# Patient Record
Sex: MALE | Race: WHITE | NOT HISPANIC OR LATINO | Employment: OTHER | URBAN - METROPOLITAN AREA
[De-identification: names, ages, dates, MRNs, and addresses within clinical notes are randomized per-mention and may not be internally consistent; named-entity substitution may affect disease eponyms.]

---

## 2017-01-11 ENCOUNTER — OFFICE VISIT (OUTPATIENT)
Dept: OTHER | Facility: CLINIC | Age: 77
End: 2017-01-11
Attending: SURGERY
Payer: COMMERCIAL

## 2017-01-11 ENCOUNTER — HOSPITAL ENCOUNTER (OUTPATIENT)
Dept: ULTRASOUND IMAGING | Facility: CLINIC | Age: 77
Discharge: HOME | End: 2017-01-11
Attending: SURGERY | Admitting: SURGERY
Payer: COMMERCIAL

## 2017-01-11 VITALS — DIASTOLIC BLOOD PRESSURE: 64 MMHG | SYSTOLIC BLOOD PRESSURE: 129 MMHG | HEART RATE: 50 BPM

## 2017-01-11 DIAGNOSIS — I65.22 CAROTID STENOSIS, LEFT: Primary | ICD-10-CM

## 2017-01-11 DIAGNOSIS — I65.29 CAROTID ARTERY STENOSIS: ICD-10-CM

## 2017-01-11 PROCEDURE — 93880 EXTRACRANIAL BILAT STUDY: CPT

## 2017-01-11 PROCEDURE — 99213 OFFICE O/P EST LOW 20 MIN: CPT | Mod: ZP | Performed by: SURGERY

## 2017-01-11 PROCEDURE — 99211 OFF/OP EST MAY X REQ PHY/QHP: CPT

## 2017-01-11 NOTE — NURSING NOTE
"Chief Complaint   Patient presents with     RECHECK     (12:00 VHC; 1:00 HLS) History of bilateral carotid artery stenosis; 1 year follow up to 12/14/15 appt with         Initial /64 mmHg  Pulse 50 Estimated body mass index is 28.23 kg/(m^2) as calculated from the following:    Height as of 8/8/16: 5' 7\" (1.702 m).    Weight as of 8/8/16: 180 lb 4.8 oz (81.784 kg).  BP completed using cuff size: regular right arm    Face to face nursing time: 8 minutes    Livia Cho MA     "

## 2017-01-18 DIAGNOSIS — I65.29 CAROTID ARTERY STENOSIS: Primary | ICD-10-CM

## 2017-01-18 NOTE — PROGRESS NOTES
No history TIA CVA Amaurosis fugax since last check.  Carotids-4/4 bilateral bruits motor/sensory function intact Cr. N. 2-12 intact  U/S basically unchanged over the last year.  Continue present medications F/U one year discussed.

## 2017-03-24 ENCOUNTER — PRE VISIT (OUTPATIENT)
Dept: CARDIOLOGY | Facility: CLINIC | Age: 77
End: 2017-03-24

## 2017-03-24 ASSESSMENT — CHADS2 SCORE
CHF: NO
HTN: YES
DIABETES: NO
STROK/TIA/THROM: YES
VASCULAR DISEASE: NO
AGE: >74
SEX: MALE

## 2017-03-29 DIAGNOSIS — I48.91 ATRIAL FIBRILLATION (H): ICD-10-CM

## 2017-03-30 ENCOUNTER — OFFICE VISIT (OUTPATIENT)
Dept: CARDIOLOGY | Facility: CLINIC | Age: 77
End: 2017-03-30
Attending: INTERNAL MEDICINE
Payer: COMMERCIAL

## 2017-03-30 ENCOUNTER — HOSPITAL ENCOUNTER (OUTPATIENT)
Dept: CARDIOLOGY | Facility: CLINIC | Age: 77
Discharge: HOME | End: 2017-03-30
Attending: INTERNAL MEDICINE | Admitting: INTERNAL MEDICINE
Payer: COMMERCIAL

## 2017-03-30 VITALS
SYSTOLIC BLOOD PRESSURE: 162 MMHG | HEIGHT: 67 IN | DIASTOLIC BLOOD PRESSURE: 80 MMHG | WEIGHT: 185 LBS | BODY MASS INDEX: 29.03 KG/M2 | HEART RATE: 60 BPM

## 2017-03-30 DIAGNOSIS — I48.0 PAROXYSMAL ATRIAL FIBRILLATION (H): Primary | ICD-10-CM

## 2017-03-30 DIAGNOSIS — I48.91 ATRIAL FIBRILLATION (H): ICD-10-CM

## 2017-03-30 PROCEDURE — 40000264 ECHO COMPLETE WITH OPTISON

## 2017-03-30 PROCEDURE — 25500064 ZZH RX 255 OP 636: Performed by: INTERNAL MEDICINE

## 2017-03-30 PROCEDURE — 93306 TTE W/DOPPLER COMPLETE: CPT | Mod: 26 | Performed by: INTERNAL MEDICINE

## 2017-03-30 PROCEDURE — 99214 OFFICE O/P EST MOD 30 MIN: CPT | Mod: 25 | Performed by: INTERNAL MEDICINE

## 2017-03-30 PROCEDURE — 93000 ELECTROCARDIOGRAM COMPLETE: CPT | Performed by: INTERNAL MEDICINE

## 2017-03-30 RX ADMIN — HUMAN ALBUMIN MICROSPHERES AND PERFLUTREN 2 ML: 10; .22 INJECTION, SOLUTION INTRAVENOUS at 11:20

## 2017-03-30 NOTE — LETTER
3/30/2017    [facility]   [address]  [facility] [address]    RE: Kamari Valentin       Dear Colleague,    I had the pleasure of seeing Kamari Valentin in the [facility].    I saw Mr. Valentin for followup of paroxysmal atrial fibrillation and aortic valve stenosis.  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  Over the last year he has been on anticoagulation without any rate control drug or antiarrhythmic drug.  The patient does not feel palpitation, dizziness or other rapid heart racing symptoms.      The patient is currently undergoing significant mental stress due to the unexpected death of his 20-year-old granddaughter.  For the above reason, he has noticed high blood pressure, even though he is currently on 4 high blood pressure medications.      PHYSICAL EXAMINATION:   VITAL SIGNS:  Blood pressure was 162/80, heart rate 60 beats per minute, body weight 185 pounds.   HEENT:  Eyes and ENT were unremarkable.   LUNGS:  Clear.   CARDIAC:  Rhythm was regular and heart sounds were normal.  He has a third-degree systolic murmur in the aortic valve area.   ABDOMEN:  Showed no hepatomegaly.   EXTREMITIES:  There was no pedal edema.     Outpatient Encounter Prescriptions as of 3/30/2017   Medication Sig Dispense Refill     rivaroxaban ANTICOAGULANT (XARELTO) 20 MG TABS tablet Take 1 tablet (20 mg) by mouth daily 90 tablet 0     hydrochlorothiazide (HYDRODIURIL) 25 MG tablet Take 1 tablet (25 mg) by mouth daily 90 tablet 0     amlodipine (NORVASC) 10 MG tablet Take 1 tablet by mouth daily.       cloNIDINE (CATAPRES) 0.1 MG tablet Take 1 tablet by mouth daily.       simvastatin (ZOCOR) 40 MG tablet Take 1 tablet by mouth daily.       hydrALAZINE (APRESOLINE) 25 MG tablet Take 25 mg by mouth 2 times daily.       No facility-administered encounter medications on file as of 3/30/2017.       ASSESSMENT AND RECOMMENDATIONS:    Homero is stable from the Cardiology point of view.  The repeat echocardiography today showed moderate aortic stenosis that is basically unchanged.  In addition, the left ventricular end-diastolic diameter also remained about the same.  I agree for him to continue the current medications and return for followup in a year.      I suggested that the patient continue to monitor his blood pressure.  If it remains persistently elevated, you may consider Losartan.  He previously had an allergy to lisinopril but no problem with the ARB.      Again, thank you for allowing me to participate in the care of your patient.      Sincerely,    Reyes Curtis MD     [facility]

## 2017-03-30 NOTE — MR AVS SNAPSHOT
"              After Visit Summary   3/30/2017    Kamari Valentin    MRN: [id number]           Patient Information     Date Of Birth          1940        Visit Information        Provider Department      3/30/2017 4:15 PM Reyes Curtis MD [facility]        Today's Diagnoses     Paroxysmal atrial fibrillation (H)    -  1       Follow-ups after your visit        Additional Services     Follow-Up with Cardiac Advanced Practice Provider           Follow-Up with Electrophysiologist                 Who to contact     If you have questions or need follow up information about today's clinic visit or your schedule please contact [facility] directly at [phone].  Normal or non-critical lab and imaging results will be communicated to you by MyChart, letter or phone within 4 business days after the clinic has received the results. If you do not hear from us within 7 days, please contact the clinic through MyChart or phone. If you have a critical or abnormal lab result, we will notify you by phone as soon as possible.  Submit refill requests through Makeblock or call your pharmacy and they will forward the refill request to us. Please allow 3 business days for your refill to be completed.          Additional Information About Your Visit        MyChart Information     Makeblock lets you send messages to your doctor, view your test results, renew your prescriptions, schedule appointments and more. To sign up, go to [email or web address] . Click on \"Log in\" on the left side of the screen, which will take you to the Welcome page. Then click on \"Sign up Now\" on the right side of the page.     You will be asked to enter the access code listed below, as well as some personal information. Please follow the directions to create your username and password.     Your access code is: [email or web address]  Expires: 7/3/2017  4:10 PM     Your access code will  " "in 90 days. If you need help or a new code, please call your [place] clinic or [phone].        Care EveryWhere ID     This is your Care EveryWhere ID. This could be used by other organizations to access your [place] medical records  [id number]        Your Vitals Were     Pulse Height BMI (Body Mass Index)             60 1.702 m (5' 7\") 28.98 kg/m2          Blood Pressure from Last 3 Encounters:   03/30/17 162/80   01/11/17 129/64   08/08/16 120/60    Weight from Last 3 Encounters:   03/30/17 83.9 kg (185 lb)   08/08/16 81.8 kg (180 lb 4.8 oz)   03/21/16 81 kg (178 lb 8 oz)              We Performed the Following     EKG 12-lead complete w/read (Future)- to be scheduled     EKG 12-lead complete w/read - Clinics (performed today)     Follow-Up with Electrophysiologist        Primary Care Provider Office Phone # Fax #    Gus Lanier [phone]       [facility] [address]  [place] [address]        Thank you!     Thank you for choosing [facility] [place]  for your care. Our goal is always to provide you with excellent care. Hearing back from our patients is one way we can continue to improve our services. Please take a few minutes to complete the written survey that you may receive in the mail after your visit with us. Thank you!             Your Updated Medication List - Protect others around you: Learn how to safely use, store and throw away your medicines at www.disposemymeds.org.          This list is accurate as of: 3/30/17 11:59 PM.  Always use your most recent med list.                   Brand Name Dispense Instructions for use    amLODIPine 10 MG tablet    NORVASC     Take 1 tablet by mouth daily.       cloNIDine 0.1 MG tablet    CATAPRES     Take 1 tablet by mouth daily.       hydrALAZINE 25 MG tablet    APRESOLINE     Take 25 mg by mouth 2 times daily.       hydrochlorothiazide 25 MG tablet    HYDRODIURIL    90 tablet    " Take 1 tablet (25 mg) by mouth daily       rivaroxaban ANTICOAGULANT 20 MG Tabs tablet    XARELTO    90 tablet    Take 1 tablet (20 mg) by mouth daily       simvastatin 40 MG tablet    ZOCOR     Take 1 tablet by mouth daily.

## 2017-03-31 NOTE — PROGRESS NOTES
HISTORY OF PRESENT ILLNESS:  I saw Mr. García for followup of paroxysmal atrial fibrillation and aortic valve stenosis.  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  Over the last year he has been on anticoagulation without any rate control drug or antiarrhythmic drug.  The patient does not feel palpitation, dizziness or other rapid heart racing symptoms.      The patient is currently undergoing significant mental stress due to the unexpected death of his 20-year-old granddaughter.  For the above reason, he has noticed high blood pressure, even though he is currently on 4 high blood pressure medications.      PHYSICAL EXAMINATION:   VITAL SIGNS:  Blood pressure was 162/80, heart rate 60 beats per minute, body weight 185 pounds.   HEENT:  Eyes and ENT were unremarkable.   LUNGS:  Clear.   CARDIAC:  Rhythm was regular and heart sounds were normal.  He has a third-degree systolic murmur in the aortic valve area.   ABDOMEN:  Showed no hepatomegaly.   EXTREMITIES:  There was no pedal edema.      ASSESSMENT AND RECOMMENDATIONS:  Mr. García is stable from the Cardiology point of view.  The repeat echocardiography today showed moderate aortic stenosis that is basically unchanged.  In addition, the left ventricular end-diastolic diameter also remained about the same.  I agree for him to continue the current medications and return for followup in a year.      I suggested that the patient continue to monitor his blood pressure.  If it remains persistently elevated, you may consider Losartan.  He previously had an allergy to lisinopril but no problem with the ARB.      cc:   Gus Lanier MD   [facility]   [address] [place] [address], [address] [place] [address]         DHARA GO MD             D: 2017 16:22   T: 2017 07:25   MT: TERESO      Name:     SHERRY GARCÍA   MRN:      [id number]        Account:      [id number]   :      1940           Service Date:  03/30/2017      Document: [id number]

## 2017-07-25 DIAGNOSIS — I48.91 ATRIAL FIBRILLATION (H): ICD-10-CM

## 2018-01-03 ENCOUNTER — OFFICE VISIT (OUTPATIENT)
Dept: OTHER | Facility: CLINIC | Age: 78
End: 2018-01-03
Attending: SURGERY
Payer: COMMERCIAL

## 2018-01-03 ENCOUNTER — HOSPITAL ENCOUNTER (OUTPATIENT)
Dept: ULTRASOUND IMAGING | Facility: CLINIC | Age: 78
Discharge: HOME | End: 2018-01-03
Attending: SURGERY | Admitting: SURGERY
Payer: COMMERCIAL

## 2018-01-03 VITALS — HEART RATE: 55 BPM | SYSTOLIC BLOOD PRESSURE: 145 MMHG | DIASTOLIC BLOOD PRESSURE: 67 MMHG

## 2018-01-03 DIAGNOSIS — I65.29 CAROTID ARTERY STENOSIS: ICD-10-CM

## 2018-01-03 DIAGNOSIS — I65.23 CAROTID STENOSIS, ASYMPTOMATIC, BILATERAL: Primary | ICD-10-CM

## 2018-01-03 PROCEDURE — 99213 OFFICE O/P EST LOW 20 MIN: CPT | Mod: ZP | Performed by: SURGERY

## 2018-01-03 PROCEDURE — 93880 EXTRACRANIAL BILAT STUDY: CPT

## 2018-01-03 PROCEDURE — G0463 HOSPITAL OUTPT CLINIC VISIT: HCPCS

## 2018-01-03 NOTE — MR AVS SNAPSHOT
"              After Visit Summary   1/3/2018    Kamari Valentin    MRN: [id number]           Patient Information     Date Of Birth          1940        Visit Information        Provider Department      1/3/2018 1:45 PM Yevgeniy Singletary MD [facility]      Today's Diagnoses     Carotid stenosis, asymptomatic, bilateral    -  1       Follow-ups after your visit        Who to contact     If you have questions or need follow up information about today's clinic visit or your schedule please contact [facility] directly at [phone].  Normal or non-critical lab and imaging results will be communicated to you by iCarsClubhart, letter or phone within 4 business days after the clinic has received the results. If you do not hear from us within 7 days, please contact the clinic through iCarsClubhart or phone. If you have a critical or abnormal lab result, we will notify you by phone as soon as possible.  Submit refill requests through Beststudy or call your pharmacy and they will forward the refill request to us. Please allow 3 business days for your refill to be completed.          Additional Information About Your Visit        MyChart Information     Beststudy lets you send messages to your doctor, view your test results, renew your prescriptions, schedule appointments and more. To sign up, go to [email or web address] . Click on \"Log in\" on the left side of the screen, which will take you to the Welcome page. Then click on \"Sign up Now\" on the right side of the page.     You will be asked to enter the access code listed below, as well as some personal information. Please follow the directions to create your username and password.     Your access code is: [email or web address]  Expires: 4/3/2018  2:16 PM     Your access code will  in 90 days. If you need help or a new code, please call your [place] clinic or [phone].        Care " EveryWhere ID     This is your Care EveryWhere ID. This could be used by other organizations to access your [place] medical records  [id number]        Your Vitals Were     Pulse                   55            Blood Pressure from Last 3 Encounters:   01/03/18 145/67   03/30/17 162/80   01/11/17 129/64    Weight from Last 3 Encounters:   03/30/17 185 lb (83.9 kg)   08/08/16 180 lb 4.8 oz (81.8 kg)   03/21/16 178 lb 8 oz (81 kg)              Today, you had the following     No orders found for display       Primary Care Provider Office Phone # Fax #    Gus Lanier [phone] [address] [address]        Equal Access to Services     CLAUDIA VELA : Hadii jacque addison hadasho Soomaali, waaxda luqadaha, qaybta kaalmada adeegyada, romeo rebolledo . So [facility] [phone].    ATENCIÓN: Si habla español, tiene a stoddard disposición servicios gratuitos de asistencia lingüística. Llame al [phone].    We comply with applicable federal civil rights laws and [place] laws. We do not discriminate on the basis of race, color, national origin, age, disability, sex, sexual orientation, or gender identity.            Thank you!     Thank you for choosing [facility] [place]  for your care. Our goal is always to provide you with excellent care. Hearing back from our patients is one way we can continue to improve our services. Please take a few minutes to complete the written survey that you may receive in the mail after your visit with us. Thank you!             Your Updated Medication List - Protect others around you: Learn how to safely use, store and throw away your medicines at www.disposemymeds.org.          This list is accurate as of: 1/3/18  2:16 PM.  Always use your most recent med list.                   Brand Name Dispense Instructions for use Diagnosis    amLODIPine 10 MG tablet    NORVASC     Take 1 tablet by mouth daily.        cloNIDine  0.1 MG tablet    CATAPRES     Take 1 tablet by mouth daily.        hydrALAZINE 25 MG tablet    APRESOLINE     Take 25 mg by mouth 2 times daily.        hydrochlorothiazide 25 MG tablet    HYDRODIURIL    90 tablet    Take 1 tablet (25 mg) by mouth daily    Essential hypertension, benign       rivaroxaban ANTICOAGULANT 20 MG Tabs tablet    XARELTO    90 tablet    Take 1 tablet (20 mg) by mouth daily    Atrial fibrillation (H)       simvastatin 40 MG tablet    ZOCOR     Take 1 tablet by mouth daily.

## 2018-01-03 NOTE — PROGRESS NOTES
No history TIA CVA Amaurosis fugax since last check.  Motor/sensory intact Cr. N. 2-12 intact Carotids 4/4 transmitted MAGALI on the left no  Bruit on the right.  He is taking Xaralto for anticoagulation.  Ultrasound today shows decreased peak velocities as compared to last year.  Doing well continue present medications F/U one year.

## 2018-01-03 NOTE — NURSING NOTE
"Chief Complaint   Patient presents with     RECHECK     Bilateral carotid (12:45 VHC; 1:45 HLS) 1 year follow up, hx of bilateral carotid artery stenosis       Initial /67 (BP Location: Left arm, Patient Position: Chair, Cuff Size: Adult Large)  Pulse 55 Estimated body mass index is 28.98 kg/(m^2) as calculated from the following:    Height as of 3/30/17: 5' 7\" (1.702 m).    Weight as of 3/30/17: 185 lb (83.9 kg).  Medication Reconciliation: complete     Face to face nursing time: 8 minutes    Livia Cho MA     "

## 2018-01-03 NOTE — LETTER
[facility] [place] [address]  [place] [address]  Phone: [phone]  Fax: [phone]      January 3, 2018    Re: Kamari Valentin - 1940    No history TIA CVA Amaurosis fugax since last check.  Motor/sensory intact Cr. N. 2-12 intact Carotids 4/4 transmitted MAGALI on the left no  Bruit on the right.  He is taking Xaralto for anticoagulation.  Ultrasound today shows decreased peak velocities as compared to last year. Doing well continue present medications F/U one year.    Yevgeniy Singletary MD

## 2018-03-21 PROBLEM — I35.0 AORTIC STENOSIS: Status: ACTIVE | Noted: 2018-03-21

## 2018-03-23 ENCOUNTER — OFFICE VISIT (OUTPATIENT)
Dept: CARDIOLOGY | Facility: CLINIC | Age: 78
End: 2018-03-23
Attending: INTERNAL MEDICINE
Payer: COMMERCIAL

## 2018-03-23 VITALS
HEART RATE: 71 BPM | BODY MASS INDEX: 29.82 KG/M2 | HEIGHT: 67 IN | DIASTOLIC BLOOD PRESSURE: 72 MMHG | SYSTOLIC BLOOD PRESSURE: 138 MMHG | WEIGHT: 190 LBS

## 2018-03-23 DIAGNOSIS — I35.0 AORTIC VALVE STENOSIS, ETIOLOGY OF CARDIAC VALVE DISEASE UNSPECIFIED: ICD-10-CM

## 2018-03-23 DIAGNOSIS — I48.0 PAROXYSMAL ATRIAL FIBRILLATION (H): Primary | ICD-10-CM

## 2018-03-23 PROCEDURE — 93000 ELECTROCARDIOGRAM COMPLETE: CPT | Performed by: PHYSICIAN ASSISTANT

## 2018-03-23 PROCEDURE — 99214 OFFICE O/P EST MOD 30 MIN: CPT | Mod: 25 | Performed by: PHYSICIAN ASSISTANT

## 2018-03-23 NOTE — MR AVS SNAPSHOT
After Visit Summary   3/23/2018    Kamari Valentin    MRN: [id number]           Patient Information     Date Of Birth          1940        Visit Information        Provider Department      3/23/2018 9:00 AM Neeru Harley PA-C [facility]        Today's Diagnoses     Paroxysmal atrial fibrillation (H)    -  1    Aortic valve stenosis, etiology of cardiac valve disease unspecified          Care Instructions    1. EKG today showed you're in atrial fibrillation with a controlled rate    2. Discussed echocardiogram done 3/2017 - moderate Aortic Stenosis.    3. No changes today - stay on all medications and see Dr. Curtis in 1 year with repeat echocardiogram but CALL if issues prior: [phone]          Follow-ups after your visit        Future tests that were ordered for you today     Open Future Orders        Priority Expected Expires Ordered    EKG 12-lead complete w/read - Clinics (to be scheduled) Routine 3/23/2019 3/24/2019 3/23/2018    Echocardiogram Routine 3/23/2019 3/24/2019 3/23/2018            Who to contact     If you have questions or need follow up information about today's clinic visit or your schedule please contact [facility] directly at [phone].  Normal or non-critical lab and imaging results will be communicated to you by Insp[employer] Log[employer] Corporationhart, letter or phone within 4 business days after the clinic has received the results. If you do not hear from us within 7 days, please contact the clinic through [employer]hart or phone. If you have a critical or abnormal lab result, we will notify you by phone as soon as possible.  Submit refill requests through StockCastr or call your pharmacy and they will forward the refill request to us. Please allow 3 business days for your refill to be completed.          Additional Information About Your Visit        StockCastr Information     StockCastr lets you send messages to your doctor,  "view your test results, renew your prescriptions, schedule appointments and more. To sign up, go to [email or web address] . Click on \"Log in\" on the left side of the screen, which will take you to the Welcome page. Then click on \"Sign up Now\" on the right side of the page.     You will be asked to enter the access code listed below, as well as some personal information. Please follow the directions to create your username and password.     Your access code is: [email or web address]  Expires: 4/3/2018  3:16 PM     Your access code will  in 90 days. If you need help or a new code, please call your [place] clinic or [phone].        Care EveryWhere ID     This is your Care EveryWhere ID. This could be used by other organizations to access your [place] medical records  [id number]        Your Vitals Were     Pulse Height BMI (Body Mass Index)             71 1.702 m (5' 7\") 29.76 kg/m2          Blood Pressure from Last 3 Encounters:   18 138/72   18 145/67   17 162/80    Weight from Last 3 Encounters:   18 86.2 kg (190 lb)   17 83.9 kg (185 lb)   16 81.8 kg (180 lb 4.8 oz)              We Performed the Following     EKG 12-lead complete w/read - Clinics (performed today)     Follow-Up with Cardiac Advanced Practice Provider        Primary Care Provider Office Phone # Fax #    Gus Lanier [phone] [address] [address]        Equal Access to Services     [facility] : Hadii jacque Chappell, albino rosas, romeo mello. So [facility] [phone].    ATENCIÓN: Si habla español, tiene a stoddard disposición servicios gratuitos de asistencia lingüística. Shalom al [phone].    We comply with applicable federal civil rights laws and [place] laws. We do not discriminate on the basis of race, color, national origin, age, disability, sex, sexual orientation, or gender " identity.            Thank you!     Thank you for choosing [facility]  for your care. Our goal is always to provide you with excellent care. Hearing back from our patients is one way we can continue to improve our services. Please take a few minutes to complete the written survey that you may receive in the mail after your visit with us. Thank you!             Your Updated Medication List - Protect others around you: Learn how to safely use, store and throw away your medicines at www.disposemymeds.org.          This list is accurate as of 3/23/18  9:29 AM.  Always use your most recent med list.                   Brand Name Dispense Instructions for use Diagnosis    amLODIPine 10 MG tablet    NORVASC     Take 1 tablet by mouth daily.        cloNIDine 0.1 MG tablet    CATAPRES     Take 1 tablet by mouth daily.        hydrALAZINE 25 MG tablet    APRESOLINE     Take 25 mg by mouth 2 times daily.        hydrochlorothiazide 25 MG tablet    HYDRODIURIL    90 tablet    Take 1 tablet (25 mg) by mouth daily    Essential hypertension, benign       rivaroxaban ANTICOAGULANT 20 MG Tabs tablet    XARELTO    90 tablet    Take 1 tablet (20 mg) by mouth daily    Atrial fibrillation (H)       simvastatin 40 MG tablet    ZOCOR     Take 1 tablet by mouth daily.

## 2018-03-23 NOTE — LETTER
3/23/2018    Gus Lanier MD  [address]  [facility] [address]    RE: Kamari Valentin       Dear Colleague,    I had the pleasure of seeing Kamari Valentin in the [facility].    HPI:   I had the pleasure of seeing Kamari today when he came with his wife Agatha for annual follow-up. He is a pleasant 78-year-old who has a history of paroxysmal atrial fibrillation. He has been asymptomatic from an atrial fibrillation standpoint and has been maintained on no rate controlling medications. He has been on anticoagulation with Xarelto without issues. Other history includes L sided CVA (resolved), hypertension and aortic stenosis, which Dr. Curtis has followed with routine echocardiograms.    He comes back today for annual follow-up telling me he continues to do well. He and his wife are quite active. He denies problems with chest pain, pressure or tightness. He denies lightheadedness, dizziness or palpitations. He has no awareness of when he is in atrial fibrillation. He denies edema, orthopnea or PND. He denies any bleeding problems on the Xarelto.    EKG today, which I over read showed atrial fibrillation at 62 bpm.  Echo 3/2017 showed an EF of 60-65% with grade 1 diastolic dysfunction. There was calcification and restriction of the aortic valve consistent with moderate aortic stenosis. The mean gradient was 25 mmHg. Left ventricle was normal in size.      Assessment & Plan:  1. Atrial Fibrillation -   * Previously paroxysmal - could be persistent as now in AFib and does not feel it  * Remains rate controlled on no medications  * Remains on AC with CHADSVASc  5 (hypertension, history of stroke and age)     PLAN:   * Continue Xarelto 20 mg daily   * BMP and EKG next year    2. AS -   * Asx'c. No significant change on 3/2017 echo compared with previous     PLAN:   * Echo in 1 year    3. HTN -   * Under good control today with polypharmacy      PLAN:   * Continue medications      Bertha  NANETTE Harley, MSPAS      Orders Placed This Encounter   Procedures     EKG 12-lead complete w/read - Clinics (performed today)     EKG 12-lead complete w/read - Clinics (to be scheduled)     Echocardiogram     No orders of the defined types were placed in this encounter.    There are no discontinued medications.      Encounter Diagnoses   Name Primary?     Paroxysmal atrial fibrillation (H) Yes     Aortic valve stenosis, etiology of cardiac valve disease unspecified        CURRENT MEDICATIONS:  Current Outpatient Prescriptions   Medication Sig Dispense Refill     rivaroxaban ANTICOAGULANT (XARELTO) 20 MG TABS tablet Take 1 tablet (20 mg) by mouth daily 90 tablet 2     hydrochlorothiazide (HYDRODIURIL) 25 MG tablet Take 1 tablet (25 mg) by mouth daily 90 tablet 0     hydrALAZINE (APRESOLINE) 25 MG tablet Take 25 mg by mouth 2 times daily.       amlodipine (NORVASC) 10 MG tablet Take 1 tablet by mouth daily.       cloNIDINE (CATAPRES) 0.1 MG tablet Take 1 tablet by mouth daily.       simvastatin (ZOCOR) 40 MG tablet Take 1 tablet by mouth daily.         ALLERGIES     Allergies   Allergen Reactions     Lisinopril        PAST MEDICAL HISTORY:  Past Medical History:   Diagnosis Date     Aortic stenosis     mild-moderate     Atrial fibrillation (H)     paroxysmal     Carotid stenosis     mild on right, moderate on left     Chronic renal insufficiency      Essential hypertension, benign      Hyperlipidaemia      Hypertrophy of prostate without urinary obstruction and other lower urinary tract symptoms (LUTS)      Rheumatic fever      Unspecified cerebral artery occlusion with cerebral infarction        PAST SURGICAL HISTORY:  Past Surgical History:   Procedure Laterality Date     ENT SURGERY      SEPTOPLASTY     ENT SURGERY      TONSILLECTOMY     HERNIA REPAIR       HERNIORRHAPHY INGUINAL  2/15/2012    Procedure:HERNIORRHAPHY INGUINAL; LEFT INGUINAL HERNIA REPAIR WITH MESH; Surgeon:SHILO READ; Location:[facility]      "ORTHOPEDIC SURGERY  left ankle 2/2013    ORIF LEFT WRIST FRACTURE       FAMILY HISTORY:  Family History   Problem Relation Age of Onset     Respiratory Brother      Coronary Artery Disease Early Onset Mother      Abdominal Aortic Aneurysm Father        SOCIAL HISTORY:  Social History     Social History     Marital status:      Spouse name: N/A     Number of children: N/A     Years of education: N/A     Social History Main Topics     Smoking status: Former Smoker     Years: 20.00     Types: Cigars, Pipe     Quit date: 9/1/2010     Smokeless tobacco: Never Used     Alcohol use 0.0 oz/week     0 Standard drinks or equivalent per week      Comment: MINIMAL AMOUNT     Drug use: No     Sexual activity: Not Asked     Other Topics Concern     None     Social History Narrative       Review of Systems:  Skin:  Negative     Eyes:  Positive for glasses  ENT:  Negative    Respiratory:  Negative for shortness of breath;dyspnea on exertion;cough  Cardiovascular:  Negative for;palpitations;chest pain;edema;dizziness;lightheadedness;fatigue    Gastroenterology: Negative for melena;hematochezia  Genitourinary:  Negative    Musculoskeletal:  Negative    Neurologic:  Negative    Psychiatric:  Negative    Heme/Lymph/Imm:  Negative    Endocrine:  Negative      Physical Exam:  Vitals: /72  Pulse 71  Ht 1.702 m (5' 7\")  Wt 86.2 kg (190 lb)  BMI 29.76 kg/m2    Constitutional:  cooperative, alert and oriented, well developed, well nourished, in no acute distress        Skin:  warm and dry to the touch, no apparent skin lesions or masses noted        Head:  normocephalic, no masses or lesions        Eyes:  pupils equal and round;conjunctivae and lids unremarkable   wear eye glasses    ENT:  no pallor or cyanosis, dentition good        Neck:  JVP normal transmitted murmur      Chest:  normal breath sounds, clear to auscultation, normal A-P diameter, normal symmetry, normal respiratory excursion, no use of accessory muscles    "     Cardiac: regular rhythm       grade 2;grade 3;systolic ejection murmur     regular rhythm, normal heart sounds, III/VI systolic murmur over the aortic valve area    Abdomen:  abdomen soft        Vascular: pulses full and equal                                      Extremities and Back:  no deformities, clubbing, cyanosis, erythema observed;no edema        Neurological:  no gross motor deficits          Recent Lab Results:  LIPID RESULTS:  Lab Results   Component Value Date    CHOL 130 02/03/2016    HDL 41 02/03/2016    LDL 75 02/03/2016    TRIG 70 02/03/2016    CHOLHDLRATIO 7.0 (H) 09/22/2010       LIVER ENZYME RESULTS:  Lab Results   Component Value Date    AST 32 09/21/2010    ALT 17 09/21/2010       CBC RESULTS:  Lab Results   Component Value Date    WBC 9.9 11/30/2010    RBC 4.33 (L) 11/30/2010    HGB 14.0 11/30/2010    HCT 41.0 11/30/2010    MCV 95 11/30/2010    MCH 32.4 11/30/2010    MCHC 34.2 11/30/2010    RDW 12.8 11/30/2010     11/30/2010       BMP RESULTS:  Lab Results   Component Value Date     11/30/2010    POTASSIUM 3.6 11/30/2010    CHLORIDE 97 11/30/2010    CO2 29 11/30/2010    ANIONGAP 13 11/30/2010     (H) 11/30/2010    BUN 20 11/30/2010    CR 1.03 11/30/2010    GFRESTIMATED 71 11/30/2010    GFRESTBLACK 86 11/30/2010    CLAY 10.3 11/30/2010        A1C RESULTS:  No results found for: A1C    INR RESULTS:  Lab Results   Component Value Date    INR 1.10 11/30/2010    INR 0.92 09/21/2010           CC  Gus Lanier MD  [address]  [place] [address]                  Thank you for allowing me to participate in the care of your patient.    Sincerely,     Neeru Harley PA-C     [facility]    cc:   Gus Lanier MD  [address]  [place] [address]

## 2018-03-23 NOTE — PROGRESS NOTES
HPI:   I had the pleasure of seeing Kamari today when he came with his wife Agatha for annual follow-up. He is a pleasant 78-year-old who has a history of paroxysmal atrial fibrillation. He has been asymptomatic from an atrial fibrillation standpoint and has been maintained on no rate controlling medications. He has been on anticoagulation with Xarelto without issues. Other history includes L sided CVA (resolved), hypertension and aortic stenosis, which Dr. Curtis has followed with routine echocardiograms.    He comes back today for annual follow-up telling me he continues to do well. He and his wife are quite active. He denies problems with chest pain, pressure or tightness. He denies lightheadedness, dizziness or palpitations. He has no awareness of when he is in atrial fibrillation. He denies edema, orthopnea or PND. He denies any bleeding problems on the Xarelto.    EKG today, which I over read showed atrial fibrillation at 62 bpm.  Echo 3/2017 showed an EF of 60-65% with grade 1 diastolic dysfunction. There was calcification and restriction of the aortic valve consistent with moderate aortic stenosis. The mean gradient was 25 mmHg. Left ventricle was normal in size.      Assessment & Plan:  1. Atrial Fibrillation -   * Previously paroxysmal - could be persistent as now in AFib and does not feel it  * Remains rate controlled on no medications  * Remains on AC with CHADSVASc  5 (hypertension, history of stroke and age)     PLAN:   * Continue Xarelto 20 mg daily   * BMP and EKG next year    2. AS -   * Asx'c. No significant change on 3/2017 echo compared with previous     PLAN:   * Echo in 1 year    3. HTN -   * Under good control today with polypharmacy      PLAN:   * Continue medications      Bertha Harley PA-C, MSPAS      Orders Placed This Encounter   Procedures     EKG 12-lead complete w/read - Clinics (performed today)     EKG 12-lead complete w/read - Clinics (to be scheduled)     Echocardiogram     No orders of  the defined types were placed in this encounter.    There are no discontinued medications.      Encounter Diagnoses   Name Primary?     Paroxysmal atrial fibrillation (H) Yes     Aortic valve stenosis, etiology of cardiac valve disease unspecified        CURRENT MEDICATIONS:  Current Outpatient Prescriptions   Medication Sig Dispense Refill     rivaroxaban ANTICOAGULANT (XARELTO) 20 MG TABS tablet Take 1 tablet (20 mg) by mouth daily 90 tablet 2     hydrochlorothiazide (HYDRODIURIL) 25 MG tablet Take 1 tablet (25 mg) by mouth daily 90 tablet 0     hydrALAZINE (APRESOLINE) 25 MG tablet Take 25 mg by mouth 2 times daily.       amlodipine (NORVASC) 10 MG tablet Take 1 tablet by mouth daily.       cloNIDINE (CATAPRES) 0.1 MG tablet Take 1 tablet by mouth daily.       simvastatin (ZOCOR) 40 MG tablet Take 1 tablet by mouth daily.         ALLERGIES     Allergies   Allergen Reactions     Lisinopril        PAST MEDICAL HISTORY:  Past Medical History:   Diagnosis Date     Aortic stenosis     mild-moderate     Atrial fibrillation (H)     paroxysmal     Carotid stenosis     mild on right, moderate on left     Chronic renal insufficiency      Essential hypertension, benign      Hyperlipidaemia      Hypertrophy of prostate without urinary obstruction and other lower urinary tract symptoms (LUTS)      Rheumatic fever      Unspecified cerebral artery occlusion with cerebral infarction        PAST SURGICAL HISTORY:  Past Surgical History:   Procedure Laterality Date     ENT SURGERY      SEPTOPLASTY     ENT SURGERY      TONSILLECTOMY     HERNIA REPAIR       HERNIORRHAPHY INGUINAL  2/15/2012    Procedure:HERNIORRHAPHY INGUINAL; LEFT INGUINAL HERNIA REPAIR WITH MESH; Surgeon:SHILO READ; Location:[facility]     ORTHOPEDIC SURGERY  left ankle 2/2013    ORIF LEFT WRIST FRACTURE       FAMILY HISTORY:  Family History   Problem Relation Age of Onset     Respiratory Brother      Coronary Artery Disease Early Onset Mother      Abdominal Aortic  "Aneurysm Father        SOCIAL HISTORY:  Social History     Social History     Marital status:      Spouse name: N/A     Number of children: N/A     Years of education: N/A     Social History Main Topics     Smoking status: Former Smoker     Years: 20.00     Types: Cigars, Pipe     Quit date: 9/1/2010     Smokeless tobacco: Never Used     Alcohol use 0.0 oz/week     0 Standard drinks or equivalent per week      Comment: MINIMAL AMOUNT     Drug use: No     Sexual activity: Not Asked     Other Topics Concern     None     Social History Narrative       Review of Systems:  Skin:  Negative     Eyes:  Positive for glasses  ENT:  Negative    Respiratory:  Negative for shortness of breath;dyspnea on exertion;cough  Cardiovascular:  Negative for;palpitations;chest pain;edema;dizziness;lightheadedness;fatigue    Gastroenterology: Negative for melena;hematochezia  Genitourinary:  Negative    Musculoskeletal:  Negative    Neurologic:  Negative    Psychiatric:  Negative    Heme/Lymph/Imm:  Negative    Endocrine:  Negative      Physical Exam:  Vitals: /72  Pulse 71  Ht 1.702 m (5' 7\")  Wt 86.2 kg (190 lb)  BMI 29.76 kg/m2    Constitutional:  cooperative, alert and oriented, well developed, well nourished, in no acute distress        Skin:  warm and dry to the touch, no apparent skin lesions or masses noted        Head:  normocephalic, no masses or lesions        Eyes:  pupils equal and round;conjunctivae and lids unremarkable   wear eye glasses    ENT:  no pallor or cyanosis, dentition good        Neck:  JVP normal transmitted murmur      Chest:  normal breath sounds, clear to auscultation, normal A-P diameter, normal symmetry, normal respiratory excursion, no use of accessory muscles        Cardiac: regular rhythm       grade 2;grade 3;systolic ejection murmur     regular rhythm, normal heart sounds, III/VI systolic murmur over the aortic valve area    Abdomen:  abdomen soft        Vascular: pulses full and equal "                                      Extremities and Back:  no deformities, clubbing, cyanosis, erythema observed;no edema        Neurological:  no gross motor deficits          Recent Lab Results:  LIPID RESULTS:  Lab Results   Component Value Date    CHOL 130 02/03/2016    HDL 41 02/03/2016    LDL 75 02/03/2016    TRIG 70 02/03/2016    CHOLHDLRATIO 7.0 (H) 09/22/2010       LIVER ENZYME RESULTS:  Lab Results   Component Value Date    AST 32 09/21/2010    ALT 17 09/21/2010       CBC RESULTS:  Lab Results   Component Value Date    WBC 9.9 11/30/2010    RBC 4.33 (L) 11/30/2010    HGB 14.0 11/30/2010    HCT 41.0 11/30/2010    MCV 95 11/30/2010    MCH 32.4 11/30/2010    MCHC 34.2 11/30/2010    RDW 12.8 11/30/2010     11/30/2010       BMP RESULTS:  Lab Results   Component Value Date     11/30/2010    POTASSIUM 3.6 11/30/2010    CHLORIDE 97 11/30/2010    CO2 29 11/30/2010    ANIONGAP 13 11/30/2010     (H) 11/30/2010    BUN 20 11/30/2010    CR 1.03 11/30/2010    GFRESTIMATED 71 11/30/2010    GFRESTBLACK 86 11/30/2010    CLAY 10.3 11/30/2010        A1C RESULTS:  No results found for: A1C    INR RESULTS:  Lab Results   Component Value Date    INR 1.10 11/30/2010    INR 0.92 09/21/2010           CC  Gus Lanier MD  [address]  [place] [address]

## 2018-03-23 NOTE — PATIENT INSTRUCTIONS
1. EKG today showed you're in atrial fibrillation with a controlled rate    2. Discussed echocardiogram done 3/2017 - moderate Aortic Stenosis.    3. No changes today - stay on all medications and see Dr. Curtis in 1 year with repeat echocardiogram but CALL if issues prior: [phone]

## 2018-04-16 DIAGNOSIS — I48.91 ATRIAL FIBRILLATION, UNSPECIFIED TYPE (H): ICD-10-CM

## 2018-09-10 ENCOUNTER — THERAPY VISIT (OUTPATIENT)
Dept: PHYSICAL THERAPY | Facility: CLINIC | Age: 78
End: 2018-09-10
Payer: COMMERCIAL

## 2018-09-10 DIAGNOSIS — M54.50 RIGHT-SIDED LOW BACK PAIN WITHOUT SCIATICA: Primary | ICD-10-CM

## 2018-09-10 PROCEDURE — 97161 PT EVAL LOW COMPLEX 20 MIN: CPT | Mod: GP | Performed by: PHYSICAL THERAPIST

## 2018-09-10 PROCEDURE — 97110 THERAPEUTIC EXERCISES: CPT | Mod: GP | Performed by: PHYSICAL THERAPIST

## 2018-09-10 PROCEDURE — G8982 BODY POS GOAL STATUS: HCPCS | Mod: GP | Performed by: PHYSICAL THERAPIST

## 2018-09-10 PROCEDURE — G8981 BODY POS CURRENT STATUS: HCPCS | Mod: GP | Performed by: PHYSICAL THERAPIST

## 2018-09-10 NOTE — MR AVS SNAPSHOT
After Visit Summary   9/10/2018    Kamari Valentin    MRN: [id number]           Patient Information     Date Of Birth          1940        Visit Information        Provider Department      9/10/2018 12:10 PM Kendy Bryson PT [facility]        Today's Diagnoses     Right-sided low back pain without sciatica    -  1       Follow-ups after your visit        Your next 10 appointments already scheduled     Sep 18, 2018  8:50 AM CDT   [facility] Sp[facility] with Kendy Bryson PT   [facility] ([facility] [place]  )    [address] [id number] [phone]              Who to contact     If you have questions or need follow up information about today's clinic visit or your schedule please contact [facility] directly at [phone].  Normal or non-critical lab and imaging results will be communicated to you by MyChart, letter or phone within 4 business days after the clinic has received the results. If you do not hear from us within 7 days, please contact the clinic through MyChart or phone. If you have a critical or abnormal lab result, we will notify you by phone as soon as possible.  Submit refill requests through [employer] [facility] or call your pharmacy and they will forward the refill request to us. Please allow 3 business days for your refill to be completed.          Additional Information About Your Visit        Care EveryWhere ID     This is your Care EveryWhere ID. This could be used by other organizations to access your [facility] medical records  [id number]         Blood Pressure from Last 3 Encounters:   03/23/18 138/72   01/03/18 145/67   03/30/17 162/80    Weight from Last 3 Encounters:   03/23/18 86.2 kg (190 lb)   03/30/17 83.9 kg (185 lb)   08/08/16 81.8 kg (180 lb 4.8 oz)              We Performed the Following     [facility]  Inital Eval Report     PT Eval, Low Complexity ([id number])     Therapeutic Exercises        Primary Care Provider Office Phone # Fax #    Gus Lanier [phone] [address]  [facility] [address]        Equal Access to Services     MARYHARISH MIRIAN : Hadii aad ku hadasho Soomaali, waaxda luqadaha, qaybta kaalmada adeegyada, waxay idiin hayaan adesunshine ramirez latonycaity . So [facility] [phone].    ATENCIÓN: Si habla español, tiene a stoddard disposición servicios gratuitos de asistencia lingüística. Llame al [phone].    We comply with applicable federal civil rights laws and [place] laws. We do not discriminate on the basis of race, color, national origin, age, disability, sex, sexual orientation, or gender identity.            Thank you!     Thank you for choosing [facility]  for your care. Our goal is always to provide you with excellent care. Hearing back from our patients is one way we can continue to improve our services. Please take a few minutes to complete the written survey that you may receive in the mail after your visit with us. Thank you!             Your Updated Medication List - Protect others around you: Learn how to safely use, store and throw away your medicines at www.disposemymeds.org.          This list is accurate as of 9/10/18  2:46 PM.  Always use your most recent med list.                   Brand Name Dispense Instructions for use Diagnosis    amLODIPine 10 MG tablet    NORVASC     Take 1 tablet by mouth daily.        cloNIDine 0.1 MG tablet    CATAPRES     Take 1 tablet by mouth daily.        hydrALAZINE 25 MG tablet    APRESOLINE     Take 25 mg by mouth 2 times daily.        hydrochlorothiazide 25 MG tablet    HYDRODIURIL    90 tablet    Take 1 tablet (25 mg) by mouth daily    Essential hypertension, benign       rivaroxaban ANTICOAGULANT 20 MG Tabs tablet    XARELTO    90 tablet    Take 1 tablet (20 mg) by mouth  daily    Atrial fibrillation, unspecified type (H)       simvastatin 40 MG tablet    ZOCOR     Take 1 tablet by mouth daily.

## 2018-09-10 NOTE — PROGRESS NOTES
[facility] Initial Evaluation  Subjective:  Patient is a 78 year old male presenting with rehab back hpi.   Kamari Valentin is a 78 year old male with a lumbar condition.  Condition occurred with:  Twisting.  Condition occurred: at home.  This is a chronic condition  Pt presents with complaints of right sided low back pain. Pt reported initial injury in May of 2018 while he was lifting a boat battery onto his boat. Pt reported he was standing on a ladder and his left foot did not move as he turned to the right and ended up twisting as he was lifting the battery. Pt reported onset of left sided sx's at the time but sx's eventually switched to the R; currently notes very minimal left sided sx's. Pt reported he saw his primary in June of 2018 for sx's related to the incident. Pt reported [place] yet scheduleed PT. Pt reported back issues appeared to be resolving however pt reported waking this AM with R sided sx's-spent yesterday cleaning his pontoon boat which may have aggravated existing sx's. Pt reported medical history significant for a stroke 8 years ago which resulted in left sided weakness. Pt.    Patient reports pain:  Lumbar spine right.  Radiates to:  No radiation.  Pain is described as aching and is intermittent Pain Scale: 0-8/10.     Symptoms are exacerbated by walking and standing and relieved by other (over the counter pain patches).  Since onset symptoms are gradually improving (until this AM when noted increased right sided low back pain).        General health as reported by patient is good.                  Barriers include:  None as reported by the patient.    Red flags:  None as reported by the patient.                        Objective:    Gait:  Slight gait deviations related to stroke 8 years ago, resulting in left sided weakness.  Assistive Devices:  None                 Lumbar/SI Evaluation  ROM:    AROM Lumbar:    Flexion:          Fingertips to ankles  Ext:                    Moderte loss, no provocation of sx's   Side Bend:        Left:  Moderate loss, no provocation of sx's    Right:  Moderate loss, increase of right sided sx's  Rotation:           Left:     Right:   Side Glide:        Left:     Right:                   Neural Tension/Mobility:    Left side:  SLR positive.     Right side:   SLR  negative.   Lumbar Palpation:      Tenderness present at Right: Quadratus Lumborum and Iliac Crest    Lumbar Provocation:      Left negative with:  PROM hip    Right negative with:  PROM hip                                                 General     ROS    Assessment/Plan:    Patient is a 78 year old male with lumbar complaints  corresponding treatment plan.                Diagnosis 1:  Right sided low back pain  Pain -  manual therapy, self management, education and home program  Decreased ROM/flexibility - manual therapy and therapeutic exercise  Decreased strength - therapeutic exercise and therapeutic activities  Impaired muscle performance - neuro re-education  Decreased function - therapeutic activities    Therapy Evaluation Codes:   1) History comprised of:   Personal factors that impact the plan of care:      None.    Comorbidity factors that impact the plan of care are:      None.     Medications impacting care: None.  2) Examination of Body Systems comprised of:   Body structures and functions that impact the plan of care:      Lumbar spine.   Activity limitations that impact the plan of care are:      Lifting, Standing and Walking.  3) Clinical presentation characteristics are:   Stable/Uncomplicated.  4) Decision-Making    Low complexity using standardized patient assessment instrument and/or measureable assessment of functional outcome.  Cumulative Therapy Evaluation is: Low complexity.    Previous and current functional limitations:  (See Goal Flow Sheet for this information)    Short term and Long term goals: (See  Goal Flow Sheet for this information)     Communication ability:  Patient appears to be able to clearly communicate and understand verbal and written communication and follow directions correctly.  Treatment Explanation - The following has been discussed with the patient:   RX ordered/plan of care  Anticipated outcomes  Possible risks and side effects  This patient would benefit from PT intervention to resume normal activities.   Rehab potential is good.    Frequency:  1 X week, once daily   Duration:  for 6 weeks  Discharge Plan:  Achieve all LTG.  Independent in home treatment program.  Reach maximal therapeutic benefit.    Please refer to the daily flowsheet for treatment today, total treatment time and time spent performing 1:1 timed codes.

## 2018-09-11 NOTE — PROGRESS NOTES
[facility] Initial Evaluation  Subjective:  Patient is a 78 year old male presenting with rehab left ankle/foot hpi.                                      Pertinent medical history includes:  High blood pressure and stroke.  Medical allergies: no.  Other surgeries include:  Orthopedic surgery and other (Hernia, tendon cleanup, wrist, shoulder).  Current medications:  High blood pressure medication.  Current occupation is Retired.    Primary job tasks include:  Driving and lifting (carrying, pushing/pulling).              Oswestry Score: 17.5 %                 Objective:  System    Physical Exam    General     ROS    Assessment/Plan:

## 2018-09-18 ENCOUNTER — THERAPY VISIT (OUTPATIENT)
Dept: PHYSICAL THERAPY | Facility: CLINIC | Age: 78
End: 2018-09-18
Payer: COMMERCIAL

## 2018-09-18 DIAGNOSIS — M54.50 RIGHT-SIDED LOW BACK PAIN WITHOUT SCIATICA: ICD-10-CM

## 2018-09-18 PROCEDURE — 97110 THERAPEUTIC EXERCISES: CPT | Mod: GP | Performed by: PHYSICAL THERAPIST

## 2018-09-18 PROCEDURE — 97140 MANUAL THERAPY 1/> REGIONS: CPT | Mod: GP | Performed by: PHYSICAL THERAPIST

## 2018-09-25 ENCOUNTER — THERAPY VISIT (OUTPATIENT)
Dept: PHYSICAL THERAPY | Facility: CLINIC | Age: 78
End: 2018-09-25
Payer: COMMERCIAL

## 2018-09-25 DIAGNOSIS — M54.50 ACUTE RIGHT-SIDED LOW BACK PAIN WITHOUT SCIATICA: ICD-10-CM

## 2018-09-25 PROCEDURE — 97110 THERAPEUTIC EXERCISES: CPT | Mod: GP | Performed by: PHYSICAL THERAPIST

## 2018-09-25 NOTE — MR AVS SNAPSHOT
After Visit Summary   9/25/2018    Kamari Valentin    MRN: [id number]           Patient Information     Date Of Birth          1940        Visit Information        Provider Department      9/25/2018 9:30 AM Kendy Bryson PT [facility]        Today's Diagnoses     Acute right-sided low back pain without sciatica           Follow-ups after your visit        Your next 10 appointments already scheduled     Oct 02, 2018  9:30 AM CDT   [facility] with Kendy Bryson PT   [facility] ([facility]  )    [address] [place] [address] [phone]              Who to contact     If you have questions or need follow up information about today's clinic visit or your schedule please contact [facility] PHYSICAL THERAP[facility] directly at [phone].  Normal or non-critical lab and imaging results will be communicated to you by MyChart, letter or phone within 4 business days after the clinic has received the results. If you do not hear from us within 7 days, please contact the clinic through MyChart or phone. If you have a critical or abnormal lab result, we will notify you by phone as soon as possible.  Submit refill requests through flikdate or call your pharmacy and they will forward the refill request to us. Please allow 3 business days for your refill to be completed.          Additional Information About Your Visit        Care EveryWhere ID     This is your Care EveryWhere ID. This could be used by other organizations to access your [facility] medical records  [id number]         Blood Pressure from Last 3 Encounters:   03/23/18 138/72   01/03/18 145/67   03/30/17 162/80    Weight from Last 3 Encounters:   03/23/18 86.2 kg (190 lb)   03/30/17 83.9 kg (185 lb)   08/08/16 81.8 kg (180 lb 4.8 oz)              We Performed the Following      Therapeutic Exercises        Primary Care Provider Office Phone # Fax #    Gus Lanier [phone] [address] [address]        Equal Access to Services     CLAUDIA VELA : Hadii jacque ku rossyo Sogloriaali, waaxda luqadaha, qaybta kaalmada adeevin, romeo nairsusan rosado. So [facility] [phone].    ATENCIÓN: Si habla español, tiene a stoddard disposición servicios gratuitos de asistencia lingüística. Llame al [phone].    We comply with applicable federal civil rights laws and [place] laws. We do not discriminate on the basis of race, color, national origin, age, disability, sex, sexual orientation, or gender identity.            Thank you!     Thank you for choosing [facility]  for your care. Our goal is always to provide you with excellent care. Hearing back from our patients is one way we can continue to improve our services. Please take a few minutes to complete the written survey that you may receive in the mail after your visit with us. Thank you!             Your Updated Medication List - Protect others around you: Learn how to safely use, store and throw away your medicines at www.disposemymeds.org.          This list is accurate as of 9/25/18 11:24 AM.  Always use your most recent med list.                   Brand Name Dispense Instructions for use Diagnosis    amLODIPine 10 MG tablet    NORVASC     Take 1 tablet by mouth daily.        cloNIDine 0.1 MG tablet    CATAPRES     Take 1 tablet by mouth daily.        hydrALAZINE 25 MG tablet    APRESOLINE     Take 25 mg by mouth 2 times daily.        hydrochlorothiazide 25 MG tablet    HYDRODIURIL    90 tablet    Take 1 tablet (25 mg) by mouth daily    Essential hypertension, benign       rivaroxaban ANTICOAGULANT 20 MG Tabs tablet    XARELTO    90 tablet    Take 1 tablet (20 mg) by mouth daily    Atrial fibrillation, unspecified type (H)        simvastatin 40 MG tablet    ZOCOR     Take 1 tablet by mouth daily.

## 2018-09-27 ENCOUNTER — HOSPITAL ENCOUNTER (EMERGENCY)
Facility: CLINIC | Age: 78
Discharge: HOME | End: 2018-09-27
Attending: EMERGENCY MEDICINE | Admitting: EMERGENCY MEDICINE
Payer: COMMERCIAL

## 2018-09-27 ENCOUNTER — APPOINTMENT (OUTPATIENT)
Dept: MRI IMAGING | Facility: CLINIC | Age: 78
End: 2018-09-27
Attending: EMERGENCY MEDICINE
Payer: COMMERCIAL

## 2018-09-27 ENCOUNTER — APPOINTMENT (OUTPATIENT)
Dept: CT IMAGING | Facility: CLINIC | Age: 78
End: 2018-09-27
Attending: EMERGENCY MEDICINE
Payer: COMMERCIAL

## 2018-09-27 VITALS
OXYGEN SATURATION: 95 % | BODY MASS INDEX: 26.68 KG/M2 | RESPIRATION RATE: 16 BRPM | TEMPERATURE: 97.9 F | DIASTOLIC BLOOD PRESSURE: 87 MMHG | HEIGHT: 67 IN | SYSTOLIC BLOOD PRESSURE: 161 MMHG | WEIGHT: 170 LBS

## 2018-09-27 DIAGNOSIS — R42 DIZZINESS: ICD-10-CM

## 2018-09-27 LAB
ALBUMIN SERPL-MCNC: 4.3 G/DL (ref 3.4–5)
ALBUMIN UR-MCNC: NEGATIVE MG/DL
ALP SERPL-CCNC: 47 U/L (ref 40–150)
ALT SERPL W P-5'-P-CCNC: 25 U/L (ref 0–70)
ANION GAP SERPL CALCULATED.3IONS-SCNC: 6 MMOL/L (ref 3–14)
APPEARANCE UR: CLEAR
AST SERPL W P-5'-P-CCNC: 22 U/L (ref 0–45)
BASOPHILS # BLD AUTO: 0 10E9/L (ref 0–0.2)
BASOPHILS NFR BLD AUTO: 0.2 %
BILIRUB SERPL-MCNC: 0.5 MG/DL (ref 0.2–1.3)
BILIRUB UR QL STRIP: NEGATIVE
BUN SERPL-MCNC: 20 MG/DL (ref 7–30)
CALCIUM SERPL-MCNC: 9.2 MG/DL (ref 8.5–10.1)
CHLORIDE SERPL-SCNC: 106 MMOL/L (ref 94–109)
CO2 SERPL-SCNC: 28 MMOL/L (ref 20–32)
COLOR UR AUTO: NORMAL
CREAT SERPL-MCNC: 0.99 MG/DL (ref 0.66–1.25)
DIFFERENTIAL METHOD BLD: NORMAL
EOSINOPHIL # BLD AUTO: 0.2 10E9/L (ref 0–0.7)
EOSINOPHIL NFR BLD AUTO: 3.3 %
ERYTHROCYTE [DISTWIDTH] IN BLOOD BY AUTOMATED COUNT: 13 % (ref 10–15)
GFR SERPL CREATININE-BSD FRML MDRD: 73 ML/MIN/1.7M2
GLUCOSE SERPL-MCNC: 109 MG/DL (ref 70–99)
GLUCOSE UR STRIP-MCNC: NEGATIVE MG/DL
HCT VFR BLD AUTO: 43.9 % (ref 40–53)
HGB BLD-MCNC: 15.1 G/DL (ref 13.3–17.7)
HGB UR QL STRIP: NEGATIVE
IMM GRANULOCYTES # BLD: 0 10E9/L (ref 0–0.4)
IMM GRANULOCYTES NFR BLD: 0 %
INTERPRETATION ECG - MUSE: NORMAL
KETONES UR STRIP-MCNC: NEGATIVE MG/DL
LEUKOCYTE ESTERASE UR QL STRIP: NEGATIVE
LYMPHOCYTES # BLD AUTO: 1.6 10E9/L (ref 0.8–5.3)
LYMPHOCYTES NFR BLD AUTO: 28.3 %
MCH RBC QN AUTO: 32.5 PG (ref 26.5–33)
MCHC RBC AUTO-ENTMCNC: 34.4 G/DL (ref 31.5–36.5)
MCV RBC AUTO: 94 FL (ref 78–100)
MONOCYTES # BLD AUTO: 0.6 10E9/L (ref 0–1.3)
MONOCYTES NFR BLD AUTO: 10.4 %
NEUTROPHILS # BLD AUTO: 3.3 10E9/L (ref 1.6–8.3)
NEUTROPHILS NFR BLD AUTO: 57.8 %
NITRATE UR QL: NEGATIVE
NRBC # BLD AUTO: 0 10*3/UL
NRBC BLD AUTO-RTO: 0 /100
PH UR STRIP: 6.5 PH (ref 5–7)
PLATELET # BLD AUTO: 209 10E9/L (ref 150–450)
POTASSIUM SERPL-SCNC: 3.8 MMOL/L (ref 3.4–5.3)
PROT SERPL-MCNC: 7.7 G/DL (ref 6.8–8.8)
RBC # BLD AUTO: 4.65 10E12/L (ref 4.4–5.9)
RBC #/AREA URNS AUTO: 1 /HPF (ref 0–2)
SODIUM SERPL-SCNC: 140 MMOL/L (ref 133–144)
SOURCE: NORMAL
SP GR UR STRIP: 1.01 (ref 1–1.03)
TROPONIN I SERPL-MCNC: <0.015 UG/L (ref 0–0.04)
UROBILINOGEN UR STRIP-MCNC: NORMAL MG/DL (ref 0–2)
WBC # BLD AUTO: 5.8 10E9/L (ref 4–11)
WBC #/AREA URNS AUTO: 1 /HPF (ref 0–5)

## 2018-09-27 PROCEDURE — A9585 GADOBUTROL INJECTION: HCPCS | Performed by: EMERGENCY MEDICINE

## 2018-09-27 PROCEDURE — 70450 CT HEAD/BRAIN W/O DYE: CPT

## 2018-09-27 PROCEDURE — 96360 HYDRATION IV INFUSION INIT: CPT | Mod: 59

## 2018-09-27 PROCEDURE — 81001 URINALYSIS AUTO W/SCOPE: CPT | Performed by: EMERGENCY MEDICINE

## 2018-09-27 PROCEDURE — 84484 ASSAY OF TROPONIN QUANT: CPT | Performed by: EMERGENCY MEDICINE

## 2018-09-27 PROCEDURE — 25000128 H RX IP 250 OP 636: Performed by: EMERGENCY MEDICINE

## 2018-09-27 PROCEDURE — 99285 EMERGENCY DEPT VISIT HI MDM: CPT | Mod: 25

## 2018-09-27 PROCEDURE — 80053 COMPREHEN METABOLIC PANEL: CPT | Performed by: EMERGENCY MEDICINE

## 2018-09-27 PROCEDURE — 85025 COMPLETE CBC W/AUTO DIFF WBC: CPT | Performed by: EMERGENCY MEDICINE

## 2018-09-27 PROCEDURE — 70553 MRI BRAIN STEM W/O & W/DYE: CPT

## 2018-09-27 PROCEDURE — 25500064 ZZH RX 255 OP 636: Performed by: EMERGENCY MEDICINE

## 2018-09-27 PROCEDURE — 93005 ELECTROCARDIOGRAM TRACING: CPT

## 2018-09-27 RX ORDER — GADOBUTROL 604.72 MG/ML
6 INJECTION INTRAVENOUS ONCE
Status: COMPLETED | OUTPATIENT
Start: 2018-09-27 | End: 2018-09-27

## 2018-09-27 RX ADMIN — SODIUM CHLORIDE 1000 ML: 9 INJECTION, SOLUTION INTRAVENOUS at 09:14

## 2018-09-27 RX ADMIN — GADOBUTROL 6 ML: 604.72 INJECTION INTRAVENOUS at 08:20

## 2018-09-27 ASSESSMENT — ENCOUNTER SYMPTOMS
LIGHT-HEADEDNESS: 1
CONFUSION: 0
SPEECH DIFFICULTY: 0

## 2018-09-27 NOTE — ED NOTES
PT walked to the bathroom without assistance.  PT maintained a steady gait throughout.  PT stated they felt slightly dizzy during walk.

## 2018-09-27 NOTE — ED NOTES
Report off to Livia LYON.  Fatimah Gar RN,.......................................... 9/27/2018   7:05 AM

## 2018-09-27 NOTE — ED AVS SNAPSHOT
Emergency Department    [address] [facility] [address]    Phone:  [phone]    Fax:  [phone]                                       Kamari Valentin   MRN: [id number]    Department:   Emergency Department   Date of Visit:  9/27/2018           Patient Information     Date Of Birth          1940        Your diagnoses for this visit were:     Dizziness        You were seen by Neeru Welch MD.      Follow-up Information     Schedule an appointment as soon as possible for a visit with Gus Lanier MD.    Specialty:  Family Practice    Contact information:    [address] [id number] [phone]          Follow up with  Emergency Department.    Specialty:  EMERGENCY MEDICINE    Why:  As needed, If symptoms worsen    Contact information:    [address] [facility] [address] [phone]        Discharge Instructions         Dizziness (Uncertain Cause)  Dizziness is a common symptom. It may be described as lightheadedness, spinning, or feeling like you are going to faint. Dizziness can have many causes.  Be sure to tell the healthcare provider about:    All medicines you take, including prescription, over-the-counter, herbs, and supplements    Any other symptoms you have    Any health problems you are being treated for    Any past major health problems you've had, such as a heart attack, balance issues, hearing problems, or blood pressure problems    Anything that causes the dizziness to get worse or better  Today's exam did not show an exact cause for your dizziness. Other tests may be needed. Follow up with your healthcare provider.  Home care    Dizziness that occurs with sudden standing may be a sign of mild dehydration. Drink extra fluids for the next few days.    If you recently started a new medicine, stopped a medicine, or had the dose of a current medicine changed, talk with the prescribing healthcare provider. Your  medicine plan may need adjustment.    If dizziness lasts more than a few seconds, sit or lie down until it passes. This may help prevent injury in case you pass out. Get up slowly when you feel better.    Don't drive or use power tools or dangerous equipment until you have had no dizziness for at least 48 hours.  Follow-up care  Follow up with your healthcare provider for further evaluation within the next 7 days or as advised.  When to seek medical advice  Call your healthcare provider for any of the following:    Worsening of symptoms or new symptoms    Passing out or seizure    Repeated vomiting    Headache    Palpitations (the sense that your heart is fluttering or beating fast or hard)    Shortness of breath    Blood in vomit or stool (black or red color)    Weakness of an arm or leg or 1 side of the face    Vision or hearing changes    Trouble walking or speaking    Chest, arm, neck, back, or jaw pain  Date Last Reviewed: 11/1/2017 2000-2017 The [employer]. [address] [place] [address]. All rights reserved. This information is not intended as a substitute for professional medical care. Always follow your healthcare professional's instructions.          Your next 10 appointments already scheduled     Oct 02, 2018  9:30 AM CDT   [facility] Sp[facility] with Kendy Bryson PT   [facility] ([facility]  )    [address] [address] [phone]              24 Hour Appointment Hotline       To make an appointment at any [facility], call [phone] ([phone]). If you don't have a family doctor or clinic, we will help you find one. [facility] ([facility] are conveniently located to serve the needs of you and your family.             Review of your medicines      Our records show that you are taking the medicines listed below. If these are incorrect, please call your family doctor or clinic.        Dose /  Directions Last dose taken    amLODIPine 10 MG tablet   Commonly known as:  NORVASC   Dose:  1 tablet        Take 1 tablet by mouth daily.   Refills:  0        cloNIDine 0.1 MG tablet   Commonly known as:  CATAPRES   Dose:  1 tablet        Take 1 tablet by mouth daily.   Refills:  0        hydrALAZINE 25 MG tablet   Commonly known as:  APRESOLINE   Dose:  25 mg        Take 25 mg by mouth 2 times daily.   Refills:  0        hydrochlorothiazide 25 MG tablet   Commonly known as:  HYDRODIURIL   Dose:  25 mg   Quantity:  90 tablet        Take 1 tablet (25 mg) by mouth daily   Refills:  0        rivaroxaban ANTICOAGULANT 20 MG Tabs tablet   Commonly known as:  XARELTO   Dose:  20 mg   Quantity:  90 tablet        Take 1 tablet (20 mg) by mouth daily   Refills:  3        simvastatin 40 MG tablet   Commonly known as:  ZOCOR   Dose:  1 tablet        Take 1 tablet by mouth daily.   Refills:  0                Procedures and tests performed during your visit     CBC with platelets + differential    Comprehensive metabolic panel    EKG 12-lead, tracing only    Head CT w/o contrast    MR Brain w/o & w Contrast    Troponin I    UA with Microscopic      Orders Needing Specimen Collection     None      Pending Results     No orders found from 9/25/2018 to 9/28/2018.            Pending Culture Results     No orders found from 9/25/2018 to 9/28/2018.            Pending Results Instructions     If you had any lab results that were not finalized at the time of your Discharge, you can call the ED Lab Result RN at [phone]. You will be contacted by this team for any positive Lab results or changes in treatment. The nurses are available 7 days a week from 10A to 6:30P.  You can leave a message 24 hours per day and they will return your call.        Test Results From Your Hospital Stay        9/27/2018  6:33 AM      Narrative     CT HEAD WITHOUT CONTRAST  9/27/2018 6:17 AM     HISTORY: Dizziness.    COMPARISON: 9/21/2010.    TECHNIQUE:  Without intravenous contrast, helical sections were  acquired through the brain. Coronal reconstructions were generated.  Radiation dose for this scan was reduced using automated exposure  control, adjustment of the mA and/or kV according to the patient's  size, or iterative reconstruction technique.    FINDINGS: Mild diffuse cerebral atrophy. Mild periventricular white  matter low attenuation, likely relating to chronic small vessel  ischemic disease. No intra-axial mass, mass effect or midline shift.  Normal gray-white matter differentiation. No visualized acute  intra-axial hemorrhage. The cerebral ventricles are normal in caliber.  The basal cisterns are patent. No extra-axial fluid collection. The  visualized portions of the paranasal sinuses and mastoid air cells are  unremarkable.        Impression     IMPRESSION: No evidence of acute intracranial abnormality.    FREDDIE OLIVER MD         9/27/2018  6:15 AM      Component Results     Component Value Ref Range & Units Status    WBC 5.8 4.0 - 11.0 10e9/L Final    RBC Count 4.65 4.4 - 5.9 10e12/L Final    Hemoglobin 15.1 13.3 - 17.7 g/dL Final    Hematocrit 43.9 40.0 - 53.0 % Final    MCV 94 78 - 100 fl Final    MCH 32.5 26.5 - 33.0 pg Final    MCHC 34.4 31.5 - 36.5 g/dL Final    RDW 13.0 10.0 - 15.0 % Final    Platelet Count 209 150 - 450 10e9/L Final    Diff Method Automated Method  Final    % Neutrophils 57.8 % Final    % Lymphocytes 28.3 % Final    % Monocytes 10.4 % Final    % Eosinophils 3.3 % Final    % Basophils 0.2 % Final    % Immature Granulocytes 0.0 % Final    Nucleated RBCs 0 0 /100 Final    Absolute Neutrophil 3.3 1.6 - 8.3 10e9/L Final    Absolute Lymphocytes 1.6 0.8 - 5.3 10e9/L Final    Absolute Monocytes 0.6 0.0 - 1.3 10e9/L Final    Absolute Eosinophils 0.2 0.0 - 0.7 10e9/L Final    Absolute Basophils 0.0 0.0 - 0.2 10e9/L Final    Abs Immature Granulocytes 0.0 0 - 0.4 10e9/L Final    Absolute Nucleated RBC 0.0  Final         9/27/2018  6:28  AM      Component Results     Component Value Ref Range & Units Status    Sodium 140 133 - 144 mmol/L Final    Potassium 3.8 3.4 - 5.3 mmol/L Final    Chloride 106 94 - 109 mmol/L Final    Carbon Dioxide 28 20 - 32 mmol/L Final    Anion Gap 6 3 - 14 mmol/L Final    Glucose 109 (H) 70 - 99 mg/dL Final    Urea Nitrogen 20 7 - 30 mg/dL Final    Creatinine 0.99 0.66 - 1.25 mg/dL Final    GFR Estimate 73 >60 mL/min/1.7m2 Final    Non  GFR Calc    GFR Estimate If Black 88 >60 mL/min/1.7m2 Final    African American GFR Calc    Calcium 9.2 8.5 - 10.1 mg/dL Final    Bilirubin Total 0.5 0.2 - 1.3 mg/dL Final    Albumin 4.3 3.4 - 5.0 g/dL Final    Protein Total 7.7 6.8 - 8.8 g/dL Final    Alkaline Phosphatase 47 40 - 150 U/L Final    ALT 25 0 - 70 U/L Final    AST 22 0 - 45 U/L Final         9/27/2018  6:47 AM      Component Results     Component Value Ref Range & Units Status    Troponin I ES <0.015 0.000 - 0.045 ug/L Final    The 99th percentile for upper reference range is 0.045 ug/L.  Troponin values   in the range of 0.045 - 0.120 ug/L may be associated with risks of adverse   clinical events.           9/27/2018  7:14 AM      Component Results     Component Value Ref Range & Units Status    Color Urine Light Yellow  Final    Appearance Urine Clear  Final    Glucose Urine Negative NEG^Negative mg/dL Final    Bilirubin Urine Negative NEG^Negative Final    Ketones Urine Negative NEG^Negative mg/dL Final    Specific Gravity Urine 1.010 1.003 - 1.035 Final    Blood Urine Negative NEG^Negative Final    pH Urine 6.5 5.0 - 7.0 pH Final    Protein Albumin Urine Negative NEG^Negative mg/dL Final    Urobilinogen mg/dL Normal 0.0 - 2.0 mg/dL Final    Nitrite Urine Negative NEG^Negative Final    Leukocyte Esterase Urine Negative NEG^Negative Final    Source Midstream Urine  Final    WBC Urine 1 0 - 5 /HPF Final    RBC Urine 1 0 - 2 /HPF Final         9/27/2018  8:49 AM      Narrative     MRI BRAIN WITHOUT AND WITH  CONTRAST  9/27/2018 8:20 AM     HISTORY:  Dizziness, history of similar symptoms with disorder of  stroke.      TECHNIQUE:  Multiplanar, multisequence MRI of the brain without and  with 6 mL Gadavist.     COMPARISON: Head CT from earlier the same day. Brain MR 9/21/2010.     FINDINGS: No restricted diffusion to suggest acute infarct. No mass  effect or midline shift. Chronic lacunar infarct in the right basal  ganglia and right thalamus. Moderate diffuse parenchymal volume loss.  Patchy periventricular white matter T2 hyperintensities are  nonspecific, but most likely related to chronic microvascular ischemic  disease.    There is no abnormal intracranial enhancement.     Moderate mucosal thickening along the inferior left maxillary sinus.  The major arterial T2 flow voids at the base of the brain appear  patent.         Impression     IMPRESSION:    1. No evidence of acute infarct, mass, hemorrhage, or herniation.  2. Chronic lacunar infarct in the right basal ganglia and right  thalamus.  3. Diffuse parenchymal volume loss and white matter changes likely due  to chronic microvascular ischemic disease.    KAMARI ALFORD MD                Clinical Quality Measure: Blood Pressure Screening     Your blood pressure was checked while you were in the emergency department today. The last reading we obtained was  BP: 154/72 . Please read the guidelines below about what these numbers mean and what you should do about them.  If your systolic blood pressure (the top number) is less than 120 and your diastolic blood pressure (the bottom number) is less than 80, then your blood pressure is normal. There is nothing more that you need to do about it.  If your systolic blood pressure (the top number) is 120-139 or your diastolic blood pressure (the bottom number) is 80-89, your blood pressure may be higher than it should be. You should have your blood pressure rechecked within a year by a primary care provider.  If your systolic  blood pressure (the top number) is 140 or greater or your diastolic blood pressure (the bottom number) is 90 or greater, you may have high blood pressure. High blood pressure is treatable, but if left untreated over time it can put you at risk for heart attack, stroke, or kidney failure. You should have your blood pressure rechecked by a primary care provider within the next 4 weeks.  If your provider in the emergency department today gave you specific instructions to follow-up with your doctor or provider even sooner than that, you should follow that instruction and not wait for up to 4 weeks for your follow-up visit.        Thank you for choosing [facility]       Thank you for choosing [facility] for your care. Our goal is always to provide you with excellent care. Hearing back from our patients is one way we can continue to improve our services. Please take a few minutes to complete the written survey that you may receive in the mail after you visit with us. Thank you!        Care EveryWhere ID     This is your Care EveryWhere ID. This could be used by other organizations to access your [facility] medical records  [id number]        Equal Access to Services     CLAUDIA VELA : Hadchio Chappell, wabernardinoda ralph, qaybta kaalmasee estrada, romeo rebolledo . So [facility] [phone].    ATENCIÓN: Si habla español, tiene a stoddard disposición servicios gratuitos de asistencia lingüística. Llame al [phone].    We comply with applicable federal civil rights laws and [place] laws. We do not discriminate on the basis of race, color, national origin, age, disability, sex, sexual orientation, or gender identity.            After Visit Summary       This is your record. Keep this with you and show to your community pharmacist(s) and doctor(s) at your next visit.

## 2018-09-27 NOTE — DISCHARGE INSTRUCTIONS
Dizziness (Uncertain Cause)  Dizziness is a common symptom. It may be described as lightheadedness, spinning, or feeling like you are going to faint. Dizziness can have many causes.  Be sure to tell the healthcare provider about:    All medicines you take, including prescription, over-the-counter, herbs, and supplements    Any other symptoms you have    Any health problems you are being treated for    Any past major health problems you've had, such as a heart attack, balance issues, hearing problems, or blood pressure problems    Anything that causes the dizziness to get worse or better  Today's exam did not show an exact cause for your dizziness. Other tests may be needed. Follow up with your healthcare provider.  Home care    Dizziness that occurs with sudden standing may be a sign of mild dehydration. Drink extra fluids for the next few days.    If you recently started a new medicine, stopped a medicine, or had the dose of a current medicine changed, talk with the prescribing healthcare provider. Your medicine plan may need adjustment.    If dizziness lasts more than a few seconds, sit or lie down until it passes. This may help prevent injury in case you pass out. Get up slowly when you feel better.    Don't drive or use power tools or dangerous equipment until you have had no dizziness for at least 48 hours.  Follow-up care  Follow up with your healthcare provider for further evaluation within the next 7 days or as advised.  When to seek medical advice  Call your healthcare provider for any of the following:    Worsening of symptoms or new symptoms    Passing out or seizure    Repeated vomiting    Headache    Palpitations (the sense that your heart is fluttering or beating fast or hard)    Shortness of breath    Blood in vomit or stool (black or red color)    Weakness of an arm or leg or 1 side of the face    Vision or hearing changes    Trouble walking or speaking    Chest, arm, neck, back, or jaw pain  Date  Last Reviewed: 11/1/2017 2000-2017 [employer]. [address] [place] [address], [place] [address]. All rights reserved. This information is not intended as a substitute for professional medical care. Always follow your healthcare professional's instructions.

## 2018-09-27 NOTE — ED PROVIDER NOTES
History     Chief Complaint:  Light Headedness    HPI   Kamari Valentin is a 78 year old male with a history of atrial fibrillation and a stroke on Xarelto who presents to the emergency department today for evaluation of light headedness. The patient reports that he went to bed last night around 2200 feeling otherwise normal. He states that he then woke around 0200 to use the restroom, which is not unusual for him, when he felt light headed. He notes that he used the furniture and walls to guide himself to be the bathroom before returning to sleep. The patient explains that he then again woke around 0400 and still felt uneasy, prompting him to inform his wife that he needed to present to the ED. Here the patient explains that he had a similar light headedness four years ago when he had a stroke. He denies any additional symptoms at that time. Today he and his wife deny any confusion, slurred speech, or difficulty moving extremities. He has not had other recent infectious sxs such as cough, SOB, CP, abd pain, n/v/d, urinary sxs.    Allergies:  Lisinopril     Medications:    Norvasc  Catapres  Apresoline  Hydrodiuril  Xarelto  Zocor    Past Medical History:    Aortic stenosis  Atrial fibrillation  Carotid stenosis  Chronic renal insufficiency  Essential hypertension  Hyperlipidemia  Hypertrophy of prostate without urinary obstruction and other lower UTI symptoms  Rheumatic fever  Cerebral artery occulusion with cerebral infarction    Past Surgical History:    Septoplasty  Tonsillectomy  Hernia repair  ORIF left wrist fracture    Family History:    Brother: respiratory  Mother: CAD  Father: AAA    Social History:  The patient was accompanied to the ED by his wife.  Smoking Status: Former Smoker   Years: 20   Types: Cigars, pipe   Quit date: 9/1/10  Smokeless Tobacco: Never Used  Alcohol Use: Positive  Marital Status:        Review of Systems   Neurological: Positive for light-headedness. Negative for speech  "difficulty.        Slight imbalance when walking  No difficulty moving extremities   Psychiatric/Behavioral: Negative for confusion.   All other systems reviewed and are negative.    Physical Exam     Patient Vitals for the past 24 hrs:   BP Temp Temp src Heart Rate Resp SpO2 Height Weight   09/27/18 0930 154/72 - - - - 96 % - -   09/27/18 0900 149/88 - - - - 96 % - -   09/27/18 0730 152/69 - - - - 94 % - -   09/27/18 0723 155/84 - - - - 92 % - -   09/27/18 0645 154/79 - - - - - - -   09/27/18 0630 151/68 - - 54 - 95 % - -   09/27/18 0600 152/76 - - - - - - -   09/27/18 0541 174/77 97.9  F (36.6  C) Temporal 71 16 94 % 1.702 m (5' 7\") 77.1 kg (170 lb)     Physical Exam  General/Appearance: appears stated age, well-groomed, appears comfortable  Eyes: EOMI, no scleral injection, no icterus  ENT: MMM  Neck: supple, nl ROM, no stiffness  Cardiovascular: RRR, nl S1S2, no m/r/g, 2+ pulses in all 4 extremities, cap refill <2sec  Respiratory: CTAB, good air movement throughout, no wheezes/rhonchi/rales, no increased WOB, no retractions  Back: no lesions  GI: abd soft, non-distended, nttp,  no HSM, no rebound, no guarding, nl BS  MSK: CRUMP, good tone, no bony abnormality  Skin: warm and well-perfused, no rash, no edema, no ecchymosis, nl turgor  Neuro: GCS 15, alert and oriented, no gross focal neuro deficits   Psych: interacts appropriately  Heme: no petechia, no purpura, no active bleeding        Emergency Department Course     ECG:  @ 0536  Indication: Dizziness  Vent. Rate 60 bpm. FL interval 172 ms. QRS duration 92 ms. QT/QTc 410/410 ms. P-R-T axis 27 -53 65.   Sinus rhythm. Left anterior fascicular block.  Abnormal ECG.    Read by Dr. Welch.     Imaging:  Head CT without contrast:   No evidence of acute intracranial abnormality.   Report per radiology.     MR Brain without and with contrast:   1. No evidence of acute infarct, mass, hemorrhage, or herniation.  2. Chronic lacunar infarct in the right basal ganglia and " right  thalamus.  3. Diffuse parenchymal volume loss and white matter changes likely due  to chronic microvascular ischemic disease.  Report per radiology.   Radiology findings were communicated with the patient who voiced understanding of the findings.    Laboratory:  UA: Clear light yellow urine, WNL     CBC:  WBC 5.8, HGB 15.1, , otherwise WNL   CMP: Glucose 109 (H), otherwise WNL (Creatinine 0.99)   (0600) Troponin I: <0.015      Interventions:  (0914) Normal Saline, 1 liter, IV bolus      Emergency Department Course:  Nursing notes and vitals reviewed.   I performed an exam of the patient as documented above.  EKG was done, interpretation as above.   A peripheral IV was established. Blood was drawn from the patient. This was sent for laboratory testing, findings above.    The patient was sent for a head CT and MRI while in the emergency department, findings above.      (0901) I updated the patient on results.   Findings and plan explained to the patient. Patient discharged home with instructions regarding supportive care, medications, and reasons to return. The importance of close follow-up was reviewed.     Impression & Plan      Medical Decision Making:  Kamari Valentin is a 78 year old male who presents to the emergency department today for evaluation of nonspecific dizziness, primarily lightheadedness.  He denies chest pain, shortness of breath, palpitations.  Most concerning to him and his family as he had the same presentation several years ago when he had a stroke.  He is on blood thinners so a CT head was ordered by the initial provider who saw him to rule out bleed.  Once his came back as negative we went to MRI to evaluate for stroke.  Thankfully there were no acute findings.  I doubt ACS.  There is no evidence of dysrhythmia.  Electrolytes and hemoglobins are within normal limits.  He feels better after IV fluids and ready for discharge.    Diagnosis:    ICD-10-CM    1. Dizziness R42       Disposition:   Discharge to home      Discharge Medications:  New Prescriptions    No medications on file     Scribe Disclosure:  I, Yamileth Lubin, am serving as a scribe at 5:54 AM on 9/27/2018 to document services personally performed by Dr. Welch based on my observations and the provider's statements to me.     EMERGENCY DEPARTMENT       Neeru Welch MD  09/27/18 1024

## 2018-09-27 NOTE — ED AVS SNAPSHOT
Emergency Department    [address] [place] [address]    Phone:  [phone]    Fax:  [phone]                                       Kamari Valentin   MRN: [id number]    Department:   Emergency Department   Date of Visit:  9/27/2018           After Visit Summary Signature Page     I have received my discharge instructions, and my questions have been answered. I have discussed any challenges I see with this plan with the nurse or doctor.    ..........................................................................................................................................  Patient/Patient Representative Signature      ..........................................................................................................................................  Patient Representative Print Name and Relationship to Patient    ..................................................               ................................................  Date                                   Time    ..........................................................................................................................................  Reviewed by Signature/Title    ...................................................              ..............................................  Date                                               Time          22EPIC Rev 08/18

## 2018-11-27 DIAGNOSIS — I65.29 CAROTID STENOSIS: Primary | ICD-10-CM

## 2019-01-22 ENCOUNTER — OFFICE VISIT (OUTPATIENT)
Dept: OTHER | Facility: CLINIC | Age: 79
End: 2019-01-22
Attending: SURGERY
Payer: COMMERCIAL

## 2019-01-22 ENCOUNTER — HOSPITAL ENCOUNTER (OUTPATIENT)
Dept: ULTRASOUND IMAGING | Facility: CLINIC | Age: 79
Discharge: HOME | End: 2019-01-22
Attending: SURGERY | Admitting: SURGERY
Payer: COMMERCIAL

## 2019-01-22 VITALS — DIASTOLIC BLOOD PRESSURE: 72 MMHG | SYSTOLIC BLOOD PRESSURE: 126 MMHG | HEART RATE: 54 BPM

## 2019-01-22 DIAGNOSIS — I65.29 CAROTID STENOSIS: ICD-10-CM

## 2019-01-22 DIAGNOSIS — I65.22 CAROTID STENOSIS, ASYMPTOMATIC, LEFT: Primary | ICD-10-CM

## 2019-01-22 PROCEDURE — 99213 OFFICE O/P EST LOW 20 MIN: CPT | Mod: ZP | Performed by: SURGERY

## 2019-01-22 PROCEDURE — G0463 HOSPITAL OUTPT CLINIC VISIT: HCPCS | Mod: 25

## 2019-01-22 PROCEDURE — 93880 EXTRACRANIAL BILAT STUDY: CPT

## 2019-01-22 SDOH — HEALTH STABILITY: MENTAL HEALTH: HOW OFTEN DO YOU HAVE A DRINK CONTAINING ALCOHOL?: NEVER

## 2019-01-22 NOTE — PROGRESS NOTES
"Kamari Valentin is a 78 year old male who presents for:  Chief Complaint   Patient presents with     RECHECK     francis carotid us (8:00 VHC, 9:00 HLS) History of bilateral carotid stenosis; 1 year follow up to 1/3/18 appointment with Dr.Saylor chin        Vitals:    Vitals:    01/22/19 0841   BP: 126/72   BP Location: Right arm   Patient Position: Chair   Cuff Size: Adult Regular   Pulse: 54       BMI:  Estimated body mass index is 26.63 kg/m  as calculated from the following:    Height as of 9/27/18: 5' 7\" (1.702 m).    Weight as of 9/27/18: 170 lb (77.1 kg).    Pain Score:  Data Unavailable        Petra Toney MA    "

## 2019-01-22 NOTE — LETTER
[facility]  [address]  [place] [address]  Phone: [phone]  Fax: [phone]      2019    RE: Kamari Valentin, : 1940    No history TIA  CVA Amaurosis  Fugax  Since last  Check.  Carotids 4/4 no  Bruits motor/sensory  Intact Cr. N. 2-12 intact  Carotid ultrasound stable over the last  Year 50-69% stenosis bilaterally by calculation however peak velocities 128 R and 228L.  Continue present medications F/U  One year.  He also has some ankle and foot edema on the right non tender no history  Of trauma.  ? Venous valvular incompetence if it becomes symptomatic consider venous duplex scan discussed--he  Will  Let us know        Sincerely,    Yevgeniy Singletary MD    [facility]  [address]  [place] [address]  Phone: [phone]  Fax: [phone]

## 2019-01-28 PROBLEM — M54.50 RIGHT-SIDED LOW BACK PAIN WITHOUT SCIATICA: Status: RESOLVED | Noted: 2018-09-10 | Resolved: 2019-01-28

## 2019-01-28 NOTE — PROGRESS NOTES
"DISCHARGE REPORT    Progress reporting period is from 9-10-18 to 10-2-18.       SUBJECTIVE  Subjective changes noted by patient:  [place] so low back pain was not aggravated. However, when they returned home on Sunday, there was water in the basement so had to pull up carpeting and remove the padding. A little more \"stiff\" than usual as a result. Reports standing tolerance remains about 30' but on occasion, longer.    Current pain level is 0-8/10.    Previous pain level was  0-8/10; 8/10 AM of IE.  Changes in function:  Goals ongoing.  Adverse reaction to treatment or activity: activity - see above.    OBJECTIVE  Objective: LROM: flexion/extension-NE on right sided sx's. L SB-NE; R SB-increased right sided sx's.     ASSESSMENT/PLAN  Updated problem list and treatment plan: Diagnosis 1:  Low back pain   STG/LTGs have been met or progress has been made towards goals:  Goals ongoing.  Assessment of Progress: The patient's condition has potential to improve.  Self Management Plans:  Patient has been instructed in a home treatment program.  Gene continues to require the following intervention to meet STG and LTG's:  Pt completed 3 visits; cancelled last visit due to other medical issues. Did not re-schedule.    Recommendations:  Discharge.    Please refer to the daily flowsheet for treatment today, total treatment time and time spent performing 1:1 timed codes.        "

## 2019-04-02 ENCOUNTER — OFFICE VISIT (OUTPATIENT)
Dept: CARDIOLOGY | Facility: CLINIC | Age: 79
End: 2019-04-02
Payer: COMMERCIAL

## 2019-04-02 ENCOUNTER — HOSPITAL ENCOUNTER (OUTPATIENT)
Dept: CARDIOLOGY | Facility: CLINIC | Age: 79
Discharge: HOME | End: 2019-04-02
Attending: PHYSICIAN ASSISTANT | Admitting: PHYSICIAN ASSISTANT
Payer: COMMERCIAL

## 2019-04-02 VITALS
BODY MASS INDEX: 29.03 KG/M2 | DIASTOLIC BLOOD PRESSURE: 71 MMHG | SYSTOLIC BLOOD PRESSURE: 140 MMHG | HEIGHT: 67 IN | WEIGHT: 185 LBS | HEART RATE: 60 BPM

## 2019-04-02 DIAGNOSIS — I48.0 PAROXYSMAL ATRIAL FIBRILLATION (H): Primary | ICD-10-CM

## 2019-04-02 DIAGNOSIS — I10 ESSENTIAL HYPERTENSION: ICD-10-CM

## 2019-04-02 DIAGNOSIS — I48.19 PERSISTENT ATRIAL FIBRILLATION (H): ICD-10-CM

## 2019-04-02 DIAGNOSIS — I35.0 AORTIC VALVE STENOSIS, ETIOLOGY OF CARDIAC VALVE DISEASE UNSPECIFIED: ICD-10-CM

## 2019-04-02 DIAGNOSIS — I48.0 PAROXYSMAL ATRIAL FIBRILLATION (H): ICD-10-CM

## 2019-04-02 PROCEDURE — 99214 OFFICE O/P EST MOD 30 MIN: CPT | Mod: 25 | Performed by: INTERNAL MEDICINE

## 2019-04-02 PROCEDURE — 93000 ELECTROCARDIOGRAM COMPLETE: CPT | Performed by: INTERNAL MEDICINE

## 2019-04-02 PROCEDURE — 40000264 ECHOCARDIOGRAM COMPLETE

## 2019-04-02 PROCEDURE — 93306 TTE W/DOPPLER COMPLETE: CPT | Mod: 26 | Performed by: INTERNAL MEDICINE

## 2019-04-02 PROCEDURE — 25500064 ZZH RX 255 OP 636: Performed by: PHYSICIAN ASSISTANT

## 2019-04-02 RX ORDER — LOSARTAN POTASSIUM 100 MG/1
100 TABLET ORAL DAILY
Qty: 30 TABLET | Refills: 3 | Status: ON HOLD | OUTPATIENT
Start: 2019-04-02 | End: 2020-11-06

## 2019-04-02 RX ADMIN — HUMAN ALBUMIN MICROSPHERES AND PERFLUTREN 9 ML: 10; .22 INJECTION, SOLUTION INTRAVENOUS at 13:45

## 2019-04-02 ASSESSMENT — MIFFLIN-ST. JEOR: SCORE: 1502.78

## 2019-04-02 NOTE — PROGRESS NOTES
Service Date: 2019      HISTORY OF PRESENT ILLNESS:  I saw Mr. García for followup of atrial fibrillation.  He is an 78-year-old white male who initially had paroxysmal atrial fibrillation.  Recently he has developed persistent atrial fibrillation.  He does not require rate control medications.  Symptomatically, he does not feel palpitation or shortness of breath.  He is on Xarelto without bleeding.      He recently has noticed some swelling in his lower legs.      PHYSICAL EXAMINATION:   VITAL SIGNS:  Blood pressure was 140/71, heart rate 60 beats per minute, body weight 185 pounds.   LUNGS:  Clear.   CARDIAC:  Rhythm was irregularly irregular.  Heart sounds were normal with no murmur.   EXTREMITIES:  He had bilateral 1+ pitting leg edema.      EKG showed atrial fibrillation with controlled ventricular rate.      ASSESSMENT AND RECOMMENDATIONS:  Mr. García has bilateral leg edema.  I have stopped amlodipine, which has a side effect of causing leg edema.  To control high blood pressure, I added losartan 100 mg p.o. b.i.d.  The patient will see you in about 2 weeks for further evaluation.  At that time, you can reassess his leg edema and decide if he will need a stronger diuretic or not.  His next Cardiology followup is scheduled for 1 year.      cc:      Gus Lanier MD    [facility]   [address]   [place] [address]         DHARA GO MD             D: 2019   T: 2019   MT: DOT      Name:     SHERRY GARCÍA   MRN:      [id number]        Account:      [id number]   :      1940           Service Date: 2019      Document: [id number]

## 2019-04-02 NOTE — LETTER
4/2/2019    Gus Lanier MD  [address]  [facility] [address]    RE: Kamari Valentin       Dear Colleague,    I had the pleasure of seeing Kamari Valentin in the [facility].    HPI and Plan:   See dictation    Orders Placed This Encounter   Procedures     Follow-Up with Electrophysiologist     Follow-Up with Cardiac Advanced Practice Provider     EKG 12-lead complete w/read - Clinics (performed today)     EKG 12-lead complete w/read (Future)- to be scheduled       Orders Placed This Encounter   Medications     losartan (COZAAR) 100 MG tablet     Sig: Take 1 tablet (100 mg) by mouth daily     Dispense:  30 tablet     Refill:  3       Medications Discontinued During This Encounter   Medication Reason     amlodipine (NORVASC) 10 MG tablet          Encounter Diagnoses   Name Primary?     Paroxysmal atrial fibrillation (H) Yes     Essential hypertension      Persistent atrial fibrillation (H)        CURRENT MEDICATIONS:  Current Outpatient Medications   Medication Sig Dispense Refill     cloNIDINE (CATAPRES) 0.1 MG tablet Take 1 tablet by mouth daily.       hydrALAZINE (APRESOLINE) 25 MG tablet Take 25 mg by mouth 2 times daily.       hydrochlorothiazide (HYDRODIURIL) 25 MG tablet Take 1 tablet (25 mg) by mouth daily 90 tablet 0     losartan (COZAAR) 100 MG tablet Take 1 tablet (100 mg) by mouth daily 30 tablet 3     rivaroxaban ANTICOAGULANT (XARELTO) 20 MG TABS tablet Take 1 tablet (20 mg) by mouth daily 90 tablet 3     simvastatin (ZOCOR) 40 MG tablet Take 1 tablet by mouth daily.         ALLERGIES     Allergies   Allergen Reactions     Lisinopril        PAST MEDICAL HISTORY:  Past Medical History:   Diagnosis Date     Aortic stenosis     mild-moderate     Atrial fibrillation (H)     paroxysmal     Carotid stenosis     mild on right, moderate on left     Chronic renal insufficiency      Essential hypertension, benign      Hyperlipidaemia      Hypertrophy of prostate without  urinary obstruction and other lower urinary tract symptoms (LUTS)      Rheumatic fever      Unspecified cerebral artery occlusion with cerebral infarction        PAST SURGICAL HISTORY:  Past Surgical History:   Procedure Laterality Date     ENT SURGERY      SEPTOPLASTY     ENT SURGERY      TONSILLECTOMY     HERNIA REPAIR       HERNIORRHAPHY INGUINAL  2/15/2012    Procedure:HERNIORRHAPHY INGUINAL; LEFT INGUINAL HERNIA REPAIR WITH MESH; Surgeon:SHILO READ; Location:[facility]     ORTHOPEDIC SURGERY  left ankle 2013    ORIF LEFT WRIST FRACTURE       FAMILY HISTORY:  Family History   Problem Relation Age of Onset     Respiratory Brother      Coronary Artery Disease Early Onset Mother      Abdominal Aortic Aneurysm Father        SOCIAL HISTORY:  Social History     Socioeconomic History     Marital status:      Spouse name: None     Number of children: None     Years of education: None     Highest education level: None   Occupational History     None   Social Needs     Financial resource strain: None     Food insecurity:     Worry: None     Inability: None     Transportation needs:     Medical: None     Non-medical: None   Tobacco Use     Smoking status: Former Smoker     Years: 20.00     Types: Cigars, Pipe     Last attempt to quit: 2010     Years since quittin.5     Smokeless tobacco: Never Used   Substance and Sexual Activity     Alcohol use: No     Alcohol/week: 0.0 oz     Frequency: Never     Drug use: No     Sexual activity: None   Lifestyle     Physical activity:     Days per week: None     Minutes per session: None     Stress: None   Relationships     Social connections:     Talks on phone: None     Gets together: None     Attends Nondenominational service: None     Active member of club or organization: None     Attends meetings of clubs or organizations: None     Relationship status: None     Intimate partner violence:     Fear of current or ex partner: None     Emotionally abused: None     Physically  "abused: None     Forced sexual activity: None   Other Topics Concern     Parent/sibling w/ CABG, MI or angioplasty before 65F 55M? Not Asked   Social History Narrative     None       Review of Systems:  Skin:  Negative       Eyes:  Positive for glasses    ENT:  Negative      Respiratory:  Negative for shortness of breath;dyspnea on exertion;cough     Cardiovascular:  Negative for;palpitations;chest pain;edema;dizziness;lightheadedness;fatigue      Gastroenterology: Negative for melena;hematochezia    Genitourinary:  Negative      Musculoskeletal:  Negative      Neurologic:  Negative      Psychiatric:  Negative      Heme/Lymph/Imm:  Negative      Endocrine:  Negative        Physical Exam:  Vitals: /71   Pulse 60   Ht 1.702 m (5' 7\")   Wt 83.9 kg (185 lb)   BMI 28.98 kg/m       Constitutional:  cooperative, alert and oriented, well developed, well nourished, in no acute distress        Skin:  warm and dry to the touch, no apparent skin lesions or masses noted          Head:  normocephalic, no masses or lesions        Eyes:  pupils equal and round;conjunctivae and lids unremarkable   wear eye glasses    Lymph:      ENT:  no pallor or cyanosis, dentition good        Neck:  JVP normal transmitted murmur      Respiratory:  normal breath sounds, clear to auscultation, normal A-P diameter, normal symmetry, normal respiratory excursion, no use of accessory muscles         Cardiac: regular rhythm       grade 2;grade 3;systolic ejection murmur     regular rhythm, normal heart sounds, III/VI systolic murmur over the aortic valve area  pulses full and equal                                        GI:  abdomen soft        Extremities and Muscular Skeletal:  no deformities, clubbing, cyanosis, erythema observed;no edema              Neurological:  no gross motor deficits        Psych:  Alert and Oriented x 3        CC  Reyes Curtis MD  [address]  [place] [address]                Thank you for allowing me to " participate in the care of your patient.      Sincerely,     Reyes Curtis MD     [facility]    cc:   Reyes Curtis MD  [address]  [place] [address]

## 2019-04-02 NOTE — LETTER
2019      Gus Lanier MD  [address]  [facility] [address]      RE: Sherry García       Dear Colleague,    I had the pleasure of seeing Sherry García in the [facility].    Service Date: 2019      HISTORY OF PRESENT ILLNESS:  I saw Mr. García for followup of atrial fibrillation.  He is an 78-year-old white male who initially had paroxysmal atrial fibrillation.  Recently he has developed persistent atrial fibrillation.  He does not require rate control medications.  Symptomatically, he does not feel palpitation or shortness of breath.  He is on Xarelto without bleeding.      He recently has noticed some swelling in his lower legs.      PHYSICAL EXAMINATION:   VITAL SIGNS:  Blood pressure was 140/71, heart rate 60 beats per minute, body weight 185 pounds.   LUNGS:  Clear.   CARDIAC:  Rhythm was irregularly irregular.  Heart sounds were normal with no murmur.   EXTREMITIES:  He had bilateral 1+ pitting leg edema.      EKG showed atrial fibrillation with controlled ventricular rate.      ASSESSMENT AND RECOMMENDATIONS:  Mr. García has bilateral leg edema.  I have stopped amlodipine, which has a side effect of causing leg edema.  To control high blood pressure, I added losartan 100 mg p.o. b.i.d.  The patient will see you in about 2 weeks for further evaluation.  At that time, you can reassess his leg edema and decide if he will need a stronger diuretic or not.  His next Cardiology followup is scheduled for 1 year.      cc:      Gus Lanier MD    [facility]   [address]   [place] [address]         DHARA GO MD             D: 2019   T: 2019   MT: DOT      Name:     SHERRY GARCÍA   MRN:      [id number]        Account:      [id number]   :      1940           Service Date: 2019      Document: [id number]           Outpatient Encounter Medications as of 2019   Medication Sig Dispense Refill      cloNIDINE (CATAPRES) 0.1 MG tablet Take 1 tablet by mouth daily.       hydrALAZINE (APRESOLINE) 25 MG tablet Take 25 mg by mouth 2 times daily.       hydrochlorothiazide (HYDRODIURIL) 25 MG tablet Take 1 tablet (25 mg) by mouth daily 90 tablet 0     losartan (COZAAR) 100 MG tablet Take 1 tablet (100 mg) by mouth daily 30 tablet 3     rivaroxaban ANTICOAGULANT (XARELTO) 20 MG TABS tablet Take 1 tablet (20 mg) by mouth daily 90 tablet 3     simvastatin (ZOCOR) 40 MG tablet Take 1 tablet by mouth daily.       [DISCONTINUED] amlodipine (NORVASC) 10 MG tablet Take 1 tablet by mouth daily.       [] perflutren diluted 1mL to 2mL with saline (OPTISON) diluted injection 3 mL        [] sodium chloride (PF) 0.9% PF flush 10 mL        No facility-administered encounter medications on file as of 2019.        Again, thank you for allowing me to participate in the care of your patient.      Sincerely,    Reyes Curtis MD     [facility]

## 2019-04-02 NOTE — PROGRESS NOTES
HPI and Plan:   See dictation    Orders Placed This Encounter   Procedures     Follow-Up with Electrophysiologist     Follow-Up with Cardiac Advanced Practice Provider     EKG 12-lead complete w/read - Clinics (performed today)     EKG 12-lead complete w/read (Future)- to be scheduled       Orders Placed This Encounter   Medications     losartan (COZAAR) 100 MG tablet     Sig: Take 1 tablet (100 mg) by mouth daily     Dispense:  30 tablet     Refill:  3       Medications Discontinued During This Encounter   Medication Reason     amlodipine (NORVASC) 10 MG tablet          Encounter Diagnoses   Name Primary?     Paroxysmal atrial fibrillation (H) Yes     Essential hypertension      Persistent atrial fibrillation (H)        CURRENT MEDICATIONS:  Current Outpatient Medications   Medication Sig Dispense Refill     cloNIDINE (CATAPRES) 0.1 MG tablet Take 1 tablet by mouth daily.       hydrALAZINE (APRESOLINE) 25 MG tablet Take 25 mg by mouth 2 times daily.       hydrochlorothiazide (HYDRODIURIL) 25 MG tablet Take 1 tablet (25 mg) by mouth daily 90 tablet 0     losartan (COZAAR) 100 MG tablet Take 1 tablet (100 mg) by mouth daily 30 tablet 3     rivaroxaban ANTICOAGULANT (XARELTO) 20 MG TABS tablet Take 1 tablet (20 mg) by mouth daily 90 tablet 3     simvastatin (ZOCOR) 40 MG tablet Take 1 tablet by mouth daily.         ALLERGIES     Allergies   Allergen Reactions     Lisinopril        PAST MEDICAL HISTORY:  Past Medical History:   Diagnosis Date     Aortic stenosis     mild-moderate     Atrial fibrillation (H)     paroxysmal     Carotid stenosis     mild on right, moderate on left     Chronic renal insufficiency      Essential hypertension, benign      Hyperlipidaemia      Hypertrophy of prostate without urinary obstruction and other lower urinary tract symptoms (LUTS)      Rheumatic fever      Unspecified cerebral artery occlusion with cerebral infarction        PAST SURGICAL HISTORY:  Past Surgical History:   Procedure  Laterality Date     ENT SURGERY      SEPTOPLASTY     ENT SURGERY      TONSILLECTOMY     HERNIA REPAIR       HERNIORRHAPHY INGUINAL  2/15/2012    Procedure:HERNIORRHAPHY INGUINAL; LEFT INGUINAL HERNIA REPAIR WITH MESH; Surgeon:SHILO READ; Location:[facility]     ORTHOPEDIC SURGERY  left ankle 2013    ORIF LEFT WRIST FRACTURE       FAMILY HISTORY:  Family History   Problem Relation Age of Onset     Respiratory Brother      Coronary Artery Disease Early Onset Mother      Abdominal Aortic Aneurysm Father        SOCIAL HISTORY:  Social History     Socioeconomic History     Marital status:      Spouse name: None     Number of children: None     Years of education: None     Highest education level: None   Occupational History     None   Social Needs     Financial resource strain: None     Food insecurity:     Worry: None     Inability: None     Transportation needs:     Medical: None     Non-medical: None   Tobacco Use     Smoking status: Former Smoker     Years: 20.00     Types: Cigars, Pipe     Last attempt to quit: 2010     Years since quittin.5     Smokeless tobacco: Never Used   Substance and Sexual Activity     Alcohol use: No     Alcohol/week: 0.0 oz     Frequency: Never     Drug use: No     Sexual activity: None   Lifestyle     Physical activity:     Days per week: None     Minutes per session: None     Stress: None   Relationships     Social connections:     Talks on phone: None     Gets together: None     Attends Christian service: None     Active member of club or organization: None     Attends meetings of clubs or organizations: None     Relationship status: None     Intimate partner violence:     Fear of current or ex partner: None     Emotionally abused: None     Physically abused: None     Forced sexual activity: None   Other Topics Concern     Parent/sibling w/ CABG, MI or angioplasty before 65F 55M? Not Asked   Social History Narrative     None       Review of Systems:  Skin:  Negative      "  Eyes:  Positive for glasses    ENT:  Negative      Respiratory:  Negative for shortness of breath;dyspnea on exertion;cough     Cardiovascular:  Negative for;palpitations;chest pain;edema;dizziness;lightheadedness;fatigue      Gastroenterology: Negative for melena;hematochezia    Genitourinary:  Negative      Musculoskeletal:  Negative      Neurologic:  Negative      Psychiatric:  Negative      Heme/Lymph/Imm:  Negative      Endocrine:  Negative        Physical Exam:  Vitals: /71   Pulse 60   Ht 1.702 m (5' 7\")   Wt 83.9 kg (185 lb)   BMI 28.98 kg/m      Constitutional:  cooperative, alert and oriented, well developed, well nourished, in no acute distress        Skin:  warm and dry to the touch, no apparent skin lesions or masses noted          Head:  normocephalic, no masses or lesions        Eyes:  pupils equal and round;conjunctivae and lids unremarkable   wear eye glasses    Lymph:      ENT:  no pallor or cyanosis, dentition good        Neck:  JVP normal transmitted murmur      Respiratory:  normal breath sounds, clear to auscultation, normal A-P diameter, normal symmetry, normal respiratory excursion, no use of accessory muscles         Cardiac: regular rhythm       grade 2;grade 3;systolic ejection murmur     regular rhythm, normal heart sounds, III/VI systolic murmur over the aortic valve area  pulses full and equal                                        GI:  abdomen soft        Extremities and Muscular Skeletal:  no deformities, clubbing, cyanosis, erythema observed;no edema              Neurological:  no gross motor deficits        Psych:  Alert and Oriented x 3        CC  Reyes Curtis MD  [id number]  [id number]  [place] [address]              "

## 2019-04-29 DIAGNOSIS — I48.91 ATRIAL FIBRILLATION, UNSPECIFIED TYPE (H): ICD-10-CM

## 2019-07-28 ENCOUNTER — OFFICE VISIT (OUTPATIENT)
Dept: URGENT CARE | Facility: URGENT CARE | Age: 79
End: 2019-07-28
Payer: COMMERCIAL

## 2019-07-28 VITALS
HEART RATE: 73 BPM | DIASTOLIC BLOOD PRESSURE: 76 MMHG | TEMPERATURE: 99 F | RESPIRATION RATE: 20 BRPM | SYSTOLIC BLOOD PRESSURE: 124 MMHG | WEIGHT: 184.19 LBS | BODY MASS INDEX: 28.85 KG/M2

## 2019-07-28 DIAGNOSIS — L03.113 CELLULITIS OF RIGHT UPPER EXTREMITY: Primary | ICD-10-CM

## 2019-07-28 PROCEDURE — 99214 OFFICE O/P EST MOD 30 MIN: CPT | Mod: 25 | Performed by: FAMILY MEDICINE

## 2019-07-28 PROCEDURE — 96372 THER/PROPH/DIAG INJ SC/IM: CPT | Performed by: FAMILY MEDICINE

## 2019-07-28 RX ORDER — CEPHALEXIN 500 MG/1
500 CAPSULE ORAL 4 TIMES DAILY
Qty: 40 CAPSULE | Refills: 0 | Status: SHIPPED | OUTPATIENT
Start: 2019-07-28 | End: 2019-08-07

## 2019-07-28 RX ORDER — CEFTRIAXONE SODIUM 1 G
1 VIAL (EA) INJECTION ONCE
Status: COMPLETED | OUTPATIENT
Start: 2019-07-28 | End: 2019-07-28

## 2019-07-28 RX ADMIN — Medication 1 G: at 12:53

## 2019-08-19 NOTE — PROGRESS NOTES
SUBJECTIVE: Kamari Valentin is a 79 year old male presenting with a chief complaint of red rt arm.  Onset of symptoms was day(s) ago.  Course of illness is worsening.    Severity moderate  Current and Associated symptoms: none  Treatment measures tried include None tried.  Predisposing factors include None.    Past Medical History:   Diagnosis Date     Aortic stenosis     mild-moderate     Atrial fibrillation (H)     paroxysmal     Carotid stenosis     mild on right, moderate on left     Chronic renal insufficiency      Essential hypertension, benign      Hyperlipidaemia      Hypertrophy of prostate without urinary obstruction and other lower urinary tract symptoms (LUTS)      Rheumatic fever      Unspecified cerebral artery occlusion with cerebral infarction      Allergies   Allergen Reactions     Lisinopril      Social History     Tobacco Use     Smoking status: Former Smoker     Years: 20.00     Types: Cigars, Pipe     Last attempt to quit: 2010     Years since quittin.9     Smokeless tobacco: Never Used   Substance Use Topics     Alcohol use: No     Alcohol/week: 0.0 oz     Frequency: Never       ROS:  SKIN: no rash  GI: no vomiting    OBJECTIVE:  /76 (Cuff Size: Adult Regular)   Pulse 73   Temp 99  F (37.2  C) (Oral)   Resp 20   Wt 83.5 kg (184 lb 3 oz)   BMI 28.85 kg/m  GENERAL APPEARANCE: healthy, alert and no distress  NEURO: Normal strength and tone, sensory exam grossly normal,  normal speech and mentation  SKIN: red warm arm      ICD-10-CM    1. Cellulitis of right upper extremity L03.113 cefTRIAXone (ROCEPHIN) injection 1 g     cephALEXin (KEFLEX) 500 MG capsule     CEFTRIAXONE NA INJ /250MG     INJECTION INTRAMUSCULAR OR SUB-Q       Fluids/Rest, f/u if worse/not any better

## 2019-09-13 DIAGNOSIS — I65.23 CAROTID STENOSIS, ASYMPTOMATIC, BILATERAL: Primary | ICD-10-CM

## 2020-01-06 ENCOUNTER — HOSPITAL ENCOUNTER (OUTPATIENT)
Dept: ULTRASOUND IMAGING | Facility: CLINIC | Age: 80
Discharge: HOME | End: 2020-01-06
Attending: SURGERY | Admitting: SURGERY
Payer: COMMERCIAL

## 2020-01-06 ENCOUNTER — OFFICE VISIT (OUTPATIENT)
Dept: OTHER | Facility: CLINIC | Age: 80
End: 2020-01-06
Attending: SURGERY
Payer: COMMERCIAL

## 2020-01-06 VITALS — HEART RATE: 70 BPM | SYSTOLIC BLOOD PRESSURE: 125 MMHG | DIASTOLIC BLOOD PRESSURE: 75 MMHG

## 2020-01-06 DIAGNOSIS — I65.23 CAROTID STENOSIS, ASYMPTOMATIC, BILATERAL: Primary | ICD-10-CM

## 2020-01-06 DIAGNOSIS — I65.23 CAROTID STENOSIS, ASYMPTOMATIC, BILATERAL: ICD-10-CM

## 2020-01-06 PROCEDURE — G0463 HOSPITAL OUTPT CLINIC VISIT: HCPCS | Mod: 25

## 2020-01-06 PROCEDURE — 93880 EXTRACRANIAL BILAT STUDY: CPT

## 2020-01-06 PROCEDURE — 99213 OFFICE O/P EST LOW 20 MIN: CPT | Mod: ZP | Performed by: SURGERY

## 2020-01-06 NOTE — NURSING NOTE
"Kamari Valentin is a 79 year old male who presents for:  Chief Complaint   Patient presents with     RECHECK     Previous Dr Singletary Pt - Bilat Carotid (9:30 V[facility], 10:15 KMK) History of bilateral carotid artery stenosis; 1 yr f/u to 1/22/19 appt with Dr. Singletary.        Vitals:    Vitals:    01/06/20 0957   BP: 125/75   BP Location: Left arm   Patient Position: Chair   Cuff Size: Adult Large   Pulse: 70       BMI:  Estimated body mass index is 28.85 kg/m  as calculated from the following:    Height as of 4/2/19: 5' 7\" (1.702 m).    Weight as of 7/28/19: 184 lb 3 oz (83.5 kg).    Pain Score:  Data Unavailable        Etta Gray MA    "

## 2020-01-06 NOTE — PROGRESS NOTES
Mr. Valentin is a very pleasant 79-year-old gentleman who has been under care of my recently retired , Dr. Singletary.   About 10 years ago patient suffered from a cerebrovascular accident which involved the right basal ganglia and internal capsule.  He was placed on Pradaxa and now he is on Xarelto.  He has had moderate carotid stenosis which has been under surveillance.  The carotid stenosis was not the reason for his cerebrovascular accident many years ago. He also has asymptomatic severe aortic stenosis.  He sees Dr. Curtis for his atrial fibrillation and aortic valvular stenosis.    Today he denies any recent episodes of amaurosis fugax or transient ischemic attack.    On examination: He appears comfortable and is in no acute distress.  Vitals: Blood pressure in left arm is 125 x 75 mmHg.  Pulse rate is 70 bpm.  HEENT: Head is atraumatic and normocephalic, mucosa pink.  Mental: Alert and oriented x4, judgment insight is good.  Lymphatic: No supraclavicular or cervical adenopathy is noted.  Cardiac: Irregular rhythm, S1 plus S2 plus 2 out of 6 systolic murmur.  Chest: Clear to auscultation bilaterally.  Neuro: Moving both upper and lower extremities with equal 5 out of 5 power.    Imaging data: He underwent duplex sonography of his carotid which was reviewed by myself and I reviewed those with the patient as well.    BILATERAL CAROTID ULTRASOUND   1/6/2020 9:43 AM      HISTORY:  History of bilateral carotid artery stenosis. Carotid  stenosis, asymptomatic, bilateral.     COMPARISON: January 22, 2019     RIGHT CAROTID FINDINGS:  There is moderate calcified atherosclerotic  plaque at the carotid bifurcation and proximal internal carotid artery  with mildly irregular margins.     Right ICA PSV:  101 cm/sec.  Right ICA EDV:  31 cm/sec.  Right ICA/CCA PSV Ratio:  1.19    These indicate less than 50% diameter stenosis of the right ICA.    Right Vertebral: Antegrade flow.   Right ECA: Antegrade flow.      LEFT  CAROTID FINDINGS:  There is moderate to severe mixed  atherosclerotic plaque at the proximal internal carotid artery.     Left ICA PSV:  171 cm/sec.  Left ICA EDV:  30 cm/sec.  Left ICA/CCA PSV Ratio:  2.55    These indicate 50-69% diameter stenosis of the left ICA.    Left Vertebral: Antegrade flow.   Left ECA: Antegrade flow.      Causes of Decreased Accuracy:   None.                                                                       IMPRESSION:    1. Less than 50% diameter stenosis of the right ICA relative to the  distal ICA diameter.   2. 50-69% diameter stenosis of the left ICA relative to the distal ICA  diameter.      KARYN ROGERS MD    Diagnosis: Asymptomatic, bilateral carotid stenosis.    Plan: I explained the findings of the duplex sonography to him and his wife.  I have explained that the findings are unchanged in the disease is stable.  No need for surgical intervention.  I recommend that we check it again in 1 year.  If they remain to be stable then I do not feel the need for continued surveillance given the fact that the reason for cerebrovascular accident was not the carotid stenosis.

## 2020-04-15 NOTE — PROGRESS NOTES
"Kamari Valentin is a 80 year old male who is being evaluated via a billable telephone visit.      The patient has been notified of following:     \"This telephone visit will be conducted via a call between you and your physician/provider. We have found that certain health care needs can be provided without the need for a physical exam.  This service lets us provide the care you need with a short phone conversation.  If a prescription is necessary we can send it directly to your pharmacy.  If lab work is needed we can place an order for that and you can then stop by our lab to have the test done at a later time.    Telephone visits are billed at different rates depending on your insurance coverage. During this emergency period, for some insurers they may be billed the same as an in-person visit.  Please reach out to your insurance provider with any questions.    If during the course of the call the physician/provider feels a telephone visit is not appropriate, you will not be charged for this service.\"    Patient has given verbal consent for Telephone visit?  Yes    How would you like to obtain your AVS? Mail a copy     [phone]  Pt reported wt today 175#, /74, P-79  Quynh Santacruz MARIA FERNANDA    CC:  Annual follow up    VITALS:  Weight 175# (was 185#@ 4/2019)  /74  HR 79 bpm    BRIEF HPI:  81 yo M who sees Dr. Curtis for his h/o:    1. Persistent AFib - had been asx'c. On Xarelto without issues.   2. CHADSVASc 5 (HTN, CVA, age)  3. Severe Aortic Stenosis noted on echo 4/2019  4. Hypertension  5. CVA, carotid disease followed by Dr. Lagunas. Had R basal ganglia and internal capsule stent ~2010. <50% JARON; 50-69% LICA stenosis (stable). Plan for follow-up 1/2021    Dr. Curtis saw him 4/2019 at which time his paroxysmal AFib and become more persistent. He was doing well but noted 1+ LE pitting edema. Amlodipine was stopped and switched to losartan 100 mg. Annual follow-up was recommended. It does not appear that the echo " that was done prior to his appt was reviewed.    INTERVAL HISTORY:  No LE edema since switching the amlodipine to losartan.  No c/o orthopnea or PND. His weight has dropped but he thinks he this is more caloric and wasn't due to fluid retention.    No c/o CP. 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    No palpitations, lightheadedness or dizziness. No syncope.    DIAGNOSTICS:  Carotids 1/2020 with <50% lesion JARON; 50-69% LICA  Echo 4/2019 with EF 60-65%. Mild-mod LVH. Normal WM. RV normal. Mild RISHABH. Trace-mild MR. Trace-mild TR. RVSP 33+RAP. Heavily calcified aortic valve (possibly bicuspid) with mean gradient significantly increased 54 mmHg KARYNA 0.72 cm2. No AI. It does not appear that this was reviewed at Dr. Curtis's appt with him 4/2019.      REVIEW OF SYSTEMS:  Negative except as noted above     PHYSICAL EXAM:  Deferred    ASSESSMENT/PLAN:    1. Persistent AFib    No sxs with this and HR appears to be under good control    Remains on Xarelto without issues     PLAN:    No changes; continue Xarelto. No rate controlling meds have been needed      2. Benign Essential HTN    Remains on losartan 100 mg daily, hydrochlorothiazide 25 mg daily, hydralazine 25 mg BID and clonidine 0.1     SBPs at home have been 130s typically     PLAN:    Continue current meds    3. CV Dz    Last saw Dr. Lagunas 1/2020 and carotid dz remained stable    Remains on statin therapy. LDL last checked 2016     PLAN:    Lipids in 3 m    Continue routine follow-up with Dr. Lagunas    4. Aortic Stenosis, severe    Noted on echo 4/2019 but it does not appear it was reviewed at Dr. Curtis's appt     Completly asx'c      PLAN:    Reviewed cardinal s/sxs of severe/critical aortic stenosis and what to watch for    Repeat echo once OK infection/exposure risk mitigated with plans for referral to [facility]    Will call if has any concerning sxs and then plan to  move work up/referally up      I have reviewed the note as documented above.  This accurately captures the substance of my conversation with the patient.        Phone call contact time  Call Started at 0900  Call Ended at 0923       NANETTE BreauxAS

## 2020-04-16 ENCOUNTER — VIRTUAL VISIT (OUTPATIENT)
Dept: CARDIOLOGY | Facility: CLINIC | Age: 80
End: 2020-04-16
Payer: COMMERCIAL

## 2020-04-16 VITALS
HEART RATE: 79 BPM | SYSTOLIC BLOOD PRESSURE: 134 MMHG | DIASTOLIC BLOOD PRESSURE: 74 MMHG | BODY MASS INDEX: 27.41 KG/M2 | WEIGHT: 175 LBS

## 2020-04-16 DIAGNOSIS — I35.0 AORTIC VALVE STENOSIS, ETIOLOGY OF CARDIAC VALVE DISEASE UNSPECIFIED: Primary | ICD-10-CM

## 2020-04-16 DIAGNOSIS — E78.2 MIXED HYPERLIPIDEMIA: ICD-10-CM

## 2020-04-16 PROCEDURE — 99213 OFFICE O/P EST LOW 20 MIN: CPT | Mod: 95 | Performed by: PHYSICIAN ASSISTANT

## 2020-05-15 DIAGNOSIS — I48.91 ATRIAL FIBRILLATION, UNSPECIFIED TYPE (H): ICD-10-CM

## 2020-07-09 ENCOUNTER — HOSPITAL ENCOUNTER (OUTPATIENT)
Dept: CARDIOLOGY | Facility: CLINIC | Age: 80
Discharge: HOME | End: 2020-07-09
Attending: PHYSICIAN ASSISTANT | Admitting: PHYSICIAN ASSISTANT
Payer: COMMERCIAL

## 2020-07-09 DIAGNOSIS — I35.0 AORTIC VALVE STENOSIS, ETIOLOGY OF CARDIAC VALVE DISEASE UNSPECIFIED: ICD-10-CM

## 2020-07-09 DIAGNOSIS — E78.5 HYPERLIPIDEMIA: Primary | ICD-10-CM

## 2020-07-09 PROCEDURE — 25500064 ZZH RX 255 OP 636: Performed by: PHYSICIAN ASSISTANT

## 2020-07-09 PROCEDURE — 40000264 ECHOCARDIOGRAM COMPLETE

## 2020-07-09 PROCEDURE — 93306 TTE W/DOPPLER COMPLETE: CPT | Mod: 26 | Performed by: INTERNAL MEDICINE

## 2020-07-09 RX ADMIN — HUMAN ALBUMIN MICROSPHERES AND PERFLUTREN 9 ML: 10; .22 INJECTION, SOLUTION INTRAVENOUS at 09:51

## 2020-07-10 ENCOUNTER — DOCUMENTATION ONLY (OUTPATIENT)
Dept: CARDIOLOGY | Facility: CLINIC | Age: 80
End: 2020-07-10

## 2020-07-10 NOTE — PROGRESS NOTES
Pls let Gene know that Dr. Fabian will review his echo in detail during 8/27 appt. I ordered this due to aortic stenosis noted on last echo 2019.     His EF remains normal and valve remains severely stenosed.  He should keep Dr. Fabian to determine if this valve should be fixed.    He should contact us if he starts getting sx'c AoS (fainting/lightheadedness, heart failure, angina).  When I spoke with him 4/2020 he was feeling great and had just laid down 15 bags of mulch!

## 2020-07-14 ENCOUNTER — DOCUMENTATION ONLY (OUTPATIENT)
Dept: CARDIOLOGY | Facility: CLINIC | Age: 80
End: 2020-07-14

## 2020-07-14 NOTE — PROGRESS NOTES
Gene returned my call, I did let him know that Echo is still showing severe aortic stenosis, and advise he plan to keep his scheduled  appt w/ Dr Fabian to discuss in Detail at the august appt.pt offers no complaints, I did ask him to call the office if he starts to have sxs such as Fainting, lightheadedness, angina, increased SOB. Pt agrees to call if any problems, and plans to keep august appt.  He also has appt next week @ [facility] office (Annita Proctor MD) and asked for the echo to be sent to her as well  Done-mmunns lpn

## 2020-07-14 NOTE — TELEPHONE ENCOUNTER
I did call Pt, got the VM, I left him a message to call back to discuss the echo results, sxs to watch for and need to keep appt in August w/ Dr Fabian re valve-left my number-mmunns lpn  Await a call back

## 2020-08-26 ENCOUNTER — TELEPHONE (OUTPATIENT)
Dept: CARDIOLOGY | Facility: CLINIC | Age: 80
End: 2020-08-26

## 2020-08-26 NOTE — TELEPHONE ENCOUNTER
Wellness Screening Tool    Symptom Screening:    Do you have one of the following NEW symptoms:      Fever (subjective or >100.0)?  No    New cough? No    Shortness of breath? No    Chills? No    New loss of taste or smell? No    Generalized body aches? No    New persistent headache? No    New sore throat? No    Nausea, vomiting or diarrhea? No    Within the past 3 weeks, have you been exposed to someone with a known positive illness below?      COVID - 19 (known or suspected) No    Chicken pox?  No    Measles? No    Pertussis? No    Have you had a positive COVID-19 diagnostic test (nasal swab test) in the last 14 days or are you currently   on self-quarantine restrictions (i.e.travel restriction, exposure, etc?) No        Patient notified of visitor restriction: Yes  Patient informed to wear a mask: Yes    Patient's appointment status: Patient will be seen in clinic as scheduled on 8/27/20

## 2020-08-27 ENCOUNTER — OFFICE VISIT (OUTPATIENT)
Dept: CARDIOLOGY | Facility: CLINIC | Age: 80
End: 2020-08-27
Attending: PHYSICIAN ASSISTANT
Payer: COMMERCIAL

## 2020-08-27 ENCOUNTER — CARE COORDINATION (OUTPATIENT)
Dept: CARDIOLOGY | Facility: CLINIC | Age: 80
End: 2020-08-27

## 2020-08-27 VITALS
SYSTOLIC BLOOD PRESSURE: 127 MMHG | WEIGHT: 180.6 LBS | DIASTOLIC BLOOD PRESSURE: 73 MMHG | HEART RATE: 62 BPM | BODY MASS INDEX: 28.29 KG/M2

## 2020-08-27 DIAGNOSIS — E78.2 MIXED HYPERLIPIDEMIA: Primary | ICD-10-CM

## 2020-08-27 DIAGNOSIS — I48.91 ATRIAL FIBRILLATION, UNSPECIFIED TYPE (H): ICD-10-CM

## 2020-08-27 DIAGNOSIS — I35.0 AORTIC VALVE STENOSIS, ETIOLOGY OF CARDIAC VALVE DISEASE UNSPECIFIED: ICD-10-CM

## 2020-08-27 DIAGNOSIS — I10 ESSENTIAL HYPERTENSION: ICD-10-CM

## 2020-08-27 PROCEDURE — 93000 ELECTROCARDIOGRAM COMPLETE: CPT | Performed by: INTERNAL MEDICINE

## 2020-08-27 PROCEDURE — 99204 OFFICE O/P NEW MOD 45 MIN: CPT | Mod: 25 | Performed by: INTERNAL MEDICINE

## 2020-08-27 NOTE — PROGRESS NOTES
HPI and Plan:     Mr. Ludwig Valentin comes in to structural clinic today with his wife and his daughter on the phone.  He is 80 is referred by Bertha Harley for severe aortic valve stenosis.  Ludwig is himself asymptomatic.  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  He is 80 denies any shortness of breath chest pain lightheadedness dizziness syncope near syncope me orthopnea pedal edema or fatigue.  His echocardiogram done showed normal ejection fraction with a gradient of 50 and aortic valve area 0.63.  He has mild aortic mitral insufficiency.    Patient's STS risk was 2.6%.  He has no other significant coronary disease disease history no history of CHF and microinfarction stroke.  He does have chronic persistent atrial fibrillation and is on oral anticoagulation.    Assessment: Patient is a good post TAVR as well as S AVR candidate.  Have outlined the risk and benefits of both approaches including the risk of pacemaker vascular complication stroke permanent pacemaker and death.  Patient prefers the transcatheter approach.  Given the fact that he is completely asymptomatic I think he is a good candidate for early TAVR.  We will have the research nurse contact him have him undergo stress testing and if he qualifies enroll him in early TAVR or if he fails a treadmill test have him undergo aortic valve replacement.  He will need a coronary angiogram (heart catheterization and TAVR CT scan prior.    Orders Placed This Encounter   Procedures     EKG 12-lead complete w/read - Clinics (performed today)     No orders of the defined types were placed in this encounter.    There are no discontinued medications.      Encounter Diagnosis   Name Primary?     Aortic valve stenosis, etiology of cardiac valve disease unspecified        CURRENT MEDICATIONS:  Current Outpatient Medications   Medication Sig Dispense Refill     cloNIDINE (CATAPRES) 0.1 MG tablet Take 1 tablet by mouth daily.       hydrALAZINE  (APRESOLINE) 25 MG tablet Take 25 mg by mouth 2 times daily.       hydrochlorothiazide (HYDRODIURIL) 25 MG tablet Take 1 tablet (25 mg) by mouth daily 90 tablet 0     losartan (COZAAR) 100 MG tablet Take 1 tablet (100 mg) by mouth daily 30 tablet 3     rivaroxaban ANTICOAGULANT (XARELTO ANTICOAGULANT) 20 MG TABS tablet Take 1 tablet (20 mg) by mouth daily 90 tablet 3     simvastatin (ZOCOR) 40 MG tablet Take 1 tablet by mouth daily.         ALLERGIES     Allergies   Allergen Reactions     Lisinopril        PAST MEDICAL HISTORY:  Past Medical History:   Diagnosis Date     Aortic stenosis     mild-moderate     Atrial fibrillation (H)     paroxysmal     Carotid stenosis     mild on right, moderate on left     Chronic renal insufficiency      Essential hypertension, benign      Hyperlipidaemia      Hypertrophy of prostate without urinary obstruction and other lower urinary tract symptoms (LUTS)      Rheumatic fever      Unspecified cerebral artery occlusion with cerebral infarction        PAST SURGICAL HISTORY:  Past Surgical History:   Procedure Laterality Date     ENT SURGERY      SEPTOPLASTY     ENT SURGERY      TONSILLECTOMY     HERNIA REPAIR       HERNIORRHAPHY INGUINAL  2/15/2012    Procedure:HERNIORRHAPHY INGUINAL; LEFT INGUINAL HERNIA REPAIR WITH MESH; Surgeon:SHILO READ; Location:[facility]     ORTHOPEDIC SURGERY  left ankle 2/2013    ORIF LEFT WRIST FRACTURE       FAMILY HISTORY:  Family History   Problem Relation Age of Onset     Respiratory Brother      Coronary Artery Disease Early Onset Mother      Abdominal Aortic Aneurysm Father        SOCIAL HISTORY:  Social History     Socioeconomic History     Marital status:      Spouse name: None     Number of children: None     Years of education: None     Highest education level: None   Occupational History     None   Social Needs     Financial resource strain: None     Food insecurity     Worry: None     Inability: None     Transportation needs     Medical:  None     Non-medical: None   Tobacco Use     Smoking status: Former Smoker     Years: 20.00     Types: Cigars, Pipe     Last attempt to quit: 2010     Years since quittin.9     Smokeless tobacco: Never Used   Substance and Sexual Activity     Alcohol use: No     Alcohol/week: 0.0 standard drinks     Frequency: Never     Drug use: No     Sexual activity: None   Lifestyle     Physical activity     Days per week: None     Minutes per session: None     Stress: None   Relationships     Social connections     Talks on phone: None     Gets together: None     Attends Yazidi service: None     Active member of club or organization: None     Attends meetings of clubs or organizations: None     Relationship status: None     Intimate partner violence     Fear of current or ex partner: None     Emotionally abused: None     Physically abused: None     Forced sexual activity: None   Other Topics Concern     Parent/sibling w/ CABG, MI or angioplasty before 65F 55M? Not Asked   Social History Narrative     None       Review of Systems:  Skin:  Negative     Eyes:  Positive for glasses  ENT:  Negative    Respiratory:  Negative for shortness of breath;dyspnea on exertion;cough  Cardiovascular:  Negative for;palpitations;chest pain;edema;dizziness;lightheadedness;fatigue    Gastroenterology: Negative for melena;hematochezia  Genitourinary:  Negative    Musculoskeletal:  Negative    Neurologic:  Negative    Psychiatric:  Negative    Heme/Lymph/Imm:  Negative    Endocrine:  Negative      Physical Exam:  Vitals: /73   Pulse 62   Wt 81.9 kg (180 lb 9.6 oz)   BMI 28.29 kg/m      Constitutional:  cooperative, alert and oriented, well developed, well nourished, in no acute distress        Skin:  warm and dry to the touch, no apparent skin lesions or masses noted          Head:  normocephalic, no masses or lesions        Eyes:  pupils equal and round;conjunctivae and lids unremarkable   wear eye glasses    Lymph:      ENT:   no pallor or cyanosis, dentition good        Neck:  JVP normal transmitted murmur      Respiratory:  normal breath sounds, clear to auscultation, normal A-P diameter, normal symmetry, normal respiratory excursion, no use of accessory muscles         Cardiac:   irregularly irregular rhythm     grade 3;late systolic murmur;radiation to the carotid     regular rhythm, normal heart sounds, III/VI systolic murmur over the aortic valve area    radial-femoral delay                                      GI:  abdomen soft        Extremities and Muscular Skeletal:  no deformities, clubbing, cyanosis, erythema observed;no edema              Neurological:  no gross motor deficits        Psych:  Alert and Oriented x 3      Recent Lab Results:  LIPID RESULTS:  Lab Results   Component Value Date    CHOL 130 02/03/2016    HDL 41 02/03/2016    LDL 75 02/03/2016    TRIG 70 02/03/2016    CHOLHDLRATIO 7.0 (H) 09/22/2010       LIVER ENZYME RESULTS:  Lab Results   Component Value Date    AST 22 09/27/2018    ALT 25 09/27/2018       CBC RESULTS:  Lab Results   Component Value Date    WBC 5.8 09/27/2018    RBC 4.65 09/27/2018    HGB 15.1 09/27/2018    HCT 43.9 09/27/2018    MCV 94 09/27/2018    MCH 32.5 09/27/2018    MCHC 34.4 09/27/2018    RDW 13.0 09/27/2018     09/27/2018       BMP RESULTS:  Lab Results   Component Value Date     09/27/2018    POTASSIUM 3.8 09/27/2018    CHLORIDE 106 09/27/2018    CO2 28 09/27/2018    ANIONGAP 6 09/27/2018     (H) 09/27/2018    BUN 20 09/27/2018    CR 0.99 09/27/2018    GFRESTIMATED 73 09/27/2018    GFRESTBLACK 88 09/27/2018    CLAY 9.2 09/27/2018        A1C RESULTS:  No results found for: A1C    INR RESULTS:  Lab Results   Component Value Date    INR 1.10 11/30/2010    INR 0.92 09/21/2010           CC  Neeru Harley PABetoC  [address]  [place] [id number]

## 2020-08-27 NOTE — LETTER
8/27/2020    Gus Lanier MD  [address]  [facility] [address]    RE: Kamari Valentin       Dear Colleague,    I had the pleasure of seeing Kamari Valentin in the [facility].    HPI and Plan:     Mr. Ludwig Valentin comes in to structural clinic today with his wife and his daughter on the phone.  He is 80 is referred by Bertha Harley for severe aortic valve stenosis.  Ludwig is himself asymptomatic.  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  He is 80 denies any shortness of breath chest pain lightheadedness dizziness syncope near syncope me orthopnea pedal edema or fatigue.  His echocardiogram done showed normal ejection fraction with a gradient of 50 and aortic valve area 0.63.  He has mild aortic mitral insufficiency.    Patient's STS risk was 2.6%.  He has no other significant coronary disease disease history no history of CHF and microinfarction stroke.  He does have chronic persistent atrial fibrillation and is on oral anticoagulation.    Assessment: Patient is a good post TAVR as well as S AVR candidate.  Have outlined the risk and benefits of both approaches including the risk of pacemaker vascular complication stroke permanent pacemaker and death.  Patient prefers the transcatheter approach.  Given the fact that he is completely asymptomatic I think he is a good candidate for early TAVR.  We will have the research nurse contact him have him undergo stress testing and if he qualifies enroll him in early TAVR or if he fails a treadmill test have him undergo aortic valve replacement.  He will need a coronary angiogram (heart catheterization and TAVR CT scan prior.    Orders Placed This Encounter   Procedures     EKG 12-lead complete w/read - Clinics (performed today)     No orders of the defined types were placed in this encounter.    There are no discontinued medications.      Encounter Diagnosis   Name Primary?     Aortic valve stenosis,  etiology of cardiac valve disease unspecified        CURRENT MEDICATIONS:  Current Outpatient Medications   Medication Sig Dispense Refill     cloNIDINE (CATAPRES) 0.1 MG tablet Take 1 tablet by mouth daily.       hydrALAZINE (APRESOLINE) 25 MG tablet Take 25 mg by mouth 2 times daily.       hydrochlorothiazide (HYDRODIURIL) 25 MG tablet Take 1 tablet (25 mg) by mouth daily 90 tablet 0     losartan (COZAAR) 100 MG tablet Take 1 tablet (100 mg) by mouth daily 30 tablet 3     rivaroxaban ANTICOAGULANT (XARELTO ANTICOAGULANT) 20 MG TABS tablet Take 1 tablet (20 mg) by mouth daily 90 tablet 3     simvastatin (ZOCOR) 40 MG tablet Take 1 tablet by mouth daily.         ALLERGIES     Allergies   Allergen Reactions     Lisinopril        PAST MEDICAL HISTORY:  Past Medical History:   Diagnosis Date     Aortic stenosis     mild-moderate     Atrial fibrillation (H)     paroxysmal     Carotid stenosis     mild on right, moderate on left     Chronic renal insufficiency      Essential hypertension, benign      Hyperlipidaemia      Hypertrophy of prostate without urinary obstruction and other lower urinary tract symptoms (LUTS)      Rheumatic fever      Unspecified cerebral artery occlusion with cerebral infarction        PAST SURGICAL HISTORY:  Past Surgical History:   Procedure Laterality Date     ENT SURGERY      SEPTOPLASTY     ENT SURGERY      TONSILLECTOMY     HERNIA REPAIR       HERNIORRHAPHY INGUINAL  2/15/2012    Procedure:HERNIORRHAPHY INGUINAL; LEFT INGUINAL HERNIA REPAIR WITH MESH; Surgeon:SHILO READ; Location:[facility]     ORTHOPEDIC SURGERY  left ankle 2/2013    ORIF LEFT WRIST FRACTURE       FAMILY HISTORY:  Family History   Problem Relation Age of Onset     Respiratory Brother      Coronary Artery Disease Early Onset Mother      Abdominal Aortic Aneurysm Father        SOCIAL HISTORY:  Social History     Socioeconomic History     Marital status:      Spouse name: None     Number of children: None     Years of  education: None     Highest education level: None   Occupational History     None   Social Needs     Financial resource strain: None     Food insecurity     Worry: None     Inability: None     Transportation needs     Medical: None     Non-medical: None   Tobacco Use     Smoking status: Former Smoker     Years: 20.00     Types: Cigars, Pipe     Last attempt to quit: 2010     Years since quittin.9     Smokeless tobacco: Never Used   Substance and Sexual Activity     Alcohol use: No     Alcohol/week: 0.0 standard drinks     Frequency: Never     Drug use: No     Sexual activity: None   Lifestyle     Physical activity     Days per week: None     Minutes per session: None     Stress: None   Relationships     Social connections     Talks on phone: None     Gets together: None     Attends Oriental orthodox service: None     Active member of club or organization: None     Attends meetings of clubs or organizations: None     Relationship status: None     Intimate partner violence     Fear of current or ex partner: None     Emotionally abused: None     Physically abused: None     Forced sexual activity: None   Other Topics Concern     Parent/sibling w/ CABG, MI or angioplasty before 65F 55M? Not Asked   Social History Narrative     None       Review of Systems:  Skin:  Negative     Eyes:  Positive for glasses  ENT:  Negative    Respiratory:  Negative for shortness of breath;dyspnea on exertion;cough  Cardiovascular:  Negative for;palpitations;chest pain;edema;dizziness;lightheadedness;fatigue    Gastroenterology: Negative for melena;hematochezia  Genitourinary:  Negative    Musculoskeletal:  Negative    Neurologic:  Negative    Psychiatric:  Negative    Heme/Lymph/Imm:  Negative    Endocrine:  Negative      Physical Exam:  Vitals: /73   Pulse 62   Wt 81.9 kg (180 lb 9.6 oz)   BMI 28.29 kg/m      Constitutional:  cooperative, alert and oriented, well developed, well nourished, in no acute distress        Skin:  warm  and dry to the touch, no apparent skin lesions or masses noted          Head:  normocephalic, no masses or lesions        Eyes:  pupils equal and round;conjunctivae and lids unremarkable   wear eye glasses    Lymph:      ENT:  no pallor or cyanosis, dentition good        Neck:  JVP normal transmitted murmur      Respiratory:  normal breath sounds, clear to auscultation, normal A-P diameter, normal symmetry, normal respiratory excursion, no use of accessory muscles         Cardiac:   irregularly irregular rhythm     grade 3;late systolic murmur;radiation to the carotid     regular rhythm, normal heart sounds, III/VI systolic murmur over the aortic valve area    radial-femoral delay                                      GI:  abdomen soft        Extremities and Muscular Skeletal:  no deformities, clubbing, cyanosis, erythema observed;no edema              Neurological:  no gross motor deficits        Psych:  Alert and Oriented x 3      Recent Lab Results:  LIPID RESULTS:  Lab Results   Component Value Date    CHOL 130 02/03/2016    HDL 41 02/03/2016    LDL 75 02/03/2016    TRIG 70 02/03/2016    CHOLHDLRATIO 7.0 (H) 09/22/2010       LIVER ENZYME RESULTS:  Lab Results   Component Value Date    AST 22 09/27/2018    ALT 25 09/27/2018       CBC RESULTS:  Lab Results   Component Value Date    WBC 5.8 09/27/2018    RBC 4.65 09/27/2018    HGB 15.1 09/27/2018    HCT 43.9 09/27/2018    MCV 94 09/27/2018    MCH 32.5 09/27/2018    MCHC 34.4 09/27/2018    RDW 13.0 09/27/2018     09/27/2018       BMP RESULTS:  Lab Results   Component Value Date     09/27/2018    POTASSIUM 3.8 09/27/2018    CHLORIDE 106 09/27/2018    CO2 28 09/27/2018    ANIONGAP 6 09/27/2018     (H) 09/27/2018    BUN 20 09/27/2018    CR 0.99 09/27/2018    GFRESTIMATED 73 09/27/2018    GFRESTBLACK 88 09/27/2018    CLAY 9.2 09/27/2018        A1C RESULTS:  No results found for: A1C    INR RESULTS:  Lab Results   Component Value Date    INR 1.10  11/30/2010    INR 0.92 09/21/2010           CC  Neeru Harley PA-C  [address]  [place] [id number]                  Thank you for allowing me to participate in the care of your patient.      Sincerely,     MARIA T GALLARDO MD     [facility]    cc:   Neeru Harley PA-C  [address]  [place] [id number]

## 2020-08-27 NOTE — PROGRESS NOTES
Spoke with Gene and his wife along with their daughter about early TAVR trial. He would like to move ahead with the research for early TAVR. I reached out the Jolynn the research nurse and left his MRN and name. I let the family know that they will be hearing from her in the week with a plan for the treadmill. I also gave them my number so if they don't hear anything they can give me a call and I will follow up. No other questions at this time.Evan Mccormick RN

## 2020-08-27 NOTE — LETTER
8/27/2020    Gus Lanier MD  [address]  [facility] [address]    RE: Kamari Valentin       Dear Colleague,    I had the pleasure of seeing Kamari Valentin in the [facility].    HPI and Plan:     Mr. Ludwig Valentin comes in to structural clinic today with his wife and his daughter on the phone.  He is 80 is referred by Bertha Harley for severe aortic valve stenosis.  Ludwig is himself asymptomatic.  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  He is 80 denies any shortness of breath chest pain lightheadedness dizziness syncope near syncope me orthopnea pedal edema or fatigue.  His echocardiogram done showed normal ejection fraction with a gradient of 50 and aortic valve area 0.63.  He has mild aortic mitral insufficiency.    Patient's STS risk was 2.6%.  He has no other significant coronary disease disease history no history of CHF and microinfarction stroke.  He does have chronic persistent atrial fibrillation and is on oral anticoagulation.    Assessment: Patient is a good post TAVR as well as S AVR candidate.  Have outlined the risk and benefits of both approaches including the risk of pacemaker vascular complication stroke permanent pacemaker and death.  Patient prefers the transcatheter approach.  Given the fact that he is completely asymptomatic I think he is a good candidate for early TAVR.  We will have the research nurse contact him have him undergo stress testing and if he qualifies enroll him in early TAVR or if he fails a treadmill test have him undergo aortic valve replacement.  He will need a coronary angiogram (heart catheterization and TAVR CT scan prior.    Orders Placed This Encounter   Procedures     EKG 12-lead complete w/read - Clinics (performed today)     No orders of the defined types were placed in this encounter.    There are no discontinued medications.      Encounter Diagnosis   Name Primary?     Aortic valve stenosis,  etiology of cardiac valve disease unspecified        CURRENT MEDICATIONS:  Current Outpatient Medications   Medication Sig Dispense Refill     cloNIDINE (CATAPRES) 0.1 MG tablet Take 1 tablet by mouth daily.       hydrALAZINE (APRESOLINE) 25 MG tablet Take 25 mg by mouth 2 times daily.       hydrochlorothiazide (HYDRODIURIL) 25 MG tablet Take 1 tablet (25 mg) by mouth daily 90 tablet 0     losartan (COZAAR) 100 MG tablet Take 1 tablet (100 mg) by mouth daily 30 tablet 3     rivaroxaban ANTICOAGULANT (XARELTO ANTICOAGULANT) 20 MG TABS tablet Take 1 tablet (20 mg) by mouth daily 90 tablet 3     simvastatin (ZOCOR) 40 MG tablet Take 1 tablet by mouth daily.         ALLERGIES     Allergies   Allergen Reactions     Lisinopril        PAST MEDICAL HISTORY:  Past Medical History:   Diagnosis Date     Aortic stenosis     mild-moderate     Atrial fibrillation (H)     paroxysmal     Carotid stenosis     mild on right, moderate on left     Chronic renal insufficiency      Essential hypertension, benign      Hyperlipidaemia      Hypertrophy of prostate without urinary obstruction and other lower urinary tract symptoms (LUTS)      Rheumatic fever      Unspecified cerebral artery occlusion with cerebral infarction        PAST SURGICAL HISTORY:  Past Surgical History:   Procedure Laterality Date     ENT SURGERY      SEPTOPLASTY     ENT SURGERY      TONSILLECTOMY     HERNIA REPAIR       HERNIORRHAPHY INGUINAL  2/15/2012    Procedure:HERNIORRHAPHY INGUINAL; LEFT INGUINAL HERNIA REPAIR WITH MESH; Surgeon:SHILO READ; Location:[facility]     ORTHOPEDIC SURGERY  left ankle 2/2013    ORIF LEFT WRIST FRACTURE       FAMILY HISTORY:  Family History   Problem Relation Age of Onset     Respiratory Brother      Coronary Artery Disease Early Onset Mother      Abdominal Aortic Aneurysm Father        SOCIAL HISTORY:  Social History     Socioeconomic History     Marital status:      Spouse name: None     Number of children: None     Years of  education: None     Highest education level: None   Occupational History     None   Social Needs     Financial resource strain: None     Food insecurity     Worry: None     Inability: None     Transportation needs     Medical: None     Non-medical: None   Tobacco Use     Smoking status: Former Smoker     Years: 20.00     Types: Cigars, Pipe     Last attempt to quit: 2010     Years since quittin.9     Smokeless tobacco: Never Used   Substance and Sexual Activity     Alcohol use: No     Alcohol/week: 0.0 standard drinks     Frequency: Never     Drug use: No     Sexual activity: None   Lifestyle     Physical activity     Days per week: None     Minutes per session: None     Stress: None   Relationships     Social connections     Talks on phone: None     Gets together: None     Attends Zoroastrianism service: None     Active member of club or organization: None     Attends meetings of clubs or organizations: None     Relationship status: None     Intimate partner violence     Fear of current or ex partner: None     Emotionally abused: None     Physically abused: None     Forced sexual activity: None   Other Topics Concern     Parent/sibling w/ CABG, MI or angioplasty before 65F 55M? Not Asked   Social History Narrative     None       Review of Systems:  Skin:  Negative     Eyes:  Positive for glasses  ENT:  Negative    Respiratory:  Negative for shortness of breath;dyspnea on exertion;cough  Cardiovascular:  Negative for;palpitations;chest pain;edema;dizziness;lightheadedness;fatigue    Gastroenterology: Negative for melena;hematochezia  Genitourinary:  Negative    Musculoskeletal:  Negative    Neurologic:  Negative    Psychiatric:  Negative    Heme/Lymph/Imm:  Negative    Endocrine:  Negative      Physical Exam:  Vitals: /73   Pulse 62   Wt 81.9 kg (180 lb 9.6 oz)   BMI 28.29 kg/m      Constitutional:  cooperative, alert and oriented, well developed, well nourished, in no acute distress        Skin:  warm  and dry to the touch, no apparent skin lesions or masses noted          Head:  normocephalic, no masses or lesions        Eyes:  pupils equal and round;conjunctivae and lids unremarkable   wear eye glasses    Lymph:      ENT:  no pallor or cyanosis, dentition good        Neck:  JVP normal transmitted murmur      Respiratory:  normal breath sounds, clear to auscultation, normal A-P diameter, normal symmetry, normal respiratory excursion, no use of accessory muscles         Cardiac:   irregularly irregular rhythm     grade 3;late systolic murmur;radiation to the carotid     regular rhythm, normal heart sounds, III/VI systolic murmur over the aortic valve area    radial-femoral delay                                      GI:  abdomen soft        Extremities and Muscular Skeletal:  no deformities, clubbing, cyanosis, erythema observed;no edema              Neurological:  no gross motor deficits        Psych:  Alert and Oriented x 3      Recent Lab Results:  LIPID RESULTS:  Lab Results   Component Value Date    CHOL 130 02/03/2016    HDL 41 02/03/2016    LDL 75 02/03/2016    TRIG 70 02/03/2016    CHOLHDLRATIO 7.0 (H) 09/22/2010       LIVER ENZYME RESULTS:  Lab Results   Component Value Date    AST 22 09/27/2018    ALT 25 09/27/2018       CBC RESULTS:  Lab Results   Component Value Date    WBC 5.8 09/27/2018    RBC 4.65 09/27/2018    HGB 15.1 09/27/2018    HCT 43.9 09/27/2018    MCV 94 09/27/2018    MCH 32.5 09/27/2018    MCHC 34.4 09/27/2018    RDW 13.0 09/27/2018     09/27/2018       BMP RESULTS:  Lab Results   Component Value Date     09/27/2018    POTASSIUM 3.8 09/27/2018    CHLORIDE 106 09/27/2018    CO2 28 09/27/2018    ANIONGAP 6 09/27/2018     (H) 09/27/2018    BUN 20 09/27/2018    CR 0.99 09/27/2018    GFRESTIMATED 73 09/27/2018    GFRESTBLACK 88 09/27/2018    CLAY 9.2 09/27/2018        A1C RESULTS:  No results found for: A1C    INR RESULTS:  Lab Results   Component Value Date    INR 1.10  11/30/2010    INR 0.92 09/21/2010         Thank you for allowing me to participate in the care of your patient.    Sincerely,     MARIA T GALLARDO MD     [facility]

## 2020-09-01 ENCOUNTER — CARE COORDINATION (OUTPATIENT)
Dept: CARDIOLOGY | Facility: CLINIC | Age: 80
End: 2020-09-01

## 2020-09-01 DIAGNOSIS — I35.0 AORTIC VALVE STENOSIS, ETIOLOGY OF CARDIAC VALVE DISEASE UNSPECIFIED: Primary | ICD-10-CM

## 2020-09-01 NOTE — PROGRESS NOTES
I reached out to Gene to introduce myself and our research team and to briefly go over some of what to expect with his involvement in the study.  I let him know that someone would be contacting him this week to arrange a TAVR CT scan and that we could go over the study in more detail when he comes in for that appointment.  He did mention to me that he is nursing a sprained ankle, so we would likely need to wait a bit for his recovery to schedule a stress test.  He has my number in case of any questions:      Haleigh Ferraro, RN, BSN  Cardiology Research Nurse Clinician  [facility] / [address] [place] [address], [address]  [id number]  [email or web address]  Cell: [phone]

## 2020-09-15 DIAGNOSIS — I35.0 AORTIC STENOSIS, SEVERE: Primary | ICD-10-CM

## 2020-09-17 ENCOUNTER — HOSPITAL ENCOUNTER (OUTPATIENT)
Dept: CARDIOLOGY | Facility: CLINIC | Age: 80
Discharge: HOME | End: 2020-09-17
Attending: INTERNAL MEDICINE | Admitting: INTERNAL MEDICINE
Payer: COMMERCIAL

## 2020-09-17 VITALS — HEART RATE: 70 BPM | DIASTOLIC BLOOD PRESSURE: 69 MMHG | SYSTOLIC BLOOD PRESSURE: 130 MMHG

## 2020-09-17 DIAGNOSIS — I35.0 AORTIC VALVE STENOSIS, ETIOLOGY OF CARDIAC VALVE DISEASE UNSPECIFIED: ICD-10-CM

## 2020-09-17 LAB
CREAT BLD-MCNC: 1.3 MG/DL (ref 0.66–1.25)
GFR SERPL CREATININE-BSD FRML MDRD: 53 ML/MIN/{1.73_M2}

## 2020-09-17 PROCEDURE — 71275 CT ANGIOGRAPHY CHEST: CPT | Mod: 26 | Performed by: INTERNAL MEDICINE

## 2020-09-17 PROCEDURE — 82565 ASSAY OF CREATININE: CPT | Performed by: INTERNAL MEDICINE

## 2020-09-17 PROCEDURE — 74174 CTA ABD&PLVS W/CONTRAST: CPT

## 2020-09-17 PROCEDURE — 74174 CTA ABD&PLVS W/CONTRAST: CPT | Mod: 26 | Performed by: INTERNAL MEDICINE

## 2020-09-17 PROCEDURE — 25800030 ZZH RX IP 258 OP 636: Performed by: INTERNAL MEDICINE

## 2020-09-17 PROCEDURE — 25000128 H RX IP 250 OP 636: Performed by: INTERNAL MEDICINE

## 2020-09-17 RX ORDER — DIPHENHYDRAMINE HCL 25 MG
25 CAPSULE ORAL
Status: DISCONTINUED | OUTPATIENT
Start: 2020-09-17 | End: 2020-09-18 | Stop reason: HOSPADM

## 2020-09-17 RX ORDER — METHYLPREDNISOLONE SODIUM SUCCINATE 125 MG/2ML
125 INJECTION, POWDER, LYOPHILIZED, FOR SOLUTION INTRAMUSCULAR; INTRAVENOUS
Status: DISCONTINUED | OUTPATIENT
Start: 2020-09-17 | End: 2020-09-18 | Stop reason: HOSPADM

## 2020-09-17 RX ORDER — ACYCLOVIR 200 MG/1
0-1 CAPSULE ORAL
Status: DISCONTINUED | OUTPATIENT
Start: 2020-09-17 | End: 2020-09-18 | Stop reason: HOSPADM

## 2020-09-17 RX ORDER — ONDANSETRON 2 MG/ML
4 INJECTION INTRAMUSCULAR; INTRAVENOUS
Status: DISCONTINUED | OUTPATIENT
Start: 2020-09-17 | End: 2020-09-18 | Stop reason: HOSPADM

## 2020-09-17 RX ORDER — DIPHENHYDRAMINE HYDROCHLORIDE 50 MG/ML
25-50 INJECTION INTRAMUSCULAR; INTRAVENOUS
Status: DISCONTINUED | OUTPATIENT
Start: 2020-09-17 | End: 2020-09-18 | Stop reason: HOSPADM

## 2020-09-17 RX ORDER — IOPAMIDOL 755 MG/ML
500 INJECTION, SOLUTION INTRAVASCULAR ONCE
Status: COMPLETED | OUTPATIENT
Start: 2020-09-17 | End: 2020-09-17

## 2020-09-17 RX ADMIN — IOPAMIDOL 115 ML: 755 INJECTION, SOLUTION INTRAVENOUS at 14:05

## 2020-09-17 RX ADMIN — SODIUM CHLORIDE 100 ML: 9 INJECTION, SOLUTION INTRAVENOUS at 14:05

## 2020-09-22 ENCOUNTER — CARE COORDINATION (OUTPATIENT)
Dept: CARDIOLOGY | Facility: CLINIC | Age: 80
End: 2020-09-22

## 2020-09-22 DIAGNOSIS — I35.0 AORTIC STENOSIS, SEVERE: Primary | ICD-10-CM

## 2020-09-22 NOTE — PROGRESS NOTES
Dr. Fabian and I spoke with Kamari more about the Early TAVR Research Study and Kamari has decided to participate.  He does however have a sprained ankle that he is nursing at the moment so we will bump his Stress Test out to the week of October 16th.  We will get consent signed at that time and will try to coordinate a visit with one of our surgeons along with a repeat echo (as his current echo will be outdated by that time).  Please call me if you have any questions about the care coordination of Kamari.    Haleigh Ferraro, RN, BSN  Cardiology Research Nurse Clinician  [facility] / [address] [place] [address], [address]  [address]  [email or web address]  Cell:  [phone]

## 2020-10-12 ENCOUNTER — HOSPITAL ENCOUNTER (OUTPATIENT)
Dept: CARDIOLOGY | Facility: CLINIC | Age: 80
End: 2020-10-12
Attending: INTERNAL MEDICINE
Payer: COMMERCIAL

## 2020-10-12 DIAGNOSIS — I35.0 AORTIC STENOSIS, SEVERE: ICD-10-CM

## 2020-10-12 PROCEDURE — 93306 TTE W/DOPPLER COMPLETE: CPT | Mod: 26 | Performed by: INTERNAL MEDICINE

## 2020-10-12 PROCEDURE — 255N000002 HC RX 255 OP 636: Performed by: INTERNAL MEDICINE

## 2020-10-12 PROCEDURE — 93306 TTE W/DOPPLER COMPLETE: CPT

## 2020-10-12 RX ADMIN — HUMAN ALBUMIN MICROSPHERES AND PERFLUTREN 5 ML: 10; .22 INJECTION, SOLUTION INTRAVENOUS at 12:30

## 2020-10-12 NOTE — PROGRESS NOTES
Kamari's stress test was canceled today due to high blood pressure and has been resceduled for  tomorrow at 3pm.  Dr. Fabian would like him to take 200 mg of Losartan before he arrives for his test.  I will go over this with Kamari and his family.

## 2020-10-13 ENCOUNTER — RESEARCH ENCOUNTER (OUTPATIENT)
Dept: CARDIOLOGY | Facility: CLINIC | Age: 80
End: 2020-10-13

## 2020-10-13 ENCOUNTER — HOSPITAL ENCOUNTER (OUTPATIENT)
Dept: CARDIOLOGY | Facility: CLINIC | Age: 80
Discharge: HOME | End: 2020-10-13
Attending: INTERNAL MEDICINE | Admitting: INTERNAL MEDICINE
Payer: COMMERCIAL

## 2020-10-13 DIAGNOSIS — I35.0 AORTIC STENOSIS, SEVERE: ICD-10-CM

## 2020-10-13 PROCEDURE — 93017 CV STRESS TEST TRACING ONLY: CPT

## 2020-10-13 PROCEDURE — 93016 CV STRESS TEST SUPVJ ONLY: CPT | Performed by: INTERNAL MEDICINE

## 2020-10-13 PROCEDURE — 93018 CV STRESS TEST I&R ONLY: CPT | Performed by: INTERNAL MEDICINE

## 2020-10-13 NOTE — PROGRESS NOTES
Research Consent Note:  IRB# [id number]   PI: Ruddy Fabian MD at [phone]   : Haleigh Ferraro RN at [phone]      Trial Name: Early TAVR: Evaluation of Transcatheter Aortic Valve Replacement compared to Surveillance for Patients with Asymptomatic severe Aortic Stenosis  Estimated duration of study: 5-10 years      Met the patient in the [facility] at  10:30 yesterday morning for a preplanned consent visit and discussion of the Early TAVR trial. During the clinic visit he, his wife Bonnie and his daughter Rachael met with me, nurse coordinator and Jacque Leon, research associate.  They requested a discussion for possible participation in the above study.  The joint decision making document was referred to for points of consent discussion to be completed.  The current IRB approved consent form was reviewed and discussed at length with the patient and wife and daughter. This included purpose, research hypothesis, nature of the research, risks & benefits, and procedures required for screening, enrollment, randomization as well as all required follow up. Randomization arms (study device- ELENO 3 Valve versus clinical surveillance) were explained in detail by myself the nurse coordinator and research associate Jacque Leon. In addition the Registry was discussed for symptomatic patients during the screening treadmill.  Confidentiality issues, compensation for injury, and alternative procedures available were also explained. The joint decision making checklist provided by study sponsor was reviewed and signed by subject. Subject was informed that participation is voluntary and that refusal to participate will involve no penalty or decrease benefits to which the subject is otherwise entitled. Patient had the opportunity to read the entire written consent, ask questions and express concerns and offered sufficient time to consider the research trial. Patient was able to clearly state what  study participation involved and the associated requirements. All questions and concerns were addressed. Patient voluntarily signed the consent and HIPAA forms on [id number] @ 11:20am prior to any research required tests/procedures and was given a copy of the signed forms.  Subject verbalizes understanding that his participation is voluntary and may withdraw participation at any time and that, although he signed the consent for this study, it may be determined, during the screening process, that he may not be a suitable candidate for this trial.  .  Consent version: 6- Signed on 10-  Hippa version: 8-  Signed on 10-

## 2020-10-14 ENCOUNTER — VIRTUAL VISIT (OUTPATIENT)
Dept: CARDIOLOGY | Facility: CLINIC | Age: 80
End: 2020-10-14
Attending: SURGERY
Payer: COMMERCIAL

## 2020-10-14 DIAGNOSIS — I35.0 AORTIC STENOSIS, SEVERE: Primary | ICD-10-CM

## 2020-10-14 DIAGNOSIS — I35.0 AORTIC VALVE STENOSIS, ETIOLOGY OF CARDIAC VALVE DISEASE UNSPECIFIED: Primary | ICD-10-CM

## 2020-10-14 DIAGNOSIS — Z11.59 ENCOUNTER FOR SCREENING FOR OTHER VIRAL DISEASES: Primary | ICD-10-CM

## 2020-10-14 PROCEDURE — 99207 PR NON-BILLABLE SERV PER CHARTING: CPT | Mod: 95 | Performed by: SURGERY

## 2020-10-14 NOTE — LETTER
"10/14/2020      RE: Kamari Valentin  [address]  [facility] [address]       Dear Colleague,    Thank you for the opportunity to participate in the care of your patient, Kamari Valentin, at the [facility]. Please see a copy of my visit note below.    Kamari Valentin is a 80 year old male who is being evaluated via a billable telephone visit.      The patient has been notified of following:     \"This telephone visit will be conducted via a call between you and your physician/provider. We have found that certain health care needs can be provided without the need for a physical exam.  This service lets us provide the care you need with a short phone conversation.  If a prescription is necessary we can send it directly to your pharmacy.  If lab work is needed we can place an order for that and you can then stop by our lab to have the test done at a later time.    Telephone visits are billed at different rates depending on your insurance coverage. During this emergency period, for some insurers they may be billed the same as an in-person visit.  Please reach out to your insurance provider with any questions.    If during the course of the call the physician/provider feels a telephone visit is not appropriate, you will not be charged for this service.\"    Patient has given verbal consent for Telephone visit?  Yes    What phone number would you like to be contacted at? [phone]    How would you like to obtain your AVS? Mail a copy        Chief Complaint:   Asymptomatic- follows with Cards EP/Structural heart.      History is obtained from the patient          History of Present Illness:   This patient is a 80 year old male who follows with cards EP for persistent a.fib on xarelto, known aortic stenosis. Asymptomatic.               Past Medical History:      Past Medical History        Past Medical History:   Diagnosis Date     " Aortic stenosis       mild-moderate     Atrial fibrillation (H)       paroxysmal     Carotid stenosis       mild on right, moderate on left     Chronic renal insufficiency       Essential hypertension, benign       Hyperlipidaemia       Hypertrophy of prostate without urinary obstruction and other lower urinary tract symptoms (LUTS)       Rheumatic fever       Unspecified cerebral artery occlusion with cerebral infarction                   Past Surgical History:      Past Surgical History         Past Surgical History:   Procedure Laterality Date     ENT SURGERY         SEPTOPLASTY     ENT SURGERY         TONSILLECTOMY     HERNIA REPAIR         HERNIORRHAPHY INGUINAL   2/15/2012     Procedure:HERNIORRHAPHY INGUINAL; LEFT INGUINAL HERNIA REPAIR WITH MESH; Surgeon:SHILO READ; Location:[facility]     ORTHOPEDIC SURGERY   left ankle 2/2013     ORIF LEFT WRIST FRACTURE                   Social History:      Social History            Tobacco Use     Smoking status: Former Smoker       Years: 20.00       Types: Cigars, Pipe       Quit date: 9/1/2010       Years since quitting: 10.1     Smokeless tobacco: Never Used   Substance Use Topics     Alcohol use: No       Alcohol/week: 0.0 standard drinks       Frequency: Never              Family History:      Family History         Family History   Problem Relation Age of Onset     Respiratory Brother       Coronary Artery Disease Early Onset Mother       Abdominal Aortic Aneurysm Father                   Immunizations:      There is no immunization history on file for this patient.          Allergies:           Allergies   Allergen Reactions     Lisinopril                Medications:      Prescriptions Prior to Admission           Medications Prior to Admission   Medication Sig Dispense Refill Last Dose     cloNIDINE (CATAPRES) 0.1 MG tablet Take 1 tablet by mouth daily.     10/22/2020 at Unknown time     hydrALAZINE (APRESOLINE) 25 MG tablet Take 25 mg by mouth 2 times daily.      10/22/2020 at Unknown time     hydrochlorothiazide (HYDRODIURIL) 25 MG tablet Take 1 tablet (25 mg) by mouth daily 90 tablet 0 10/21/2020 at Unknown time     losartan (COZAAR) 100 MG tablet Take 1 tablet (100 mg) by mouth daily 30 tablet 3 10/22/2020 at Unknown time     simvastatin (ZOCOR) 40 MG tablet Take 1 tablet by mouth daily.     10/21/2020 at Unknown time     rivaroxaban ANTICOAGULANT (XARELTO ANTICOAGULANT) 20 MG TABS tablet Take 1 tablet (20 mg) by mouth daily 90 tablet 3 10/19/2020                     Review of Systems:    10 point review of systems is normal.          Physical Exam:   Physical exam not done as this was a phone visit that occurred during a global pandemic.    Echo: Interpretation Summary  Rhythm is atrial fibrillation throughout the study.     Left ventricular function is normal.The EF is 60-65%.  The right ventricle is normal in size and function.  Severe AS. Vmax 4.3m/sec, Mean gradient 45mmHg, KARYNA 0.50cm2 at LVOT 2.1cm, DVI  0.14. Mild aortic insufficiency is present.  Moderate mitral annular calcification is present. Mean gradient 3mmHg at  77bpm. Mild MR.  No pericardial effusion is present.     This study was compared with the study from 7/9/20. There has been no  significant change.          Assessment and Plan:   Patient being considered for TAVR. Will need coronary angio to determine his candidacy.        Please do not hesitate to contact me if you have any questions/concerns.     Sincerely,     Diego Wooten MD

## 2020-10-14 NOTE — PROGRESS NOTES
"Kamari Valentin is a 80 year old male who is being evaluated via a billable telephone visit.      The patient has been notified of following:     \"This telephone visit will be conducted via a call between you and your physician/provider. We have found that certain health care needs can be provided without the need for a physical exam.  This service lets us provide the care you need with a short phone conversation.  If a prescription is necessary we can send it directly to your pharmacy.  If lab work is needed we can place an order for that and you can then stop by our lab to have the test done at a later time.    Telephone visits are billed at different rates depending on your insurance coverage. During this emergency period, for some insurers they may be billed the same as an in-person visit.  Please reach out to your insurance provider with any questions.    If during the course of the call the physician/provider feels a telephone visit is not appropriate, you will not be charged for this service.\"    Patient has given verbal consent for Telephone visit?  Yes    What phone number would you like to be contacted at? [phone]    How would you like to obtain your AVS? Mail a copy        Chief Complaint:   Asymptomatic- follows with Cards EP/Structural heart.      History is obtained from the patient          History of Present Illness:   This patient is a 80 year old male who follows with cards EP for persistent a.fib on xarelto, known aortic stenosis. Asymptomatic.               Past Medical History:      Past Medical History        Past Medical History:   Diagnosis Date     Aortic stenosis       mild-moderate     Atrial fibrillation (H)       paroxysmal     Carotid stenosis       mild on right, moderate on left     Chronic renal insufficiency       Essential hypertension, benign       Hyperlipidaemia       Hypertrophy of prostate without urinary obstruction and other lower urinary tract symptoms (LUTS)       " Rheumatic fever       Unspecified cerebral artery occlusion with cerebral infarction                   Past Surgical History:      Past Surgical History         Past Surgical History:   Procedure Laterality Date     ENT SURGERY         SEPTOPLASTY     ENT SURGERY         TONSILLECTOMY     HERNIA REPAIR         HERNIORRHAPHY INGUINAL   2/15/2012     Procedure:HERNIORRHAPHY INGUINAL; LEFT INGUINAL HERNIA REPAIR WITH MESH; Surgeon:SHILO READ; Location:[facility]     ORTHOPEDIC SURGERY   left ankle 2/2013     ORIF LEFT WRIST FRACTURE                   Social History:      Social History            Tobacco Use     Smoking status: Former Smoker       Years: 20.00       Types: Cigars, Pipe       Quit date: 9/1/2010       Years since quitting: 10.1     Smokeless tobacco: Never Used   Substance Use Topics     Alcohol use: No       Alcohol/week: 0.0 standard drinks       Frequency: Never              Family History:      Family History         Family History   Problem Relation Age of Onset     Respiratory Brother       Coronary Artery Disease Early Onset Mother       Abdominal Aortic Aneurysm Father                   Immunizations:      There is no immunization history on file for this patient.          Allergies:           Allergies   Allergen Reactions     Lisinopril                Medications:      Prescriptions Prior to Admission           Medications Prior to Admission   Medication Sig Dispense Refill Last Dose     cloNIDINE (CATAPRES) 0.1 MG tablet Take 1 tablet by mouth daily.     10/22/2020 at Unknown time     hydrALAZINE (APRESOLINE) 25 MG tablet Take 25 mg by mouth 2 times daily.     10/22/2020 at Unknown time     hydrochlorothiazide (HYDRODIURIL) 25 MG tablet Take 1 tablet (25 mg) by mouth daily 90 tablet 0 10/21/2020 at Unknown time     losartan (COZAAR) 100 MG tablet Take 1 tablet (100 mg) by mouth daily 30 tablet 3 10/22/2020 at Unknown time     simvastatin (ZOCOR) 40 MG tablet Take 1 tablet by mouth daily.      10/21/2020 at Unknown time     rivaroxaban ANTICOAGULANT (XARELTO ANTICOAGULANT) 20 MG TABS tablet Take 1 tablet (20 mg) by mouth daily 90 tablet 3 10/19/2020                     Review of Systems:    10 point review of systems is normal.          Physical Exam:   Physical exam not done as this was a phone visit that occurred during a global pandemic.    Echo: Interpretation Summary  Rhythm is atrial fibrillation throughout the study.     Left ventricular function is normal.The EF is 60-65%.  The right ventricle is normal in size and function.  Severe AS. Vmax 4.3m/sec, Mean gradient 45mmHg, KARYNA 0.50cm2 at LVOT 2.1cm, DVI  0.14. Mild aortic insufficiency is present.  Moderate mitral annular calcification is present. Mean gradient 3mmHg at  77bpm. Mild MR.  No pericardial effusion is present.     This study was compared with the study from 7/9/20. There has been no  significant change.          Assessment and Plan:   Patient being considered for TAVR. Will need coronary angio to determine his candidacy.

## 2020-10-15 ENCOUNTER — RESEARCH ENCOUNTER (OUTPATIENT)
Dept: CARDIOLOGY | Facility: CLINIC | Age: 80
End: 2020-10-15

## 2020-10-15 ENCOUNTER — OFFICE VISIT (OUTPATIENT)
Dept: CARDIOLOGY | Facility: CLINIC | Age: 80
End: 2020-10-15
Payer: COMMERCIAL

## 2020-10-15 VITALS
WEIGHT: 171.5 LBS | DIASTOLIC BLOOD PRESSURE: 78 MMHG | SYSTOLIC BLOOD PRESSURE: 122 MMHG | BODY MASS INDEX: 26.86 KG/M2 | OXYGEN SATURATION: 97 % | HEART RATE: 82 BPM

## 2020-10-15 DIAGNOSIS — E78.2 MIXED HYPERLIPIDEMIA: ICD-10-CM

## 2020-10-15 DIAGNOSIS — I10 ESSENTIAL HYPERTENSION, BENIGN: ICD-10-CM

## 2020-10-15 DIAGNOSIS — I35.0 SEVERE AORTIC STENOSIS: Primary | ICD-10-CM

## 2020-10-15 DIAGNOSIS — I63.9 CEREBROVASCULAR ACCIDENT (CVA), UNSPECIFIED MECHANISM (H): ICD-10-CM

## 2020-10-15 DIAGNOSIS — I35.0 AORTIC VALVE STENOSIS, ETIOLOGY OF CARDIAC VALVE DISEASE UNSPECIFIED: Primary | ICD-10-CM

## 2020-10-15 DIAGNOSIS — I48.20 CHRONIC ATRIAL FIBRILLATION (H): ICD-10-CM

## 2020-10-15 PROCEDURE — 99214 OFFICE O/P EST MOD 30 MIN: CPT | Performed by: NURSE PRACTITIONER

## 2020-10-15 RX ORDER — SODIUM CHLORIDE 9 MG/ML
INJECTION, SOLUTION INTRAVENOUS CONTINUOUS
Status: CANCELLED | OUTPATIENT
Start: 2020-10-15

## 2020-10-15 RX ORDER — POTASSIUM CHLORIDE 1500 MG/1
20 TABLET, EXTENDED RELEASE ORAL
Status: CANCELLED | OUTPATIENT
Start: 2020-10-15

## 2020-10-15 RX ORDER — LIDOCAINE 40 MG/G
CREAM TOPICAL
Status: CANCELLED | OUTPATIENT
Start: 2020-10-15

## 2020-10-15 NOTE — PATIENT INSTRUCTIONS
Patient is enrolled in Early TAVR.  Patient is asymptomatic and underwent a screening treadmill.  Patient had no symptoms and normal BP response.  Although he did not make the required METS he was limited by is leg and ambulatory ability and not cardiovascular limitations.  Therefore I believe he still should continue in the Early TAVR trial.  Discussed with research personal.      Ruddy Fabian,

## 2020-10-15 NOTE — PATIENT INSTRUCTIONS
Thanks for participating in a office visit with the [facility] today.    Reviewed recommendation for coronary angiogram for completion of TAVR workup.   Risks and benefits of angiogram reviewed with verbal understanding.   Hold Xarelto 3 days prior to procedure.   Hold hydrochlorothiazide on the day of procedure  Ok to take AM medications on the morning of procedure   Start full dose aspirin 325 mg on the evening prior to procedure and morning of.   Follow further instructions as provided by valve coordinator.     ANGIOGRAM  [facility]   [place], [place]   Contact [facility] if needed at [phone], Option#2 or [phone].      1. In preparation for your procedure, we require that you do the following:    a. Nothing to eat or drink after midnight if your procedure is before 12 noon.  (If your procedure is scheduled after 12 noon, you may have a clear liquid  breakfast before 8:00am, then nothing else to eat or drink. See list below.)     b. Take your usual morning medications with a small sip of water on the day of your procedure unless you are on the following medications:    Aspirin:  o If you currently take Aspirin, continue taking your same dose.  o If you do not take Aspirin, take 325mg of Aspirin the day before and the morning of the procedure.    Coumadin or Warfarin:  o Stop Coumadin or Warfarin on:  __________________________    Pradaxa or Xarelto:  o Stop Pradaxa or Xarelto on:  ______________________________    Diabetic:  o If you take Glucophage (metformin) do not take the morning of the procedure or for 2 days after the procedure. Contact your Primary Care MD regarding glucose control for the days you do not take Glucophage.  o If you are on other oral diabetic medications do not take on the morning of the procedure.  o If you take Insulin contact your Primary Care MD for the recommended dosage to take the morning  "of the procedure.    Diuretic (Water Pill):  o If you take a diuretic (water pill), do not take the morning of the procedure.    c. If you have an allergy to dye, please contact the [facility] office 4 days before your procedure at [phone] option 2, and ask for a nurse.    2. You will be unable to drive after your procedure; please arrange to have someone drive you home the day of your procedure. You will also need to make sure that there is a responsible adult with you for 24 hours after your procedure. Your procedure will be cancelled if you do not have transportation home or someone to stay with you for 24 hrs.     3. Your procedure will be done at [facility]. Please park in the  [address] Ramp  on the west side of [facility] on [address]. Take the skyway over [facility] to the hospital. Please check in on the first floor, \"Skyway Welcome Desk\" which is one floor down from the skyway level.     4. If you have any questions about your upcoming procedure, please contact the nurse at [facility] at [phone] or the nurse at [facility] at [phone], option#2.  02/20/2013 S17          Please call my nurse at [phone] with any questions or concerns.    Scheduling phone number: [phone]  Reminder: Please bring in all current medications, over the counter supplements and vitamin bottles to your next appointment.        "

## 2020-10-15 NOTE — LETTER
10/15/2020    Gus Lanier MD  [address]  [facility] [address]    RE: Kamari Valentin       Dear Colleague,    I had the pleasure of seeing Kamair Valentin in the [facility].    anCardiology Clinic Progress Note  Kamari Valentin MRN# [id number]   YOB: 1940 Age: 80 year old     Reason For Visit: H&P for right and left heart catheterization   Primary Cardiologist:   Dr. Fabian/ Dr. Curtis          History of Presenting Illness:      Kamari Valentin is a pleasant 80 year old patient who carries a past medical history significant for persistent atrial fibrillation on chronic anticoagulation, hypertension, CVA, carotid disease, and severe aortic stenosis.    On 8/27/2020 he was evaluated in our structural heart clinic for consideration of possible TAVR.  Echocardiogram in July, showed normal ejection fraction estimated at 60 to 65%, a severely dilated left atrium, mild tricuspid and mitral regurgitation, mild aortic regurgitation, and severe aortic stenosis with a mean AOV pressure gradient of 50.6 mmHg, and aortic valve area of 0.60 cm .  Due to the fact he was asymptomatic, he was recommended for evaluation and workup for early TAVR.  TAVR CT demonstrated an aortic valve calcium score of 2733. Carotid US (9/2019) showed < 50% JARON stenosis and 50-69% stenosis to the LICA.  A follow-up echocardiogram showed a normal ejection fraction, normal RV size and function, severe AS with a V-max of 4.3 m/s, mean gradient of 45 mmHg, aortic valve area of 0.50 cm , and mild MR.  He recently underwent an exercise stress test.  He was unable to meet the required METS as he was limited by his leg and ambulatory ability and not cardiovascular limitations.  He has been recommended for a right and left heart catheterization to complete the TAVR work-up.  He presents today, accompanied by his wife, for H&P.     He is feeling well on a cardiac standpoint, denies chest pain, shortness  of breath, PND, orthopnea, presyncope, syncope, edema, heart racing, or palpitations.  His primary complaint is left leg and arm weakness, residual from his CVA.  Upon exam, lungs are clear bilaterally, rhythm is irregular with regular rate, audible systolic murmur, no neck vein distention, abdominal distention, or lower extremity edema.    Blood pressure is well controlled at 122/78, heart rate 82, managed on hydralazine, clonidine, hydrochlorothiazide, and losartan.  Atrial fibrillation is controlled without AV reshma blocking agents and anticoagulated on Xarelto.  BMI 26.86    Activity is primarily sedentary  Remains compliant with all medications.                   Assessment and Plan:       1.  Severe aortic stenosis -asymptomatic. Echocardiogram showed a normal ejection fraction, normal RV size and function, severe AS with a V-max of 4.3 m/s, mean gradient of 45 mmHg, aortic valve area of 0.50 cm , and mild MR. Recommended he undergo work-up for early TAVR trial.  He is scheduled for a right and left heart catheterization on 10/22/2020. Risks and benefits of right and left heart catheterization were discussed today including, bleeding, bruising, infection, allergic reaction, kidney damage (including need for dialysis), stroke, heart attack, vascular damage, emergency open heart surgery, up to and including death.  Patient and his wife indicate understanding and is agreeable to proceed.  Hold Xarelto beginning 10/19/2020, take full dose aspirin 325 mg on the evening prior to procedure and  the morning of.  Hold hydrochlorothiazide on the day of procedure.  N.p.o. after midnight on the evening prior to procedure.  Okay to take a.m. medications on the morning of procedure with a small sip of water.  Covid testing as scheduled.  Labs will be done on the day of procedure.    2.  Atrial fibrillation -rate controlled on no AV reshma blocking agents.  Anticoagulated with Xarelto (scheduled to hold 3 days prior to  procedure)    3. CVA/ carotid disease - Carotid US (9/2019) showed < 50% JARON stenosis and 50-69% stenosis to the LICA. On statin and anticoagulation.     4. Hypertension - Well controlled on current medical therapy.              Thank you for allowing me to participate in this delightful patient's care.        Chelsi Faith, APRN CNP         Review of Systems:     Review of Systems:  Skin:  Negative     Eyes:  Positive for glasses  ENT:  Negative    Respiratory:  Negative for shortness of breath;dyspnea on exertion;cough  Cardiovascular:  Negative for;palpitations;chest pain;edema;dizziness;lightheadedness;fatigue    Gastroenterology: Negative for melena;hematochezia  Genitourinary:  Negative    Musculoskeletal:  Negative    Neurologic:  Negative    Psychiatric:  Negative    Heme/Lymph/Imm:  Negative    Endocrine:  Negative                Physical Exam:     GEN:  In general, this is a well nourished male in no acute distress.  HEENT:  Pupils equal, round. Sclerae nonicteric. Clear oropharynx. Mucous membranes moist.  NECK: Supple, no masses appreciated. Trachea midline. No JVD   C/V:  Regular rate and irregular rhythm, systolic  murmur, rub or gallop. No S3 or RV heave.   RESP: Respirations are unlabored. No use of accessory muscles. Clear to auscultation bilaterally without wheezing, rales, or rhonchi.  GI: Abdomen soft, nontender, nondistended. No HSM appreciated.   EXTREM: No  LE edema. No cyanosis or clubbing.  NEURO: Alert and oriented, cooperative. Gait steady w/cane. Left upper and lower extremity weakness.   PSYCH: Normal affect.  SKIN: Warm and dry. No rashes or petechiae appreciated.          Past Medical History:     Past Medical History:   Diagnosis Date     Aortic stenosis     mild-moderate     Atrial fibrillation (H)     paroxysmal     Carotid stenosis     mild on right, moderate on left     Chronic renal insufficiency      Essential hypertension, benign      Hyperlipidaemia      Hypertrophy of  prostate without urinary obstruction and other lower urinary tract symptoms (LUTS)      Rheumatic fever      Unspecified cerebral artery occlusion with cerebral infarction               Past Surgical History:     Past Surgical History:   Procedure Laterality Date     ENT SURGERY      SEPTOPLASTY     ENT SURGERY      TONSILLECTOMY     HERNIA REPAIR       HERNIORRHAPHY INGUINAL  2/15/2012    Procedure:HERNIORRHAPHY INGUINAL; LEFT INGUINAL HERNIA REPAIR WITH MESH; Surgeon:SHILO READ; Location:[facility]     ORTHOPEDIC SURGERY  left ankle 2/2013    ORIF LEFT WRIST FRACTURE              Allergies:   Lisinopril       Data:   All laboratory data reviewed:    LAST CHOLESTEROL:  Lab Results   Component Value Date    CHOL 130 02/03/2016     Lab Results   Component Value Date    HDL 41 02/03/2016     Lab Results   Component Value Date    LDL 75 02/03/2016     Lab Results   Component Value Date    TRIG 70 02/03/2016     Lab Results   Component Value Date    CHOLHDLRATIO 7.0 09/22/2010       LAST BMP:  Lab Results   Component Value Date     09/27/2018      Lab Results   Component Value Date    POTASSIUM 3.8 09/27/2018     Lab Results   Component Value Date    CHLORIDE 106 09/27/2018     Lab Results   Component Value Date    CLAY 9.2 09/27/2018     Lab Results   Component Value Date    CO2 28 09/27/2018     Lab Results   Component Value Date    BUN 20 09/27/2018     Lab Results   Component Value Date    CR 0.99 09/27/2018     Lab Results   Component Value Date     09/27/2018       LAST CBC:  Lab Results   Component Value Date    WBC 5.8 09/27/2018     Lab Results   Component Value Date    RBC 4.65 09/27/2018     Lab Results   Component Value Date    HGB 15.1 09/27/2018     Lab Results   Component Value Date    HCT 43.9 09/27/2018     Lab Results   Component Value Date    MCV 94 09/27/2018     Lab Results   Component Value Date    MCH 32.5 09/27/2018     Lab Results   Component Value Date    MCHC 34.4 09/27/2018     Lab  Results   Component Value Date    RDW 13.0 09/27/2018     Lab Results   Component Value Date     09/27/2018         Thank you for allowing me to participate in the care of your patient.    Sincerely,     PAULINA [facility]

## 2020-10-15 NOTE — PROGRESS NOTES
Left a message withGene in regards to their up coming procedure scheduled for 10/22/2020. [facility] 1000. He will remain NPO after midnight the night before the procedure and will take morning meds with small sip of water. He/she will need to hold xarelto for 3 days pre procedure.H knows that the Covid team will be in touch with the details about setting up their testing per procedure. I also informed them of the change in the visitor policy and that one person is allowed to come with them. I asked that they and their visitor wear a mask to the hospital and if they do not have one, one will be given to them at the front door. They are also aware that they and their visitor will be screened for Covid upon arrival at the hospital. He will be given conscious sedation for the procedure and therefore will need to have a . No further questions at this time.Evan Mccormick RN

## 2020-10-16 NOTE — PROGRESS NOTES
[facility]  Kamari Valentin MRN# [id number]   YOB: 1940 Age: 80 year old     Reason For Visit: H&P for right and left heart catheterization   Primary Cardiologist:   Dr. Fabian/ Dr. Curtis          History of Presenting Illness:      Kamari Valentin is a pleasant 80 year old patient who carries a past medical history significant for persistent atrial fibrillation on chronic anticoagulation, hypertension, CVA, carotid disease, and severe aortic stenosis.    On 8/27/2020 he was evaluated in our structural heart clinic for consideration of possible TAVR.  Echocardiogram in July, showed normal ejection fraction estimated at 60 to 65%, a severely dilated left atrium, mild tricuspid and mitral regurgitation, mild aortic regurgitation, and severe aortic stenosis with a mean AOV pressure gradient of 50.6 mmHg, and aortic valve area of 0.60 cm .  Due to the fact he was asymptomatic, he was recommended for evaluation and workup for early TAVR.  TAVR CT demonstrated an aortic valve calcium score of 2733. Carotid US (9/2019) showed < 50% JARON stenosis and 50-69% stenosis to the LICA.  A follow-up echocardiogram showed a normal ejection fraction, normal RV size and function, severe AS with a V-max of 4.3 m/s, mean gradient of 45 mmHg, aortic valve area of 0.50 cm , and mild MR.  He recently underwent an exercise stress test.  He was unable to meet the required METS as he was limited by his leg and ambulatory ability and not cardiovascular limitations.  He has been recommended for a right and left heart catheterization to complete the TAVR work-up.  He presents today, accompanied by his wife, for H&P.     He is feeling well on a cardiac standpoint, denies chest pain, shortness of breath, PND, orthopnea, presyncope, syncope, edema, heart racing, or palpitations.  His primary complaint is left leg and arm weakness, residual from his CVA.  Upon exam, lungs are clear bilaterally, rhythm is irregular  with regular rate, audible systolic murmur, no neck vein distention, abdominal distention, or lower extremity edema.    Blood pressure is well controlled at 122/78, heart rate 82, managed on hydralazine, clonidine, hydrochlorothiazide, and losartan.  Atrial fibrillation is controlled without AV reshma blocking agents and anticoagulated on Xarelto.  BMI 26.86    Activity is primarily sedentary  Remains compliant with all medications.                   Assessment and Plan:       1.  Severe aortic stenosis -asymptomatic. Echocardiogram showed a normal ejection fraction, normal RV size and function, severe AS with a V-max of 4.3 m/s, mean gradient of 45 mmHg, aortic valve area of 0.50 cm , and mild MR. Recommended he undergo work-up for early TAVR trial.  He is scheduled for a right and left heart catheterization on 10/22/2020. Risks and benefits of right and left heart catheterization were discussed today including, bleeding, bruising, infection, allergic reaction, kidney damage (including need for dialysis), stroke, heart attack, vascular damage, emergency open heart surgery, up to and including death.  Patient and his wife indicate understanding and is agreeable to proceed.  Hold Xarelto beginning 10/19/2020, take full dose aspirin 325 mg on the evening prior to procedure and  the morning of.  Hold hydrochlorothiazide on the day of procedure.  N.p.o. after midnight on the evening prior to procedure.  Okay to take a.m. medications on the morning of procedure with a small sip of water.  Covid testing as scheduled.  Labs will be done on the day of procedure.    2.  Atrial fibrillation -rate controlled on no AV reshma blocking agents.  Anticoagulated with Xarelto (scheduled to hold 3 days prior to procedure)    3. CVA/ carotid disease - Carotid US (9/2019) showed < 50% JARON stenosis and 50-69% stenosis to the LICA. On statin and anticoagulation.     4. Hypertension - Well controlled on current medical therapy.               Thank you for allowing me to participate in this delightful patient's care.        Chelsi Faith, APRN CNP         Review of Systems:     Review of Systems:  Skin:  Negative     Eyes:  Positive for glasses  ENT:  Negative    Respiratory:  Negative for shortness of breath;dyspnea on exertion;cough  Cardiovascular:  Negative for;palpitations;chest pain;edema;dizziness;lightheadedness;fatigue    Gastroenterology: Negative for melena;hematochezia  Genitourinary:  Negative    Musculoskeletal:  Negative    Neurologic:  Negative    Psychiatric:  Negative    Heme/Lymph/Imm:  Negative    Endocrine:  Negative                Physical Exam:     GEN:  In general, this is a well nourished male in no acute distress.  HEENT:  Pupils equal, round. Sclerae nonicteric. Clear oropharynx. Mucous membranes moist.  NECK: Supple, no masses appreciated. Trachea midline. No JVD   C/V:  Regular rate and irregular rhythm, systolic  murmur, rub or gallop. No S3 or RV heave.   RESP: Respirations are unlabored. No use of accessory muscles. Clear to auscultation bilaterally without wheezing, rales, or rhonchi.  GI: Abdomen soft, nontender, nondistended. No HSM appreciated.   EXTREM: No  LE edema. No cyanosis or clubbing.  NEURO: Alert and oriented, cooperative. Gait steady w/cane. Left upper and lower extremity weakness.   PSYCH: Normal affect.  SKIN: Warm and dry. No rashes or petechiae appreciated.          Past Medical History:     Past Medical History:   Diagnosis Date     Aortic stenosis     mild-moderate     Atrial fibrillation (H)     paroxysmal     Carotid stenosis     mild on right, moderate on left     Chronic renal insufficiency      Essential hypertension, benign      Hyperlipidaemia      Hypertrophy of prostate without urinary obstruction and other lower urinary tract symptoms (LUTS)      Rheumatic fever      Unspecified cerebral artery occlusion with cerebral infarction               Past Surgical History:     Past Surgical  History:   Procedure Laterality Date     ENT SURGERY      SEPTOPLASTY     ENT SURGERY      TONSILLECTOMY     HERNIA REPAIR       HERNIORRHAPHY INGUINAL  2/15/2012    Procedure:HERNIORRHAPHY INGUINAL; LEFT INGUINAL HERNIA REPAIR WITH MESH; Surgeon:SHILO READ; Location:[facility]     ORTHOPEDIC SURGERY  left ankle 2/2013    ORIF LEFT WRIST FRACTURE              Allergies:   Lisinopril       Data:   All laboratory data reviewed:    LAST CHOLESTEROL:  Lab Results   Component Value Date    CHOL 130 02/03/2016     Lab Results   Component Value Date    HDL 41 02/03/2016     Lab Results   Component Value Date    LDL 75 02/03/2016     Lab Results   Component Value Date    TRIG 70 02/03/2016     Lab Results   Component Value Date    CHOLHDLRATIO 7.0 09/22/2010       LAST BMP:  Lab Results   Component Value Date     09/27/2018      Lab Results   Component Value Date    POTASSIUM 3.8 09/27/2018     Lab Results   Component Value Date    CHLORIDE 106 09/27/2018     Lab Results   Component Value Date    CLAY 9.2 09/27/2018     Lab Results   Component Value Date    CO2 28 09/27/2018     Lab Results   Component Value Date    BUN 20 09/27/2018     Lab Results   Component Value Date    CR 0.99 09/27/2018     Lab Results   Component Value Date     09/27/2018       LAST CBC:  Lab Results   Component Value Date    WBC 5.8 09/27/2018     Lab Results   Component Value Date    RBC 4.65 09/27/2018     Lab Results   Component Value Date    HGB 15.1 09/27/2018     Lab Results   Component Value Date    HCT 43.9 09/27/2018     Lab Results   Component Value Date    MCV 94 09/27/2018     Lab Results   Component Value Date    MCH 32.5 09/27/2018     Lab Results   Component Value Date    MCHC 34.4 09/27/2018     Lab Results   Component Value Date    RDW 13.0 09/27/2018     Lab Results   Component Value Date     09/27/2018

## 2020-10-20 ENCOUNTER — CARE COORDINATION (OUTPATIENT)
Dept: CARDIOLOGY | Facility: CLINIC | Age: 80
End: 2020-10-20

## 2020-10-20 DIAGNOSIS — Z11.59 ENCOUNTER FOR SCREENING FOR OTHER VIRAL DISEASES: ICD-10-CM

## 2020-10-20 LAB
SARS-COV-2 RNA SPEC QL NAA+PROBE: NOT DETECTED
SPECIMEN SOURCE: NORMAL

## 2020-10-20 PROCEDURE — U0003 INFECTIOUS AGENT DETECTION BY NUCLEIC ACID (DNA OR RNA); SEVERE ACUTE RESPIRATORY SYNDROME CORONAVIRUS 2 (SARS-COV-2) (CORONAVIRUS DISEASE [COVID-19]), AMPLIFIED PROBE TECHNIQUE, MAKING USE OF HIGH THROUGHPUT TECHNOLOGIES AS DESCRIBED BY CMS-2020-01-R: HCPCS | Performed by: INTERNAL MEDICINE

## 2020-10-20 NOTE — PROGRESS NOTES
Patient's wife called wondering if there is any way patient could have his TAVR procedure done next week since her son is in town.  Patient is scheduled for angiogram on 10/22/20, therefore will not be presented at TAVR conference until 10/29/20.  He is also a candidate for early TAVR research.    He could be randomized to medication therapy.  I explained this to her and she understands and will not plan on TAVR procedure for next week.

## 2020-10-22 ENCOUNTER — HOSPITAL ENCOUNTER (OUTPATIENT)
Facility: CLINIC | Age: 80
Discharge: HOME | End: 2020-10-22
Attending: INTERNAL MEDICINE | Admitting: INTERNAL MEDICINE
Payer: COMMERCIAL

## 2020-10-22 ENCOUNTER — APPOINTMENT (OUTPATIENT)
Dept: CT IMAGING | Facility: CLINIC | Age: 80
End: 2020-10-22
Attending: SURGERY
Payer: COMMERCIAL

## 2020-10-22 ENCOUNTER — APPOINTMENT (OUTPATIENT)
Dept: ULTRASOUND IMAGING | Facility: CLINIC | Age: 80
End: 2020-10-22
Attending: SURGERY
Payer: COMMERCIAL

## 2020-10-22 ENCOUNTER — APPOINTMENT (OUTPATIENT)
Dept: GENERAL RADIOLOGY | Facility: CLINIC | Age: 80
End: 2020-10-22
Attending: SURGERY
Payer: COMMERCIAL

## 2020-10-22 VITALS
BODY MASS INDEX: 27.48 KG/M2 | SYSTOLIC BLOOD PRESSURE: 131 MMHG | HEIGHT: 67 IN | DIASTOLIC BLOOD PRESSURE: 70 MMHG | OXYGEN SATURATION: 95 % | HEART RATE: 79 BPM | RESPIRATION RATE: 16 BRPM | WEIGHT: 175.1 LBS | TEMPERATURE: 97.6 F

## 2020-10-22 DIAGNOSIS — I25.10 ATHEROSCLEROSIS OF NATIVE CORONARY ARTERY OF NATIVE HEART WITHOUT ANGINA PECTORIS: ICD-10-CM

## 2020-10-22 DIAGNOSIS — I35.0 AORTIC VALVE STENOSIS, ETIOLOGY OF CARDIAC VALVE DISEASE UNSPECIFIED: ICD-10-CM

## 2020-10-22 PROBLEM — Z98.890 STATUS POST CORONARY ANGIOGRAM: Status: ACTIVE | Noted: 2020-10-22

## 2020-10-22 LAB
ABO + RH BLD: NORMAL
ABO + RH BLD: NORMAL
ALBUMIN SERPL-MCNC: 3.8 G/DL (ref 3.4–5)
ALBUMIN UR-MCNC: NEGATIVE MG/DL
ALP SERPL-CCNC: 44 U/L (ref 40–150)
ALT SERPL W P-5'-P-CCNC: 22 U/L (ref 0–70)
ANION GAP SERPL CALCULATED.3IONS-SCNC: 3 MMOL/L (ref 3–14)
APPEARANCE UR: CLEAR
APTT PPP: 27 SEC (ref 22–37)
AST SERPL W P-5'-P-CCNC: 17 U/L (ref 0–45)
BILIRUB DIRECT SERPL-MCNC: 0.2 MG/DL (ref 0–0.2)
BILIRUB SERPL-MCNC: 1 MG/DL (ref 0.2–1.3)
BILIRUB UR QL STRIP: NEGATIVE
BLD GP AB SCN SERPL QL: NORMAL
BLOOD BANK CMNT PATIENT-IMP: NORMAL
BLOOD BANK CMNT PATIENT-IMP: NORMAL
BUN SERPL-MCNC: 30 MG/DL (ref 7–30)
CALCIUM SERPL-MCNC: 8.8 MG/DL (ref 8.5–10.1)
CHLORIDE SERPL-SCNC: 109 MMOL/L (ref 94–109)
CO2 SERPL-SCNC: 29 MMOL/L (ref 20–32)
COLOR UR AUTO: YELLOW
CREAT SERPL-MCNC: 1.19 MG/DL (ref 0.66–1.25)
ERYTHROCYTE [DISTWIDTH] IN BLOOD BY AUTOMATED COUNT: 12.9 % (ref 10–15)
GFR SERPL CREATININE-BSD FRML MDRD: 56 ML/MIN/{1.73_M2}
GLUCOSE SERPL-MCNC: 111 MG/DL (ref 70–99)
GLUCOSE UR STRIP-MCNC: NEGATIVE MG/DL
HBA1C MFR BLD: 6.2 % (ref 0–5.6)
HCT VFR BLD AUTO: 42.5 % (ref 40–53)
HGB BLD-MCNC: 13.9 G/DL (ref 13.3–17.7)
HGB UR QL STRIP: NEGATIVE
INR PPP: 1.05 (ref 0.86–1.14)
KETONES UR STRIP-MCNC: NEGATIVE MG/DL
LEUKOCYTE ESTERASE UR QL STRIP: NEGATIVE
MCH RBC QN AUTO: 31.7 PG (ref 26.5–33)
MCHC RBC AUTO-ENTMCNC: 32.7 G/DL (ref 31.5–36.5)
MCV RBC AUTO: 97 FL (ref 78–100)
MUCOUS THREADS #/AREA URNS LPF: PRESENT /LPF
NITRATE UR QL: NEGATIVE
PH UR STRIP: 6 PH (ref 5–7)
PLATELET # BLD AUTO: 204 10E9/L (ref 150–450)
POTASSIUM SERPL-SCNC: 3.5 MMOL/L (ref 3.4–5.3)
PROT SERPL-MCNC: 6.9 G/DL (ref 6.8–8.8)
RBC # BLD AUTO: 4.38 10E12/L (ref 4.4–5.9)
RBC #/AREA URNS AUTO: <1 /HPF (ref 0–2)
SODIUM SERPL-SCNC: 141 MMOL/L (ref 133–144)
SOURCE: ABNORMAL
SP GR UR STRIP: 1.02 (ref 1–1.03)
SPECIMEN EXP DATE BLD: NORMAL
UROBILINOGEN UR STRIP-MCNC: NORMAL MG/DL (ref 0–2)
WBC # BLD AUTO: 6 10E9/L (ref 4–11)
WBC #/AREA URNS AUTO: 1 /HPF (ref 0–5)

## 2020-10-22 PROCEDURE — 250N000013 HC RX MED GY IP 250 OP 250 PS 637: Performed by: INTERNAL MEDICINE

## 2020-10-22 PROCEDURE — 93880 EXTRACRANIAL BILAT STUDY: CPT

## 2020-10-22 PROCEDURE — 272N000001 HC OR GENERAL SUPPLY STERILE: Performed by: INTERNAL MEDICINE

## 2020-10-22 PROCEDURE — 258N000003 HC RX IP 258 OP 636: Performed by: INTERNAL MEDICINE

## 2020-10-22 PROCEDURE — 36415 COLL VENOUS BLD VENIPUNCTURE: CPT

## 2020-10-22 PROCEDURE — 80048 BASIC METABOLIC PNL TOTAL CA: CPT | Performed by: INTERNAL MEDICINE

## 2020-10-22 PROCEDURE — 999N000071 HC STATISTIC HEART CATH LAB OR EP LAB

## 2020-10-22 PROCEDURE — 36415 COLL VENOUS BLD VENIPUNCTURE: CPT | Performed by: SURGERY

## 2020-10-22 PROCEDURE — 86900 BLOOD TYPING SEROLOGIC ABO: CPT | Performed by: SURGERY

## 2020-10-22 PROCEDURE — C1887 CATHETER, GUIDING: HCPCS | Performed by: INTERNAL MEDICINE

## 2020-10-22 PROCEDURE — 99153 MOD SED SAME PHYS/QHP EA: CPT | Performed by: INTERNAL MEDICINE

## 2020-10-22 PROCEDURE — 85730 THROMBOPLASTIN TIME PARTIAL: CPT | Performed by: INTERNAL MEDICINE

## 2020-10-22 PROCEDURE — 85027 COMPLETE CBC AUTOMATED: CPT | Performed by: INTERNAL MEDICINE

## 2020-10-22 PROCEDURE — 250N000011 HC RX IP 250 OP 636: Performed by: INTERNAL MEDICINE

## 2020-10-22 PROCEDURE — 93970 EXTREMITY STUDY: CPT

## 2020-10-22 PROCEDURE — 86901 BLOOD TYPING SEROLOGIC RH(D): CPT | Performed by: SURGERY

## 2020-10-22 PROCEDURE — 86850 RBC ANTIBODY SCREEN: CPT | Performed by: SURGERY

## 2020-10-22 PROCEDURE — 93456 R HRT CORONARY ARTERY ANGIO: CPT | Performed by: INTERNAL MEDICINE

## 2020-10-22 PROCEDURE — 99152 MOD SED SAME PHYS/QHP 5/>YRS: CPT | Performed by: INTERNAL MEDICINE

## 2020-10-22 PROCEDURE — 250N000009 HC RX 250: Performed by: INTERNAL MEDICINE

## 2020-10-22 PROCEDURE — 93010 ELECTROCARDIOGRAM REPORT: CPT | Performed by: INTERNAL MEDICINE

## 2020-10-22 PROCEDURE — C1769 GUIDE WIRE: HCPCS | Performed by: INTERNAL MEDICINE

## 2020-10-22 PROCEDURE — 80076 HEPATIC FUNCTION PANEL: CPT | Performed by: INTERNAL MEDICINE

## 2020-10-22 PROCEDURE — 70486 CT MAXILLOFACIAL W/O DYE: CPT

## 2020-10-22 PROCEDURE — 81001 URINALYSIS AUTO W/SCOPE: CPT | Performed by: SURGERY

## 2020-10-22 PROCEDURE — 93005 ELECTROCARDIOGRAM TRACING: CPT

## 2020-10-22 PROCEDURE — 82803 BLOOD GASES ANY COMBINATION: CPT

## 2020-10-22 PROCEDURE — C1894 INTRO/SHEATH, NON-LASER: HCPCS | Performed by: INTERNAL MEDICINE

## 2020-10-22 PROCEDURE — 71046 X-RAY EXAM CHEST 2 VIEWS: CPT

## 2020-10-22 PROCEDURE — 999N000054 HC STATISTIC EKG NON-CHARGEABLE

## 2020-10-22 PROCEDURE — 83036 HEMOGLOBIN GLYCOSYLATED A1C: CPT | Mod: GZ | Performed by: INTERNAL MEDICINE

## 2020-10-22 PROCEDURE — 85610 PROTHROMBIN TIME: CPT | Performed by: INTERNAL MEDICINE

## 2020-10-22 RX ORDER — FLUMAZENIL 0.1 MG/ML
0.2 INJECTION, SOLUTION INTRAVENOUS
Status: DISCONTINUED | OUTPATIENT
Start: 2020-10-22 | End: 2020-10-22 | Stop reason: HOSPADM

## 2020-10-22 RX ORDER — LIDOCAINE 40 MG/G
CREAM TOPICAL
Status: DISCONTINUED | OUTPATIENT
Start: 2020-10-22 | End: 2020-10-22 | Stop reason: HOSPADM

## 2020-10-22 RX ORDER — NITROGLYCERIN 5 MG/ML
VIAL (ML) INTRAVENOUS
Status: DISCONTINUED | OUTPATIENT
Start: 2020-10-22 | End: 2020-10-22 | Stop reason: HOSPADM

## 2020-10-22 RX ORDER — VERAPAMIL HYDROCHLORIDE 2.5 MG/ML
INJECTION, SOLUTION INTRAVENOUS
Status: DISCONTINUED | OUTPATIENT
Start: 2020-10-22 | End: 2020-10-22 | Stop reason: HOSPADM

## 2020-10-22 RX ORDER — ACETAMINOPHEN 325 MG/1
650 TABLET ORAL EVERY 4 HOURS PRN
Status: DISCONTINUED | OUTPATIENT
Start: 2020-10-22 | End: 2020-10-22 | Stop reason: HOSPADM

## 2020-10-22 RX ORDER — FENTANYL CITRATE 50 UG/ML
INJECTION, SOLUTION INTRAMUSCULAR; INTRAVENOUS
Status: DISCONTINUED | OUTPATIENT
Start: 2020-10-22 | End: 2020-10-22 | Stop reason: HOSPADM

## 2020-10-22 RX ORDER — FENTANYL CITRATE 50 UG/ML
25-50 INJECTION, SOLUTION INTRAMUSCULAR; INTRAVENOUS
Status: ACTIVE | OUTPATIENT
Start: 2020-10-22 | End: 2020-10-22

## 2020-10-22 RX ORDER — ATROPINE SULFATE 0.1 MG/ML
0.5 INJECTION INTRAVENOUS
Status: DISCONTINUED | OUTPATIENT
Start: 2020-10-22 | End: 2020-10-22 | Stop reason: HOSPADM

## 2020-10-22 RX ORDER — NALOXONE HYDROCHLORIDE 0.4 MG/ML
.2-.4 INJECTION, SOLUTION INTRAMUSCULAR; INTRAVENOUS; SUBCUTANEOUS
Status: DISCONTINUED | OUTPATIENT
Start: 2020-10-22 | End: 2020-10-22 | Stop reason: HOSPADM

## 2020-10-22 RX ORDER — POTASSIUM CHLORIDE 1500 MG/1
20 TABLET, EXTENDED RELEASE ORAL
Status: COMPLETED | OUTPATIENT
Start: 2020-10-22 | End: 2020-10-22

## 2020-10-22 RX ORDER — SODIUM CHLORIDE 9 MG/ML
INJECTION, SOLUTION INTRAVENOUS CONTINUOUS
Status: DISCONTINUED | OUTPATIENT
Start: 2020-10-22 | End: 2020-10-22 | Stop reason: HOSPADM

## 2020-10-22 RX ORDER — NALOXONE HYDROCHLORIDE 0.4 MG/ML
.1-.4 INJECTION, SOLUTION INTRAMUSCULAR; INTRAVENOUS; SUBCUTANEOUS
Status: DISCONTINUED | OUTPATIENT
Start: 2020-10-22 | End: 2020-10-22 | Stop reason: HOSPADM

## 2020-10-22 RX ORDER — HEPARIN SODIUM 1000 [USP'U]/ML
INJECTION, SOLUTION INTRAVENOUS; SUBCUTANEOUS
Status: DISCONTINUED | OUTPATIENT
Start: 2020-10-22 | End: 2020-10-22 | Stop reason: HOSPADM

## 2020-10-22 RX ADMIN — SODIUM CHLORIDE: 9 INJECTION, SOLUTION INTRAVENOUS at 08:24

## 2020-10-22 RX ADMIN — POTASSIUM CHLORIDE 20 MEQ: 1500 TABLET, EXTENDED RELEASE ORAL at 08:47

## 2020-10-22 ASSESSMENT — MIFFLIN-ST. JEOR: SCORE: 1452.88

## 2020-10-22 NOTE — PROGRESS NOTES
Discharge instructions for heart catheterization reviewed with pt and wife, questions answered and both state understanding.

## 2020-10-22 NOTE — PRE-PROCEDURE
GENERAL PRE-PROCEDURE:   Procedure:  CAG RHC  Date/Time:  10/22/2020 10:52 AM    Verbal consent obtained?: Yes    Written consent obtained?: Yes    Risks and benefits: Risks, benefits and alternatives were discussed    Consent given by:  Patient  Patient states understanding of procedure being performed: Yes    Patient's understanding of procedure matches consent: Yes    Procedure consent matches procedure scheduled: Yes    Expected level of sedation:  Moderate  Appropriately NPO:  Yes  ASA Class:  Class 3- Severe systemic disease, definite functional limitations  Mallampati  :  Grade 2- soft palate, base of uvula, tonsillar pillars, and portion of posterior pharyngeal wall visible  Lungs:  Lungs clear with good breath sounds bilaterally  Heart:  Systolic murmur and normal heart sounds and rate  History & Physical reviewed:  History and physical reviewed and no updates needed  Statement of review:  I have reviewed the lab findings, diagnostic data, medications, and the plan for sedation

## 2020-10-22 NOTE — PROGRESS NOTES
PATIENT/VISITOR WELLNESS SCREENING    Step 1 Patient Screening    1. In the last month, have you been in contact with someone who was confirmed or suspected to have Coronavirus/COVID-19? No    2. Do you have the following symptoms?  Fever/Chills? No   Cough? No   Shortness of breath? No   New loss of taste or smell? No  Sore throat? No  Muscle or body aches? No  Headaches? No  Fatigue? No  Vomiting or diarrhea? No    Step 2 Visitor Screening    1. Name of Visitor (1 visitor per patient): Bonnie    2. In the last month, have you been in contact with someone who was confirmed or suspected to have Coronavirus/COVID-19? No    3. Do you have the following symptoms?  Fever/Chills? No   Cough? No   Shortness of breath? No   Skin rash? No   Loss of taste or smell? No  Sore throat? No  Runny or stuffy nose? No  Muscle or body aches? No  Headaches? No  Fatigue? No  Vomiting or diarrhea? No    If the visitor has positive symptoms, notify supervisor/manger  Per policy, the visitor will need to leave the facility     Step 3 Refer to logic grid below for actions    NO SYMPTOM(S)    ACTIONS:  1. Standard rooming process  2. Provider to assess per normal protocol  3. Implement precautions as needed and per guidelines     POSITIVE SYMPTOM(S)  If positive for ANY of the following symptoms: fever, cough, shortness of breath, rash    ACTION:  1. Continue to have the patient wear a mask   2. Room patient as soon as possible  3. Don appropriate PPE when entering room  4. Provider evaluation

## 2020-10-22 NOTE — DISCHARGE INSTRUCTIONS
Cardiac Angiogram Discharge Instructions - Brachial & Radial    After you go home:      Have an adult stay with you until tomorrow.    Drink extra fluids for 2 days.    You may resume your normal diet.    No smoking       For 24 hours - due to the sedation you received:    Relax and take it easy.    Do NOT make any important or legal decisions.    Do NOT drive or operate machines at home or at work.    Do NOT drink alcohol.    Care of Arm & Wrist Puncture Sites:      For the first 24 hrs - check the puncture site every 1-2 hours while awake.    It is normal to have soreness at the puncture site and mild tingling in your hand for up to 3 days.    Remove the bandaid after 24 hours. If there is minor oozing, apply another bandaid and remove it after 12 hours.    You may shower. Do NOT take a bath, or use a hot tub or pool for at least 3 days. Do NOT scrub the site. Do not use lotion or powder near the puncture site.           Activity - For 2 days:    Arm site:      Do not use your hand or arm to support your weight (such as rising from a chair)     Do not lift more than 5 pounds or exercise your arm (such as tennis, golf or bowling).       Wrist site:    do not use your hand or arm to support your weight (such as rising from a chair)     do not bend your wrist (such as lifting a garage door).    do not lift more than 5 pounds or exercise your arm (such as tennis, golf or bowling).    Do NOT do any heavy activity such as exercise, lifting, or straining.     Bleeding:     Arm site:    If you start bleeding from the site in your arm, sit down and press firmly on/above the site with your fingers for 10 minutes.    Once bleeding stops, keep your arm straight for 2 hours.     [facility] as soon as you can.    Wrist site:    If you start bleeding from the site in your wrist, sit down and press firmly on/above the site for 10 minutes.     Once bleeding stops, keep arm still for 2 hours.     Call [facility] as soon as you can.    Call 911 right away if you have heavy bleeding or bleeding that does not stop.      Medicines:      If you are taking an antiplatelet medication such as Plavix, Brilinta or Effient, do not stop taking it until you talk to your cardiologist.        If you are on Metformin (Glucophage), do not restart it until you have blood tests (within 2 to 3 days after discharge).  After you have your blood drawn, you may restart the Metformin.    Take your medications, including blood thinners, unless your provider tells you not to.      If you take Coumadin (Warfarin), have your INR checked by your provider in  3-5 days. Call your clinic to schedule this.    If you have stopped any other medicines, check with your provider about when to restart them.    Follow Up Appointments:      Follow up with [facility] Nurse Practitioner at [facility] of patient preference in 7-10 days.    Call the clinic if:      You have increased pain or a large or growing hard lump around the site.    The site is red, swollen, hot or tender.    Blood or fluid is draining from the site.    You have chills or a fever greater than 101 F (38 C).    Your arm feels numb, cool or changes color.    You have hives, a rash or unusual itching.    Any questions or concerns.      [facility] [place]:    [phone] [facility] (7 days a week)

## 2020-10-22 NOTE — PROGRESS NOTES
Care Suites Discharge Nursing Note    Patient Information  Name: Kamari Valentin  Age: 80 year old    Discharge Education:  Discharge instructions reviewed: Yes trae Tiwari  Additional education/resources provided: no  Patient/patient representative verbalizes understanding: Yes  Patient discharging on new medications: No  Medication education completed: N/A    Discharge Plans:   Discharge location: home  Discharge ride contacted: Yes  Approximate discharge time: 1640    Discharge Criteria:  Discharge criteria met and vital signs stable: Yes    Patient Belongs:  Patient belongings returned to patient: Yes    Milly Rush RN

## 2020-10-22 NOTE — PROGRESS NOTES
Care Suites Admission Nursing Note    Patient Information  Name: Kamari Valentin  Age: 80 year old  Reason for admission: heart cath  Care Suites arrival time: 0745    Visitor Information  Name: Bonnie  Informed of visitor restrictions: Yes  1 visitor allowed per patient   Visitor must screen negative for COVID symptoms   Visitor must wear a mask  Waiting rooms closed to visitors    Patient Admission/Assessment   Pre-procedure assessment complete: Yes  If abnormal assessment/labs, provider notified: N/A  NPO: Yes  Medications held per instructions/orders: Yes  Consent: deferred  If applicable, pregnancy test status: deferred  Patient oriented to room: Yes  Education/questions answered: Yes  Plan/other:     Discharge Planning  Discharge name/phone number:[phone]  Overnight post sedation caregiver: wife Bonnie  Discharge location: home    Karie Moon RN

## 2020-10-22 NOTE — CONSULTS
Cardiovascular and Thoracic Surgery Consultation      Kamari Valentin MRN# [id number]   YOB: 1940 Age: 80 year old   Date of Admission: 10/22/2020     Reason for consult: Dr. Ruddy Fabian to evaluate Mr. Valentin for CABG/AVR           Assessment and Plan:   Aortic Stenosis/CAD: Originally a TAVR pt; however with CAD, will plan for SAVR/CABG. Nml EF 60-65%.  Persistent A.fib: on Xarelto, will plan for atrial appendage ligation, further MAZE/interventions per surgeon  Totally asymptomatic. Wants to discuss with surgeon and talk with family this weekend.              Chief Complaint:   Asymptomatic- follows with Cards EP/Structural heart.     History is obtained from the patient         History of Present Illness:   This patient is a 80 year old male who follows with cards EP for persistent a.fib on xarelto, known aortic stenosis. Asymptomatic.               Past Medical History:     Past Medical History:   Diagnosis Date     Aortic stenosis     mild-moderate     Atrial fibrillation (H)     paroxysmal     Carotid stenosis     mild on right, moderate on left     Chronic renal insufficiency      Essential hypertension, benign      Hyperlipidaemia      Hypertrophy of prostate without urinary obstruction and other lower urinary tract symptoms (LUTS)      Rheumatic fever      Unspecified cerebral artery occlusion with cerebral infarction              Past Surgical History:     Past Surgical History:   Procedure Laterality Date     ENT SURGERY      SEPTOPLASTY     ENT SURGERY      TONSILLECTOMY     HERNIA REPAIR       HERNIORRHAPHY INGUINAL  2/15/2012    Procedure:HERNIORRHAPHY INGUINAL; LEFT INGUINAL HERNIA REPAIR WITH MESH; Surgeon:SHILO READ; Location:[facility]     ORTHOPEDIC SURGERY  left ankle 2/2013    ORIF LEFT WRIST FRACTURE               Social History:     Social History     Tobacco Use     Smoking status: Former Smoker     Years: 20.00     Types: Cigars, Pipe     Quit date: 9/1/2010     Years since  quitting: 10.1     Smokeless tobacco: Never Used   Substance Use Topics     Alcohol use: No     Alcohol/week: 0.0 standard drinks     Frequency: Never             Family History:     Family History   Problem Relation Age of Onset     Respiratory Brother      Coronary Artery Disease Early Onset Mother      Abdominal Aortic Aneurysm Father              Immunizations:     There is no immunization history on file for this patient.          Allergies:     Allergies   Allergen Reactions     Lisinopril              Medications:     Medications Prior to Admission   Medication Sig Dispense Refill Last Dose     cloNIDINE (CATAPRES) 0.1 MG tablet Take 1 tablet by mouth daily.   10/22/2020 at Unknown time     hydrALAZINE (APRESOLINE) 25 MG tablet Take 25 mg by mouth 2 times daily.   10/22/2020 at Unknown time     hydrochlorothiazide (HYDRODIURIL) 25 MG tablet Take 1 tablet (25 mg) by mouth daily 90 tablet 0 10/21/2020 at Unknown time     losartan (COZAAR) 100 MG tablet Take 1 tablet (100 mg) by mouth daily 30 tablet 3 10/22/2020 at Unknown time     simvastatin (ZOCOR) 40 MG tablet Take 1 tablet by mouth daily.   10/21/2020 at Unknown time     rivaroxaban ANTICOAGULANT (XARELTO ANTICOAGULANT) 20 MG TABS tablet Take 1 tablet (20 mg) by mouth daily 90 tablet 3 10/19/2020             Review of Systems:   CONSTITUTIONAL: NEGATIVE for fever, chills, change in weight  ENT/MOUTH: NEGATIVE for ear, mouth and throat problems  RESP: NEGATIVE for significant cough or SOB  CV: NEGATIVE for chest pain, palpitations or peripheral edema  GI: NEGATIVE for nausea, abdominal pain, heartburn, or change in bowel habits            Physical Exam:   Vitals were reviewed  Temp: 97.6  F (36.4  C) Temp src: Oral BP: 136/80 Pulse: 60   Resp: 16 SpO2: 95 % O2 Device: None (Room air)    Constitutional:   awake, alert, cooperative, no apparent distress, and appears stated age     Eyes:   Lids and lashes normal, pupils equal, round and reactive to light,  extra ocular muscles intact, sclera clear, conjunctiva normal     Lungs:   No increased work of breathing, good air exchange, clear to auscultation bilaterally, no crackles or wheezing     Cardiovascular:   Normal apical impulse, regular rate and rhythm, normal S1 and S2, no S3 or S4, III/VI crescendo murmur      Abdomen:   No scars, normal bowel sounds, soft, non-distended, non-tender, no masses palpated, no hepatosplenomegally     Extremities: 1+ LE edema         Data:     No results found for: PH, PHARTERIAL, PO2, [id number], SAT, PCO2, HCO3, BASEEXCESS, DOMINQIUE, BEB       Lab Results   Component Value Date     10/22/2020    Lab Results   Component Value Date    CHLORIDE 109 10/22/2020    Lab Results   Component Value Date    BUN 30 10/22/2020      Lab Results   Component Value Date    POTASSIUM 3.5 10/22/2020    Lab Results   Component Value Date    CO2 29 10/22/2020    Lab Results   Component Value Date    CR 1.19 10/22/2020        Lab Results   Component Value Date    NTBNPI 513 09/21/2010     Lab Results   Component Value Date    WBC 6.0 10/22/2020    HGB 13.9 10/22/2020    HCT 42.5 10/22/2020    MCV 97 10/22/2020     10/22/2020     Echo: Interpretation Summary  Rhythm is atrial fibrillation throughout the study.     Left ventricular function is normal.The EF is 60-65%.  The right ventricle is normal in size and function.  Severe AS. Vmax 4.3m/sec, Mean gradient 45mmHg, KARYNA 0.50cm2 at LVOT 2.1cm, DVI  0.14. Mild aortic insufficiency is present.  Moderate mitral annular calcification is present. Mean gradient 3mmHg at  77bpm. Mild MR.  No pericardial effusion is present.     This study was compared with the study from 7/9/20. There has been no  significant change.

## 2020-10-22 NOTE — PROCEDURES
[facility]    Procedure: *Cath without PCI    Date/Time: 10/22/2020 11:41 AM  Performed by: Alexandru Doe MD  Authorized by: Alexandru Doe MD     UNIVERSAL PROTOCOL   Site Marked: Yes  Prior Images Obtained and Reviewed:  Yes  Required items: Required blood products, implants, devices and special equipment available    Patient identity confirmed:  Verbally with patient  Patient was reevaluated immediately before administering moderate or deep sedation or anesthesia  Confirmation Checklist:  Patient's identity using two indicators  Time out: Immediately prior to the procedure a time out was called    Universal Protocol: the Joint Commission Universal Protocol was followed    Preparation: Patient was prepped and draped in usual sterile fashion           ANESTHESIA    Local Anesthetic: Lidocaine 1% without epinephrine      SEDATION    Patient Sedated: Yes    Sedation:  Fentanyl and midazolam  Vital signs: Vital signs monitored during sedation    PROCEDURE    Procedure  1) RHC  2) CAG    Approach RTT RBV  Complications none  Indication pre TAVR  Findings  RA 8  RV 30/1,7  PA 30/11 mean  20  PWP  11  CO 4.92    LMCA  Diffuse distal irregular 50 to 60% moderately calcified  CX ostial 80% heavily calcified. Proxima 70% before M1  LAD atheromatous changes no focal stenosis  RCA dominant proximal 60%    Assess Normal RH pressures                Significant LMCA and CX disease  Moderate disease in proximal  RCA  Likely a candidate for SAVR.    Discussed with  and TAVR team    [place]  Length of time physician/provider present for 1:1 monitoring during sedation: 30

## 2020-10-23 LAB
CO2 BLD-SCNC: 26 MMOL/L (ref 21–28)
CO2 BLDCOV-SCNC: 25 MMOL/L (ref 21–28)
PCO2 BLD: 48 MM HG (ref 35–45)
PCO2 BLDV: 48 MM HG (ref 40–50)
PH BLD: 7.34 PH (ref 7.35–7.45)
PH BLDV: 7.33 PH (ref 7.32–7.43)
PO2 BLD: 101 MM HG (ref 80–105)
PO2 BLDV: 42 MM HG (ref 25–47)
SAO2 % BLDA FROM PO2: 97 % (ref 92–100)
SAO2 % BLDV FROM PO2: 74 %

## 2020-10-26 ENCOUNTER — APPOINTMENT (OUTPATIENT)
Dept: SPEECH THERAPY | Facility: CLINIC | Age: 80
DRG: 065 | End: 2020-10-26
Attending: INTERNAL MEDICINE
Payer: COMMERCIAL

## 2020-10-26 ENCOUNTER — HOSPITAL ENCOUNTER (INPATIENT)
Facility: CLINIC | Age: 80
LOS: 2 days | Discharge: NURSING, REHAB OR LONG-TERM CARE | DRG: 065 | End: 2020-10-28
Attending: EMERGENCY MEDICINE | Admitting: HOSPITALIST
Payer: COMMERCIAL

## 2020-10-26 ENCOUNTER — APPOINTMENT (OUTPATIENT)
Dept: CT IMAGING | Facility: CLINIC | Age: 80
DRG: 065 | End: 2020-10-26
Payer: COMMERCIAL

## 2020-10-26 ENCOUNTER — APPOINTMENT (OUTPATIENT)
Dept: MRI IMAGING | Facility: CLINIC | Age: 80
DRG: 065 | End: 2020-10-26
Attending: EMERGENCY MEDICINE
Payer: COMMERCIAL

## 2020-10-26 DIAGNOSIS — I63.9 CEREBROVASCULAR ACCIDENT (CVA), UNSPECIFIED MECHANISM (H): Primary | ICD-10-CM

## 2020-10-26 DIAGNOSIS — R29.898 LEFT LEG WEAKNESS: ICD-10-CM

## 2020-10-26 DIAGNOSIS — I10 ESSENTIAL HYPERTENSION, BENIGN: ICD-10-CM

## 2020-10-26 DIAGNOSIS — I63.9 ISCHEMIC STROKE (H): ICD-10-CM

## 2020-10-26 LAB
ANION GAP SERPL CALCULATED.3IONS-SCNC: 5 MMOL/L (ref 3–14)
BASOPHILS # BLD AUTO: 0 10E9/L (ref 0–0.2)
BASOPHILS NFR BLD AUTO: 0.3 %
BUN SERPL-MCNC: 24 MG/DL (ref 7–30)
CALCIUM SERPL-MCNC: 9.7 MG/DL (ref 8.5–10.1)
CHLORIDE SERPL-SCNC: 109 MMOL/L (ref 94–109)
CHOLEST SERPL-MCNC: 119 MG/DL
CO2 SERPL-SCNC: 31 MMOL/L (ref 20–32)
CREAT SERPL-MCNC: 1.01 MG/DL (ref 0.66–1.25)
DIFFERENTIAL METHOD BLD: NORMAL
EOSINOPHIL # BLD AUTO: 0.1 10E9/L (ref 0–0.7)
EOSINOPHIL NFR BLD AUTO: 1.8 %
ERYTHROCYTE [DISTWIDTH] IN BLOOD BY AUTOMATED COUNT: 13.1 % (ref 10–15)
GFR SERPL CREATININE-BSD FRML MDRD: 69 ML/MIN/{1.73_M2}
GLUCOSE SERPL-MCNC: 115 MG/DL (ref 70–99)
HCT VFR BLD AUTO: 44.6 % (ref 40–53)
HDLC SERPL-MCNC: 40 MG/DL
HGB BLD-MCNC: 14.9 G/DL (ref 13.3–17.7)
IMM GRANULOCYTES # BLD: 0 10E9/L (ref 0–0.4)
IMM GRANULOCYTES NFR BLD: 0.1 %
INTERPRETATION ECG - MUSE: NORMAL
INTERPRETATION ECG - MUSE: NORMAL
LDLC SERPL CALC-MCNC: 63 MG/DL
LYMPHOCYTES # BLD AUTO: 1.8 10E9/L (ref 0.8–5.3)
LYMPHOCYTES NFR BLD AUTO: 25.6 %
MCH RBC QN AUTO: 32.5 PG (ref 26.5–33)
MCHC RBC AUTO-ENTMCNC: 33.4 G/DL (ref 31.5–36.5)
MCV RBC AUTO: 97 FL (ref 78–100)
MONOCYTES # BLD AUTO: 0.7 10E9/L (ref 0–1.3)
MONOCYTES NFR BLD AUTO: 9.6 %
NEUTROPHILS # BLD AUTO: 4.5 10E9/L (ref 1.6–8.3)
NEUTROPHILS NFR BLD AUTO: 62.6 %
NONHDLC SERPL-MCNC: 79 MG/DL
NRBC # BLD AUTO: 0 10*3/UL
NRBC BLD AUTO-RTO: 0 /100
PLATELET # BLD AUTO: 199 10E9/L (ref 150–450)
POTASSIUM SERPL-SCNC: 3.9 MMOL/L (ref 3.4–5.3)
RBC # BLD AUTO: 4.59 10E12/L (ref 4.4–5.9)
SODIUM SERPL-SCNC: 145 MMOL/L (ref 133–144)
TRIGL SERPL-MCNC: 81 MG/DL
WBC # BLD AUTO: 7.1 10E9/L (ref 4–11)

## 2020-10-26 PROCEDURE — G0378 HOSPITAL OBSERVATION PER HR: HCPCS

## 2020-10-26 PROCEDURE — 80061 LIPID PANEL: CPT | Performed by: EMERGENCY MEDICINE

## 2020-10-26 PROCEDURE — 258N000002 HC RX IP 258 OP 250: Performed by: INTERNAL MEDICINE

## 2020-10-26 PROCEDURE — 120N000001 HC R&B MED SURG/OB

## 2020-10-26 PROCEDURE — 92526 ORAL FUNCTION THERAPY: CPT | Mod: GN | Performed by: SPEECH-LANGUAGE PATHOLOGIST

## 2020-10-26 PROCEDURE — 250N000009 HC RX 250: Performed by: EMERGENCY MEDICINE

## 2020-10-26 PROCEDURE — A9585 GADOBUTROL INJECTION: HCPCS | Performed by: EMERGENCY MEDICINE

## 2020-10-26 PROCEDURE — 85025 COMPLETE CBC W/AUTO DIFF WBC: CPT | Performed by: EMERGENCY MEDICINE

## 2020-10-26 PROCEDURE — 99285 EMERGENCY DEPT VISIT HI MDM: CPT | Mod: 25

## 2020-10-26 PROCEDURE — 255N000002 HC RX 255 OP 636: Performed by: EMERGENCY MEDICINE

## 2020-10-26 PROCEDURE — 250N000011 HC RX IP 250 OP 636: Performed by: EMERGENCY MEDICINE

## 2020-10-26 PROCEDURE — C9803 HOPD COVID-19 SPEC COLLECT: HCPCS

## 2020-10-26 PROCEDURE — 99207 PR CONSULT E&M CHANGED TO INITIAL LEVEL: CPT | Performed by: PSYCHIATRY & NEUROLOGY

## 2020-10-26 PROCEDURE — 70553 MRI BRAIN STEM W/O & W/DYE: CPT

## 2020-10-26 PROCEDURE — 99222 1ST HOSP IP/OBS MODERATE 55: CPT | Performed by: PSYCHIATRY & NEUROLOGY

## 2020-10-26 PROCEDURE — 99220 PR INITIAL OBSERVATION CARE,LEVEL III: CPT | Performed by: INTERNAL MEDICINE

## 2020-10-26 PROCEDURE — U0003 INFECTIOUS AGENT DETECTION BY NUCLEIC ACID (DNA OR RNA); SEVERE ACUTE RESPIRATORY SYNDROME CORONAVIRUS 2 (SARS-COV-2) (CORONAVIRUS DISEASE [COVID-19]), AMPLIFIED PROBE TECHNIQUE, MAKING USE OF HIGH THROUGHPUT TECHNOLOGIES AS DESCRIBED BY CMS-2020-01-R: HCPCS | Performed by: EMERGENCY MEDICINE

## 2020-10-26 PROCEDURE — 250N000013 HC RX MED GY IP 250 OP 250 PS 637: Performed by: INTERNAL MEDICINE

## 2020-10-26 PROCEDURE — 70498 CT ANGIOGRAPHY NECK: CPT

## 2020-10-26 PROCEDURE — 93005 ELECTROCARDIOGRAM TRACING: CPT

## 2020-10-26 PROCEDURE — 92610 EVALUATE SWALLOWING FUNCTION: CPT | Mod: GN | Performed by: SPEECH-LANGUAGE PATHOLOGIST

## 2020-10-26 PROCEDURE — 80048 BASIC METABOLIC PNL TOTAL CA: CPT | Performed by: EMERGENCY MEDICINE

## 2020-10-26 RX ORDER — BISACODYL 10 MG
10 SUPPOSITORY, RECTAL RECTAL DAILY PRN
Status: DISCONTINUED | OUTPATIENT
Start: 2020-10-26 | End: 2020-10-28 | Stop reason: HOSPADM

## 2020-10-26 RX ORDER — AMOXICILLIN 250 MG
2 CAPSULE ORAL 2 TIMES DAILY PRN
Status: DISCONTINUED | OUTPATIENT
Start: 2020-10-26 | End: 2020-10-28 | Stop reason: HOSPADM

## 2020-10-26 RX ORDER — ASPIRIN 81 MG/1
81 TABLET ORAL DAILY
Status: DISCONTINUED | OUTPATIENT
Start: 2020-10-26 | End: 2020-10-28 | Stop reason: HOSPADM

## 2020-10-26 RX ORDER — SIMVASTATIN 40 MG
40 TABLET ORAL EVERY EVENING
Status: DISCONTINUED | OUTPATIENT
Start: 2020-10-26 | End: 2020-10-28 | Stop reason: HOSPADM

## 2020-10-26 RX ORDER — ACETAMINOPHEN 650 MG/1
650 SUPPOSITORY RECTAL EVERY 4 HOURS PRN
Status: DISCONTINUED | OUTPATIENT
Start: 2020-10-26 | End: 2020-10-28 | Stop reason: HOSPADM

## 2020-10-26 RX ORDER — AMOXICILLIN 250 MG
1 CAPSULE ORAL 2 TIMES DAILY PRN
Status: DISCONTINUED | OUTPATIENT
Start: 2020-10-26 | End: 2020-10-28 | Stop reason: HOSPADM

## 2020-10-26 RX ORDER — ASPIRIN 300 MG/1
300 SUPPOSITORY RECTAL DAILY
Status: DISCONTINUED | OUTPATIENT
Start: 2020-10-26 | End: 2020-10-28 | Stop reason: HOSPADM

## 2020-10-26 RX ORDER — NALOXONE HYDROCHLORIDE 0.4 MG/ML
.1-.4 INJECTION, SOLUTION INTRAMUSCULAR; INTRAVENOUS; SUBCUTANEOUS
Status: DISCONTINUED | OUTPATIENT
Start: 2020-10-26 | End: 2020-10-28 | Stop reason: HOSPADM

## 2020-10-26 RX ORDER — ONDANSETRON 4 MG/1
4 TABLET, ORALLY DISINTEGRATING ORAL EVERY 6 HOURS PRN
Status: DISCONTINUED | OUTPATIENT
Start: 2020-10-26 | End: 2020-10-28 | Stop reason: HOSPADM

## 2020-10-26 RX ORDER — ONDANSETRON 2 MG/ML
4 INJECTION INTRAMUSCULAR; INTRAVENOUS EVERY 6 HOURS PRN
Status: DISCONTINUED | OUTPATIENT
Start: 2020-10-26 | End: 2020-10-28 | Stop reason: HOSPADM

## 2020-10-26 RX ORDER — GADOBUTROL 604.72 MG/ML
8 INJECTION INTRAVENOUS ONCE
Status: COMPLETED | OUTPATIENT
Start: 2020-10-26 | End: 2020-10-26

## 2020-10-26 RX ORDER — LABETALOL HYDROCHLORIDE 5 MG/ML
10-40 INJECTION, SOLUTION INTRAVENOUS EVERY 10 MIN PRN
Status: DISCONTINUED | OUTPATIENT
Start: 2020-10-26 | End: 2020-10-28 | Stop reason: HOSPADM

## 2020-10-26 RX ORDER — POLYETHYLENE GLYCOL 3350 17 G/17G
17 POWDER, FOR SOLUTION ORAL DAILY PRN
Status: DISCONTINUED | OUTPATIENT
Start: 2020-10-26 | End: 2020-10-28 | Stop reason: HOSPADM

## 2020-10-26 RX ORDER — ACETAMINOPHEN 325 MG/1
650 TABLET ORAL EVERY 4 HOURS PRN
Status: DISCONTINUED | OUTPATIENT
Start: 2020-10-26 | End: 2020-10-28 | Stop reason: HOSPADM

## 2020-10-26 RX ORDER — IOPAMIDOL 755 MG/ML
70 INJECTION, SOLUTION INTRAVASCULAR ONCE
Status: COMPLETED | OUTPATIENT
Start: 2020-10-26 | End: 2020-10-26

## 2020-10-26 RX ORDER — SODIUM CHLORIDE 9 MG/ML
INJECTION, SOLUTION INTRAVENOUS CONTINUOUS
Status: DISCONTINUED | OUTPATIENT
Start: 2020-10-26 | End: 2020-10-26

## 2020-10-26 RX ORDER — SODIUM CHLORIDE 450 MG/100ML
INJECTION, SOLUTION INTRAVENOUS CONTINUOUS
Status: DISCONTINUED | OUTPATIENT
Start: 2020-10-26 | End: 2020-10-27

## 2020-10-26 RX ADMIN — SODIUM CHLORIDE: 4.5 INJECTION, SOLUTION INTRAVENOUS at 16:57

## 2020-10-26 RX ADMIN — SIMVASTATIN 40 MG: 40 TABLET, FILM COATED ORAL at 19:53

## 2020-10-26 RX ADMIN — IOPAMIDOL 70 ML: 755 INJECTION, SOLUTION INTRAVENOUS at 12:47

## 2020-10-26 RX ADMIN — RIVAROXABAN 20 MG: 20 TABLET, FILM COATED ORAL at 16:02

## 2020-10-26 RX ADMIN — SODIUM CHLORIDE 90 ML: 9 INJECTION, SOLUTION INTRAVENOUS at 12:47

## 2020-10-26 RX ADMIN — ASPIRIN 81 MG: 81 TABLET ORAL at 16:02

## 2020-10-26 RX ADMIN — GADOBUTROL 8 ML: 604.72 INJECTION INTRAVENOUS at 10:20

## 2020-10-26 ASSESSMENT — ENCOUNTER SYMPTOMS
WEAKNESS: 1
MYALGIAS: 0

## 2020-10-26 NOTE — CONSULTS
"[facility]    Stroke Consult Note    Reason for Consult:  Lacunar infarct     Chief Complaint: One-sided Weakness       HPI  Gene CANDICE Valentin is a 80 year old male who presents with LLE weakness after coronary angiogram last Thursday that did not improve. MRI revealed a R lacunar infarct and CTA negative for LVO. Patient states his leg is improving but he still have difficulty ambulating.     He has history of Afib, on Xarelto, borderline DM and HTN.     He denies any other stroke like signs or symptoms. Speech/language are intact and he has no trouble with swallowing.     Impression  Ischemic Stroke due to small-vessel occlusion  vs ?periop complication given timing of weakness.   --A1c 6.2%  --LDL 63  --ECHO EF is 60-65%, Severe AS, Severe biatrial enlargement is present  --MRI shows small lacunar infarct in R internal capsule   --CTA shows ~50% stenosis bilaterally at the ICA       Recommendations  Acute Ischemic Stroke (without tPA) Recommendations  - Neurochecks Q 4 hours  - Blood pressure goal: normotensive   - Statin: Continue PTA statin, LDL currently within goal  - Telemetry, EKG  - Bedside Glucose Monitoring  - PT/OT/SLP  - Stroke Education  - Euthermia, Euglycemia  - Add ASA 81 mg in addition to Xarelto.   - No further stroke work up at this time.   - From neurological standpoint, patient safe for discharge to rehab if that is PT/OT's recommendation       Thank you for this consult. We will continue to follow.     The Stroke Staff is Dr. Denney.    Nakul Verdugo MD  Vascular Neurology Fellow  To page me or covering stroke neurology team member, click here: AMCOM   Choose \"On Call\" tab at top, then search dropdown box for \"Neurology Adult\", select location, press Enter, then look for stroke/neuro ICU/telestroke.    _____________________________________________________    Past Medical History   Past Medical History:   Diagnosis Date     Aortic stenosis     mild-moderate     Atrial " fibrillation (H)     paroxysmal     Carotid stenosis     mild on right, moderate on left     Chronic renal insufficiency      Essential hypertension, benign      Hyperlipidaemia      Hypertrophy of prostate without urinary obstruction and other lower urinary tract symptoms (LUTS)      Rheumatic fever      Unspecified cerebral artery occlusion with cerebral infarction      Past Surgical History   Past Surgical History:   Procedure Laterality Date     CV CORONARY ANGIOGRAM N/A 10/22/2020    Procedure: Coronary Angiogram;  Surgeon: Alexandru Doe MD;  Location:  HEART CARDIAC CATH LAB     CV RIGHT HEART CATH N/A 10/22/2020    Procedure: Right Heart Cath;  Surgeon: Alexandru Doe MD;  Location:  HEART CARDIAC CATH LAB     ENT SURGERY      SEPTOPLASTY     ENT SURGERY      TONSILLECTOMY     HERNIA REPAIR       HERNIORRHAPHY INGUINAL  2/15/2012    Procedure:HERNIORRHAPHY INGUINAL; LEFT INGUINAL HERNIA REPAIR WITH MESH; Surgeon:SHILO READ; [facility]     ORTHOPEDIC SURGERY  left ankle 2/2013    ORIF LEFT WRIST FRACTURE     Medications   Home Meds  Prior to Admission medications    Medication Sig Start Date End Date Taking? Authorizing Provider   cloNIDINE (CATAPRES) 0.1 MG tablet Take 1 tablet by mouth daily.    Reported, Patient   hydrALAZINE (APRESOLINE) 25 MG tablet Take 25 mg by mouth 2 times daily.    Reported, Patient   hydrochlorothiazide (HYDRODIURIL) 25 MG tablet Take 1 tablet (25 mg) by mouth daily 12/9/14   Reyes Curtis MD   losartan (COZAAR) 100 MG tablet Take 1 tablet (100 mg) by mouth daily 4/2/19   Reyes Curtis MD   rivaroxaban ANTICOAGULANT (XARELTO ANTICOAGULANT) 20 MG TABS tablet Take 1 tablet (20 mg) by mouth daily 5/15/20   Neeru Harley PA-C   simvastatin (ZOCOR) 40 MG tablet Take 1 tablet by mouth daily.    Reported, Patient       Scheduled Meds      Infusion Meds      PRN Meds      Allergies   Allergies   Allergen Reactions     Lisinopril      Family History    Family History   Problem Relation Age of Onset     Respiratory Brother      Coronary Artery Disease Early Onset Mother      Abdominal Aortic Aneurysm Father      Social History   Social History     Tobacco Use     Smoking status: Former Smoker     Years: 20.00     Types: Cigars, Pipe     Quit date: 9/1/2010     Years since quitting: 10.1     Smokeless tobacco: Never Used   Substance Use Topics     Alcohol use: No     Alcohol/week: 0.0 standard drinks     Frequency: Never     Drug use: No       Review of Systems   The 10 point Review of Systems is negative other than noted in the HPI or here.        PHYSICAL EXAMINATION   Temp:  [98.5  F (36.9  C)] 98.5  F (36.9  C)  Pulse:  [65-83] 65  Resp:  [16-27] 27  BP: (166)/(92) 166/92  SpO2:  [97 %-100 %] 97 %    Neurologic  Mental Status:  alert, oriented x 3, follows commands, speech clear and fluent, naming and repetition normal  Cranial Nerves:  visual fields intact, PERRL, EOMI with normal smooth pursuit, facial sensation intact and symmetric, facial movements symmetric, hearing not formally tested but intact to conversation, palate elevation symmetric and uvula midline, no dysarthria, shoulder shrug strong bilaterally, tongue protrusion midline  Motor:  Drift in LLE  Reflexes:  toes down-going  Sensory:  light touch sensation intact and symmetric throughout upper and lower extremities, no extinction on double simultaneous stimulation   Coordination:  normal finger-to-nose and heel-to-shin bilaterally without dysmetria, rapid alternating movements symmetric  Station/Gait:  deferred    Dysphagia Screen  Per Nursing    Stroke Scales    NIHSS  Interval baseline (10/26/20 1357)   Interval Comments     1a. Level of Consciousness 0-->Alert, keenly responsive   1b. LOC Questions 0-->Answers both questions correctly   1c. LOC Commands 0-->Performs both tasks correctly   2.   Best Gaze 0-->Normal   3.   Visual 0-->No visual loss   4.   Facial Palsy 1-->Minor paralysis  (flattened nasolabial fold, asymmetry on smiling)   5a. Motor Arm, Left 0-->No drift, limb holds 90 (or 45) degrees for full 10 secs   5b. Motor Arm, Right 0-->No drift, limb holds 90 (or 45) degrees for full 10 secs   6a. Motor Leg, Left 0-->No drift, leg holds 30 degree position for full 5 secs   6b. Motor Leg, right 0-->No drift, leg holds 30 degree position for full 5 secs   7.   Limb Ataxia 1-->Present in one limb   8.   Sensory 0-->Normal, no sensory loss   9.   Best Language 0-->No aphasia, normal   10. Dysarthria 0-->Normal   11. Extinction and Inattention  0-->No abnormality   Total 2 (10/26/20 1357)       Imaging  I personally reviewed all imaging; relevant findings per HPI.    Labs Data   CBC  Recent Labs   Lab 10/26/20  0907 10/22/20  0810   WBC 7.1 6.0   RBC 4.59 4.38*   HGB 14.9 13.9   HCT 44.6 42.5    204     Basic Metabolic Panel   Recent Labs   Lab 10/26/20  0907 10/22/20  0810   * 141   POTASSIUM 3.9 3.5   CHLORIDE 109 109   CO2 31 29   BUN 24 30   CR 1.01 1.19   * 111*   CLAY 9.7 8.8     Liver Panel  Recent Labs   Lab 10/22/20  0810   PROTTOTAL 6.9   ALBUMIN 3.8   BILITOTAL 1.0   ALKPHOS 44   AST 17   ALT 22     INR    Recent Labs   Lab Test 10/22/20  0810   INR 1.05      Lipid Profile    Recent Labs   Lab Test 02/03/16   CHOL 130   HDL 41   LDL 75   TRIG 70     A1C    Recent Labs   Lab Test 10/22/20  0810   A1C 6.2*     Troponin I  No results for input(s): TROPI in the last 168 hours.       Stroke Code / Stroke Consult Data Data This was a non-emergent, non-tele stroke consult.

## 2020-10-26 NOTE — ED NOTES
[facility]  ED Nurse Handoff Report    ED Chief complaint: One-sided Weakness      ED Diagnosis:   Final diagnoses:   Ischemic stroke (H)   Left leg weakness       Code Status: To be addressed by admitting provider    Allergies:   Allergies   Allergen Reactions     Lisinopril        Patient Story: Pt reports having heart cath on 10/22, discharged home that day. Pt reports feels like his left leg was weaker than right. Pt had fall that night. Report feels like when had stroke 11 yrs ago.    Focused Assessment: A&Ox4. Pt neuro exam normal in ED, although pt appeared to have difficulty transferring upon arrival assist x1 from RN. Denies cough, SOB, fevers, n/v/d. Reports increased weakness in L leg since Thursday, which is causing difficulty with transferring and ambulation. Hypertensive, otherwise vitally stable. Calm, cooperative and resting on cart.    MR Brain w/o & w Contrast   Final Result   IMPRESSION:    1. Recent small ischemic infarct involving the posterolateral right   thalamus and posterior limb of the right internal capsule. No other   recent infarcts.   2. Diffuse cerebral volume loss and cerebral white matter changes   consistent with chronic small vessel ischemic disease.       GAUTAM DELCID MD      CTA Head Neck with Contrast    (Results Pending)       Treatments and/or interventions provided: MR head, ekg    Patient's response to treatments and/or interventions: resting on cart    To be done/followed up on inpatient unit:  Continue with plan of care per admitting MD.    Does this patient have any cognitive concerns?: None    Activity level - Baseline/Home:  Independent at baseline/ since Thursday assist x1 and walker  Activity Level - Current:   Stand with Assist    Patient's Preferred language: English   Needed?: No    Isolation: None  Infection: Not Applicable  Patient tested for COVID 19 prior to admission: YES  Bariatric?: No    Vital Signs:   Vitals:    10/26/20 0900  10/26/20 0930 10/26/20 1000 10/26/20 1130   BP:       Pulse: 74 65 63 81   Resp: 16 27 21 29   Temp:       TempSrc:       SpO2: 100% 97% 96% 95%       Cardiac Rhythm: aFIB    Was the PSS-3 completed:   Yes  What interventions are required if any?  na             Family Comments: Wife at bedside  OBS brochure/video discussed/provided to patient/family: ian              Name of person given brochure if not patient: NA              Relationship to patient: NA    For the majority of the shift this patient's behavior was Green.   Behavioral interventions performed were NA.    ED NURSE PHONE NUMBER: *[id number]

## 2020-10-26 NOTE — PROGRESS NOTES
10/26/20 1507   General Information   Onset of Illness/Injury or Date of Surgery 10/26/20   Referring Physician Dr. Roderick MD   Patient/Family Therapy Goal Statement (SLP) Patient reports no problems swallowing.    Pertinent History of Current Problem Per MRI note: Recent small ischemic infarct involving the posterolateral right Recent small ischemic infarct involving the posterolateral right thalamus and posterior limb of the right internal capsule.    General Observations Pleasant and cooperative with clear speech.   Oral Motor   Oral Musculature anomalies present   Structural Abnormalities none present   Mucosal Quality dry   Dentition (Oral Motor)   Dentition (Oral Motor) natural dentition;adequate dentition   Facial Symmetry (Oral Motor)   Facial Symmetry (Oral Motor) left side impairment   Left Side Facial Asymmetry minimal impairment   Lip Function (Oral Motor)   Lip Range of Motion (Oral Motor) retraction impairment   Retraction, Lip Range of Motion minimal impairment;left side   Tongue Function (Oral Motor)   Tongue ROM (Oral Motor) elevation is impaired;protrusion is impaired;lateralization is impaired   Protrusion, Tongue ROM Impairment (Oral Motor) left side;minimal impairment   Retraction, Tongue ROM Impairment (Oral Motor) minimal impairment   Lateralization, Tongue ROM Impairment (Oral Motor) minimal impairment   Cough/Swallow/Gag Reflex (Oral Motor)   Soft Palate/Velum (Oral Motor) WNL   Volitional Throat Clear/Cough (Oral Motor) WNL   Volitional Swallow (Oral Motor) WNL   Vocal Quality/Secretion Management (Oral Motor)   Vocal Quality (Oral Motor) WFL   General Swallowing Observations   Current Diet/Method of Nutritional Intake (General Swallowing Observations, NIS) NPO   Respiratory Support (General Swallowing Observations) none   Clinical Swallow Evaluation   Feeding Assistance no assistance needed   Clinical Swallow Evaluation Textures Trialed Thin Liquids;Puree Textures;Solid Foods    Clinical Swallow Eval: Thin Liquid Texture Trial   Mode of Presentation, Thin Liquids cup;self-fed   Volume of Liquid or Food Presented 6 oz of water   Oral Phase of Swallow WFL   Pharyngeal Phase of Swallow intact   Clinical Swallow Evaluation: Puree Solid Texture Trial   Mode of Presentation, Puree spoon;self-fed   Volume of Puree Presented 4 oz of pudding   Oral Phase, Puree WFL   Pharyngeal Phase, Puree intact   Clinical Swallow Evaluation: Solid Food Texture Trial   Mode of Presentation, Solid self-fed   Volume of Solid Food Presented Saltine crackers   Oral Phase, Solid Poor AP movement;Residue in oral cavity;Premature pharyngeal entry   Oral Residue, Solid mid posterior tongue;left anterior lateral sulci   Pharyngeal Phase, Solid impaired;repeated swallows   Diagnostic Statement Mild to moderate oral residue that he is able to clear with multiple swallows or liquid rinse.    Swallowing Recommendations   Diet Consistency Recommendations dysphagia level 3 (advanced);thin liquids   Supervision Level for Intake distant supervision needed   Mode of Delivery Recommendations bolus size, small;food moistened;no straws;slow rate of intake   Swallowing Maneuver Recommendations alternate food and liquid intake;extra swallow   Monitoring/Assistance Required (Eating/Swallowing) check mouth frequently for oral residue/pocketing;cue for finger/lingual sweep if oral pocketing present;monitor for cough or change in vocal quality with intake   Recommended Feeding/Eating Techniques (Swallow Eval) maintain upright sitting position for eating;maintain upright posture during/after eating for 30 minutes   Medication Administration Recommendations, Swallowing (SLP) Pills whole one at a time.    General Therapy Interventions   Planned Therapy Interventions Dysphagia Treatment   Dysphagia treatment Modified diet education;Instruction of safe swallow strategies   SLP Therapy Assessment/Plan   Criteria for Skilled Therapeutic  Interventions Met (SLP Eval) yes   SLP Diagnosis Mild dysphagia   Rehab Potential (SLP Eval) good, to achieve stated therapy goals   Therapy Frequency (SLP Eval) 5 times/wk   Predicted Duration of Therapy Intervention (SLP Eval) 1 week   Comment, Therapy Assessment/Plan (SLP) Patient presents with mild oral and pharyngeal dysphagia at bedside secondary to new right CVA. Deficits include; left sided weakness with tongue deviation and mildly decreased strength. He demonstrated timely swallow response with thin liquids and no overt Sx of aspiration via the cup. Pudding was tolerated without difficulty. Mastication was mildly prolonged for a solid with mild to moderate oral residue that cleared with multilple swallows and a liquid rinse. Patient increases his risk due to fast pace of eating and oral residue with solids. Patient was receptive to swallow strategies and modified diet.    Therapy Plan Review/Discharge Plan (SLP)   Therapy Plan Review (SLP) evaluation/treatment results reviewed;care plan/treatment goals reviewed;risks/benefits reviewed;participants included;patient;spouse/significant other   SLP Discharge Planning    SLP Discharge Recommendation (DC Rec)   (Pending further work up. )   SLP Rationale for DC Rec Anticipate swallow goals to be met prior to discharge.    SLP Brief overview of current status  Patient with mild oral and pharyngel dysphagia at bedside. Recommend: 1. DDL 3 with thin liquids. 2. Upright, no straws, small bites/sips, check for oral residue and alternate liquids/solids as needed.     Total Evaluation Time   Total Evaluation Time (Minutes) 17

## 2020-10-26 NOTE — PROGRESS NOTES
RECEIVING UNIT ED HANDOFF REVIEW    ED Nurse Handoff Report was reviewed by: NARESH BONE RN on October 26, 2020 at 12:31 PM

## 2020-10-26 NOTE — ED TRIAGE NOTES
Pt reports having heart cath on 10/22, discharged home that day. Pt reports feels like his left leg was weaker than right. Pt had fall that night. Report feels like when had stroke 11 yrs ago.

## 2020-10-26 NOTE — PHARMACY-ADMISSION MEDICATION HISTORY
Pharmacy Medication History  Admission medication history interview status for the 10/26/2020  admission is complete. See EPIC admission navigator for prior to admission medications       Medication history sources: Patient, Patient's family/friend (wife, Aagtha), Surescripts and Care Everywhere  Location of interview: Phone  Medication history source reliability: Good  Adherence assessment: Good    Significant changes made to the medication list:  Changed: clonidine from daily to BID      Additional medication history information:   None    Medication reconciliation completed by provider prior to medication history? No    Time spent in this activity: 10 mins      Prior to Admission medications    Medication Sig Last Dose Taking? Auth Provider   cloNIDINE (CATAPRES) 0.1 MG tablet Take 1 tablet by mouth 2 times daily  10/25/2020 at pm Yes Reported, Patient   hydrALAZINE (APRESOLINE) 25 MG tablet Take 25 mg by mouth 2 times daily. 10/25/2020 at pm Yes Reported, Patient   hydrochlorothiazide (HYDRODIURIL) 25 MG tablet Take 1 tablet (25 mg) by mouth daily 10/25/2020 at am Yes Reyes Curtis MD   losartan (COZAAR) 100 MG tablet Take 1 tablet (100 mg) by mouth daily 10/25/2020 at am Yes Reyes Curtis MD   rivaroxaban ANTICOAGULANT (XARELTO ANTICOAGULANT) 20 MG TABS tablet Take 1 tablet (20 mg) by mouth daily 10/25/2020 at pm Yes Neeru Harley PA-C   simvastatin (ZOCOR) 40 MG tablet Take 1 tablet by mouth daily. 10/25/2020 at pm Yes Reported, Patient

## 2020-10-26 NOTE — H&P
[facility]    History and Physical - Hospitalist Service       Date of Admission:  10/26/2020    Assessment & Plan   Kamari Valentin is an 80 year old male with a 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     Right-sided ischemic stroke with associated left-sided weakness.   Chronic atrial fibrillation on Xarelto.  MRI showed recent ischemic stroke involving the posterolateral right  thalamus and posterior limb of the right internal capsule.  CT angiogram of the head neck showed approximate 50% luminal diameter stenosis of the origin of the left internal carotid artery and mild narrowing on the right.  Recent carotid ultrasound on 10/22 showed less than 50% stenosis on the right and greater than 70% stenosis on the left internal carotid artery.  No echocardiogram needed as patient is already known to have atrial fibrillation and is on Xarelto.  -Appreciate neurology consult  -Admit to observation with minimal residual symptoms 4 days out from a event  -Continue home Xarelto  -Add aspirin 81 mg daily and will plan to go home on this  -Hold home antihypertensives and can likely resume at discharge  -As needed labetalol in the setting of permissive hypertension  -LDL already looks good and will continue home statin  -PT/OT/speech therapy assessment with plan to transition home with home therapies and a walker tomorrow if he is continuing to do well  involving the posterolateral right  thalamus and posterior limb of the right internal capsule    Hypernatremia. Sodium 145 on admission.  -0.45%  mL/hr for 10 hours then stop  -BMP in AM    Hypertension.  -Hold home clonidine,  hydralazine, hydrochlorothiazide, and losartan.  Likely can resume these at discharge if okay with neurology. May need to hold hydrochlorothiazide if oral fluid intake may be decreased.    Hyperlipidemia.  LDL has been well controlled.  -Continue home medication     Diet:  N.p.o. pending speech evaluation and then can hopefully resume regular diet  DVT Prophylaxis: Pneumatic Compression Devices  Shen Catheter: not present  Code Status:  DNR/DNI         Disposition Plan   Expected discharge: Tomorrow, recommended to prior living arrangement once physical therapy assessment complete with transition plans for home therapy and neurology work-up complete.  Entered: Ruslan Vaughn MD 10/26/2020, 12:31 PM     The patient's care was discussed with the Patient, Patient's Family and Neurology Consultant.    Ruslan Vaughn MD  [facility]  Contact information available via [facility] Paging/Directory  ______________________________________________________________________    Chief Complaint   Left sided weakness    History of Present Illness   Kamari Valentin is a 80 year old male with a history of stroke with mild residual left sided weakness who could ambulate on his own without a walker prior to recent events, HTN, HLD, and A fib, anticoagulated with Xarelto who presents to the emergency department for evaluation of left sided weakness that developed around the time of his coronary angiogram. Patient states he had an angiogram performed 4 days ago in preparation for a planned TAVR procedure.  They did notice some mild left facial droop in the preop area prior to going into his procedure.  He was wheeled out by wheelchair and while walking into his house noted some new left leg weakness and has been unable to walk on it well without a walker since then. He called the surgeon the following day on Friday who stated this could be a residual effect from anesthesia and recommended monitoring it and if it  got worse to come in for evaluation. Symptoms continued to persist and patient called his primary this morning who advised him to present to the ED for evaluation. He denies any pain. He otherwise feels well.  He was using a walker to get around the house but did have a fall on Friday where the walker slipped out from under him and then he switched to a walker with tennis ball feet and has been getting around fairly well since then.  The results of the angiogram showed three-vessel disease and they recommended open heart surgery with bypass and valve replacement.  He was supposed to see his surgeon today and consider urgent surgery later this week.  The patient notes that he has not been very symptomatic from his heart disease and is reluctant to pursue the procedure.  He discussed things with the neurologist who recommended 9 months after stroke before proceeding into a large surgery but said that would need to be discussed with cardiovascular surgery based on his risk of waiting.  The patient and his wife are reluctant to have the surgery but understand the risks of not having it with coronary disease and worsening valve disease having significant risk. Case was discussed with neurology.    Review of Systems    The 10 point Review of Systems is negative other than noted in the HPI or here.    Past Medical History    I have reviewed this patient's medical history and updated it with pertinent information if needed.   Past Medical History:   Diagnosis Date     Aortic stenosis     mild-moderate     Atrial fibrillation (H)     paroxysmal     Carotid stenosis     mild on right, moderate on left     Chronic renal insufficiency      Essential hypertension, benign      Hyperlipidaemia      Hypertrophy of prostate without urinary obstruction and other lower urinary tract symptoms (LUTS)      Rheumatic fever      Unspecified cerebral artery occlusion with cerebral infarction        Past Surgical History   I have reviewed  this patient's surgical history and updated it with pertinent information if needed.  Past Surgical History:   Procedure Laterality Date     CV CORONARY ANGIOGRAM N/A 10/22/2020    Procedure: Coronary Angiogram;  Surgeon: Alexandru Doe MD;  Location:  HEART CARDIAC CATH LAB     CV RIGHT HEART CATH N/A 10/22/2020    Procedure: Right Heart Cath;  Surgeon: Alexandru Doe MD;  Location:  HEART CARDIAC CATH LAB     ENT SURGERY      SEPTOPLASTY     ENT SURGERY      TONSILLECTOMY     HERNIA REPAIR       HERNIORRHAPHY INGUINAL  2/15/2012    Procedure:HERNIORRHAPHY INGUINAL; LEFT INGUINAL HERNIA REPAIR WITH MESH; Surgeon:SHILO READ; Location:[facility]     ORTHOPEDIC SURGERY  left ankle 2/2013    ORIF LEFT WRIST FRACTURE       Social History   I have reviewed this patient's social history and updated it with pertinent information if needed.  Social History     Tobacco Use     Smoking status: Former Smoker     Years: 20.00     Types: Cigars, Pipe     Quit date: 9/1/2010     Years since quitting: 10.1     Smokeless tobacco: Never Used   Substance Use Topics     Alcohol use: No     Alcohol/week: 0.0 standard drinks     Frequency: Never     Drug use: No       Family History   I have reviewed this patient's family history and updated it with pertinent information if needed.  Family History   Problem Relation Age of Onset     Respiratory Brother      Coronary Artery Disease Early Onset Mother      Abdominal Aortic Aneurysm Father        Prior to Admission Medications   Prior to Admission Medications   Prescriptions Last Dose Informant Patient Reported? Taking?   cloNIDINE (CATAPRES) 0.1 MG tablet 10/25/2020 at pm Spouse/Significant Other Yes Yes   Sig: Take 1 tablet by mouth 2 times daily    hydrALAZINE (APRESOLINE) 25 MG tablet 10/25/2020 at pm Spouse/Significant Other Yes Yes   Sig: Take 25 mg by mouth 2 times daily.   hydrochlorothiazide (HYDRODIURIL) 25 MG tablet 10/25/2020 at am Spouse/Significant Other No  Yes   Sig: Take 1 tablet (25 mg) by mouth daily   losartan (COZAAR) 100 MG tablet 10/25/2020 at am Spouse/Significant Other No Yes   Sig: Take 1 tablet (100 mg) by mouth daily   rivaroxaban ANTICOAGULANT (XARELTO ANTICOAGULANT) 20 MG TABS tablet 10/25/2020 at pm Spouse/Significant Other No Yes   Sig: Take 1 tablet (20 mg) by mouth daily   simvastatin (ZOCOR) 40 MG tablet 10/25/2020 at pm Spouse/Significant Other Yes Yes   Sig: Take 1 tablet by mouth daily.      Facility-Administered Medications: None     Allergies   Allergies   Allergen Reactions     Lisinopril        Physical Exam   Vital Signs: Temp: 98.5  F (36.9  C) Temp src: Oral BP: (!) 149/76 Pulse: 67   Resp: 28 SpO2: 96 % O2 Device: None (Room air)    Weight: 0 lbs 0 oz    Constitutional: Awake, alert, cooperative, no apparent distress.  Eyes: Conjunctiva and pupils examined and normal.  HEENT: Moist mucous membranes, normal dentition.  Respiratory: Clear to auscultation bilaterally, no crackles or wheezing.  Cardiovascular: Regular rate and rhythm, normal S1 and S2, and 3/6 musical high pitched murmur throughout the chest including the right upper sternal border.  GI: Soft, non-distended, non-tender, normal bowel sounds.  Lymph/Hematologic: No anterior cervical or supraclavicular adenopathy.  Skin: No rashes, no cyanosis, no edema.  Musculoskeletal: No joint swelling, erythema or tenderness.  Neurologic: Cranial nerves 2-12 intact except for a mild left facial droop, strength 4+/5 in left hand and 4/5 in left leg, normal strength and sensation otherwise noted.  Mild left pronator drift in left arm noted.  Psychiatric: Alert, oriented to person, place and time, no obvious anxiety or depression.    Data   Data reviewed today: I reviewed all medications, new labs and imaging results over the last 24 hours. I personally reviewed the EKG tracing showing atrial fibrillation with controlled rate, mild T wave inversions in 1 and aVL.    Recent Labs   Lab  10/26/20  0907 10/22/20  0810   WBC 7.1 6.0   HGB 14.9 13.9   MCV 97 97    204   INR  --  1.05   * 141   POTASSIUM 3.9 3.5   CHLORIDE 109 109   CO2 31 29   BUN 24 30   CR 1.01 1.19   ANIONGAP 5 3   CLAY 9.7 8.8   * 111*   ALBUMIN  --  3.8   PROTTOTAL  --  6.9   BILITOTAL  --  1.0   ALKPHOS  --  44   ALT  --  22   AST  --  17     Recent Results (from the past 24 hour(s))   MR Brain w/o & w Contrast    Narrative    MRI OF THE BRAIN WITHOUT AND WITH CONTRAST 10/26/2020 11:04 AM     COMPARISON: Brain MR 9/27/2018.    HISTORY:  Focal neuro deficit, more than six hours, stroke suspected.    TECHNIQUE: Axial diffusion-weighted with ADC map, axial T2-weighted  with fat saturation, axial T1-weighted, axial turboFLAIR and coronal  T1-weighted images of the brain were acquired without intravenous  contrast.  Following intravenous administration of gadolinium (8 mL  Gadavist), axial T1-weighted images of the brain were acquired.     FINDINGS: There is a recent ischemic infarct involving the  posterolateral right thalamus extending into the posterior limb of the  right internal capsule. No other recent ischemic infarcts noted.    There is moderate diffuse cerebral volume loss. There are numerous  scattered focal and confluent periventricular areas of abnormal T2  signal hyperintensity in the cerebral white matter bilaterally that  are consistent with sequela of chronic small vessel ischemic disease.     The ventricles and basal cisterns are within normal limits in  configuration given the degree of cerebral volume loss.  There is no  midline shift.  There are no extra-axial fluid collections.  There is  no evidence for acute intracranial hemorrhage.  There is no abnormal  contrast enhancement in the brain or its coverings.     There is no sinusitis or mastoiditis.      Impression    IMPRESSION:   1. Recent small ischemic infarct involving the posterolateral right  thalamus and posterior limb of the right  internal capsule. No other  recent infarcts.  2. Diffuse cerebral volume loss and cerebral white matter changes  consistent with chronic small vessel ischemic disease.     GAUTAM DELCID MD   CTA Head Neck with Contrast    Narrative    CT ANGIOGRAM OF THE HEAD AND NECK WITHOUT AND WITH CONTRAST   10/26/2020 1:02 PM     COMPARISON: None    HISTORY: Recent ischemic infarct.    TECHNIQUE:  Precontrast localizing scans were followed by CT  angiography with an injection of 70mL [id number]-370 nonionic intravenous  contrast material with scans through the head and neck.  Images were  transferred to a separate 3-D workstation where multiplanar  reformations and 3-D images were created.  Estimates of carotid  stenoses are made relative to the distal internal carotid artery  diameters except as noted.      FINDINGS:   Neck CTA: The common carotid arteries bilaterally are patent without  stenosis. There is extensive atherosclerotic plaque of the proximal  internal carotid arteries on both sides. There is mild narrowing (less  than 50% luminal diameter stenosis) at the origin of the right  internal carotid artery. There is approximately 50% luminal diameter  stenosis at the origin of the left internal carotid artery utilizing  NASCET criteria with a stenosis diameter 2.6 mm and a normal caliber  distal left internal carotid artery diameter of 5.6 mm. The vertebral  arteries bilaterally are small. There is moderate atherosclerotic  narrowing at the origins of the vertebral arteries bilaterally.  Neither vertebral artery definitely contributes to the basilar artery.  There appears to be normal PICA termination of the left vertebral  artery. The intradural distal right vertebral artery demonstrates  multiple foci of marked narrowing and questionable total occlusion at  one point.    Head CTA: The basilar artery is tiny, possibly congenitally. There is  heavily calcified atherosclerotic plaque of the intracranial distal  internal  carotid arteries on both sides resulting in at least mild  narrowing of the paraophthalmic distal left internal carotid artery  but no significant narrowing on the right. The bilateral anterior  cerebral, bilateral middle cerebral and bilateral posterior cerebral  arteries are patent and unremarkable. The anterior communicating and  bilateral posterior communicating arteries are patent.      Impression    IMPRESSION:  1. Approximately 50% luminal diameter stenosis at the origin of the  left internal carotid artery.  2. Mild narrowing (less than 50% luminal diameter stenosis) at the  origin of the right internal carotid artery.  3. Small bilateral vertebral arteries with moderate stenosis at their  origins.  4. Probable normal variant left PICA termination of the small left  vertebral artery.  5. High-grade stenosis versus occlusion of the intradural distal right  vertebral artery just before the basilar artery.  6. Small basilar artery (likely congenital).  7. Mild atherosclerotic narrowing of the paraophthalmic distal left  internal carotid artery.  8. Otherwise, normal head CTA.      Radiation dose for this scan was reduced using automated exposure  control, adjustment of the mA and/or kV according to patient size, or  iterative reconstruction technique.    GAUTAM DELCID MD

## 2020-10-26 NOTE — ED PROVIDER NOTES
History     Chief Complaint:  Generalized Weakness      HPI  Kamari Valentin is a 80 year old male with a history of stroke with residual left sided weakness, HTN, HLD, and A fib, anticoagulated with Xarelto who presents to the emergency department for evaluation of generalized weakness. Patient states he had an angiogram performed 4 days ago in preparation for a planned TAVR procedure. He states since the angiogram he states his left leg has been weak and has been unable to walk on it. He called the surgeon who stated this could be a residual effect from anesthesia. However, symptoms continued to persist and patient called his primary this morning who advised him to present to the ED for evaluation. He denies any pain. He otherwise feels well.     Allergies:  Lisinopril    Medications:    Clonidine  Hydralazine  Hydrochlorothiazide  Losartan  Xarelto  Simvastatin    Past Medical History:    Aortic stenosis  A fib  Carotid stenosis  HTN  Hypertrophy of prostate without urinary obstruction or other LUTS  Stroke  HLD  Chronic renal insufficiency    Past Surgical History:    Right heart cath  Septoplasty  Tonsillectomy  Hernia repair  Inguinal herniorrhaphy  ORIF left wrist fracture    Family History:    CAD  AAA    Social History:  Smoking Status: Former Smoker  Alcohol Use: No  Drug Use: No  PCP: Gus Lanier   Patient arrives to the ER with his wife.  Marital Status:     Review of Systems   Musculoskeletal: Negative for myalgias.   Neurological: Positive for weakness.   All other systems reviewed and are negative.    Physical Exam   Physical Exam    Patient Vitals for the past 24 hrs:   BP Temp Temp src Pulse Resp SpO2   10/26/20 0930 -- -- -- 65 27 97 %   10/26/20 0900 -- -- -- 74 16 100 %   10/26/20 0859 (!) 166/92 98.5  F (36.9  C) Oral 83 17 100 %       General: Alert and Interactive.   Head: No signs of trauma.   Mouth/Throat: Oropharynx is clear and moist.   Eyes: Conjunctivae are normal. Pupils  are equal, round, and reactive to light.   Neck: Normal range of motion. No nuchal rigidity.   CV: Normal rate and regular rhythm.    Resp: Effort normal and breath sounds normal. No respiratory distress.   GI: Soft. There is no tenderness or guarding.   MSK: Normal range of motion. no edema.   Neuro: The patient is alert and oriented to person, place, and time.  PERRLA, EOMI, strength in upper extremities normal and symmetrical.  There is asymmetry in the coordination with heel-to-shin on the left extremity worse than the right.  There is slight weakness of the left lower extremity compared to the right.  Gait is abnormal secondary to the weakness of his left lower leg.    Sensation normal. Speech normal.  GCS eye subscore is 4. GCS verbal subscore is 5. GCS motor subscore is 6.   Skin: Skin is warm and dry. No rash noted.   Psych: normal mood and affect. behavior is normal.     Emergency Department Course   EKG  Indication: Weakness  Time: 9:24:26  Rate 69 bpm. DE interval *. QRS duration 86. QT/QTc 386/413. P-R-T axes * -26 90  Atrial Fibrillation. Septal infarct, age undetermined. Abnormal ECG.   Interpreted at 926 by Kodi Hanson MD.    Imaging:  Radiographic findings were communicated with the patient who voiced understanding of the findings.  MR Brain w/o and w/ contrast:   IMPRESSION:   1. Recent small ischemic infarct involving the posterolateral right   thalamus and posterior limb of the right internal capsule. No other   recent infarcts.   2. Diffuse cerebral volume loss and cerebral white matter changes   consistent with chronic small vessel ischemic disease.  as per radiology    Laboratory:  CBC: WBC: 7.1, HGB: 14.9, PLT: 199  BMP: Glucose 115 (H), Sodium: 145 (H), o/w WNL (Creatinine: 1.01)  Lipid panel reflex to direct LDL: WNL  Asymptomatic COVID-19 PCR: Pending     Emergency Department Course:  Nursing notes and vitals reviewed. (906) I performed an exam of the patient as documented above.      Blood drawn. COVID swab obtained. This was sent to the lab for further testing, results above.     The patient was sent for a MR while in the emergency department, findings above.     An EKG was recorded. Results as noted above.     1124 I spoke with Dr. Verdugo of Stroke-neuro and discussed the case.    1131 I spoke with Dr. Vaguhn of the hospitalist services, who is in agreement to accept the patient for admission for further monitoring, evaluation, and treatment. Findings and plan explained to the Patient who consents to admission.     Impression & Plan    Medical Decision Making:  Kamari Valentin is a 80 year old male who presents for evaluation of left leg weakness.  This is consistent with likely cerebrovascular accident.  Imaging as noted above.  Based on onset of symptoms 4 days prior, they are not a candidate for TPA. Did discuss this with the patient and they express understanding. Differential considered for symptoms included CVA, TIA, dissection, cerebral tumor, migraine, infection, metabolic derangement, demyelination, etc.  EKG shows atrial fibrillation nor arrhythmia noted on telemetry monitoring.  Will admit to medicine with neurology consulting for further cares.  Will need further workup as inpatient.  as well as OT/PT.      Covid-19  Kamari Valentin was evaluated during a global COVID-19 pandemic, which necessitated consideration that the patient might be at risk for infection with the SARS-CoV-2 virus that causes COVID-19.   Applicable protocols for evaluation were followed during the patient's care.   COVID-19 was considered as part of the patient's evaluation. The plan for testing is:  a test was obtained during this visit.    Diagnosis:    ICD-10-CM    1. Ischemic stroke (H)  I63.9 Lipid panel reflex to direct LDL     Lipid panel reflex to direct LDL     CANCELED: CBC with platelets differential     CANCELED: Comprehensive metabolic panel     CANCELED: Lipid panel reflex to direct LDL   2.  Left leg weakness  R29.898        Disposition:  Admitted to the hospital    Evan Cleaning  10/26/2020   EMERGENCY DEPARTMENT  Scribe Disclosure:  I, Evan Cleaning, am serving as a scribe at 9:06 AM on 10/26/2020 to document services personally performed by Kodi Hanson MD based on my observations and the provider's statements to me.          Kodi Hanson MD  10/26/20 1542

## 2020-10-26 NOTE — PLAN OF CARE
Pt transferred to unit from ED. MRI positive for R lacunar CVA. Alert and oriented x4. VSS on RA. Tele afib with CVR. Neuros with RLE slightly weaker than L and ataxic, and baseline (per spouse) L facial droop. NIH  = 2. CMS intact. Lung sounds clear. Denies pain. Ambulating with asst of 1 GB and walker -requires cues to ambulate safely. Tolerating DD3 diet with thin liquids. Spouse at the bedside supportive of pt.

## 2020-10-27 ENCOUNTER — APPOINTMENT (OUTPATIENT)
Dept: OCCUPATIONAL THERAPY | Facility: CLINIC | Age: 80
DRG: 065 | End: 2020-10-27
Attending: INTERNAL MEDICINE
Payer: COMMERCIAL

## 2020-10-27 ENCOUNTER — APPOINTMENT (OUTPATIENT)
Dept: PHYSICAL THERAPY | Facility: CLINIC | Age: 80
DRG: 065 | End: 2020-10-27
Payer: COMMERCIAL

## 2020-10-27 ENCOUNTER — APPOINTMENT (OUTPATIENT)
Dept: SPEECH THERAPY | Facility: CLINIC | Age: 80
DRG: 065 | End: 2020-10-27
Payer: COMMERCIAL

## 2020-10-27 ENCOUNTER — APPOINTMENT (OUTPATIENT)
Dept: PHYSICAL THERAPY | Facility: CLINIC | Age: 80
DRG: 065 | End: 2020-10-27
Attending: INTERNAL MEDICINE
Payer: COMMERCIAL

## 2020-10-27 LAB
ALBUMIN SERPL-MCNC: 3.9 G/DL (ref 3.4–5)
ALP SERPL-CCNC: 51 U/L (ref 40–150)
ALT SERPL W P-5'-P-CCNC: 23 U/L (ref 0–70)
ANION GAP SERPL CALCULATED.3IONS-SCNC: 4 MMOL/L (ref 3–14)
AST SERPL W P-5'-P-CCNC: 19 U/L (ref 0–45)
BILIRUB DIRECT SERPL-MCNC: 0.2 MG/DL (ref 0–0.2)
BILIRUB SERPL-MCNC: 0.8 MG/DL (ref 0.2–1.3)
BUN SERPL-MCNC: 18 MG/DL (ref 7–30)
CALCIUM SERPL-MCNC: 9.4 MG/DL (ref 8.5–10.1)
CHLORIDE SERPL-SCNC: 107 MMOL/L (ref 94–109)
CK SERPL-CCNC: 63 U/L (ref 30–300)
CO2 SERPL-SCNC: 28 MMOL/L (ref 20–32)
CREAT SERPL-MCNC: 0.91 MG/DL (ref 0.66–1.25)
GFR SERPL CREATININE-BSD FRML MDRD: 78 ML/MIN/{1.73_M2}
GLUCOSE SERPL-MCNC: 123 MG/DL (ref 70–99)
POTASSIUM SERPL-SCNC: 3.9 MMOL/L (ref 3.4–5.3)
PROT SERPL-MCNC: 7.2 G/DL (ref 6.8–8.8)
SARS-COV-2 RNA SPEC QL NAA+PROBE: NOT DETECTED
SODIUM SERPL-SCNC: 139 MMOL/L (ref 133–144)
SPECIMEN SOURCE: NORMAL
TROPONIN I SERPL-MCNC: <0.015 UG/L (ref 0–0.04)
TSH SERPL DL<=0.005 MIU/L-ACNC: 1.56 MU/L (ref 0.4–4)

## 2020-10-27 PROCEDURE — 80048 BASIC METABOLIC PNL TOTAL CA: CPT | Performed by: INTERNAL MEDICINE

## 2020-10-27 PROCEDURE — 92526 ORAL FUNCTION THERAPY: CPT | Mod: GN | Performed by: SPEECH-LANGUAGE PATHOLOGIST

## 2020-10-27 PROCEDURE — 97161 PT EVAL LOW COMPLEX 20 MIN: CPT | Mod: GP | Performed by: PHYSICAL THERAPIST

## 2020-10-27 PROCEDURE — 80076 HEPATIC FUNCTION PANEL: CPT | Performed by: HOSPITALIST

## 2020-10-27 PROCEDURE — G0378 HOSPITAL OBSERVATION PER HR: HCPCS

## 2020-10-27 PROCEDURE — 250N000013 HC RX MED GY IP 250 OP 250 PS 637: Performed by: HOSPITALIST

## 2020-10-27 PROCEDURE — 99232 SBSQ HOSP IP/OBS MODERATE 35: CPT | Performed by: HOSPITALIST

## 2020-10-27 PROCEDURE — 97112 NEUROMUSCULAR REEDUCATION: CPT | Mod: GP | Performed by: PHYSICAL THERAPIST

## 2020-10-27 PROCEDURE — 84484 ASSAY OF TROPONIN QUANT: CPT | Performed by: HOSPITALIST

## 2020-10-27 PROCEDURE — 84443 ASSAY THYROID STIM HORMONE: CPT | Performed by: HOSPITALIST

## 2020-10-27 PROCEDURE — 97530 THERAPEUTIC ACTIVITIES: CPT | Mod: GO

## 2020-10-27 PROCEDURE — 97116 GAIT TRAINING THERAPY: CPT | Mod: GP | Performed by: PHYSICAL THERAPIST

## 2020-10-27 PROCEDURE — 36415 COLL VENOUS BLD VENIPUNCTURE: CPT | Performed by: HOSPITALIST

## 2020-10-27 PROCEDURE — 97110 THERAPEUTIC EXERCISES: CPT | Mod: GP | Performed by: PHYSICAL THERAPIST

## 2020-10-27 PROCEDURE — 36415 COLL VENOUS BLD VENIPUNCTURE: CPT | Performed by: INTERNAL MEDICINE

## 2020-10-27 PROCEDURE — 97535 SELF CARE MNGMENT TRAINING: CPT | Mod: GO

## 2020-10-27 PROCEDURE — 250N000013 HC RX MED GY IP 250 OP 250 PS 637: Performed by: INTERNAL MEDICINE

## 2020-10-27 PROCEDURE — 99233 SBSQ HOSP IP/OBS HIGH 50: CPT | Performed by: PSYCHIATRY & NEUROLOGY

## 2020-10-27 PROCEDURE — 97165 OT EVAL LOW COMPLEX 30 MIN: CPT | Mod: GO

## 2020-10-27 PROCEDURE — 120N000001 HC R&B MED SURG/OB

## 2020-10-27 PROCEDURE — 97530 THERAPEUTIC ACTIVITIES: CPT | Mod: GP | Performed by: PHYSICAL THERAPIST

## 2020-10-27 PROCEDURE — 82550 ASSAY OF CK (CPK): CPT | Performed by: HOSPITALIST

## 2020-10-27 RX ORDER — LOSARTAN POTASSIUM 100 MG/1
100 TABLET ORAL DAILY
Status: DISCONTINUED | OUTPATIENT
Start: 2020-10-27 | End: 2020-10-28 | Stop reason: HOSPADM

## 2020-10-27 RX ADMIN — ASPIRIN 81 MG: 81 TABLET ORAL at 10:10

## 2020-10-27 RX ADMIN — RIVAROXABAN 20 MG: 20 TABLET, FILM COATED ORAL at 18:05

## 2020-10-27 RX ADMIN — SIMVASTATIN 40 MG: 40 TABLET, FILM COATED ORAL at 21:00

## 2020-10-27 RX ADMIN — LOSARTAN POTASSIUM 100 MG: 100 TABLET, FILM COATED ORAL at 18:05

## 2020-10-27 ASSESSMENT — ACTIVITIES OF DAILY LIVING (ADL)
ADLS_ACUITY_SCORE: 14
PREVIOUS_RESPONSIBILITIES: MEAL PREP;YARDWORK;MEDICATION MANAGEMENT;FINANCES;DRIVING
ADLS_ACUITY_SCORE: 14

## 2020-10-27 NOTE — CONSULTS
Stroke Education Note    The following information has been reviewed with the patient and family:    1. Warning signs of stroke    2. Calling 911 if having warning signs of stroke    3. All modifiable risk factors: hypertension, CAD, atrial fib, diabetes, hypercholesterolemia, smoking, substance abuse, diet, physical inactivity, obesity, sleep apnea.    4. Patient's risk factors for stroke which include: HTN, HLD, aortic stenosis, afib    5. Follow-up plan for after discharge    6. Discharge medications which include: Xarelto, Zocor, ASA    In addition, the [facility] Stroke Class Handout has been given to the patient and family.    Learner's response to risk factors / lifestyle modification education: Taking steps. Kamari had a stroke 11 years ago and belonged to a long term group assessing post stroke progress. He had good knowledge and put into place many of the healthy habits that were talked about in the stroke class such as diet.      Candida Saucedo RN

## 2020-10-27 NOTE — PROGRESS NOTES
[facility]  Hospitalist Progress Note   10/27/2020          Assessment and Plan:       Kamari Valentin is an 80 year old male with a history of stroke with mild residual left sided weakness who could ambulate on his own without a walker prior to recent events, HTN, HLD, and A fib, anticoagulated with Xarelto admitted on 10/26/2020 with worsening left-sided weakness.     Lacunar infarct with associated left-sided weakness.   Patient underwent elective angiogram on 10/22 for TAVR work-up and subsequently noted left lower extremity weakness that did not improve.  Resented with difficulty ambulating.  MRI showed recent ischemic stroke involving the posterolateral right thalamus and posterior limb of the right internal capsule.    CT angiogram of the head neck showed approximate 50% luminal diameter stenosis of the origin of the left internal carotid artery and mild narrowing on the right.    Recent carotid ultrasound on 10/22 showed less than 50% stenosis on the right and greater than 70% stenosis on the left internal carotid artery.    Echocardiogram 10/12/2020 with EF is 60-65%.  Severe AS, moderate mitral annular calcification.  No pericardial effusion.  CK 63, troponin undetectable, TSH 1.56, LDL 63, HDL 40.  Hemoglobin A1c 6.2.  Evaluated by stroke neurology, recommend aspirin 81 mg oral daily in addition to Xarelto.  Telemetry monitoring to be continued.  Continue PTA simvastatin therapy.  Hold home antihypertensives and can likely resume at discharge  As needed labetalol in the setting of permissive hypertension  Neurochecks every 4 hours during hospitalization.  PT, OT recommending ARU for rehabilitation.   assistance with discharge planning requested.    Chronic atrial fibrillation on Xarelto.  Continue PTA Xarelto.  PTA not on rate control medication.  Continue telemetry monitoring.     Hypernatremia. Sodium 145 on admission.  -0.45%  mL/hr for 10 hours then stop  -BMP in  AM    Coronary artery disease.  Severe aortic stenosis.  Coronary angiogram on 10/22 with CAD, LMCA : Distal 60% eccentric  modeately calcified lesion, TAVR is likely not a good first option given complexity of CAD  CV surgery following patient for AVR CABG and maze procedure.  Will defer to neurology, CV surgery regarding optimal timing of heart surgery.  Telemetry monitoring.    Hypertension.  Will resume PTA losartan  Continue to hold PTA clonidine, hydralazine, hydrochlorothiazide.  Will resume gradually.    Hyperlipidemia.  LDL has been well controlled.  Continue home simvastatin.    Prediabetes hemoglobin A1c of 6.2.  We will need to consider lifestyle modification with diet and exercise as able to.       DVT Prophylaxis: SCD, anticoagulation.  Code Status: No CPR- Do NOT Intubate  Disposition: Expected discharge pending safe discharge plan.    Discussed with patient, his wife by the bedside, daughter over the telephone, bedside RN    Aliya Escobedo MD        Interval History:      Patient lying in bed.  Complains of left upper and lower extremity weakness.  Denies any chest pain or shortness of breath.  No new tingling or numbness.  No incontinence of bowel or bladder.  No abdominal pain.       Physical Exam:        Physical Exam   Temp:  [97.1  F (36.2  C)-98.6  F (37  C)] 97.1  F (36.2  C)  Pulse:  [68-88] 72  Resp:  [16] 16  BP: (126-159)/(69-94) 153/84  SpO2:  [94 %-97 %] 95 %    PHYSICAL EXAM  GENERAL: Patient is in no distress. Alert and oriented.  HEART: Regular rate and rhythm. S1S2. No murmurs  LUNGS:  Respirations unlabored  NEURO:normal finger-to-nose and heel-to-shin bilaterally without dysmetria  Extraocular movements intact.  Facial movements symmetric.  Strength 4/5 in upper extremity, 3/5 in lower extremity   EXTREMITIES: No pedal edema. 2+ peripheral pulses.  SKIN: Warm, dry. No rash or bruising.  PSYCHIATRY Cooperative       Medications:          aspirin  81 mg Oral Daily    Or      aspirin  300 mg Rectal Daily     rivaroxaban ANTICOAGULANT  20 mg Oral Daily with supper     simvastatin  40 mg Oral QPM     acetaminophen, acetaminophen, bisacodyl, labetalol, - MEDICATION INSTRUCTIONS -, melatonin, naloxone, ondansetron **OR** ondansetron, polyethylene glycol, senna-docusate **OR** senna-docusate         Data:      All new lab and imaging data was reviewed.

## 2020-10-27 NOTE — PLAN OF CARE
BP (!) 159/94 (BP Location: Left arm)   Pulse 79   Temp 98.2  F (36.8  C) (Axillary)   Resp 16   SpO2 97%     -VSS ex HTN  -Pleasant, Alert  -Neuros intact, some left sided weakness  -Trop and hepatic panel negative

## 2020-10-27 NOTE — PROGRESS NOTES
"[facility]    Stroke Progress Note    Interval Events  No acute neurological event overnight. Pending PT evaluation today.     Impression  #Ischemic Stroke due to small-vessel occlusion    #Hx of Afib  #Evaluation for AVR/CABG Maze   --A1c 6.2%  --LDL 63  --ECHO EF is 60-65%, Severe AS, Severe biatrial enlargement is present  --MRI shows small lacunar infarct in R internal capsule   --CTA shows ~50% stenosis bilaterally at the ICA     Plan  Non urgent surgery should be delayed by at least 3 months following stroke/TIA  ASA 81 mg daily in addition to xarelto   Other stroke risk factor already medically maximize   PT/OT  No further stroke work up at this time.  Thank you for the consult  Will sign off   Please page with any additional question.     The Stroke Staff is Dr. Denney.    Nakul Verdugo MD  Vascular Neurology Fellow  To page me or covering stroke neurology team member, click here: [facility]COM   Choose \"On Call\" tab at top, then search dropdown box for \"Neurology Adult\", select location, press Enter, then look for stroke/neuro ICU/telestroke.    ______________________________________________________    Medications   Home Meds  Prior to Admission medications    Medication Sig Start Date End Date Taking? Authorizing Provider   cloNIDINE (CATAPRES) 0.1 MG tablet Take 1 tablet by mouth 2 times daily    Yes Reported, Patient   hydrALAZINE (APRESOLINE) 25 MG tablet Take 25 mg by mouth 2 times daily.   Yes Reported, Patient   hydrochlorothiazide (HYDRODIURIL) 25 MG tablet Take 1 tablet (25 mg) by mouth daily 12/9/14  Yes Reyes Curtis MD   losartan (COZAAR) 100 MG tablet Take 1 tablet (100 mg) by mouth daily 4/2/19  Yes Reyes Curtis MD   rivaroxaban ANTICOAGULANT (XARELTO ANTICOAGULANT) 20 MG TABS tablet Take 1 tablet (20 mg) by mouth daily 5/15/20  Yes Neeru Harley PA-C   simvastatin (ZOCOR) 40 MG tablet Take 1 tablet by mouth daily.   Yes Reported, Patient       Scheduled Meds    " aspirin  81 mg Oral Daily    Or     aspirin  300 mg Rectal Daily     rivaroxaban ANTICOAGULANT  20 mg Oral Daily with supper     simvastatin  40 mg Oral QPM       Infusion Meds    - MEDICATION INSTRUCTIONS -         PRN Meds  acetaminophen, acetaminophen, bisacodyl, labetalol, - MEDICATION INSTRUCTIONS -, melatonin, naloxone, ondansetron **OR** ondansetron, polyethylene glycol, senna-docusate **OR** senna-docusate       PHYSICAL EXAMINATION  Temp:  [97.1  F (36.2  C)-98.6  F (37  C)] 98.6  F (37  C)  Pulse:  [68-88] 79  Resp:  [16-17] 16  BP: (126-159)/(66-94) 148/81  SpO2:  [94 %-97 %] 94 %     Neurologic  Mental Status:  alert, oriented x 3, follows commands, speech clear and fluent, naming and repetition normal  Cranial Nerves:  visual fields intact, PERRL, EOMI with normal smooth pursuit, facial sensation intact and symmetric, facial movements symmetric, hearing not formally tested but intact to conversation, palate elevation symmetric and uvula midline, no dysarthria, shoulder shrug strong bilaterally, tongue protrusion midline  Motor:  Drift in LLE  Reflexes:  toes down-going  Sensory:  light touch sensation intact and symmetric throughout upper and lower extremities, no extinction on double simultaneous stimulation   Coordination:  normal finger-to-nose and heel-to-shin bilaterally without dysmetria, rapid alternating movements symmetric  Station/Gait:  deferred    Stroke Scales    NIHSS  Interval baseline (10/26/20 1357)   Interval Comments     1a. Level of Consciousness 0-->Alert, keenly responsive   1b. LOC Questions 0-->Answers both questions correctly   1c. LOC Commands 0-->Performs both tasks correctly   2.   Best Gaze 0-->Normal   3.   Visual 0-->No visual loss   4.   Facial Palsy 1-->Minor paralysis (flattened nasolabial fold, asymmetry on smiling)   5a. Motor Arm, Left 0-->No drift, limb holds 90 (or 45) degrees for full 10 secs   5b. Motor Arm, Right 0-->No drift, limb holds 90 (or 45) degrees for  full 10 secs   6a. Motor Leg, Left 0-->No drift, leg holds 30 degree position for full 5 secs   6b. Motor Leg, right 0-->No drift, leg holds 30 degree position for full 5 secs   7.   Limb Ataxia 1-->Present in one limb   8.   Sensory 0-->Normal, no sensory loss   9.   Best Language 0-->No aphasia, normal   10. Dysarthria 0-->Normal   11. Extinction and Inattention  0-->No abnormality   Total 2 (10/26/20 1357)       Imaging  I personally reviewed all imaging; relevant findings per HPI.     Lab Results Data   CBC  Recent Labs   Lab 10/26/20  0907 10/22/20  0810   WBC 7.1 6.0   RBC 4.59 4.38*   HGB 14.9 13.9   HCT 44.6 42.5    204     Basic Metabolic Panel    Recent Labs   Lab 10/27/20  0844 10/26/20  0907 10/22/20  0810    145* 141   POTASSIUM 3.9 3.9 3.5   CHLORIDE 107 109 109   CO2 28 31 29   BUN 18 24 30   CR 0.91 1.01 1.19   * 115* 111*   CLAY 9.4 9.7 8.8     Liver Panel  Recent Labs   Lab 10/27/20  0844 10/22/20  0810   PROTTOTAL 7.2 6.9   ALBUMIN 3.9 3.8   BILITOTAL 0.8 1.0   ALKPHOS 51 44   AST 19 17   ALT 23 22     INR    Recent Labs   Lab Test 10/22/20  0810   INR 1.05      Lipid Profile    Recent Labs   Lab Test 10/26/20  0907 02/03/16   CHOL 119 130   HDL 40 41   LDL 63 75   TRIG 81 70     A1C    Recent Labs   Lab Test 10/22/20  0810   A1C 6.2*     Troponin I    Recent Labs   Lab 10/27/20  0844   TROPI <0.015

## 2020-10-27 NOTE — PROGRESS NOTES
10/27/20 1102   Quick Adds   Type of Visit Initial PT Evaluation   Living Environment   People in home spouse   Current Living Arrangements house   Home Accessibility stairs to enter home;stairs within home   Number of Stairs, Main Entrance 5   Stair Railings, Main Entrance   (Both sides in front, just one in back. )   Number of Stairs, Within Home, Primary   (Flight to basement . Doesn't have to use. Hard for him. )   Stair Railings, Within Home, Primary railings safe and in good condition  (One rail. )   Transportation Anticipated car, drives self;family or friend will provide   Living Environment Comments Wife does most of the driving now.    Self-Care   Usual Activity Tolerance good   Regular Exercise No   Equipment Currently Used at Home cane, straight;grab bar, tub/shower;walker, rolling  (Using the cane and the grab bar at home. )   Activity/Exercise/Self-Care Comment Wife reports patient distance is limited.    Disability/Function   Hearing Difficulty or Deaf no   Wear Glasses or Blind yes   Vision Management glasses   Concentrating, Remembering or Making Decisions Difficulty no   Walking or Climbing Stairs Difficulty yes   Walking or Climbing Stairs ambulation difficulty, requires equipment;stair climbing difficulty, requires equipment  (Needs rail on stairs, using a cane prior to admission. )   Dressing/Bathing Difficulty no   Toileting no   Doing Errands Independently Difficulty (such as shopping) no   Fall history within last six months yes   Number of times patient has fallen within last six months 1   General Information   Onset of Illness/Injury or Date of Surgery 10/26/20   Referring Physician Ruslan Vaughn MD   Patient/Family Therapy Goals Statement (PT) To go to ARU. He was there 11 years ago after CVA.   Pertinent History of Current Problem (include personal factors and/or comorbidities that impact the POC) Per chart Kamari Valentin is an 80 year old male with a history of stroke with mild  residual left sided weakness who could ambulate on his own without a walker prior to recent events, HTN, HLD, and A fib, anticoagulated with Xarelto who presents to the emergency department for evaluation of left sided weakness that developed around the time of his coronary angiogram and has an MRI showing an ischemic stroke   Existing Precautions/Restrictions fall   General Observations Wife present and very supportive.    Cognition   Orientation Status (Cognition) oriented x 4   Affect/Mental Status (Cognition) WNL   Follows Commands (Cognition) WNL   Cognitive Status Comments No cognitive deficits noted.    Pain Assessment   Patient Currently in Pain No   Integumentary/Edema   Integumentary/Edema no deficits were identifed   Posture    Posture Comments In standing tends to shift weight to the right.    Range of Motion (ROM)   ROM Quick Adds ROM WFL   Strength   Strength Comments Left hip flex 3-/5, left HS 3-/5   Bed Mobility   Comment (Bed Mobility) Needed min assist for sup to sit.    Transfers   Transfer Safety Comments Needed cues for safe technique sot sit to stand but able to perform without physical assist.    Gait/Stairs (Locomotion)   Comment (Gait/Stairs) Gait 5 feet in room without AD with min assist and decreased step length on the right, Decreased left hip/knee flexion with swing phase on the left.    Balance   Balance Comments Can maintain static standing balance without UE support. Impaired dynamic balance without UE support.    Coordination   Coordination Comments Inaccurace heel to shin on the left. Very difficulty thumb to each finger on the left but can eventually perform.    Muscle Tone   Muscle Tone no deficits were identified   Clinical Impression   Criteria for Skilled Therapeutic Intervention yes, treatment indicated   PT Diagnosis (PT) Impaired functional independence   Influenced by the following impairments Decreased strength and coordination left UE and LE.    Functional limitations  "due to impairments Needs assist for bed mobility, transfers and amb.    Clinical Presentation Stable/Uncomplicated   Clinical Decision Making (Complexity) low complexity   Therapy Frequency (PT) 2x/day   Predicted Duration of Therapy Intervention (days/wks) 4 days   Planned Therapy Interventions (PT) bed mobility training;gait training;stair training;strengthening;transfer training   Risk & Benefits of therapy have been explained evaluation/treatment results reviewed;care plan/treatment goals reviewed;participants voiced agreement with care plan;participants included;patient;spouse/significant other   PT Discharge Planning    PT Discharge Recommendation (DC Rec) Acute [facility]hab [facility]-Motivated patient will benefit from intensive, interdisciplinary therapy.  Anticipate will be able to tolerate 3 hours of therapy per day   PT Rationale for DC Rec Patient is currently below baseline with functional independence due to decreased coordination and strength left UE and LE. Patient is very motivated and has a very supportive wife.    PT Brief overview of current status  Patient needs min assist for sup to sit, CGA for sit to stand and CGA for amb on level surface with ww and CGA on steps.    [facility] [employer] TM \"6 Clicks\"   2016, Trustees of [facility], under license to [employer].  All rights reserved.   6 Clicks Short Forms Basic Mobility Inpatient Short Form   [facility] AM-PAC  \"6 Clicks\" V.2 Basic Mobility Inpatient Short Form   1. Turning from your back to your side while in a flat bed without using bedrails? 3 - A Little   2. Moving from lying on your back to sitting on the side of a flat bed without using bedrails? 3 - A Little   3. Moving to and from a bed to a chair (including a wheelchair)? 3 - A Little   4. Standing up from a chair using your arms (e.g., wheelchair, or bedside chair)? 3 - A Little   5. To walk in hospital room? 3 - A Little   6. Climbing 3-5 steps with a railing? 3 - A " Lisa   Basic Mobility Raw Score (Score out of 24.Lower scores equate to lower levels of function) 18   Total Evaluation Time   Total Evaluation Time (Minutes) 14

## 2020-10-27 NOTE — UTILIZATION REVIEW
Admission Status; Secondary Review Determination    Under the authority of the Utilization Management Committee, the utilization review process indicated a secondary review on the above patient. The review outcome is based on review of the medical records, discussions with staff, and applying clinical experience noted on the date of the review.    (x) Inpatient Status Appropriate - This patient's medical care is consistent with medical management for inpatient care and reasonable inpatient medical practice.    RATIONALE FOR DETERMINATION: 80-year-old male with history of hypertension, atrial fibrillation, significant coronary artery disease, aortic stenosis severe who presents with new significant left-sided weakness after holding anticoagulation for coronary angiography.  Patient found to have acute small ischemic infarct involving the posterior lateral right thalamus and posterior limb of the right internal capsule.  With patient's complex vascular disease history and new acute weakness, inpatient evaluation management is appropriate.    At the time of admission with the information available to the attending physician more than 2 nights Hospital complex care was anticipated, based on patient risk of adverse outcome if treated as outpatient and complex care required. Inpatient admission is appropriate based on the Medicare guidelines.    This document was produced using voice recognition software    The information on this document is developed by the utilization review team in order for the business office to ensure compliance. This only denotes the appropriateness of proper admission status and does not reflect the quality of care rendered.    The definitions of Inpatient Status and Observation Status used in making the determination above are those provided in the CMS Coverage Manual, Chapter 1 and Chapter 6, section 70.4.    Sincerely,    Franco Daniel MD  Utilization Review  Physician Advisor  [facility].

## 2020-10-27 NOTE — PROGRESS NOTES
CV Surgery    Patient seen by me last Thursday for evaluation for AVR CABG [place]aze procedure but unfortunately was admitted to [facility] yesterday with CVA. Will discuss with neurology the optimal timing of heart surgery after neuro rehab.    Ginger Rendon MD

## 2020-10-27 NOTE — PROGRESS NOTES
10/27/20 1300   Quick Adds   Type of Visit Initial Occupational Therapy Evaluation   Living Environment   People in home spouse   Current Living Arrangements house   Home Accessibility stairs to enter home;stairs within home   Number of Stairs, Main Entrance 5   Stair Railings, Main Entrance   (2 railings in front, 1 railing in back entrance)   Number of Stairs, Within Home, Primary   (1 flight to top floor, 1 flight to basement)   Stair Railings, Within Home, Primary railings safe and in good condition   Transportation Anticipated car, drives self;family or friend will provide   Living Environment Comments Pt lives in 3 story home, all needs met on main floor, no need to go upstairs or to basement. Pt IND w/ all ADLS and receives Min-Mod A w/ IADLs including med mgmt, driving, housework   Self-Care   Usual Activity Tolerance good   Current Activity Tolerance moderate   Equipment Currently Used at Home cane, straight;grab bar, tub/shower;tub bench  (typically stands for showers; straddles tub bench sometimes)   Activity/Exercise/Self-Care Comment Pt IND w/ ADLs and receives A from wife w/ IADLs. Uses cane to walk at home   Disability/Function   Hearing Difficulty or Deaf no   Wear Glasses or Blind yes   Vision Management glasses   Concentrating, Remembering or Making Decisions Difficulty no   Difficulty Communicating no   Difficulty Eating/Swallowing no   Walking or Climbing Stairs Difficulty yes   Walking or Climbing Stairs ambulation difficulty, requires equipment;stair climbing difficulty, requires equipment   Mobility Management uses cane for ambulation   Dressing/Bathing Difficulty no   Toileting no   Doing Errands Independently Difficulty (such as shopping) no   Fall history within last six months yes   Number of times patient has fallen within last six months 1   Change in Functional Status Since Onset of Current Illness/Injury yes   General Information   Onset of Illness/Injury or Date of Surgery 10/26/20    Referring Physician Ruslan Vaughn MD   Additional Occupational Profile Info/Pertinent History of Current Problem Kamari Valentin is an 80 year old male with a 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    Performance Patterns (Routines, Roles, Habits) IND w/ ADLs and receives Min-Mod A w/ IADLs from wife. Insightful to deficits   Existing Precautions/Restrictions fall   Cognitive Status Examination   Orientation Status orientation to person, place and time   Affect/Mental Status (Cognitive) WFL   Follows Commands WNL   Safety Deficit safety precautions follow-through/compliance   Cognitive Status Comments Pt cognitively aware of deficits and follows 2 step commands. No signs of confusion or safety/attention deficit   Visual Perception   Visual Impairment/Limitations corrective lenses for reading   Sensory   Sensory Quick Adds No deficits were identified   Pain Assessment   Patient Currently in Pain No   Integumentary/Edema   Integumentary/Edema no deficits were identifed   Posture   Posture not impaired   Range of Motion Comprehensive   General Range of Motion upper extremity range of motion deficits identified;hand range of motion deficits identified   Comment, General Range of Motion LUE limited ROM w/ shoulder flexion and finger flex/ext   General Upper Extremity Assessment (Range of Motion)   Upper Extremity: Range of Motion shoulder, left: UE ROM;wrist, left: UE ROM   Comment: Upper Extremity ROM shoulder flexion ~120 degrees and compensate w/ abduction   General Hand Assessment (Range of Motion)   Comment, Hand ROM L hand slow and uncoordinated, PIP and DIP joints of digits 2-5 need AAROM for full  extenion   Strength Comprehensive (MMT)   General Manual Muscle Testing (MMT) Assessment upper extremity strength deficits identified   Upper Extremity (Manual Muscle Testing)   Upper Extremity: Manual Muscle Testing (MMT) left shoulder strength deficit   Coordination   Upper Extremity Coordination Left UE impaired   Gross Motor Coordination Difficulty pointing LLE straight when walking, needs cues   Fine Motor Coordination slow and uncoordinated finger movements on L hand   Bed Mobility   Bed Mobility rolling left;supine-sit   Rolling Left [place] (Bed Mobility) modified independence   Supine-Sit [place] (Bed Mobility) modified independence   Assistive Device (Bed Mobility) bed rails   Transfers   Transfers sit-stand transfer   Sit-Stand Transfer   Sit-Stand [place] (Transfers) modified independence   Assistive Device (Sit-Stand Transfers) walker, standard   Sit/Stand Transfer Comments needs VCs to push off from bed not FWW   Balance   Balance Assessment standing balance: dynamic   Standing Balance: Dynamic fair balance   Balance Comments some shakiness while walking w/ FWW, will occassionally clip objects in hallway on R side if not cued   Activities of Daily Living   BADL Assessment/Intervention lower body dressing;grooming   Lower Body Dressing Assessment/Training   [place] Level (Lower Body Dressing) modified independence   Grooming Assessment/Training   [place] Level (Grooming) modified independence   Instrumental Activities of Daily Living (IADL)   Previous Responsibilities meal prep;yardwork;medication management;finances;driving   IADL Comments Wife A w/ IADLs   Clinical Impression   Criteria for Skilled Therapeutic Interventions Met (OT) yes   OT Diagnosis Decreased safety/ind w/ ADLs and IADLs   OT Problem List-Impairments impacting ADL balance;mobility;strength;coordination   Assessment of Occupational Performance 1-3 Performance Deficits   Identified Performance Deficits Pt  "needs supervision w/ ADLs including bathing, LE dressing and needs Min-Mod A w/ IADLs including transportation, med mgmt, and housework   Planned Therapy Interventions (OT) ADL retraining;IADL retraining;strengthening   Clinical Decision Making Complexity (OT) low complexity   Therapy Frequency (OT) Daily   Predicted Duration of Therapy 6 days   Risks and Benefits of Treatment have been explained. Yes   Patient, Family & other staff in agreement with plan of care Yes   OT Discharge Planning    OT Discharge Recommendation (DC Rec) Acute Rehab Center-Motivated patient will benefit from intensive, interdisciplinary therapy.  Anticipate will be able to tolerate 3 hours of therapy per day   OT Rationale for DC Rec Pt is highly motivated w/ strong support from wife. Pt would benefit from continued rehab for 3 hours/day to improve strength and activity tolerance for ADLs and IADLs   [facility] AM-PAC  \"6 Clicks\" Daily Activity Inpatient Short Form   1. Putting on and taking off regular lower body clothing? 3 - A Little   2. Bathing (including washing, rinsing, drying)? 3 - A Little   3. Toileting, which includes using toilet, bedpan or urinal? 3 - A Little   4. Putting on and taking off regular upper body clothing? 4 - None   5. Taking care of personal grooming such as brushing teeth? 4 - None   6. Eating meals? 4 - None   Daily Activity Raw Score (Score out of 24.Lower scores equate to lower levels of function) 21   Total Evaluation Time (Minutes)   Total Evaluation Time (Minutes) 10     "

## 2020-10-27 NOTE — PLAN OF CARE
Pt here with R Lacunar CVA. A&Ox4. Left hand stiffness/slow moving and slight left facial droop; residual from previous CVA. LLE ataxic with heel/shin, slow/weak when ambulating. Up with assist x1 w/ GB/W. Using call light appropriately. Denying pain. Tolerating DD3 diet with thin liquids. VSS. Tele A.Fib w/ CVR. Plan for therapy eval today. Discharge plan pending.

## 2020-10-28 ENCOUNTER — APPOINTMENT (OUTPATIENT)
Dept: PHYSICAL THERAPY | Facility: CLINIC | Age: 80
DRG: 065 | End: 2020-10-28
Payer: COMMERCIAL

## 2020-10-28 ENCOUNTER — APPOINTMENT (OUTPATIENT)
Dept: SPEECH THERAPY | Facility: CLINIC | Age: 80
DRG: 065 | End: 2020-10-28
Payer: COMMERCIAL

## 2020-10-28 ENCOUNTER — HOSPITAL ENCOUNTER (INPATIENT)
Facility: CLINIC | Age: 80
LOS: 12 days | Discharge: HOME HEALTH | DRG: 057 | End: 2020-11-09
Attending: PHYSICAL MEDICINE & REHABILITATION | Admitting: PHYSICAL MEDICINE & REHABILITATION
Payer: COMMERCIAL

## 2020-10-28 ENCOUNTER — APPOINTMENT (OUTPATIENT)
Dept: OCCUPATIONAL THERAPY | Facility: CLINIC | Age: 80
DRG: 065 | End: 2020-10-28
Payer: COMMERCIAL

## 2020-10-28 VITALS
HEART RATE: 88 BPM | DIASTOLIC BLOOD PRESSURE: 67 MMHG | TEMPERATURE: 98.1 F | OXYGEN SATURATION: 96 % | RESPIRATION RATE: 16 BRPM | SYSTOLIC BLOOD PRESSURE: 111 MMHG

## 2020-10-28 DIAGNOSIS — I63.9 CEREBROVASCULAR ACCIDENT (CVA), UNSPECIFIED MECHANISM (H): ICD-10-CM

## 2020-10-28 DIAGNOSIS — I10 ESSENTIAL HYPERTENSION: ICD-10-CM

## 2020-10-28 PROCEDURE — 97112 NEUROMUSCULAR REEDUCATION: CPT | Mod: GP

## 2020-10-28 PROCEDURE — 97535 SELF CARE MNGMENT TRAINING: CPT | Mod: GO

## 2020-10-28 PROCEDURE — 99223 1ST HOSP IP/OBS HIGH 75: CPT | Performed by: PHYSICAL MEDICINE & REHABILITATION

## 2020-10-28 PROCEDURE — 128N000003 HC R&B REHAB

## 2020-10-28 PROCEDURE — 92526 ORAL FUNCTION THERAPY: CPT | Mod: GN | Performed by: SPEECH-LANGUAGE PATHOLOGIST

## 2020-10-28 PROCEDURE — 250N000013 HC RX MED GY IP 250 OP 250 PS 637: Performed by: PHYSICIAN ASSISTANT

## 2020-10-28 PROCEDURE — 250N000013 HC RX MED GY IP 250 OP 250 PS 637: Performed by: INTERNAL MEDICINE

## 2020-10-28 PROCEDURE — 99239 HOSP IP/OBS DSCHRG MGMT >30: CPT | Performed by: HOSPITALIST

## 2020-10-28 PROCEDURE — 97530 THERAPEUTIC ACTIVITIES: CPT | Mod: GO

## 2020-10-28 PROCEDURE — 250N000013 HC RX MED GY IP 250 OP 250 PS 637: Performed by: HOSPITALIST

## 2020-10-28 RX ORDER — HYDRALAZINE HYDROCHLORIDE 25 MG/1
25 TABLET, FILM COATED ORAL 2 TIMES DAILY
Status: DISCONTINUED | OUTPATIENT
Start: 2020-10-28 | End: 2020-11-03

## 2020-10-28 RX ORDER — CLONIDINE HYDROCHLORIDE 0.1 MG/1
0.1 TABLET ORAL 2 TIMES DAILY
Status: ON HOLD | DISCHARGE
Start: 2020-10-28 | End: 2020-11-06

## 2020-10-28 RX ORDER — ASPIRIN 81 MG/1
81 TABLET ORAL DAILY
Status: DISCONTINUED | OUTPATIENT
Start: 2020-10-29 | End: 2020-11-09 | Stop reason: HOSPADM

## 2020-10-28 RX ORDER — LOSARTAN POTASSIUM 100 MG/1
100 TABLET ORAL DAILY
Status: DISCONTINUED | OUTPATIENT
Start: 2020-10-29 | End: 2020-11-09 | Stop reason: HOSPADM

## 2020-10-28 RX ORDER — SIMVASTATIN 20 MG
40 TABLET ORAL AT BEDTIME
Status: DISCONTINUED | OUTPATIENT
Start: 2020-10-28 | End: 2020-11-09 | Stop reason: HOSPADM

## 2020-10-28 RX ORDER — ACETAMINOPHEN 325 MG/1
650 TABLET ORAL EVERY 6 HOURS PRN
Status: DISCONTINUED | OUTPATIENT
Start: 2020-10-28 | End: 2020-11-09 | Stop reason: HOSPADM

## 2020-10-28 RX ORDER — HYDRALAZINE HYDROCHLORIDE 25 MG/1
25 TABLET, FILM COATED ORAL 2 TIMES DAILY
Status: DISCONTINUED | OUTPATIENT
Start: 2020-10-28 | End: 2020-10-28 | Stop reason: HOSPADM

## 2020-10-28 RX ORDER — HYDROCHLOROTHIAZIDE 25 MG/1
25 TABLET ORAL DAILY
Qty: 90 TABLET | Refills: 0 | Status: ON HOLD | DISCHARGE
Start: 2020-10-28 | End: 2020-11-06

## 2020-10-28 RX ADMIN — SIMVASTATIN 40 MG: 20 TABLET, FILM COATED ORAL at 21:10

## 2020-10-28 RX ADMIN — ASPIRIN 81 MG: 81 TABLET ORAL at 09:55

## 2020-10-28 RX ADMIN — HYDRALAZINE HYDROCHLORIDE 25 MG: 25 TABLET ORAL at 21:10

## 2020-10-28 RX ADMIN — RIVAROXABAN 20 MG: 10 TABLET, FILM COATED ORAL at 18:13

## 2020-10-28 RX ADMIN — HYDRALAZINE HYDROCHLORIDE 25 MG: 25 TABLET ORAL at 13:04

## 2020-10-28 RX ADMIN — LOSARTAN POTASSIUM 100 MG: 100 TABLET, FILM COATED ORAL at 09:56

## 2020-10-28 ASSESSMENT — ACTIVITIES OF DAILY LIVING (ADL)
TOILETING_ISSUES: NO
ADLS_ACUITY_SCORE: 14
ADLS_ACUITY_SCORE: 14
FALL_HISTORY_WITHIN_LAST_SIX_MONTHS: YES
DIFFICULTY_EATING/SWALLOWING: NO
ADLS_ACUITY_SCORE: 14
WHICH_OF_THE_ABOVE_FUNCTIONAL_RISKS_HAD_A_RECENT_ONSET_OR_CHANGE?: AMBULATION;TRANSFERRING;TOILETING;BATHING;DRESSING
ADLS_ACUITY_SCORE: 14
NUMBER_OF_TIMES_PATIENT_HAS_FALLEN_WITHIN_LAST_SIX_MONTHS: 1

## 2020-10-28 ASSESSMENT — MIFFLIN-ST. JEOR: SCORE: 1438.82

## 2020-10-28 NOTE — PLAN OF CARE
FOCUS/GOAL  Medical management    ASSESSMENT, INTERVENTIONS AND CONTINUING PLAN FOR GOAL:  Pt arrived to unit via w/c to unit, transported by wife. Alert & oriented, speech clear. Needs assist of one with walker and gait belt. Continent of bladder and bowel, LBM 10/27 per report. Denies pain or discomfort. Pt and wife were oriented to room and call light system. Fall precautions in place.

## 2020-10-28 NOTE — INTERIM SUMMARY
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 He was at angiogram procedure in prep for planned upcoming TAVR procedure and was noted to have a L facial droop, however no other neuro signs noted. He was discharged via WC to his car. Upon returning home, he had increased difficulty with L sided weakness. He called his surgeon team who said to monitor it and come in if it does not improve. L sided weakness persisted and he did have a fall at home, and so he presented for evaluation. MRI shows a subacute lacunar stroke in the R internal capsule/corona radiata in the setting of microvascular disease. The patient is medically ready for discharge to [facility] for rehabilitation.  Patient requires an intensive inpatient rehab program to address the following acute impairments:impaired strength, impaired activity tolerance, impaired tone, impaired balance, impaired coordination, impaired judgement and safety awareness, impaired weight shifting and dysphagia    Current Active Medical Management Needs/Risks for Clinical Complications  The patient requires the high level of rehabilitation physician supervision that accompanies the provision of intensive rehabilitation therapy.  The patient needs the services of the rehabilitation physician to assess the patient  medically and functionally and to modify the course of treatment as needed to maximize the patient's capacity to benefit from the rehabilitation process.  Neurologic: in patient with hx of prior CVA, and now new CVA, assess neuro signs and symptoms to ensure pt progressing; manage anticoagulation plan-- currently on aspirin, Xarelto  Cardiology: in patient with hx HTN, afib, and HLD-- currently on Cozaar, Zocor; plan was to have TAVR procedure this week, currently postponed    Past Medical/Surgical History   Surgery in the past 100 days: No   Additional relevant past medical history: prior stroke with mild residual left sided weakness, HTN, HLD, a-fib on Xarelto    Level of Functioning prior to Admission:  Home Environment  Lives with: spouse  Living arrangements: house  Home accessibility:    Stairs to enter home: 5  Stairs to negotiate within home: (1 flight to top floor, 1 flight to basement)  Stair railings at home: railings safe and in good condition  Equipment currently used at home: cane, straight, grab bar, tub/shower, tub bench(typically stands for showers; straddles tub bench sometimes)  Activity/exercise/self-care comment: Pt IND w/ ADLs and receives A from wife w/ IADLs. Uses cane to walk at home    Ambulation:  Mod I with cane  Transferring:  Mod I  Toileting:  Ind  Bathing:  Ind  Dressing:  Ind   Eating:  Ind  Communication:  Ind  Swallowing:  Ind  Cognition:  Ind  Prior Functional Level Comment:  Lives with wife who is able to provide A  Additional Comments: Wife able to assist pt as needed.     Level of Function: GG Scale (Section GG Functional Ability and Goals; CMS's JONES Version 3.0 Manual effective 10.1.2019):  PT Current Function Goals for Rehab   Bed Rolling 3 Partial/moderate assistance 6 Independent   Supine to Sit 3 Partial/moderate assistance 6 Independent   Sit to Stand 3 Partial/moderate assistance 6 Independent   Transfer 3 Partial/moderate assistance 6 Independent   Ambulation 3  Partial/moderate assistance 6 Independent   Stairs Not completed 6 Independent     OT Current Function Goals for Rehab   Feeding 3 Partial/moderate assistance 6 Independent   Grooming 3 Partial/moderate assistance 6 Independent   Bathing 3 Partial/moderate assistance 6 Independent   Upper Body Dressing 5 Setup or clean-up assistance 6 Independent   Lower Body Dressing 3 Partial/moderate assistance 6 Independent   Toileting 3 Partial/moderate assistance 6 Independent   Toilet Transfer 3 Partial/moderate assistance 6 Independent   Tub/Shower Transfer Not completed 4 Supervision or touching assitance   Cognition Not Assessed Supervision     SLP Current Function Goals for Rehab   Swallow Not Impaired Not applicable   Communication Not Impaired Not applicable       Current Diet:  Regular diet and Thin liquids    Summary Statement:  The patient initially required SLP services for mild dysphagia, however currently has been upgraded to regular diet with thin liquids. Anticipate will need SLP only for diet tolerance follow up, and also will benefit from a full cognitive/linguistic assessment as the pt is noted to not use FWW . Currently the patient requires Min A for bed mobility, transfers, and gait x up to 200 feet. Pt requires Min A with any task involving the L UE due to weakness including LB dressing, LB bathing. The patient also is noted to have difficulty with safety with FWW, and possible new cognitive deficits requiring a full cognitive/linguistic assessment at [facility].     Expected Therapies and Services required during Inpatient Rehab admission  Intensity of Therapy: Patient requires intensive therapies not available in a lesser level of care. Patient is motivated, making gains, and can tolerate 3 hours of therapy a day.  Physical Therapy: 60 minutes per day, at least 5 days a week for 6 days  Occupational Therapy: 60 minutes per day, at least 5 days a week for 6 days  Speech and Language Therapy: 60 minutes per day,  at least 5 days a week for 6 days.  Rehabilitation Nursing Needs: Patient requires 24 hour Rehab Nursing to manage vitals, medication education, tone management, positioning, carryover of new rehab techniques, care coordination, assess neurologic status, stroke education and provide safe environment for patient at falls risk.    Precautions/restrictions/special needs:   Precautions: fall precautions   Restrictions: NA   Special Needs: NA   Designated visitor: Agatha Valentin (wife)    Expected Level of Improvement: Anticipate with intensive therapies, close medical management, and rehabilitative nursing care the patient will improve strength, balance, tolerance to activity, safety  Expected Length of time to achieve: 6 days    Anticipated Discharge Needs:  Anticipated Discharge Destination: Home  Anticipated Discharge Support: Family member  24/7 support available : Yes  Identified caregiver(s):  Wife  Anticipated Discharge Needs: Home with outpatient therapy    Identified challenges/barriers:  JOSE, stairs within home    Admissions Liaison Signature/Date/Time:         Physician statement of review and agreement:  I have reviewed and am in agreement of the need for IRF stay to address above functional and medical needs. In addition to above statements address, Patient requires intensive active and ongoing therapeutic intervention and multiple therapies; Patient requires medical supervision; Expected to actively participate in the intensive rehab program; Sufficiently stable to actively participate; Expectation for measurable improvement in functional capacity or adaption to impairments.    Physician Signature/Date/Time:

## 2020-10-28 NOTE — PROGRESS NOTES
Physical Therapy Discharge Summary    Reason for therapy discharge:    Discharged to acute rehabilitation facility.    Progress towards therapy goal(s). See goals on Care Plan in [facility] electronic health record for goal details.  Goals not met.  Barriers to achieving goals:   discharge from facility.    Therapy recommendation(s):    Below baseline per below.          10/28/20 0840   Signing Clinician's Name / Credentials   Signing clinician's name / credentials Brittany Dressler, DPT, NCS  (Shortened session as pt using bathroom.)   Quick Adds   Rehab Discipline PT   Neuromuscular Re-Education   Minutes of Treatment 25   Symptoms Noted During/After Treatment fatigue   Treatment Detail PT: L hip flexor and hamstring functional motor control & strengthening progressions from yesterday to improve foot clearance, sagittal plane progression, activity tolerance, safe mobility, etc. Seated open chain cycling at L LE forward then backwards with amplitude and control focus with edu on functional relevance with gait, stairs. Sit-stands, pivots and amb Selvin without AD with HHA, trunk stab & intermittent rail use (more consistently when fatigued): 200' with L hip drop - worse with fatigue with more imbalance, toe out correctable with attention to task, limit hip flx for foot clearance correctable with attention to task with cycling pattern carryover, limited heel-toe rocker pattern given imbalance with limited L stance phase. [place] gait at rail with HHA & trunk stab (Selvin) for exacergated hip and knee flx control with amb 10' twice (quick to neuromuscular fatigue). Stairs; reciprocally with Neuro PAT handling 2 rails, light Selvin, technique cues. Bottom step tap with L foot with focus on hip flx then knee flx to  foot to remove (vs dragging off step), progressed to 2nd step taps. Seated break. Walk back to room, finished with pt in chair with alarm armed, fatigued. (Pt out of the bathroom with CNA with RW & CGA via GB).  "  PT Discharge Planning    PT Discharge Recommendation (DC Rec) Acute Rehab Center-Motivated patient will benefit from intensive, interdisciplinary therapy.  Anticipate will be able to tolerate 3 hours of therapy per day   PT Rationale for DC Rec Below independent baseline, but motivated wtih good potential, supportive spouse.    PT Brief overview of current status  L hemiparesis with motor dyscontrol impacting foot clearance / safe mobility, activity tolerance. Good potential for progress with PT, pt motivated. Selvin tx and gait wtihout an AD.    Additional Documentation   PT Plan Functional motor control training (hip/knee flx), strengthening (hip flx & abd, knee flx) - RW for symmetry for biomechanical focus vs no AD for balance.    [facility] AM-PAC  \"6 Clicks\" V.2 Basic Mobility Inpatient Short Form   1. Turning from your back to your side while in a flat bed without using bedrails? 4 - None   2. Moving from lying on your back to sitting on the side of a flat bed without using bedrails? 3 - A Little   3. Moving to and from a bed to a chair (including a wheelchair)? 3 - A Little   4. Standing up from a chair using your arms (e.g., wheelchair, or bedside chair)? 3 - A Little   5. To walk in hospital room? 3 - A Little   6. Climbing 3-5 steps with a railing? 3 - A Little   Basic Mobility Raw Score (Score out of 24.Lower scores equate to lower levels of function) 19   Total Session Time   Total Session Time (minutes) 25 minutes     "

## 2020-10-28 NOTE — DISCHARGE SUMMARY
Discharge Summary  Hospitalist    Date of Admission:  10/26/2020  Date of Discharge:  10/28/2020  Discharging Provider: Aliya Escobedo MD    Primary Care Physician   Gus Lanier  Primary Care Provider Phone Number: [phone]  Primary Care Provider Fax Number: [phone]    PRINCIPAL DIAGNOSIS  Lacunar infarct with associated left-sided weakness.   Prediabetes with a hemoglobin A1c of 6.2.    Past Medical History:   Diagnosis Date     Aortic stenosis     mild-moderate     Atrial fibrillation (H)     paroxysmal     Carotid stenosis     mild on right, moderate on left     Chronic renal insufficiency      Essential hypertension, benign      Hyperlipidaemia      Hypertrophy of prostate without urinary obstruction and other lower urinary tract symptoms (LUTS)      Rheumatic fever      Unspecified cerebral artery occlusion with cerebral infarction        History of Present Illness   Kamari Valentin is an 80 year old male who presented with left-sided weakness.    Hospital Course   Kamari Valentin is an 80 year old male with a history of stroke with mild residual left sided weakness who could ambulate on his own without a walker prior to recent events, HTN, HLD, and A fib, anticoagulated with Xarelto admitted on 10/26/2020 with worsening left-sided weakness.     Lacunar infarct with associated left-sided weakness.   Patient underwent elective angiogram on 10/22 for TAVR work-up and subsequently noted left lower extremity weakness that did not improve.  Resented with difficulty ambulating.  MRI showed recent ischemic stroke involving the posterolateral right thalamus and posterior limb of the right internal capsule.    CT angiogram of the head neck showed approximate 50% luminal diameter stenosis of the origin of the left internal carotid artery and mild narrowing on the right.    Recent carotid ultrasound on 10/22 showed less than 50% stenosis on the right and greater than 70% stenosis on the left internal  carotid artery.    Echo 10/12/2020 with EF is 60-65%.  Severe AS, moderate mitral annular calcification. No pericardial effusion.  CK 63, troponin undetectable, TSH 1.56  LDL 63, HDL 40.  Hemoglobin A1c 6.2.  Evaluated by stroke neurology, recommend aspirin 81 mg oral daily in addition to Xarelto.  Continue PTA simvastatin therapy.  Home antihypertensive regimen as below.  PT, OT recommending ARU for rehabilitation.  Discussed with patient's wife in agreement with ARU.     Chronic atrial fibrillation on Xarelto.  Continue PTA Xarelto.  PTA not on rate control medication.  Telemetry monitoring no acute events.     Hypernatremia. Sodium 145 on admission.  Sodium levels normalized.     Coronary artery disease.  Severe aortic stenosis.  Coronary angiogram on 10/22 with CAD, LMCA : Distal 60% eccentric  modeately calcified lesion, TAVR is likely not a good first option given complexity of CAD  CV surgery follows patient for AVR CABG and maze procedure.   Per neurology defer nonemergent surgery to at least 3 months after stroke/TIA.     Hypertension.  Continue PTA losartan [restarted 10/27].  Will restart PTA hydralazine today.  [10/28].  Restart PTA clonidine, hydrochlorothiazide gradually in the next couple of days.  Must monitor blood pressure readings and review with provider prior weight to restarting medication.     Hyperlipidemia.  LDL has been well controlled.  Continue home simvastatin.     Prediabetes hemoglobin A1c of 6.2.  Need to consider lifestyle modification with diet and exercise as able to.         Aliya Escobedo MD, MD    Pending Results   Unresulted Labs Ordered in the Past 30 Days of this Admission     No orders found from 9/26/2020 to 10/27/2020.             Physical Exam   There were no vitals filed for this visit.  Vital Signs with Ranges  Temp:  [97.1  F (36.2  C)-98.5  F (36.9  C)] 98.1  F (36.7  C)  Pulse:  [71-88] 88  Resp:  [16] 16  BP: (111-153)/(67-93) 111/67  SpO2:  [93 %-96 %] 96 %  No  intake/output data recorded.  PHYSICAL EXAM  GENERAL: Patient is in no distress. Alert and oriented.  HEART: Regular rate and rhythm. S1S2. No murmurs  LUNGS:  Respirations unlabored  NEURO:normal finger-to-nose and heel-to-shin bilaterally without dysmetria  Extraocular movements intact.  Facial movements symmetric.  Strength 4/5 in upper extremity, 3/5 in lower extremity   EXTREMITIES: No pedal edema. 2+ peripheral pulses.  SKIN: Warm, dry. No rash or bruising.  PSYCHIATRY Cooperative    )Consultations This Hospital Stay   PHYSICAL THERAPY ADULT IP CONSULT  OCCUPATIONAL THERAPY ADULT IP CONSULT  SPEECH LANGUAGE PATH ADULT IP CONSULT  SWALLOW EVAL SPEECH PATH AT BEDSIDE IP CONSULT  PATIENT LEARNING CENTER IP CONSULT  CARE MANAGEMENT / SOCIAL WORK IP CONSULT  PHYSICAL THERAPY ADULT IP CONSULT  OCCUPATIONAL THERAPY ADULT IP CONSULT    Time Spent on this Encounter   IAliya MD, personally saw the patient today and spent greater than 30 minutes discharging this patient.  Discussed with patient, his wife by the bedside, floor team,  on floor.  Discharge Orders      General info for SNF    Length of Stay Estimate: Short Term Care: Estimated # of Days <30  Condition at Discharge: Stable  Level of care:skilled   Rehabilitation Potential: Good  Admission H&P remains valid and up-to-date: Yes  Recent Chemotherapy: N/A  Use Nursing Home Standing Orders: Yes     Mantoux instructions    Give two-step Mantoux (PPD) Per Facility Policy Yes     Reason for your hospital stay    You were admitted to the hospital with left lower extremity weakness, MRI showing stroke.  Followed by neurology recommend aspirin in addition to Xarelto.     Follow Up and recommended labs and tests    Follow-up with medical provider in 3 to 5 days or earlier if symptomatic.  Needs age-appropriate health maintenance on PCP visit.  Letter blood sugars periodically.  Follow-up with neurology in 6 to 8 weeks or earlier if  symptomatic.  Follow-up with CV surgery per schedule.     Additional Discharge Instructions    Monitor daily blood pressure readings, review on provider visit and restart at admission clonidine and hydrochlorothiazide accordingly.  Per neurology nonurgent surgery should be delayed by at least 3 months following stroke/TIA.  Consider lifestyle modification with diet and exercise as able to for prediabetes [hemoglobin A1c 6.2.]     Activity - Up with nursing assistance     Physical Therapy Adult Consult    Evaluate and treat as clinically indicated.    Reason:Stroke     Occupational Therapy Adult Consult    Evaluate and treat as clinically indicated.    Reason:Stroke     Fall precautions     Advance Diet as Tolerated    Low-salt, low-fat diet, consider moderate carbohydrate controlled diet.       Discharge Medications   Current Discharge Medication List      START taking these medications    Details   aspirin (ASA) 81 MG EC tablet Take 1 tablet (81 mg) by mouth daily  Qty:      Associated Diagnoses: Cerebrovascular accident (CVA), unspecified mechanism (H)         CONTINUE these medications which have CHANGED    Details   cloNIDine (CATAPRES) 0.1 MG tablet Take 1 tablet (0.1 mg) by mouth 2 times daily Restart in 1-2 days after review of blood pressure readings on provider visit  Qty:      Associated Diagnoses: Cerebrovascular accident (CVA), unspecified mechanism (H)      hydrochlorothiazide (HYDRODIURIL) 25 MG tablet Take 1 tablet (25 mg) by mouth daily Restart in next 2-3 days after monitoring blood pressures and review with provider  Qty: 90 tablet, Refills: 0    Associated Diagnoses: Essential hypertension, benign         CONTINUE these medications which have NOT CHANGED    Details   hydrALAZINE (APRESOLINE) 25 MG tablet Take 25 mg by mouth 2 times daily.      losartan (COZAAR) 100 MG tablet Take 1 tablet (100 mg) by mouth daily  Qty: 30 tablet, Refills: 3    Associated Diagnoses: Essential hypertension       rivaroxaban ANTICOAGULANT (XARELTO ANTICOAGULANT) 20 MG TABS tablet Take 1 tablet (20 mg) by mouth daily  Qty: 90 tablet, Refills: 3    Associated Diagnoses: Atrial fibrillation, unspecified type (H)      simvastatin (ZOCOR) 40 MG tablet Take 1 tablet by mouth daily.           Allergies   Allergies   Allergen Reactions     Lisinopril        Discharge Disposition   Discharged to rehabilitation facility  Condition at discharge: Good    DATA  Most Recent 3 CBC's:  Recent Labs   Lab Test 10/26/20  0907 10/22/20  0810 09/27/18  0600   WBC 7.1 6.0 5.8   HGB 14.9 13.9 15.1   MCV 97 97 94    204 209      Most Recent 3 BMP's:  Recent Labs   Lab Test 10/27/20  0844 10/26/20  0907 10/22/20  0810    145* 141   POTASSIUM 3.9 3.9 3.5   CHLORIDE 107 109 109   CO2 28 31 29   BUN 18 24 30   CR 0.91 1.01 1.19   ANIONGAP 4 5 3   CLAY 9.4 9.7 8.8   * 115* 111*     Most Recent 2 LFT's:  Recent Labs   Lab Test 10/27/20  0844 10/22/20  0810   AST 19 17   ALT 23 22   ALKPHOS 51 44   BILITOTAL 0.8 1.0     Most Recent 3 Troponin's:  Recent Labs   Lab Test 10/27/20  0844 09/27/18  0600   TROPI <0.015 <0.015     Most Recent Cholesterol Panel:  Recent Labs   Lab Test 10/26/20  0907   CHOL 119   LDL 63   HDL 40   TRIG 81   Most Recent TSH, T4 and A1c Labs:  Recent Labs   Lab Test 10/27/20  0844 10/22/20  0810   TSH 1.56  --    A1C  --  6.2*     Results for orders placed or performed during the hospital encounter of 10/26/20   MR Brain w/o & w Contrast    Narrative    MRI OF THE BRAIN WITHOUT AND WITH CONTRAST 10/26/2020 11:04 AM     COMPARISON: Brain MR 9/27/2018.    HISTORY:  Focal neuro deficit, more than six hours, stroke suspected.    TECHNIQUE: Axial diffusion-weighted with ADC map, axial T2-weighted  with fat saturation, axial T1-weighted, axial turboFLAIR and coronal  T1-weighted images of the brain were acquired without intravenous  contrast.  Following intravenous administration of gadolinium (8 mL  Gadavist), axial  T1-weighted images of the brain were acquired.     FINDINGS: There is a recent ischemic infarct involving the  posterolateral right thalamus extending into the posterior limb of the  right internal capsule. No other recent ischemic infarcts noted.    There is moderate diffuse cerebral volume loss. There are numerous  scattered focal and confluent periventricular areas of abnormal T2  signal hyperintensity in the cerebral white matter bilaterally that  are consistent with sequela of chronic small vessel ischemic disease.     The ventricles and basal cisterns are within normal limits in  configuration given the degree of cerebral volume loss.  There is no  midline shift.  There are no extra-axial fluid collections.  There is  no evidence for acute intracranial hemorrhage.  There is no abnormal  contrast enhancement in the brain or its coverings.     There is no sinusitis or mastoiditis.      Impression    IMPRESSION:   1. Recent small ischemic infarct involving the posterolateral right  thalamus and posterior limb of the right internal capsule. No other  recent infarcts.  2. Diffuse cerebral volume loss and cerebral white matter changes  consistent with chronic small vessel ischemic disease.     GAUTAM DELCID MD   CTA Head Neck with Contrast    Narrative    CT ANGIOGRAM OF THE HEAD AND NECK WITHOUT AND WITH CONTRAST   10/26/2020 1:02 PM     COMPARISON: None    HISTORY: Recent ischemic infarct.    TECHNIQUE:  Precontrast localizing scans were followed by CT  angiography with an injection of 70mL [id number]-370 nonionic intravenous  contrast material with scans through the head and neck.  Images were  transferred to a separate 3-D workstation where multiplanar  reformations and 3-D images were created.  Estimates of carotid  stenoses are made relative to the distal internal carotid artery  diameters except as noted.      FINDINGS:   Neck CTA: The common carotid arteries bilaterally are patent without  stenosis. There is  extensive atherosclerotic plaque of the proximal  internal carotid arteries on both sides. There is mild narrowing (less  than 50% luminal diameter stenosis) at the origin of the right  internal carotid artery. There is approximately 50% luminal diameter  stenosis at the origin of the left internal carotid artery utilizing  NASCET criteria with a stenosis diameter 2.6 mm and a normal caliber  distal left internal carotid artery diameter of 5.6 mm. The vertebral  arteries bilaterally are small. There is moderate atherosclerotic  narrowing at the origins of the vertebral arteries bilaterally.  Neither vertebral artery definitely contributes to the basilar artery.  There appears to be normal PICA termination of the left vertebral  artery. The intradural distal right vertebral artery demonstrates  multiple foci of marked narrowing and questionable total occlusion at  one point.    Head CTA: The basilar artery is tiny, possibly congenitally. There is  heavily calcified atherosclerotic plaque of the intracranial distal  internal carotid arteries on both sides resulting in at least mild  narrowing of the paraophthalmic distal left internal carotid artery  but no significant narrowing on the right. The bilateral anterior  cerebral, bilateral middle cerebral and bilateral posterior cerebral  arteries are patent and unremarkable. The anterior communicating and  bilateral posterior communicating arteries are patent.      Impression    IMPRESSION:  1. Approximately 50% luminal diameter stenosis at the origin of the  left internal carotid artery.  2. Mild narrowing (less than 50% luminal diameter stenosis) at the  origin of the right internal carotid artery.  3. Small bilateral vertebral arteries with moderate stenosis at their  origins.  4. Probable normal variant left PICA termination of the small left  vertebral artery.  5. High-grade stenosis versus occlusion of the intradural distal right  vertebral artery just before the  basilar artery.  6. Small basilar artery (likely congenital).  7. Mild atherosclerotic narrowing of the paraophthalmic distal left  internal carotid artery.  8. Otherwise, normal head CTA.      Radiation dose for this scan was reduced using automated exposure  control, adjustment of the mA and/or kV according to patient size, or  iterative reconstruction technique.    GAUTAM DELCID MD

## 2020-10-28 NOTE — PROGRESS NOTES
Care Management Follow Up Note    Length of Stay (days) 2    Patient plan of care discussed at Interdisciplinary Rounds: yes  Expected Discharge Date: 10/28/20  Concerns to be Addressed:  ARU placement       Anticipated Discharge Disposition:  ARU  Anticipated Discharge Services:    Anticipated Discharge DME:      Plan:      Pt was seen by OT who recommend ARU along w/PT. RN CC [facility] CORTNEY spoke w/liaison who reports they have open beds today; will review and let SW know the outcome. Referral placed thru DOD.    NIKOS Anderson   [facility]  [phone]      UPDATE@1012: Per ARU liaison, OT recommendations may not meet ARU criteria, thus liaison will page [facility] OT to see if they can meet w/pt before noon today to re-review/clarify yesterdays assessment. Will await callback from liaison.     UPDATE@1111: Per request from ARU, CORTNEY set up a tentative ride for 2 pm, once OT sees pt again at 11:30. Spouse confirmed she will transport. CORTNEY updated Charge RN.

## 2020-10-28 NOTE — PLAN OF CARE
Pt here with R. Ischemic stroke with left sided weakness . A&Ox4. Neuros intact ex for LLE weakness 4/5. Slight left pronator drift. VSS. Tele NSR. Regular diet, thin liquids, no straws. Takes pills whole. Up with A1/GB/W. Denies pain. Pt scoring green on the Aggression Stop Light Tool. Plan to discharge to ARU.  Patient Belongings: clothing  Home Medications: none

## 2020-10-28 NOTE — PROGRESS NOTES
Care Coordination:  A neurology f/p appointment was made for this pt and added to the AVS:  Follow-up with neurology in 6 to 8 weeks or earlier if symptomatic.  You have a Follow up Appointment with Danielle MOREL at [facility] Neurology on Thursday 12/10 at 10:00AM with 945 check in time.  Address :  [address] [place] [address]  If you have any questions about this appointment or need to reschedule it please call the clinic at Phone Number [phone]    The pt has already discharged to ARU.  I paged Sam to update him on the status of the pt's appointments.        Antonietta Mackenzie RN, BSN Care Coordinator  [facility]  Mobile: [phone]

## 2020-10-28 NOTE — DISCHARGE INSTRUCTIONS
You are discharging to:    [facility]  [address]th [place]  [address]    [phone]      Your risk factors for stroke or TIA (transient ischemic attack):    Your Risk Factors Your Results Normal Ranges   High blood pressure BP Readings from Last 1 Encounters:   10/28/20 111/67    Less than 120/80   Cholesterol              Total Lab Results   Component Value Date    CHOL 119 10/26/2020      Less than 150    Triglycerides   Lab Results   Component Value Date    TRIG 81 10/26/2020    Less than 150   LDL Lab Results   Component Value Date    LDL 63 10/26/2020       Less than 70   HDL Lab Results   Component Value Date    HDL 40 10/26/2020            Greater than 40 (men)  Greater than 50 (women)   Diabetes Recent Labs   Lab 10/27/20  0844   *    Fasting blood glucose    Smoking/tobacco use  Quit smoking and tobacco   Overweight  Lose 1-2 pounds a week   Lack of exercise  30 minutes moderate activity each day   Other risk factors include carotid (neck) artery disease, atrial fibrillation and stress. You may be on new medicine to treat high blood pressure, cholesterol, diabetes or atrial fibrillation.    Understanding Stroke Booklet given to patient. Please refer to booklet for further information.    Stroke warning signs and symptoms - CALL 911 right away for:  - Sudden numbness or weakness in the face, arm or leg (often on one side of the body).  - Sudden confusion or trouble understanding what is going on.  - Sudden blurred or decreased vision in one or both eyes.  - Sudden trouble speaking, loss of balance, dizziness or problems with coordination.  - Sudden, severe headache for no reason.  - Fainting or seizures.  - Symptoms may go away then come back suddenly.

## 2020-10-28 NOTE — PLAN OF CARE
Pt here with right ischemic stroke and LSW. A&Ox4. Neuros: L facial droop, LLE weakness 4/5. VSS. Tele Afib with CVR. Regular diet with thin liquids, no straws. Up with A1 GBW. Continent of bowel and bladder. Denies pain. Pt scoring green on the Aggression Stop Light Tool. Plans to discharge to ARU.

## 2020-10-28 NOTE — PROGRESS NOTES
Care Management Discharge Note    Discharge Planning:  Expected Discharge Date: 10/28/20     Concerns to be Addressed:   N/A      Anticipated Discharge Disposition:  [facility]  Anticipated Discharge Services:  ARU  Anticipated Discharge DME:      Patient/family educated on Medicare website which has current facility and service quality ratings:  N/A  Referrals Placed by CM/SW:  Yes  Education Provided on the Discharge Plan:  Yes  Patient/Family in Agreement with the Plan:  Yes     Disposition Comments:      Selected Continued Care - Admitted Since 10/26/2020    No services have been selected for the patient.         Additional Information:      Orders and discharge summary have been placed. Spouse was provided  transport instructions to ARU. No further SW interventions anticipated at this time. SW consult for discharge planning was closed.     NIKOS Anderson   [facility]  [phone]

## 2020-10-28 NOTE — PROVIDER NOTIFICATION
"Dr. Escobedo paged, \"Pt accepted to ARU today. Wife to transport at 1400. Can you round and prepare orders? Also need discharge summary filed asap. Thanks!\"  "

## 2020-10-28 NOTE — PLAN OF CARE
/70 (BP Location: Left arm)   Pulse 73   Temp 97.9  F (36.6  C) (Oral)   Resp 16   SpO2 93%     Nursing shift note    -VSS  -Neuros intact from today to yesterday  -Very pleasant  -Working with PT  -Neuros intact x2  -Afib CVR    Behavior & Aggression Tool color:  Green    Pt's belongings:   Remains with patient

## 2020-10-28 NOTE — PLAN OF CARE
Speech Language Therapy Discharge Summary    Reason for therapy discharge:    All goals and outcomes met, no further needs identified.    Progress towards therapy goal(s). See goals on Care Plan in [facility] electronic health record for goal details.  Goals met    Therapy recommendation(s):    No further therapy is recommended. Patient currently tolerating a regular diet and tin liquids.
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
## 2020-10-29 ENCOUNTER — APPOINTMENT (OUTPATIENT)
Dept: PHYSICAL THERAPY | Facility: CLINIC | Age: 80
End: 2020-10-29
Payer: COMMERCIAL

## 2020-10-29 ENCOUNTER — APPOINTMENT (OUTPATIENT)
Dept: OCCUPATIONAL THERAPY | Facility: CLINIC | Age: 80
End: 2020-10-29
Payer: COMMERCIAL

## 2020-10-29 LAB
ANION GAP SERPL CALCULATED.3IONS-SCNC: 3 MMOL/L (ref 3–14)
BASOPHILS # BLD AUTO: 0 10E9/L (ref 0–0.2)
BASOPHILS NFR BLD AUTO: 0.6 %
BUN SERPL-MCNC: 25 MG/DL (ref 7–30)
CALCIUM SERPL-MCNC: 9 MG/DL (ref 8.5–10.1)
CHLORIDE SERPL-SCNC: 108 MMOL/L (ref 94–109)
CO2 SERPL-SCNC: 30 MMOL/L (ref 20–32)
CREAT SERPL-MCNC: 0.96 MG/DL (ref 0.66–1.25)
DIFFERENTIAL METHOD BLD: NORMAL
EOSINOPHIL # BLD AUTO: 0.1 10E9/L (ref 0–0.7)
EOSINOPHIL NFR BLD AUTO: 2 %
ERYTHROCYTE [DISTWIDTH] IN BLOOD BY AUTOMATED COUNT: 12.7 % (ref 10–15)
GFR SERPL CREATININE-BSD FRML MDRD: 73 ML/MIN/{1.73_M2}
GLUCOSE SERPL-MCNC: 98 MG/DL (ref 70–99)
HCT VFR BLD AUTO: 45.4 % (ref 40–53)
HGB BLD-MCNC: 14.5 G/DL (ref 13.3–17.7)
IMM GRANULOCYTES # BLD: 0 10E9/L (ref 0–0.4)
IMM GRANULOCYTES NFR BLD: 0.2 %
LYMPHOCYTES # BLD AUTO: 1.4 10E9/L (ref 0.8–5.3)
LYMPHOCYTES NFR BLD AUTO: 21.6 %
MCH RBC QN AUTO: 31.9 PG (ref 26.5–33)
MCHC RBC AUTO-ENTMCNC: 31.9 G/DL (ref 31.5–36.5)
MCV RBC AUTO: 100 FL (ref 78–100)
MONOCYTES # BLD AUTO: 0.8 10E9/L (ref 0–1.3)
MONOCYTES NFR BLD AUTO: 11.3 %
NEUTROPHILS # BLD AUTO: 4.3 10E9/L (ref 1.6–8.3)
NEUTROPHILS NFR BLD AUTO: 64.3 %
NRBC # BLD AUTO: 0 10*3/UL
NRBC BLD AUTO-RTO: 0 /100
PLATELET # BLD AUTO: 175 10E9/L (ref 150–450)
POTASSIUM SERPL-SCNC: 4.9 MMOL/L (ref 3.4–5.3)
RBC # BLD AUTO: 4.54 10E12/L (ref 4.4–5.9)
SODIUM SERPL-SCNC: 141 MMOL/L (ref 133–144)
WBC # BLD AUTO: 6.7 10E9/L (ref 4–11)

## 2020-10-29 PROCEDURE — 97110 THERAPEUTIC EXERCISES: CPT | Mod: GO | Performed by: OCCUPATIONAL THERAPIST

## 2020-10-29 PROCEDURE — 97535 SELF CARE MNGMENT TRAINING: CPT | Mod: GO | Performed by: OCCUPATIONAL THERAPIST

## 2020-10-29 PROCEDURE — 97112 NEUROMUSCULAR REEDUCATION: CPT | Mod: GP

## 2020-10-29 PROCEDURE — 80048 BASIC METABOLIC PNL TOTAL CA: CPT | Performed by: PHYSICIAN ASSISTANT

## 2020-10-29 PROCEDURE — 128N000003 HC R&B REHAB

## 2020-10-29 PROCEDURE — 85025 COMPLETE CBC W/AUTO DIFF WBC: CPT | Performed by: PHYSICIAN ASSISTANT

## 2020-10-29 PROCEDURE — 97116 GAIT TRAINING THERAPY: CPT | Mod: GP

## 2020-10-29 PROCEDURE — 250N000013 HC RX MED GY IP 250 OP 250 PS 637: Performed by: PHYSICIAN ASSISTANT

## 2020-10-29 PROCEDURE — 99233 SBSQ HOSP IP/OBS HIGH 50: CPT | Performed by: PHYSICAL MEDICINE & REHABILITATION

## 2020-10-29 PROCEDURE — 36415 COLL VENOUS BLD VENIPUNCTURE: CPT | Performed by: PHYSICIAN ASSISTANT

## 2020-10-29 PROCEDURE — 97163 PT EVAL HIGH COMPLEX 45 MIN: CPT | Mod: GP

## 2020-10-29 PROCEDURE — 97530 THERAPEUTIC ACTIVITIES: CPT | Mod: GP

## 2020-10-29 PROCEDURE — 97166 OT EVAL MOD COMPLEX 45 MIN: CPT | Mod: GO | Performed by: OCCUPATIONAL THERAPIST

## 2020-10-29 RX ADMIN — LOSARTAN POTASSIUM 100 MG: 100 TABLET, FILM COATED ORAL at 08:26

## 2020-10-29 RX ADMIN — ASPIRIN 81 MG: 81 TABLET ORAL at 08:25

## 2020-10-29 RX ADMIN — RIVAROXABAN 20 MG: 10 TABLET, FILM COATED ORAL at 18:13

## 2020-10-29 RX ADMIN — HYDRALAZINE HYDROCHLORIDE 25 MG: 25 TABLET ORAL at 21:20

## 2020-10-29 RX ADMIN — HYDRALAZINE HYDROCHLORIDE 25 MG: 25 TABLET ORAL at 08:26

## 2020-10-29 RX ADMIN — SIMVASTATIN 40 MG: 20 TABLET, FILM COATED ORAL at 21:20

## 2020-10-29 ASSESSMENT — ACTIVITIES OF DAILY LIVING (ADL): PREVIOUS_RESPONSIBILITIES: SHOPPING;YARDWORK;MEDICATION MANAGEMENT;FINANCES;DRIVING

## 2020-10-29 NOTE — PLAN OF CARE
"Discharge Planner Post-Acute Rehab PT:     Discharge Plan: Home w/ home health PT. ELOS: 7 days, pt has FWW and SEC for discharge home    Precautions: Falls    Current Status:  Transfer: CGA-SBA w/FWW for STS and SPT. Intermittent VCs for sequencing and safety.    Gait: Amb 200' w/FWW, CGA. Pt presents w/decreased coordination of LLE during stance and swing phase. Demoing intermittent foot drag w/swing phase and L trendelenberg in stance phase requiring facilitation and VCs for improved sequencing.    Stairs: 6\" step x 12 w/CGA, reciprocal ascent, non-reciprocal descent. Leading w/RLE for improved pain control of R ankle (painful ROM)    Balance: SBA for static and dynamic seated balance w/o UE support. STS, SBA w/FWW requiring BUE support. Static standing balance, SBA w/ and w/o FWW. Requiring BUE support for dynamic balance activities    Assessment: Pt presents w/ LLE weakness and impaired coordination, impaired balance, and pain limiting pt functional mobility. Pt will benefit from skilled inpatient rehab stay for improved LLE coordination in stance and swing phase in gait and for neuro re-ed of LLE during all functional mobility. Pt currently requiring CGA/SBA for ambulation w/FWW. Pt motivated to participate in all therapy activities.    Other Barriers to Discharge (DME, Family Training, etc): Family training, stairs, L hemiparesis.   "

## 2020-10-29 NOTE — PLAN OF CARE
Occupational Therapy Discharge Summary    Reason for therapy discharge:    Discharged to acute rehabilitation facility.    Progress towards therapy goal(s). See goals on Care Plan in [facility] electronic health record for goal details.  Goals not met.  Barriers to achieving goals:   discharge from facility.    Therapy recommendation(s):    Continued therapy is recommended.  Rationale/Recommendations:  at ARU to improve ind/safety w/ ADL's and IADL's.

## 2020-10-29 NOTE — PROGRESS NOTES
Pt was new admit 10/28. Pt is alert/oriented x4, able to make needs known. A1 with walker. Continent of bowel/bladder, LBM 10/27. PVR's in evening shift was 40, and 105 at 0300. Will continue PVR's until below 100. VSS. Denies pain, SOB, dizziness, vision changes, chest pain etc. Continue POC.

## 2020-10-29 NOTE — CONSULTS
10/29/20 1500   Living Arrangements   People in home spouse   Able to Return to Prior Arrangements yes   Living Arrangement Comments 5 JOSE, bedroom/bathroom on main level    Home Safety   Patient Feels Safe Living in Home? yes   CM/SW Discharge Planning   Expected Discharge Date 20   Patient/Family Anticipates Transition to home with family   Concerns to be Addressed no discharge needs identified;denies needs/concerns at this time   Patient/family educated on Medicare website which has current facility and service quality ratings no   Transportation Anticipated car, drives self;family or friend will provide   Anticipated Discharge Disposition (CM/SW) Home;Home Care;Outpatient Rehab (PT, OT, SLP, Cardiac or Pulmonary)   Patient/Family in Agreement with Plan yes       Social Work: Initial Assessment with Discharge Plan    Patient Name: Kamari Valentin  : 1940  Age: 80 year old  MRN: [id number]  Completed assessment with: Patient   Admitted to ARU: 10/28/2020    Presenting Information   Date of SW assessment: 2020  Health Care Directive: Health Care Directive Agent (if patient not able to make decisions)  Primary Health Care Agent: Patient   Secondary Health Care Agent: Pt wife MANUEL  Living Situation: Lives in a house with spouse. 5 JOSE, STI (1 flight to top floor, 1 flight to basement). Bedroom/bathroom on the main level with a bath tub/shower combo. Railing on the stair cases.   Previous Functional Status: Pt IND w/ ADLs and receives A from wife w/ IADLs. Uses cane to walk at home.  DME available: Cane, grab bars in shower, tub bench. Has two walkers from a aunt who passed away.   Patient and family understanding of hospitalization: Appropriate. Pt reported feeling 'thankful'. Appears motivated. Tearful when talking about his family.   Cultural/Language/Spiritual Considerations: English-speaking,  81 y/o  male.       Physical Health  Reason for admission: Stroke Ischemic  "01.1 (L) Body Involvement (R) Brain Stroke; lacunar stroke in the Right internal capsule/corona radiata in setting of microvascular disease    Kamari Valentin is an 79 y/o male with past medical history of stroke with mild residual left sided weakness, HTN, HLD, a-fib on Xarelto who admitted to the hospital for worsened L sided weakness since returning home from angiogram last week. He was at angiogram procedure in prep for planned upcoming TAVR procedure and was noted to have a L facial droop, however no other neuro signs noted. He was discharged via WC to his car. Upon returning home, he had increased difficulty with L sided weakness. He called his surgeon team who said to monitor it and come in if it does not improve. L sided weakness persisted and he did have a fall at home, and so he presented for evaluation. MRI shows a subacute lacunar stroke in the R internal capsule/corona radiata in the setting of microvascular disease.     Provider Information   Primary Care Physician:Gus Lanier   [address], [place] [address]  PH:[phone]  : None reported     Mental Health/Chemical Dependency:   Diagnosis: None reported. Although pt was tearful when talking about his family.Shared that he is typically a emotional toni.   Alcohol/Tobacco/Narcotis: Denied   Support/Services in Place: None reported   Services Needed/Recommended: Supportive services available by consult (Health Psychology and [place] services)   Sexuality/Intimacy: Not discussed     Support System  Marital Status: . Wife is healthy and well, per pt. Pt wife enjoys to be active and outside \"have to fight her for the \". Able to assist at discharge.   Family support: Two sons and 1 dtr. One son in [facility], another in [place] and dtr in [place]. 5 grandchildren, 20, 18, 14 and two other younger grandchildren.   Other support available: \"wonderful young neighbors\"     Community Resources  Current in home " services: None reported   Previous services: [facility] OP therapy 5 years ago.     Financial/Employment/Education  Employment Status: Retired-worked as a  and traveled a lot   Income Source: Social security income   Education: Not discussed  Financial Concerns:  None reported   Insurance: UCARE/UCARE MEDICARE NON [employer]      Discharge Plan   Patient and family discharge goal: Home with A and HHC vs OP pending progress   Provided Education on discharge plan: Yes  Patient agreeable to discharge plan:  Yes  Provided education and attained signature for Medicare IM and IRF Patient Rights and Privacy Information provided to patient : Yes  Provided patient with [place] [facility] [place] Resources: Yes, flyer given and information added to AVS  Barriers to discharge: None identified     Discharge Recommendations   Disposition: See above   Transportation Needs: Family assistance   Name of Transportation Company and Phone: N/A     Additional comments   ELOS 6 days. HHC vs OP pending progress, anticipate OP therapy. Wife able to assist at discharge. No immediate SW needs or concerns at this time. SW will remain available as needs arise.     Per Care Coordinator note PTA:   Follow-up with neurology in 6 to 8 weeks or earlier if symptomatic.  You have a Follow up Appointment with Danielle MOREL at [facility] on Thursday 12/10 at 10:00AM with 945 check in time.  Address: [address] [place] [address]   Phone: [phone]    Please invite to Care Conference:  Pt wife     Sandra John, MARILOU, [facility] Acute Rehab Unit   Phone: [phone]  I   Pager: [phone]

## 2020-10-29 NOTE — PROGRESS NOTES
Discharge Planner Post-Acute Rehab OT:     Discharge Plan: Home with HC OT services. Estimated LOS: 7 days.     Precautions: Falls, Left sided weakness     Current Status:  ADLs: Pt requires set up and SBA for FB dressing while seated. Pt requires CGA with FWW when standing to pull up underwear and pants over hips. Pt able to don shoes while seated with SBA. Pt requires Min A with FWW and grab bar for toilet transfer with cues for safe approach. Pt requires CGA with FWW with verbal cueing for safety of LLE for functional mobility.   IADLs: To assess at a later date.  Vision/Cognition: Pt wears trifocal glasses and is nearsighted. Further assess cognition.     Assessment: Pt early in ARU stay and eager to participate in therapy services. Pt presents with L sided weakness, decreased LUE FMC skills, and decreased functional standing tolerance. Goal is for pt become MOD I in all ADL/IADLs prior to discharge.     Other Barriers to Discharge (DME, Family Training, etc): Will likely need FWW and extended tub bench upon discharge. Pt has raised toilet at home, but may benefit from grab bars.

## 2020-10-29 NOTE — PROGRESS NOTES
"  VS: /60 (BP Location: Left arm)   Pulse 88   Temp 96.9  F (36.1  C) (Oral)   Resp 18   Ht 1.702 m (5' 7\")   Wt 78 kg (172 lb)   SpO2 96%   BMI 26.94 kg/m     O2: RA   Output: Voiding in bathroom with assistance   Last BM: PT reported morning of 10/28, passing gas this shift   Activity: Ambulate SBA w/ walker, no SOB this shift     Skin: Clean, dry, normal color, Single dry scab on left elbow.    Pain: Pt denies pain this shift   CMS: Pulses palpable all four extremities, Pt reports no numbness/tingling.   Dressing: N/A   Diet: Tolerating regular diet, thin liquids, Pills whole   LDA: N/A   Equipment: A1 w/ walker & non slip footwear.    Plan: Continue rehabilitation course and return home with outpatient care. Tentative discontinue date 11/5   Additional Info:        "

## 2020-10-29 NOTE — PHARMACY-MEDICATION REGIMEN REVIEW
Pharmacy Medication Regimen Review  Kamari Valentin is a 80 year old male who is currently in the Acute Rehab Unit.    Assessment: All medications have an appropriate indications, durations and no unnecessary use was found    Plan:   Continue current medication.   Recommend occasional Hgb checks since patient of advanced age on Xarelto and ASA ( increased risk of bleeding)   Attending provider will be sent this note for review.  If there are any emergent issues noted above, pharmacist will contact provider directly by phone.      Pharmacy will periodically review the resident's medication regimen for any PRN medications not administered in > 72 hours and discontinue them. The pharmacist will discuss gradual dose reductions of psychopharmacologic medications with interdisciplinary team on a regular basis.    Please contact pharmacy if the above does not answer specific medication questions/concerns.    Background:  A pharmacist has reviewed all medications and pertinent medical history today.  Medications were reviewed for appropriate use and any irregularities found are listed with recommendations.      Current Facility-Administered Medications:      acetaminophen (TYLENOL) tablet 650 mg, 650 mg, Oral, Q6H PRN, Isaura Jacome PA     aspirin EC tablet 81 mg, 81 mg, Oral, Daily, Isaura Jacome PA, 81 mg at 10/29/20 0825     hydrALAZINE (APRESOLINE) tablet 25 mg, 25 mg, Oral, BID, Isaura Jacome PA, 25 mg at 10/29/20 0826     losartan (COZAAR) tablet 100 mg, 100 mg, Oral, Daily, Isaura Jacome PA, 100 mg at 10/29/20 0826     Patient is already receiving anticoagulation with heparin, enoxaparin (LOVENOX), warfarin (COUMADIN)  or other anticoagulant medication, , Does not apply, Continuous PRN, Isaura Jacome PA     rivaroxaban ANTICOAGULANT (XARELTO) tablet 20 mg, 20 mg, Oral, Daily with supper, Isaura Jacome PA, 20 mg at 10/28/20 1813     simvastatin (ZOCOR) tablet 40 mg, 40 mg, Oral, At  Bedtime, Isaura Jacome PA, 40 mg at 10/28/20 2110  No current outpatient prescriptions on file.   PMH: ischemic recent stroke, pt has hx of CAD,HLD,HTN, AFib

## 2020-10-29 NOTE — PLAN OF CARE
FOCUS/GOAL  Medication management and Medical management    ASSESSMENT, INTERVENTIONS AND CONTINUING PLAN FOR GOAL:  A&O, A1 w/ walker. Makes needs known, alarms on.  Pt denied pain.  Continent of B/B, LBM 10/27.  Declined bath during evening.

## 2020-10-29 NOTE — PROGRESS NOTES
10/29/20 0900   Quick Adds   Type of Visit Initial Occupational Therapy Evaluation   Living Environment   People in home spouse   Current Living Arrangements house   Home Accessibility stairs to enter home;stairs within home   Number of Stairs, Main Entrance 5  (5 to enter back, 5 to enter in front)   Stair Railings, Main Entrance railings safe and in good condition;railings on both sides of stairs;railing on left side (ascending)   Number of Stairs, Within Home, Primary other (see comments)  (14. 7 from basement to main, 7 from main to upstairs)   Stair Railings, Within Home, Primary railings safe and in good condition;railing on right side (ascending)   Transportation Anticipated car, drives self;family or friend will provide   Living Environment Comments OT: Pt lives with spouse in a 2 story home. Pt only needs to access main level. Bathroom and bedroom on main level. Pt has double sized bed and rolls to the R to get out. Raised toilet in bathroom. Ridge around sink for safe . Tub shower/combo with 5 inch lip. Grab bar angled in shower.  Laundry located in basement, however does not need to access; spouse will assist   Self-Care   Usual Activity Tolerance good   Current Activity Tolerance fair   Regular Exercise No   Equipment Currently Used at Home cane, straight;grab bar, tub/shower;raised toilet seat;walker, rolling   Activity/Exercise/Self-Care Comment OT: Pt enjoys going to his cabin and working in his workshop. Pt enjoys playing cribbage. Pt enjoys spending time with family. Pt previously was IND with ADLs/IADLs and functional mobility.    Disability/Function   Hearing Difficulty or Deaf no   Wear Glasses or Blind yes   Vision Management Trifocal glasses, nearsighted   Concentrating, Remembering or Making Decisions Difficulty no   Difficulty Communicating no   Difficulty Eating/Swallowing no   Walking or Climbing Stairs Difficulty yes  (LLE difficulty since previous stroke)   Walking or Climbing  "Stairs ambulation difficulty, requires equipment   Dressing/Bathing Difficulty no   Toileting no   Doing Errands Independently Difficulty (such as shopping) yes   Fall history within last six months yes   Number of times patient has fallen within last six months 1   Change in Functional Status Since Onset of Current Illness/Injury yes   General Information   Onset of Illness/Injury or Date of Surgery 10/22/20   Referring Physician Dr. Melara   Patient/Family Therapy Goal Statement (OT) OT: \"To get my left leg working again\"   Additional Occupational Profile Info/Pertinent History of Current Problem Per chart review, pt \"is an 81 y/o male with past medical history of stroke with mild residual left sided weakness, HTN, HLD, a-fib on Xarelto who admitted to the hospital for worsened L sided weakness since returning home from angiogram last week. He was at angiogram procedure in prep for planned upcoming TAVR procedure and was noted to have a L facial droop, however no other neuro signs noted. He was discharged via WC to his car. Upon returning home, he had increased difficulty with L sided weakness. He called his surgeon team who said to monitor it and come in if it does not improve. L sided weakness persisted and he did have a fall at home, and so he presented for evaluation. MRI shows a subacute lacunar stroke in the R internal capsule/corona radiata in the setting of microvascular disease. \"   Existing Precautions/Restrictions fall   Limitations/Impairments safety/cognitive   Left Upper Extremity (Weight-bearing Status) full weight-bearing (FWB)   Right Upper Extremity (Weight-bearing Status) full weight-bearing (FWB)   Left Lower Extremity (Weight-bearing Status) full weight-bearing (FWB)   Right Lower Extremity (Weight-bearing Status) full weight-bearing (FWB)   Heart Disease Risk Factors High blood pressure;Medical history;Gender;Age   Cognitive Status Examination   Orientation Status orientation to " person, place and time   Visual Perception   Impact of Vision Impairment on Function (Vision) OT: all visual fields, convergence, and peripheral vision is intact   Sensory   Sensory Comments OT: Light touch intact with BUE's   Pain Assessment   Patient Currently in Pain No   Integumentary/Edema   Integumentary/Edema no deficits were identifed   Posture   Posture forward head position;protracted shoulders   Range of Motion Comprehensive   General Range of Motion no range of motion deficits identified;bilateral upper extremity ROM WFL   Comment, General Range of Motion OT: Pt req'd increased time to complete full ROM with LUE. BUE's all planes WFL   Strength Comprehensive (MMT)   General Manual Muscle Testing (MMT) Assessment no strength deficits identified   Comment, General Manual Muscle Testing (MMT) Assessment OT: BUE's: 5/5 sh flex/ext, 5/5 sh abd/add, 5/5 elbow flex/ext, 5/5 wrist flex/ext   Hand Strength   Left hand  (pounds) 56   Right hand  (pounds) 66   Coordination   Right hand, nine hole peg test (seconds) 30   Left hand, Box and Block test (cubes transferred in 1 minute) 1 min 7 seconds   Functional Limitations Decreased speed;Fine motor ADL performance impaired;Object transport impaired   ARC Assessment Only   Acute Rehab Functional Assessment See IP Rehab Daily Documentation Flowsheet for Functional Mobility/ADL Assessment   Instrumental Activities of Daily Living (IADL)   Previous Responsibilities shopping;yardwork;medication management;finances;driving   IADL Comments OT: Pt spouse can assist with shopping, yardwork, meal prep, laundry, and household mgmt.   Clinical Impression   Criteria for Skilled Therapeutic Interventions Met (OT) yes;meets criteria;skilled treatment is necessary   OT Diagnosis OT: Pt presents with decreased safety and IND in ADL/IADLs.    OT Problem List-Impairments impacting ADL problems related to;activity tolerance impaired;balance;cognition;coordination;fear &  anxiety;inability to direct their own care;mobility;motor control;muscle tone;postural control;sensory feedback;other (see comments)   ADL comments/analysis OT: Pt presents with impaired balance, impaired LUE FMC skills, impaired functional mobility, and impaired standing tolerance.    Assessment of Occupational Performance 3-5 Performance Deficits   Identified Performance Deficits OT: Performance deficits include toileting, dressing, G/H tasks, bathing, and all IADL tasks   Planned Therapy Interventions (OT) ADL retraining;IADL retraining;balance training;bed mobility training;cognition;fine motor coordination training;groups;motor coordination training;neuromuscular re-education;transfer training;home program guidelines;progressive activity/exercise;risk factor education;other (see comments)   Clinical Decision Making Complexity (OT) moderate complexity   Therapy Frequency (OT) Daily   Predicted Duration of Therapy 7 days   Anticipated Equipment Needs Upon Discharge (OT) bathing equipment;dressing equipment;toileting equipment   Risks and Benefits of Treatment have been explained. Yes   Patient, Family & other staff in agreement with plan of care Yes   Comment-Clinical Impression OT: Pt would benefit from skilled OT services d/t recent decline in safety and IND with ADl/IADL and functional mobility   OT Discharge Planning    OT Discharge Recommendation (DC Rec) Home with assist;home with home care occupational therapy;home with outpatient occupational therapy   Total Evaluation Time (Minutes)   Total Evaluation Time (Minutes) 30

## 2020-10-29 NOTE — PROGRESS NOTES
"  [facility]   Acute Rehabilitation Unit  Daily progress note  CC:     Stroke Ischemic 01.1 (L) Body Involvement (R) Brain Stroke; lacunar stroke in the Right internal capsule/corona radiata in setting of microvascular disease    S: In good spirits,    ROS: Denies headache,. Nausea, sob, or urgency.    Currently the patient requires Min A for bed mobility, transfers, and gait x up to 200 feet. Pt requires Min A with any task involving the L UE due to weakness including LB dressing, LB bathing. The patient also is noted to have difficulty with safety with FWW, and possible new cognitive deficits requiring a full cognitive/linguistic assessment at [facility].         [unfilled]   Scheduled meds    aspirin  81 mg Oral Daily     hydrALAZINE  25 mg Oral BID     losartan  100 mg Oral Daily     rivaroxaban ANTICOAGULANT  20 mg Oral Daily with supper     simvastatin  40 mg Oral At Bedtime       PRN meds:  acetaminophen, - MEDICATION INSTRUCTIONS -      PHYSICAL EXAM  /60 (BP Location: Left arm)   Pulse 88   Temp 96.9  F (36.1  C) (Oral)   Resp 18   Ht 1.702 m (5' 7\")   Wt 78 kg (172 lb)   SpO2 96%   BMI 26.94 kg/m    Gen: Alert and oriented  HEENT: EOMI, face symmetric  CV: S1, S2 RRR  Pulm: Clear to auscultation  Abd: Soft, non tender  Ext: Calves non tender  Neuro/MSK: Moves all four, mild dysmetria  And weakness left side  Responds to touch.    LABS  Last Comprehensive Metabolic Panel:  Sodium   Date Value Ref Range Status   10/29/2020 141 133 - 144 mmol/L Final     Potassium   Date Value Ref Range Status   10/29/2020 4.9 3.4 - 5.3 mmol/L Final     Comment:     Specimen moderately hemolyzed, Potassium may be falsely elevated     Chloride   Date Value Ref Range Status   10/29/2020 108 94 - 109 mmol/L Final     Carbon Dioxide   Date Value Ref Range Status   10/29/2020 30 20 - 32 mmol/L Final     Anion Gap   Date Value Ref Range Status   10/29/2020 3 3 - 14 mmol/L Final "     Glucose   Date Value Ref Range Status   10/29/2020 98 70 - 99 mg/dL Final     Urea Nitrogen   Date Value Ref Range Status   10/29/2020 25 7 - 30 mg/dL Final     Creatinine   Date Value Ref Range Status   10/29/2020 0.96 0.66 - 1.25 mg/dL Final     GFR Estimate   Date Value Ref Range Status   10/29/2020 73 >60 mL/min/[1.73_m2] Final     Comment:     Non  GFR Calc  Starting 12/18/2018, serum creatinine based estimated GFR (eGFR) will be   calculated using the Chronic Kidney Disease Epidemiology Collaboration   (CKD-EPI) equation.       Calcium   Date Value Ref Range Status   10/29/2020 9.0 8.5 - 10.1 mg/dL Final        CBC RESULTS:   Recent Labs   Lab Test 10/29/20  0550   WBC 6.7   RBC 4.54   HGB 14.5   HCT 45.4      MCH 31.9   MCHC 31.9   RDW 12.7           ASSESSMENT AND PLAN    Kamari Valentin is a 80 year old right hand dominant male with Stroke Ischemic 01.1 (L) Body Involvement (R) Brain Stroke; lacunar stroke in the Right internal capsule/corona radiata in setting of microvascular disease     The patient is admitted to [facility] for rehabilitation.  Patient requires an intensive inpatient rehab program to address the following acute impairments:impaired strength, impaired activity tolerance, impaired tone, impaired balance, impaired coordination, impaired judgement and safety awareness, impaired weight shifting and dysphagia        Admission to acute inpatient rehab:    Impairment group code: Stroke Ischemic 01.1 (L) Body Involvement (R) Brain Stroke; lacunar stroke in the Right internal capsule/corona radiata in setting of microvascular disease        1. PT, OT and SLP 60 minutes of each on a daily basis, in addition to rehab nursing and close management of physiatrist.       2. Impairment of ADL's: OT for 60 minutes daily to work on ADL re-training such as grooming, self cares and bathing.       3. Impairment of mobility: PT for 60 minutes daily to work on neuromuscular  re-education focusing on strength, balance, coordination, and endurance.       4. Impairment of cognition/language/swallow:  SLP for 60 minutes daily to work on cognitive and linguistic deficits.     5. Rehab RN for med administration, bowel regimen, glucose monitoring and wound care.       6. Medical Conditions     Lacunar infarct with associated left-sided weakness.   Patient underwent elective angiogram on 10/22 for TAVR work-up and subsequently noted left lower extremity weakness that did not improve.  Resented with difficulty ambulating.  MRI showed recent ischemic stroke involving the posterolateral right thalamus and posterior limb of the right internal capsule.    CT angiogram of the head neck showed approximate 50% luminal diameter stenosis of the origin of the left internal carotid artery and mild narrowing on the right.    Recent carotid ultrasound on 10/22 showed less than 50% stenosis on the right and greater than 70% stenosis on the left internal carotid artery.    Echo 10/12/2020 with EF is 60-65%.  Severe AS, moderate mitral annular calcification. No pericardial effusion.  CK 63, troponin undetectable, TSH 1.56  LDL 63, HDL 40.  Hemoglobin A1c 6.2.  Evaluated by stroke neurology, recommend aspirin 81 mg oral daily in addition to Xarelto.  Continue PTA simvastatin therapy.  Home antihypertensive regimen as below.  PT, OT recommending ARU for rehabilitation.  Discussed with patient's wife in agreement with ARU.     Chronic atrial fibrillation on Xarelto.  Continue PTA Xarelto.  PTA not on rate control medication.  Telemetry monitoring no acute events.     Hypernatremia. Sodium 145 on admission.  Sodium levels normalized.     Coronary artery disease.  Severe aortic stenosis.  Coronary angiogram on 10/22 with CAD, LMCA : Distal 60% eccentric  modeately calcified lesion, TAVR is likely not a good first option given complexity of CAD  CV surgery follows patient for AVR CABG and maze procedure.   Per  neurology defer nonemergent surgery to at least 3 months after stroke/TIA.     Hypertension.  Continue PTA losartan [restarted 10/27].  Will restart PTA hydralazine today.  [10/28].  Restart PTA clonidine, hydrochlorothiazide gradually in the next couple of days.  Must monitor blood pressure readings and review with provider prior weight to restarting medication.     Hyperlipidemia.  LDL has been well controlled.  Continue home simvastatin.     Prediabetes hemoglobin A1c of 6.2.  Need to consider lifestyle modification with diet and exercise as able to.     7. Adjustment to disability:  Clinical psychology to eval and treat as appropriate  8. FEN: Regular with thins, low salt, low fat mod carb controlled diet  9. Bowel: Monitor  10. Bladder: Monitor  11. DVT Prophylaxis: On Xarelto  12. GI Prophylaxis: Monitor. May need Pepcid  13. Code: No CPR, DNI  14. Disposition: Home with family  15. ELOS: 7 days  16. Rehab prognosis:  Fair  Follow up Appointments on Discharge:      Follow-up with medical provider in 3 to 5 days or earlier if symptomatic.  Needs age-appropriate health maintenance on PCP visit.  Letter blood sugars periodically.  Follow-up with neurology in 6 to 8 weeks or earlier if symptomatic.  Follow-up with CV surgery per schedule.     Chris Melara MD   Physical Medicine & Rehabilitation

## 2020-10-29 NOTE — PROGRESS NOTES
"   10/29/20 1000   Quick Adds   Type of Visit Initial PT Evaluation   Living Environment   People in home spouse   Current Living Arrangements house   Home Accessibility stairs to enter home;stairs within home   Number of Stairs, Main Entrance 5  (5 in front w/ double rail, 5 in back w/one rail L (ascent))   Stair Railings, Main Entrance railings safe and in good condition;railings on both sides of stairs;railing on left side (ascending)   Number of Stairs, Within Home, Primary 6  (6 to basement (shop) doesn't need to use)   Stair Railings, Within Home, Primary railings safe and in good condition;railing on right side (ascending)   Transportation Anticipated car, drives self;family or friend will provide   Living Environment Comments PT: Pt uses back JOSE most frequently, 2 level home, but able to live completely on main level, grab bars in shower; drives car that is \"like a pick-up\" w/a running board to enter. Pt reports good social support from wife.   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 Started silver sneakers ~5 years ago, but not since COVID d/t shut down. Has FWW and standard walker and SEC at home. Was not using any AD until recently d/t R ankle pain. Pt reports baseline ankle instability w/history of injuries to B ankle   Disability/Function   Hearing Difficulty or Deaf no   Wear Glasses or Blind yes   Vision Management Glasses   Concentrating, Remembering or Making Decisions Difficulty no   Difficulty Communicating no   Difficulty Eating/Swallowing no   Walking or Climbing Stairs Difficulty yes   Walking or Climbing " Stairs ambulation difficulty, requires equipment;stair climbing difficulty, requires equipment  (Amb w/SEC (recently, see ROM for detail), rail for stairs)   Dressing/Bathing Difficulty no   Toileting no   Doing Errands Independently Difficulty (such as shopping) yes   Fall history within last six months yes   Number of times patient has fallen within last six months 1   Change in Functional Status Since Onset of Current Illness/Injury yes   General Information   Onset of Illness/Injury or Date of Surgery 10/22/20   Referring Physician Dr. 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 Pt presented for TAVR w/L facial droop, DC'd home - presented to ED in following days d/t increased L sided weakness. PMH includes: prior R sided stroke, HTN, HLD, and a-fib.   Existing Precautions/Restrictions fall   Heart Disease Risk Factors High blood pressure;Overweight;Age;Gender   General Observations PT: Pt had prior stroke, experienced NMES for L hip flexor, did not tolerate well. BP stable throughout session at 115/70 seated, and 123/71 post amb   Cognition   Orientation Status (Cognition) oriented x 4   Affect/Mental Status (Cognition) WNL   Follows Commands (Cognition) WNL   Memory Deficit (Cognition) other (see comments)  (Pt repeated self multiple times through session)   Cognitive Status Comments PT: No overt deficits noted. PT observed that pt repeated self on multiple occasions, but able to demonstrate safety awareness insight throughout session, even from tips from prior admission ~11 yrs ago.   Pain Assessment   Patient Currently in Pain No   Integumentary/Edema   Integumentary/Edema no deficits were identifed   Posture    Posture  Forward head position;Protracted shoulders  (Flexed trunk when amb)   Range of Motion (ROM)   ROM Comment BLE WNL PROM, w/exception of B ankle, limited DF to neutral. Pt reports ankle injuries in past including the following: L tendon repair, unable to report specifics about surgery; R ankle cartilagenous/bony bump on dorsum of foot at level of tarsal d/t hx of rheumatic fever. TTP at R medial ankle, intermittent discomfort w/gait, but refused use of compression.   Strength   Strength Comments Pt demoing L trendelenberg w/gait indicating hip abduction weakness when in functional position and coordinating BLE for gait.   MMT: Hip, Rehab Eval   Hip Flexion - Left Side (4-/5) good minus, left   Hip Extension - Left Side (4-/5) good minus, left   Hip ABduction - Left Side (5/5) normal,left   Hip ADduction - Left Side (5/5) normal,left   Hip Flexion - Right Side (5/5) normal,right   Hip Extension - Right Side (5/5) normal,right   Hip ABduction - Right Side (5/5) normal,right   Hip ADduction - Right Side (5/5) normal,right   MMT: Knee, Rehab Eval   Knee Flexion - Left Side (4-/5) good minus, left   Knee Extension - Left Side (5/5) normal,left   Knee Flexion - Right Side (4-/5) good minus, right   Knee Extension - Right Side (5/5) normal,right   MMT: Ankle, Rehab Eval   Ankle Dorsiflexion - Left Side 5/5) normal,left   Ankle Plantarflexion - Left Side 5/5) normal,left   Ankle Dorsiflexion - Right Side (4/5) good, right   Ankle Plantarflexion - Right Side 5/5) normal, right   ARC Assessment Only   Acute Rehab Functional Assessment See IP Rehab Daily Documentation Flowsheet for Functional Mobility/ADL Assessment   Balance   Sitting Balance: Static good balance  (able to sit unsupported)   Sitting Balance: Dynamic good balance  (demo B pelvic trunk dissociation)   Sit-to-Stand Balance fair balance  (Requiring BUE support to complete STS)   Standing Balance: Static fair balance  (static stand w/no UE support, unable nbos or  EC)   Standing Balance: Dynamic fair balance  (BUE support required)   Sensory Examination   Sensory Perception Comments Light touch WNL B, proprioception 4/4 B   Coordination   Coordination other (see comments)   Coordination Comments Mild impairment when completing L heel-shin, inaccurate placement at heel. No reports of vision changes and / or perception changes   Muscle Tone   Muscle Tone other (see comments)   Muscle Tone Comments No deficits noted in BLE, but increased tone in L hand   Clinical Impression   Criteria for Skilled Therapeutic Intervention yes, treatment indicated   PT Diagnosis (PT) PT: Pt presents w/decreased functional mobility, L hemiparesis LE>UE, impaired LLE coordination, and decreased activity tolerance 2/2 R CVA.   Influenced by the following impairments LLE weakness, impaired coordination, impaired balance, and pain   Functional limitations due to impairments Decreased participation in preferred activities, decreased functional mobility within home   Clinical Presentation Unstable/Unpredictable   Clinical Presentation Rationale PT: Pt presents w/PMH of stroke and baseline B ankle instability w/current R ankle pain, and stairs to enter home   Clinical Decision Making (Complexity) high complexity   Therapy Frequency (PT) Daily   Predicted Duration of Therapy Intervention (days/wks) 7 days   Planned Therapy Interventions (PT) balance training;bed mobility training;E-stim;gait training;home exercise program;manual therapy techniques;motor coordination training;neuromuscular re-education;postural re-education;ROM (range of motion);patient/family education;stair training;strengthening;stretching;transfer training   Anticipated Equipment Needs at Discharge (PT)   (Pt has equipment needed)   Risk & Benefits of therapy have been explained evaluation/treatment results reviewed;care plan/treatment goals reviewed;risks/benefits reviewed;current/potential barriers reviewed;participants voiced  agreement with care plan;participants included;patient   Clinical Impression Comments PT: Pt presents w/ LLE weakness and impaired coordination, impaired balance, and pain limiting pt functional mobility. Pt will benefit from skilled inpatient rehab stay for improved LLE coordination in stance and swing phase in gait and for neuro re-ed of LLE during all functional mobility. Pt currently requiring CGA/SBA for ambulation w/FWW. Pt motivated to participate in all therapy activities.   Total Evaluation Time   Total Evaluation Time (Minutes) 25

## 2020-10-30 ENCOUNTER — APPOINTMENT (OUTPATIENT)
Dept: PHYSICAL THERAPY | Facility: CLINIC | Age: 80
End: 2020-10-30
Payer: COMMERCIAL

## 2020-10-30 ENCOUNTER — APPOINTMENT (OUTPATIENT)
Dept: OCCUPATIONAL THERAPY | Facility: CLINIC | Age: 80
End: 2020-10-30
Payer: COMMERCIAL

## 2020-10-30 PROCEDURE — 99233 SBSQ HOSP IP/OBS HIGH 50: CPT | Performed by: PHYSICAL MEDICINE & REHABILITATION

## 2020-10-30 PROCEDURE — 250N000013 HC RX MED GY IP 250 OP 250 PS 637: Performed by: PHYSICIAN ASSISTANT

## 2020-10-30 PROCEDURE — 97110 THERAPEUTIC EXERCISES: CPT | Mod: GP

## 2020-10-30 PROCEDURE — 128N000003 HC R&B REHAB

## 2020-10-30 PROCEDURE — 97112 NEUROMUSCULAR REEDUCATION: CPT | Mod: GP

## 2020-10-30 PROCEDURE — 97530 THERAPEUTIC ACTIVITIES: CPT | Mod: GO

## 2020-10-30 PROCEDURE — 97535 SELF CARE MNGMENT TRAINING: CPT | Mod: GO

## 2020-10-30 RX ADMIN — SIMVASTATIN 40 MG: 20 TABLET, FILM COATED ORAL at 21:24

## 2020-10-30 RX ADMIN — ASPIRIN 81 MG: 81 TABLET ORAL at 08:02

## 2020-10-30 RX ADMIN — RIVAROXABAN 20 MG: 10 TABLET, FILM COATED ORAL at 17:12

## 2020-10-30 RX ADMIN — HYDRALAZINE HYDROCHLORIDE 25 MG: 25 TABLET ORAL at 21:24

## 2020-10-30 NOTE — PROGRESS NOTES
"  [facility]   Acute Rehabilitation Unit  Daily progress note  CC:     Stroke Ischemic 01.1 (L) Body Involvement (R) Brain Stroke; lacunar stroke in the Right internal capsule/corona radiata in setting of microvascular disease    S: In good spirits. Emotional 10/30/2020. Writes in the planner. Willing to see Psychology. Wife supportive.    ROS: Denies headache,. Nausea, sob, or urgency.    Currently the patient requires Min A for bed mobility, transfers, and gait x up to 200 feet. Pt requires Min A with any task involving the L UE due to weakness including LB dressing, LB bathing. The patient also is noted to have difficulty with safety with FWW, and possible new cognitive deficits requiring a full cognitive/linguistic assessment at [facility].         [unfilled]   Scheduled meds    aspirin  81 mg Oral Daily     hydrALAZINE  25 mg Oral BID     losartan  100 mg Oral Daily     rivaroxaban ANTICOAGULANT  20 mg Oral Daily with supper     simvastatin  40 mg Oral At Bedtime       PRN meds:  acetaminophen, - MEDICATION INSTRUCTIONS -      PHYSICAL EXAM  /57 (BP Location: Left arm)   Pulse 77   Temp 96.9  F (36.1  C) (Oral)   Resp 18   Ht 1.702 m (5' 7\")   Wt 78 kg (172 lb)   SpO2 97%   BMI 26.94 kg/m    Gen: Alert and oriented  HEENT: EOMI, face symmetric  CV: S1, S2 RRR  Pulm: Clear to auscultation  Abd: Soft, non tender  Ext: Calves non tender  Neuro/MSK: Moves all four, mild dysmetria  And weakness left side  Responds to touch.  Teary at times.    LABS  Last Comprehensive Metabolic Panel:  Sodium   Date Value Ref Range Status   10/29/2020 141 133 - 144 mmol/L Final     Potassium   Date Value Ref Range Status   10/29/2020 4.9 3.4 - 5.3 mmol/L Final     Comment:     Specimen moderately hemolyzed, Potassium may be falsely elevated     Chloride   Date Value Ref Range Status   10/29/2020 108 94 - 109 mmol/L Final     Carbon Dioxide   Date Value Ref Range Status   10/29/2020 30 " 20 - 32 mmol/L Final     Anion Gap   Date Value Ref Range Status   10/29/2020 3 3 - 14 mmol/L Final     Glucose   Date Value Ref Range Status   10/29/2020 98 70 - 99 mg/dL Final     Urea Nitrogen   Date Value Ref Range Status   10/29/2020 25 7 - 30 mg/dL Final     Creatinine   Date Value Ref Range Status   10/29/2020 0.96 0.66 - 1.25 mg/dL Final     GFR Estimate   Date Value Ref Range Status   10/29/2020 73 >60 mL/min/[1.73_m2] Final     Comment:     Non  GFR Calc  Starting 12/18/2018, serum creatinine based estimated GFR (eGFR) will be   calculated using the Chronic Kidney Disease Epidemiology Collaboration   (CKD-EPI) equation.       Calcium   Date Value Ref Range Status   10/29/2020 9.0 8.5 - 10.1 mg/dL Final        CBC RESULTS:   Recent Labs   Lab Test 10/29/20  0550   WBC 6.7   RBC 4.54   HGB 14.5   HCT 45.4      MCH 31.9   MCHC 31.9   RDW 12.7           ASSESSMENT AND PLAN    Kamari Valentin is a 80 year old right hand dominant male with Stroke Ischemic 01.1 (L) Body Involvement (R) Brain Stroke; lacunar stroke in the Right internal capsule/corona radiata in setting of microvascular disease     The patient is admitted to [facility] for rehabilitation.  Patient requires an intensive inpatient rehab program to address the following acute impairments:impaired strength, impaired activity tolerance, impaired tone, impaired balance, impaired coordination, impaired judgement and safety awareness, impaired weight shifting and dysphagia        Admission to acute inpatient rehab:    Impairment group code: Stroke Ischemic 01.1 (L) Body Involvement (R) Brain Stroke; lacunar stroke in the Right internal capsule/corona radiata in setting of microvascular disease        1. PT, OT and SLP 60 minutes of each on a daily basis, in addition to rehab nursing and close management of physiatrist.       2. Impairment of ADL's: OT for 60 minutes daily to work on ADL re-training such as grooming, self cares and  bathing.       3. Impairment of mobility: PT for 60 minutes daily to work on neuromuscular re-education focusing on strength, balance, coordination, and endurance.       4. Impairment of cognition/language/swallow:  SLP for 60 minutes daily to work on cognitive and linguistic deficits.     5. Rehab RN for med administration, bowel regimen, glucose monitoring and wound care.       6. Medical Conditions     Lacunar infarct with associated left-sided weakness.   Patient underwent elective angiogram on 10/22 for TAVR work-up and subsequently noted left lower extremity weakness that did not improve.  Resented with difficulty ambulating.  MRI showed recent ischemic stroke involving the posterolateral right thalamus and posterior limb of the right internal capsule.    CT angiogram of the head neck showed approximate 50% luminal diameter stenosis of the origin of the left internal carotid artery and mild narrowing on the right.    Recent carotid ultrasound on 10/22 showed less than 50% stenosis on the right and greater than 70% stenosis on the left internal carotid artery.    Echo 10/12/2020 with EF is 60-65%.  Severe AS, moderate mitral annular calcification. No pericardial effusion.  CK 63, troponin undetectable, TSH 1.56  LDL 63, HDL 40.  Hemoglobin A1c 6.2.  Evaluated by stroke neurology, recommend aspirin 81 mg oral daily in addition to Xarelto.  Continue PTA simvastatin therapy.  Home antihypertensive regimen as below.  PT, OT recommending ARU for rehabilitation.  Discussed with patient's wife in agreement with ARU.     Chronic atrial fibrillation on Xarelto.  Continue PTA Xarelto.  PTA not on rate control medication.  Telemetry monitoring no acute events.     Hypernatremia. Sodium 145 on admission.  Sodium levels normalized.     Coronary artery disease.  Severe aortic stenosis.  Coronary angiogram on 10/22 with CAD, LMCA : Distal 60% eccentric  modeately calcified lesion, TAVR is likely not a good first option given  complexity of CAD  CV surgery follows patient for AVR CABG and maze procedure.   Per neurology defer nonemergent surgery to at least 3 months after stroke/TIA.     Hypertension.  Continue PTA losartan [restarted 10/27].  Will restart PTA hydralazine today.  [10/28].  Restart PTA clonidine, hydrochlorothiazide gradually in the next couple of days.  Must monitor blood pressure readings and review with provider prior weight to restarting medication.     Hyperlipidemia.  LDL has been well controlled.  Continue home simvastatin.     Prediabetes hemoglobin A1c of 6.2.  Need to consider lifestyle modification with diet and exercise as able to.     7. Adjustment to disability:  Clinical psychology to eval and treat as appropriate.  8. FEN: Regular with thins, low salt, low fat mod carb controlled diet  9. Bowel: Monitor  10. Bladder: Monitor  11. DVT Prophylaxis: On Xarelto  12. GI Prophylaxis: Monitor. May need Pepcid  13. Code: No CPR, DNI  14. Disposition: Home with family  15. ELOS: 7 days  16. Rehab prognosis:  Fair  Follow up Appointments on Discharge:      Needs age-appropriate health maintenance on PCP visit,  blood sugars periodically.  Follow-up with neurology in 6 to 8 weeks or earlier if symptomatic.  Follow-up with CV surgery per schedule.     Chris Melara MD   Physical Medicine & Rehabilitation

## 2020-10-30 NOTE — PLAN OF CARE
"Discharge Planner Post-Acute Rehab PT:      Discharge Plan: Home w/ home health PT. ELOS: 7 days, pt has FWW and SEC for discharge home     Precautions: Falls     Current Status:  Transfer: CGA-SBA w/FWW for STS and SPT. Intermittent VCs for sequencing and safety.     Gait: Amb 200' w/FWW, CGA. Pt presents w/decreased coordination of LLE during stance and swing phase. Demoing intermittent foot drag w/swing phase and L trendelenberg in stance phase requiring facilitation and VCs for improved sequencing.     Stairs: 6\" step x 12 w/CGA, reciprocal ascent, non-reciprocal descent. Leading w/RLE for improved pain control of R ankle (painful ROM)     Balance: SBA for static and dynamic seated balance w/o UE support. STS, SBA w/FWW requiring BUE support. Static standing balance, SBA w/ and w/o FWW. Requiring BUE support for dynamic balance activities     Assessment: Making fast improvements in LLE coordination at neuro-ismael. Tolerates sessions well, but needs increased focus on L attention and activation during both swing and stance phase.      Other Barriers to Discharge (DME, Family Training, etc): Family training, stairs, L hemiparesis.  "

## 2020-10-30 NOTE — PLAN OF CARE
Discharge Planner Post-Acute Rehab OT:      Discharge Plan: Home with HC OT services. Estimated LOS: 7 days.      Precautions: Falls, Left sided weakness      Current Status:  ADLs: Pt requires SBA w/shower bench following set up for shower with hand held shower head, CGA when standing to rinse ariadna area. Pt requires set up and SBA for FB dressing while seated, Min A for pants when standing with rail to pull over hips this date. Pt able to don gripper socks while seated with following set up. Completes g/h tasks standing at sink CGA. Pt requires Min A with FWW and grab bar for toilet transfer with cues for safe approach. Pt requires CGA with FWW with verbal cueing for safety of LLE for functional mobility.   IADLs: To assess at a later date.  Vision/Cognition: Pt wears trifocal glasses and is nearsighted. Further assess cognition.      Assessment: Pt motivated and engaged with tasks, demonstrated good follow through with dressing techniques this tx. Pt presents with L sided weakness, decreased LUE FMC skills, and decreased functional standing tolerance. Goal is for pt become MOD I in all ADL/IADLs prior to discharge.      Other Barriers to Discharge (DME, Family Training, etc): Will likely need FWW and extended tub bench upon discharge. Pt has raised toilet at home, but may benefit from grab bars.

## 2020-10-30 NOTE — PLAN OF CARE
Pt alert and oriented x4. Slept well through the night. No complaints of shortness of breath or chest pain. Denies pain. Up with CGA, walker and gait belt up to the bathroom. Regular diet, thin liquids. Takes pills whole. Continent of bowel and bladder. LBM: 10/27. Skin intact. Able to make needs known. Will continue with plan of care. Bed alarm on for pt safety over night.

## 2020-10-30 NOTE — PLAN OF CARE
FOCUS/GOAL  Bowel management, Bladder management, and Nutrition/Feeding/Swallowing precautions    ASSESSMENT, INTERVENTIONS AND CONTINUING PLAN FOR GOAL:  Alert and oriented x4, cont of B/B, LBM this AM. Pt has good appetite and oral hydration. Hydralazine held this AM, see chart for BP. No other concerns, pt able to make needs known. Continue POC.

## 2020-10-30 NOTE — PLAN OF CARE
FOCUS/GOAL  Safety management    ASSESSMENT, INTERVENTIONS AND CONTINUING PLAN FOR GOAL:    Pt is alert and oriented. Remains CGA with walker and gait belt. No complaints of pain. Continent of bladder. No BM this shift. Continue with plan of care.

## 2020-10-31 ENCOUNTER — APPOINTMENT (OUTPATIENT)
Dept: PHYSICAL THERAPY | Facility: CLINIC | Age: 80
End: 2020-10-31
Payer: COMMERCIAL

## 2020-10-31 ENCOUNTER — APPOINTMENT (OUTPATIENT)
Dept: OCCUPATIONAL THERAPY | Facility: CLINIC | Age: 80
End: 2020-10-31
Payer: COMMERCIAL

## 2020-10-31 PROCEDURE — 97530 THERAPEUTIC ACTIVITIES: CPT | Mod: GP

## 2020-10-31 PROCEDURE — 97112 NEUROMUSCULAR REEDUCATION: CPT | Mod: GP

## 2020-10-31 PROCEDURE — 97530 THERAPEUTIC ACTIVITIES: CPT | Mod: GO | Performed by: OCCUPATIONAL THERAPIST

## 2020-10-31 PROCEDURE — 97116 GAIT TRAINING THERAPY: CPT | Mod: GP

## 2020-10-31 PROCEDURE — 250N000013 HC RX MED GY IP 250 OP 250 PS 637: Performed by: PHYSICIAN ASSISTANT

## 2020-10-31 PROCEDURE — 128N000003 HC R&B REHAB

## 2020-10-31 PROCEDURE — 97530 THERAPEUTIC ACTIVITIES: CPT | Mod: GO

## 2020-10-31 PROCEDURE — 97535 SELF CARE MNGMENT TRAINING: CPT | Mod: GO | Performed by: OCCUPATIONAL THERAPIST

## 2020-10-31 PROCEDURE — 97110 THERAPEUTIC EXERCISES: CPT | Mod: GP

## 2020-10-31 RX ADMIN — LOSARTAN POTASSIUM 100 MG: 100 TABLET, FILM COATED ORAL at 08:31

## 2020-10-31 RX ADMIN — SIMVASTATIN 40 MG: 20 TABLET, FILM COATED ORAL at 21:21

## 2020-10-31 RX ADMIN — RIVAROXABAN 20 MG: 10 TABLET, FILM COATED ORAL at 16:53

## 2020-10-31 RX ADMIN — HYDRALAZINE HYDROCHLORIDE 25 MG: 25 TABLET ORAL at 08:32

## 2020-10-31 RX ADMIN — ASPIRIN 81 MG: 81 TABLET ORAL at 08:31

## 2020-10-31 RX ADMIN — HYDRALAZINE HYDROCHLORIDE 25 MG: 25 TABLET ORAL at 21:21

## 2020-10-31 NOTE — PLAN OF CARE
FOCUS/GOAL  Medical management    ASSESSMENT, INTERVENTIONS AND CONTINUING PLAN FOR GOAL:  Patient slept well. He called for assistance to the bathroom twice,  transferred and ambulated with a walker and CGA, he needed to be reminded proper us of his walker. No c/o pain.

## 2020-10-31 NOTE — PLAN OF CARE
Discharge Planner Post-Acute Rehab OT:      Discharge Plan: Home with HC OT services. Estimated LOS: 7 days.      Precautions: Falls, Left sided weakness      Current Status:  ADLs: Pt requires set up and SBA for FB dressing while seated. Pt able to don gripper socks while seated with following set up. Completes g/h tasks standing at sink SBA. Pt requires Min A with FWW and grab bar for toilet transfer. Pt requires CGA with FWW with verbal cueing for safety of LLE for functional mobility.   IADLs: To assess at a later date.  Vision/Cognition: Pt wears trifocal glasses and is nearsighted. Further assess cognition.      Assessment: Pt motivated and engaged with tasks, demonstrated good follow through with dressing techniques this tx. Pt presents with L sided weakness, decreased LUE FMC skills, and decreased functional standing tolerance. Goal is for pt become MOD I in all ADL/IADLs prior to discharge.      Other Barriers to Discharge (DME, Family Training, etc): Will likely need FWW and extended tub bench upon discharge. Pt has raised toilet at home, but may benefit from grab bars.

## 2020-10-31 NOTE — PROGRESS NOTES
Pt alert/oriented x4. Ambulates with CGA and walker, gaitbelt. Continent of bowel/bladder,LBM 10/30. VSS. Able to make needs known. Denies pain, SOB, dizziness. Bed alarms on for safety. Continue POC.

## 2020-10-31 NOTE — PLAN OF CARE
Pt is alert and oriented x 4, he denies pain or discomfort. He needed a stand by assist with a walker for transfer and ambulation to short distances. We will continue to monitor for safety while assisting with activity of daily livings.

## 2020-10-31 NOTE — PROGRESS NOTES
Individualized Overall Plan Of Care (IOPOC)      Rehab diagnosis/Impairment Group Code: Stroke ischemic 01.1 (l) body involvement (r) brain stroke; lacunar stroke in the right internal capsule/corona radiata in setting of microvascular disease  Stroke (cerebrum) (h)     Expected functional outcome: goal is to achieve a level of mod I     Clinical Impression Comments: PT: Pt presents w/ LLE weakness and impaired coordination, impaired balance, and pain limiting pt functional mobility. Pt will benefit from skilled inpatient rehab stay for improved LLE coordination in stance and swing phase in gait and for neuro re-ed of LLE during all functional mobility. Pt currently requiring CGA/SBA for ambulation w/FWW. Pt motivated to participate in all therapy activities.  Mobility:    ADL: goal is to achieve a level of mod I     Communication/Cognition/Swallow:   Monitor closely for aspiration    Intensity of therapy:   PT 90 minutes 7 days per week 7 days  OT 90 minutes 7 days per week 7 days        Education stroke and anticoagulation  Neuropsychology Testing: No        Medical Prognosis: good       Physician summary statement:goal for patient is to achieve a level of mod I     Discharge destination: prior home  Discharge rehabilitation needs: home care, PT and OT      Estimated length of stay: 7      Rehabilitation Physician Alexandru Wellington DO

## 2020-10-31 NOTE — PLAN OF CARE
"Discharge Planner Post-Acute Rehab PT:      Discharge Plan: Home w/ home health PT. ELOS: 7 days, pt has FWW and SEC for discharge home     Precautions: Falls     Current Status:  Transfer: CGA-SBA w/FWW for STS and SPT. Intermittent VCs for sequencing and safety.     Gait: Amb 200' w/FWW, CGA. Pt presents w/decreased coordination of LLE during stance and swing phase. Demoing intermittent foot drag w/swing phase and L trendelenberg in stance phase requiring facilitation and VCs for improved sequencing.     Stairs: Not addressed on this date but from previous session: 6\" step x 12 w/CGA, reciprocal ascent, non-reciprocal descent. Leading w/RLE for improved pain control of R ankle (painful ROM)     Balance: SBA for static and dynamic seated balance w/o UE support. STS, SBA w/FWW requiring BUE support. Static standing balance, SBA w/ and w/o FWW. Requiring BUE support for dynamic balance activities     Assessment:Continues to make improvements and is benfitting from intensity of PT.  Has good activity tolerance.  Less cueing today for transfers and ambulation.  Patient is able to self correct.  Denied ankle pain today      Other Barriers to Discharge (DME, Family Training, etc): Family training, stairs, L hemiparesis.  "

## 2020-11-01 ENCOUNTER — APPOINTMENT (OUTPATIENT)
Dept: PHYSICAL THERAPY | Facility: CLINIC | Age: 80
End: 2020-11-01
Payer: COMMERCIAL

## 2020-11-01 ENCOUNTER — APPOINTMENT (OUTPATIENT)
Dept: OCCUPATIONAL THERAPY | Facility: CLINIC | Age: 80
End: 2020-11-01
Payer: COMMERCIAL

## 2020-11-01 LAB — GLUCOSE BLDC GLUCOMTR-MCNC: 80 MG/DL (ref 70–99)

## 2020-11-01 PROCEDURE — 97112 NEUROMUSCULAR REEDUCATION: CPT | Mod: GP

## 2020-11-01 PROCEDURE — 97530 THERAPEUTIC ACTIVITIES: CPT | Mod: GO | Performed by: OCCUPATIONAL THERAPIST

## 2020-11-01 PROCEDURE — 97110 THERAPEUTIC EXERCISES: CPT | Mod: GP

## 2020-11-01 PROCEDURE — 97110 THERAPEUTIC EXERCISES: CPT | Mod: GO | Performed by: OCCUPATIONAL THERAPIST

## 2020-11-01 PROCEDURE — 97535 SELF CARE MNGMENT TRAINING: CPT | Mod: GO | Performed by: OCCUPATIONAL THERAPIST

## 2020-11-01 PROCEDURE — 250N000013 HC RX MED GY IP 250 OP 250 PS 637: Performed by: PHYSICIAN ASSISTANT

## 2020-11-01 PROCEDURE — 999N001017 HC STATISTIC GLUCOSE BY METER IP

## 2020-11-01 PROCEDURE — 128N000003 HC R&B REHAB

## 2020-11-01 PROCEDURE — 97116 GAIT TRAINING THERAPY: CPT | Mod: GP

## 2020-11-01 PROCEDURE — 97750 PHYSICAL PERFORMANCE TEST: CPT | Mod: GP

## 2020-11-01 PROCEDURE — 99232 SBSQ HOSP IP/OBS MODERATE 35: CPT | Performed by: PHYSICAL MEDICINE & REHABILITATION

## 2020-11-01 RX ADMIN — ASPIRIN 81 MG: 81 TABLET ORAL at 07:28

## 2020-11-01 RX ADMIN — RIVAROXABAN 20 MG: 10 TABLET, FILM COATED ORAL at 17:07

## 2020-11-01 RX ADMIN — SIMVASTATIN 40 MG: 20 TABLET, FILM COATED ORAL at 21:07

## 2020-11-01 RX ADMIN — LOSARTAN POTASSIUM 100 MG: 100 TABLET, FILM COATED ORAL at 07:28

## 2020-11-01 RX ADMIN — HYDRALAZINE HYDROCHLORIDE 25 MG: 25 TABLET ORAL at 07:28

## 2020-11-01 NOTE — PROGRESS NOTES
"  [facility]   Acute Rehabilitation Unit  Daily progress note  CC:     Stroke Ischemic 01.1 (L) Body Involvement (R) Brain Stroke; lacunar stroke in the Right internal capsule/corona radiata in setting of microvascular disease    Interval History:  Patient seen and examined this morning.  No acute events reported overnight.  Denies chest pain, shortness of breath, no fever or chills.  Doing well with therapy, concerned about getting into 5 steps at home.  Voiding well and good BM.        Functional  PT:  Ferguson Balance Scale (BBS) Cutoff Scores: A score of < 45/56 indicates an increased risk for falls.   Patient Score: 33/56         ROS: 10 point ROS neg other than the symptoms noted above in the HPI.             [unfilled]   Scheduled meds    aspirin  81 mg Oral Daily     hydrALAZINE  25 mg Oral BID     losartan  100 mg Oral Daily     rivaroxaban ANTICOAGULANT  20 mg Oral Daily with supper     simvastatin  40 mg Oral At Bedtime       PRN meds:  acetaminophen, - MEDICATION INSTRUCTIONS -      PHYSICAL EXAM  /64 (BP Location: Right arm)   Pulse 76   Temp 96.9  F (36.1  C) (Oral)   Resp 20   Ht 1.702 m (5' 7\")   Wt 78 kg (172 lb)   SpO2 96%   BMI 26.94 kg/m    Gen: NAD, sitting in chair  HEENT: EOMI, face symmetric  CV: S1, S2 RRR  Pulm: Clear to auscultation, nonlbaored  Abd: Soft, non tender  Ext: Calves non tender  Neuro/MSK: Moves all four, mild dysmetria  And weakness left side  Responds to touch.      LABS  Last Comprehensive Metabolic Panel:  Sodium   Date Value Ref Range Status   10/29/2020 141 133 - 144 mmol/L Final     Potassium   Date Value Ref Range Status   10/29/2020 4.9 3.4 - 5.3 mmol/L Final     Comment:     Specimen moderately hemolyzed, Potassium may be falsely elevated     Chloride   Date Value Ref Range Status   10/29/2020 108 94 - 109 mmol/L Final     Carbon Dioxide   Date Value Ref Range Status   10/29/2020 30 20 - 32 mmol/L Final     Anion Gap "   Date Value Ref Range Status   10/29/2020 3 3 - 14 mmol/L Final     Glucose   Date Value Ref Range Status   10/29/2020 98 70 - 99 mg/dL Final     Urea Nitrogen   Date Value Ref Range Status   10/29/2020 25 7 - 30 mg/dL Final     Creatinine   Date Value Ref Range Status   10/29/2020 0.96 0.66 - 1.25 mg/dL Final     GFR Estimate   Date Value Ref Range Status   10/29/2020 73 >60 mL/min/[1.73_m2] Final     Comment:     Non  GFR Calc  Starting 12/18/2018, serum creatinine based estimated GFR (eGFR) will be   calculated using the Chronic Kidney Disease Epidemiology Collaboration   (CKD-EPI) equation.       Calcium   Date Value Ref Range Status   10/29/2020 9.0 8.5 - 10.1 mg/dL Final        CBC RESULTS:   Recent Labs   Lab Test 10/29/20  0550   WBC 6.7   RBC 4.54   HGB 14.5   HCT 45.4      MCH 31.9   MCHC 31.9   RDW 12.7           ASSESSMENT AND PLAN    Kamari Valentin is a 80 year old right hand dominant male with Stroke Ischemic 01.1 (L) Body Involvement (R) Brain Stroke; lacunar stroke in the Right internal capsule/corona radiata in setting of microvascular disease     The patient is admitted to [facility] for rehabilitation.  Patient requires an intensive inpatient rehab program to address the following acute impairments:impaired strength, impaired activity tolerance, impaired tone, impaired balance, impaired coordination, impaired judgement and safety awareness, impaired weight shifting and dysphagia        Admission to acute inpatient rehab:    Impairment group code: Stroke Ischemic 01.1 (L) Body Involvement (R) Brain Stroke; lacunar stroke in the Right internal capsule/corona radiata in setting of microvascular disease        1. PT, OT and SLP 60 minutes of each on a daily basis, in addition to rehab nursing and close management of physiatrist.       2. Impairment of ADL's: OT for 60 minutes daily to work on ADL re-training such as grooming, self cares and bathing.       3. Impairment of  mobility: PT for 60 minutes daily to work on neuromuscular re-education focusing on strength, balance, coordination, and endurance.       4. Impairment of cognition/language/swallow:  SLP for 60 minutes daily to work on cognitive and linguistic deficits.     5. Rehab RN for med administration, bowel regimen, glucose monitoring and wound care.       6. Medical Conditions     Lacunar infarct with associated left-sided weakness.   Patient underwent elective angiogram on 10/22 for TAVR work-up and subsequently noted left lower extremity weakness that did not improve.  Resented with difficulty ambulating.  MRI showed recent ischemic stroke involving the posterolateral right thalamus and posterior limb of the right internal capsule.    CT angiogram of the head neck showed approximate 50% luminal diameter stenosis of the origin of the left internal carotid artery and mild narrowing on the right.    Recent carotid ultrasound on 10/22 showed less than 50% stenosis on the right and greater than 70% stenosis on the left internal carotid artery.    Echo 10/12/2020 with EF is 60-65%.  Severe AS, moderate mitral annular calcification. No pericardial effusion.  CK 63, troponin undetectable, TSH 1.56  LDL 63, HDL 40.  Hemoglobin A1c 6.2.  Evaluated by stroke neurology, recommend aspirin 81 mg oral daily in addition to Xarelto.  Continue PTA simvastatin therapy.  Home antihypertensive regimen as below.  PT, OT recommending ARU for rehabilitation.  Discussed with patient's wife in agreement with ARU.     Chronic atrial fibrillation on Xarelto.  Continue PTA Xarelto.  PTA not on rate control medication.  Telemetry monitoring no acute events.     Hypernatremia. Sodium 145 on admission.  Sodium levels normalized.     Coronary artery disease.  Severe aortic stenosis.  Coronary angiogram on 10/22 with CAD, LMCA : Distal 60% eccentric  modeately calcified lesion, TAVR is likely not a good first option given complexity of CAD  CV surgery  follows patient for AVR CABG and maze procedure.   Per neurology defer nonemergent surgery to at least 3 months after stroke/TIA.     Hypertension.  Continue PTA losartan [restarted 10/27].  Will restart PTA hydralazine today.  [10/28].  Restart PTA clonidine, hydrochlorothiazide gradually in the next couple of days.  Must monitor blood pressure readings and review with provider prior weight to restarting medication.     Hyperlipidemia.  LDL has been well controlled.  Continue home simvastatin.     Prediabetes hemoglobin A1c of 6.2.  Need to consider lifestyle modification with diet and exercise as able to.     7. Adjustment to disability:  Clinical psychology to eval and treat as appropriate.  8. FEN: Regular with thins, low salt, low fat mod carb controlled diet  9. Bowel: Monitor  10. Bladder: Monitor  11. DVT Prophylaxis: On Xarelto  12. GI Prophylaxis: Monitor. May need Pepcid  13. Code: No CPR, DNI  14. Disposition: Home with family  15. ELOS: 7 days  16. Rehab prognosis:  Fair  Follow up Appointments on Discharge:      Needs age-appropriate health maintenance on PCP visit,  blood sugars periodically.  Follow-up with neurology in 6 to 8 weeks or earlier if symptomatic.  Follow-up with CV surgery per schedule.     Alexandru Wellington, DO  Physical Medicine & Rehabilitation          I spent a total of 25 minutes face to face and coordinating care of Kamari Valentin. Over 50% of my time on the unit was spent counseling the patient and /or coordinating care regarding recent CVA.

## 2020-11-01 NOTE — PROGRESS NOTES
11/01/20 0936   Signing Clinician's Name / Credentials   Signing clinician's name / credentials Fabien Adame DPT   Ferguson Balance Scale (JIMBO ASCENCIO, JUMA S, FRANTZ LUNDBERG, FUAD GOVEA: MEASURING BALANCE IN THE ELDERLY: VALIDATION OF AN INSTRUMENT. CAN. J. PUB. HEALTH, JULY/AUGUST SUPPLEMENT 2:S7-11, 1992.)   Sit To Stand 3   Standing Unsupported 4   Sitting Unsupported 4   Stand to Sit 2   Transfers 2   Standing with Eyes Closed 3   Standing Unsupported, Feet Together 4   Reach Forward With Outstretched Arm 4   Retrieve Object From Floor 0   Turning to Look Behind 3   Turn 360 Degrees 1   Placing Alternate Foot on Stool (4-6 inches) 1   Unsupported Tandem Stand (Demonstrate to Subject) 1   One Leg Stand 1   Total Score (A score of 45 or less has been correlated with an increased risk of falls)   Total Score (out of 56) 33       Ferguson Balance Scale (BBS) Cutoff Scores: A score of < 45/56 indicates an increased risk for falls.   Patient Score: 33/56    The BBS is a measure of static and dynamic standing balance that has been validated in community dwelling elderly individuals and individuals who have Parkinson's Disease, MS, and those who are s/p CVA and TBI. The test is administered without an assistive device. Scores from the Ferguson are used to determine the probability of falling based on the patient's previous history of falls and their test performance.     Minimal Detectable Change = 6.5   & Minimal Detectable Change (Parkinson's Disease) = 5 according to Julio Cesar & John Pauley 2008    Assessment (rationale for performing, application to patient s function & care plan): Falls risk assessment, discharge planning and AD recommendation    Minutes billed as physical performance test: 18

## 2020-11-01 NOTE — PLAN OF CARE
"Discharge Planner Post-Acute Rehab PT:      Discharge Plan: Home w/ home health PT. pt has FWW and SEC for discharge home     Precautions: Falls     Current Status:  Transfer: SBA with FWW, some hand placement inconsistency     Gait: SBA with FWW, decreased L foot clearance that is variable with fatigue, but awareness progressing.     Stairs: SBA in // bars for 4\" step-up with BLE leading this date     Balance: 33/45 on Ferguson 11/1     Assessment: Making good improvement. Some plantar pain on B near met heads, pt believes from NuStep without shoes on. No bruising. Hopeful progression to mod-I bed<>chair     Other Barriers to Discharge (DME, Family Training, etc): Family training, stairs, L hemiparesis.  "

## 2020-11-01 NOTE — PLAN OF CARE
Pt is alert and oriented x 4, he denies pain or discomfort. He needed a stand by assist with a walker for transfer and ambulation to short distances. He drags his left foot when he walks. We will continue to monitor for safety while assisting with activity of daily livings.

## 2020-11-01 NOTE — PLAN OF CARE
FOCUS/GOAL  Medical management    ASSESSMENT, INTERVENTIONS AND CONTINUING PLAN FOR GOAL:  Patient slept well, no c/o pain. He called for assistance to the bathroom, he needed minimal assistance for transfers, ambulation with a walker and toileting.

## 2020-11-01 NOTE — PLAN OF CARE
Discharge Plan: Home with HC OT services. Estimated LOS: 7 days.      Precautions: Falls, Left sided weakness      Current Status:  ADLs: Pt requires set up and SBA for FB dressing while seated. Pt able to don gripper socks while seated with following set up. Completes g/h tasks standing at sink SBA with FWW. Pt requires Min A with FWW and grab bar for toilet transfer. Pt requires CGA with FWW with verbal cueing for safety of LLE for functional mobility.   IADLs: To assess at a later date.  Vision/Cognition: Pt wears trifocal glasses and is nearsighted. Further assess cognition.      Assessment: Pt motivated and engaged with tasks through session. Pt demonstrating carryover with dressing techniques in session yest and today. Pt completing object retrieval and grocery shopping task, reaching through all planes and to the floor with no LOB. Pt presents with L sided weakness, decreased LUE FMC skills, and decreased functional standing tolerance. Goal is for pt become MOD I in all ADL/IADLs prior to discharge.      Other Barriers to Discharge (DME, Family Training, etc): Will likely need FWW and extended tub bench upon discharge. Pt has raised toilet at home, but may benefit from grab bars.

## 2020-11-01 NOTE — PLAN OF CARE
A&O x4, able to make needs known. Denies pain. Continent of bladder using the bathroom. Up w/ SBA and walker. Using call light appropriately. Continue w/ plan of care.

## 2020-11-02 ENCOUNTER — APPOINTMENT (OUTPATIENT)
Dept: OCCUPATIONAL THERAPY | Facility: CLINIC | Age: 80
End: 2020-11-02
Payer: COMMERCIAL

## 2020-11-02 ENCOUNTER — APPOINTMENT (OUTPATIENT)
Dept: EDUCATION SERVICES | Facility: CLINIC | Age: 80
End: 2020-11-02
Attending: PHYSICAL MEDICINE & REHABILITATION
Payer: COMMERCIAL

## 2020-11-02 ENCOUNTER — APPOINTMENT (OUTPATIENT)
Dept: PHYSICAL THERAPY | Facility: CLINIC | Age: 80
End: 2020-11-02
Payer: COMMERCIAL

## 2020-11-02 PROCEDURE — 97110 THERAPEUTIC EXERCISES: CPT | Mod: GP

## 2020-11-02 PROCEDURE — 97530 THERAPEUTIC ACTIVITIES: CPT | Mod: GO | Performed by: OCCUPATIONAL THERAPIST

## 2020-11-02 PROCEDURE — 250N000013 HC RX MED GY IP 250 OP 250 PS 637: Performed by: PHYSICIAN ASSISTANT

## 2020-11-02 PROCEDURE — 97530 THERAPEUTIC ACTIVITIES: CPT | Mod: GP

## 2020-11-02 PROCEDURE — 128N000003 HC R&B REHAB

## 2020-11-02 PROCEDURE — 97116 GAIT TRAINING THERAPY: CPT | Mod: GP

## 2020-11-02 PROCEDURE — 97535 SELF CARE MNGMENT TRAINING: CPT | Mod: GO | Performed by: OCCUPATIONAL THERAPIST

## 2020-11-02 PROCEDURE — 90791 PSYCH DIAGNOSTIC EVALUATION: CPT | Mod: 95 | Performed by: PSYCHOLOGIST

## 2020-11-02 PROCEDURE — 99233 SBSQ HOSP IP/OBS HIGH 50: CPT | Performed by: PHYSICAL MEDICINE & REHABILITATION

## 2020-11-02 RX ORDER — LANOLIN ALCOHOL/MO/W.PET/CERES
3 CREAM (GRAM) TOPICAL
Status: DISCONTINUED | OUTPATIENT
Start: 2020-11-02 | End: 2020-11-09 | Stop reason: HOSPADM

## 2020-11-02 RX ADMIN — LOSARTAN POTASSIUM 100 MG: 100 TABLET, FILM COATED ORAL at 10:32

## 2020-11-02 RX ADMIN — RIVAROXABAN 20 MG: 10 TABLET, FILM COATED ORAL at 17:03

## 2020-11-02 RX ADMIN — SIMVASTATIN 40 MG: 20 TABLET, FILM COATED ORAL at 21:01

## 2020-11-02 RX ADMIN — ASPIRIN 81 MG: 81 TABLET ORAL at 08:12

## 2020-11-02 ASSESSMENT — MIFFLIN-ST. JEOR: SCORE: 1417.5

## 2020-11-02 NOTE — PROGRESS NOTES
Individualized Overall Plan Of Care (IOPOC)      Rehab diagnosis/Impairment Group Code: Stroke ischemic 01.1 (l) body involvement (r) brain stroke; lacunar stroke in the right internal capsule/corona radiata in setting of microvascular disease  Stroke (cerebrum) (h)     Expected functional outcome: Anticipate with intensive therapies, close medical management, and rehabilitative nursing care the patient will improve strength, balance, tolerance to activity, safety  Expected Length of time to achieve: 6 days    Clinical Impression Comments: PT: Pt presents w/ LLE weakness and impaired coordination, impaired balance, and pain limiting pt functional mobility. Pt will benefit from skilled inpatient rehab stay for improved LLE coordination in stance and swing phase in gait and for neuro re-ed of LLE during all functional mobility. Pt currently requiring CGA/SBA for ambulation w/FWW. Pt motivated to participate in all therapy activities.  Mobility:    ADL:  ADLs: Pt requires SBA w/shower bench following set up for shower with hand held shower head, CGA when standing to rinse ariadna area. Pt requires set up and SBA for FB dressing while seated, Min A for pants when standing with rail to pull over hips this date. Pt able to don gripper socks while seated with following set up. Completes g/h tasks standing at sink CGA. Pt requires Min A with FWW and grab bar for toilet transfer with cues for safe approach. Pt requires CGA with FWW with verbal cueing for safety of LLE for functional mobility.   IADLs: To assess at a later date.  Vision/Cognition: Pt wears trifocal glasses and is nearsighted. Further assess cognition.      Assessment: Pt motivated and engaged with tasks, demonstrated good follow through with dressing techniques this tx. Pt presents with L sided weakness, decreased LUE FMC skills, and decreased functional standing tolerance. Goal is for pt become MOD I in all ADL/IADLs prior to discharge.      Other Barriers to  Discharge (DME, Family Training, etc): Will likely need FWW and extended tub bench upon discharge. Pt has raised toilet at home, but may benefit from grab bars.     Communication/Cognition/Swallow:        Intensity of therapy:   PT 60 minutes 5 days per week 7 days  OT 60 minutes 5 days per week 7 days  SLP 60 minutes 5 days per week 7 days    Orthotics None  Education manage vitals, medication education, tone management, positioning, carryover of new rehab techniques, care coordination, assess neurologic status, stroke education and provide safe environment for patient at falls risk.  Neuropsychology Testing: No  Other:  TBD      Medical Prognosis: Fair    Physician summary statement: Patient requires intensive active and ongoing therapeutic intervention and multiple therapies; Patient requires medical supervision; Expected to actively participate in the intensive rehab program; Sufficiently stable to actively participate; Expectation for measurable improvement in functional capacity or adaption to impairments.    Discharge destination: prior home and family  Discharge rehabilitation needs: outpatient      Estimated length of stay: 7 days      Rehabilitation Physician Chris Melara MD

## 2020-11-02 NOTE — PLAN OF CARE
Discharge Plan: Home with HC OT services. Estimated LOS: 7 days.      Precautions: Falls, Left sided weakness      Current Status:  ADLs:CGA with FWW and grab bar for toilet transfer. Pt requires CGA with FWW with verbal cueing for safety of LLE for functional mobility.Meal prep completed with patient able to transport safely and reaching and bending safely with support of counter.Appeard safe with stove top use , shutting off burner when completed.   IADLs: To assess at a later date.  Vision/Cognition: Pt wears trifocal glasses and is nearsighted. Further assess cognition.      Assessment: Pt motivated and engaged with tasks through session. Pt demonstrating carryover with dressing techniques in session yest and today. Pt completing object retrieval and grocery shopping task, reaching through all planes and to the floor with no LOB. Pt presents with L sided weakness, decreased LUE FMC skills, and decreased functional standing tolerance. Goal is for pt become MOD I in all ADL/IADLs prior to discharge.      Other Barriers to Discharge (DME, Family Training, etc): Will likely need FWW and extended tub bench upon discharge. Pt has raised toilet at home, but may benefit from grab bars.      Cosigned by: Femi Bates, OT at 11/1/2020  3:48 PM   Revision History

## 2020-11-02 NOTE — PLAN OF CARE
FOCUS/GOAL  Mobility    ASSESSMENT, INTERVENTIONS AND CONTINUING PLAN FOR GOAL:  Ao1 with walker/gaitbelt for transfers. Continent of bladder using toilet in bathroom. New order noted for PRN melatonin, after report of pt not sleeping well on previous shift. BP prior to morning meds was 104/61; BP meds held at that time and BP on later recheck was 127/70. Dr Melara notified prior to administering BP meds, and gave instructions to hold morning dose of hydralazine and give losartan as ordered. Pt reported mild pain to bottom of left foot r/t using nustep in therapy; declined intervention. Will continue with POC.

## 2020-11-02 NOTE — PLAN OF CARE
"Discharge Planner Post-Acute Rehab PT:      Discharge Plan: Home w/ home health PT. pt has FWW and SEC for discharge home. Expected 11/09.       Precautions: Falls     Current Status:  Transfer: SBA with FWW     Gait: SBA with FWW, decreased L toe drag with addition of L toe up orthosis      Stairs: L 6\" step-up with B rail and CGA      Balance: 33/45 on Ferguson 11/1     Assessment: Pt demonstrated improved chair approach and sit to stand with massed practice but continues to demonstrate some inconsistency in foot placement and improved L hip strength and decreased L hip drop with ambulation.     Other Barriers to Discharge (DME, Family Training, etc): Family training, stairs, L hemiparesis.  "

## 2020-11-02 NOTE — CONSULTS
"  Health Psychology                  Clinic    Department of Medicine  Bonnie Dent, Ph.D., L.P. [phone]                          [facility]  [facility] Alan Martinez, Ph.D.,  L.P. [phone]                 3rd Floor  [place] Mail [address]   Quynh Saleem, Ph.D., L.P. [phone]    [address] Praveena aPrdo, Ph.D., L.P. [phone]            [place] [address]          Martell Tompkins, Ph.D., A.B.P.P., L.P. [phone]      Jeanine Moreno, Ph.D., L.P. [phone]      Inpatient Health Psychology Consultation*    DOS: 11/2/2020    Met with Mr Valentin via telephone to complete requested psychology consultation.  Recent CVA secondary to angiogram in context of past CVA.  He has no psychiatric history, and it was noted even in 2011 at the time of his first stroke and [facility] admission that he was emotionally labile and reported that he has \"always been an emotional person.\"  Reports similar information today. However, the level of emotional reactivity/lability observed in conversation today suggests that this baseline tendency is likely significantly elevated following these strokes. It does not resemble psydobulbar affect as the emotional reactions are not contradictory to the context in which they appear.  He denies cardinal symptoms of depression, observes positive progress and expressing good motivation. Some report of overactive thinking when he awakens at night but does not focus on fears or anxieties.  Full dictation to follow.    Bonnie Dent, PhD, LP        *In accordance with the Rules of the [place] Board of [employer], it is noted that psychological descriptions and scientific procedures underlying psychological evaluations have limitations.  Absolute predictions cannot be made based on information in this report.    "

## 2020-11-02 NOTE — PLAN OF CARE
FOCUS/GOAL  Medical management    ASSESSMENT, INTERVENTIONS AND CONTINUING PLAN FOR GOAL:  Patient did not sleep well tonight, he was restless at times. VSS. He called for assistance to use the bathroom twice, ambulated with a walker and minimal assistance, performed ariadna cares with supervision, moderate assistance with clothing management.  He may benefit from a sleeping pill.

## 2020-11-02 NOTE — CONSULTS
Health Psychology                  Clinic    Department of Medicine  Bonnie Dent, Ph.D., L.P. [phone]                          Clinics and Surgery Center  [facility] Alan Martinez, Ph.D.,  L.P. [phone]                 3rd [facility] Mail Code 777   Quynh Saleem, Ph.D., L.P. [phone]    [address] Praveena Pardo, Ph.D., L.P. [phone]            [place] [address]          Martell Tompkins, Ph.D., A.B.P.P., L.P. [phone]      Jeanine Moreno, Ph.D., L.P. [phone]            This telehealth service is appropriate and effective for delivering services in light of the necessity for social distancing to mitigate the COVID-19 epidemic and for conservation of PPE.     Patient has agreed to receiving telehealth services after being informed about it: Yes    Patient prefers video invitation/information to be sent by:   email    Time service started:  Time service ended:    Mode of transmission: telephone    Location of originating:  Hospital room of the patient    Distance site:  Home office of provider for [facility]    The patient has been notified that:  Video/telephone visits will be conducted via a call with their psychologist to provide the care they need with a video conversation. Video/telephone visits may be billed at different rates depending on insurance coverage.  Patients are advised to please contact their insurance provider with any questions about their health insurance coverage. If during the course of a call the psychologist feels a video/telephone visit is not appropriate, patients will not be charged for this service.      Inpatient Health Psychology Consultation*    DOS:  11/02/2020      REFERRAL SOURCE:  Chris Melara MD, Acute Rehabilitation Center, [facility], [place].      REASON FOR REFERRAL:  Kamari Valentin is an 80-year-old man currently on the Acute Rehabilitation Center  "following an ischemic stroke that was apparently triggered by an angiogram intended to prepare him for a planned TAVR procedure.  Mr. Valentin has a history of previous stroke in 2011, and was on ARC at that time.  It was noted in that 2011 admission and again during this admission that Mr. Valentin exhibits a high level of emotional reactivity, and there is some concern for possible depression.  This evaluation was requested to assess his current emotional status and to make treatment recommendations. The evaluation was conducted by telephone in context of mitigation efforts of the COVID-19 pandemic.     HISTORY OF PRESENT ILLNESS:  Mr. Valentin denies any significant history of depression, anxiety or other emotional issues that have affected his functioning.  He does describe himself as a \"very emotional person\" over his entire life.  He acknowledges that his tendency to cry very easily when talking about particular topics may have increased somewhat following both of his strokes.  However, he states quite emphatically that he does not feel depressed, and in fact, has told other staff that he has encountered during this hospitalization that he feels extremely thankful.  He tells me that he cries only when discussing \"certain topics.\"  Within our conversation today, I observed him crying multiple times, often leaving him unable to respond verbally to a specific question.  The most striking example of this was when I asked for his insight into how his wife is doing given his current hospitalization.  I heard him to begin to cry, and he was unable to speak for some time.      Mr. Valentin also denies any significant symptoms of anxiety that affect his functioning.  He tells me that there have been times over his life when he has awoken at night with active thinking occurring.  However, he believes that this was related to his tendency to always be very busy with projects and tasks around the house, and that when he " awoke with his mind going he would simply focus on his to-do list for the next day.  He notes that within his current hospitalization, he has been sleeping relatively well.  He notes that last night, he did not sleep well at all and that he was physically uncomfortable and called for help multiple times.      Mr. Valentin reports a high level of motivation within his rehabilitation work.  He reports that he sees himself making positive progress in his rehabilitation therapies.  This is consistent with documentation by his rehabilitation therapists.  He reports no change in appetite and feels that he is eating well.       Mr. Valentin also reports that he is currently grieving the death of a very close friend.  He found out only over this past weekend of this friend's death.  He tells me how he had spoken to this friend and only 2 weeks ago with a plan to get together for lunch that fell through, and then found out that his friend  shortly thereafter.  He talks about the multiple losses that this friend had incurred and how he was able to be supportive to his friend.       Mr. Valentin was appreciative of this contact with Health Psychology.  He feels that he has adequate support from family and friends, and does not feel the need for additional ongoing support during this [facility] admission.      SOCIAL HISTORY:  Mr. Valentin, and his wife, Agatha, have been  for many decades, and have 3 adult children and 5 grandchildren.  Two of his sons live in other [facility], and his daughter lives in [place].  One of his sons was just here for a visit and left on the weekend.  Mr. 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  He had close friends within his work, and notes that the friend who  recently was a friend that he knew through his work in the industry.  Mr. Valentin describes himself as someone who always kept busy fixing things and  "working on projects, although he states rather modestly that he is \"not really a .\"  He enjoys working outside and being active and laughs when he tells me that his wife enjoys it as well, and they often have to fight over who is mowing the lawn.  They are also close with younger neighbors and have support from them as needed.      PAST MEDICAL HISTORY:  As above in HPI, Mr. Valentin experienced a stroke in 2011 that left him with mild residual left-sided weakness.  Please see his [facility], electronic medical record for more complete information on his medical history, current admission and status, and all medications.      PSYCHIATRIC HISTORY:  As above in the HPI.  Mr. Valentin has never been diagnosed with any significant mental health issues and has never had the need to meet with mental health providers.   Mr. Valentin has noted in documentation going back many years that he has always been \"an emotional person.\" No other significant psychiatric history was available at the time of this evaluation.      BEHAVIORAL IMPRESSIONS:  Mr. Valentin presented for this evaluation by telephone in his hospital room on the [facility].  He was alert and oriented, and agreeable to this contact.  He reported his mood as no different than his typical baseline and denied any significant depression.  He exhibited a very labile affect, crying multiple times throughout the conversation, often in response to specific questions, primarily related to his family and his current hospitalization.  He also exhibited and talked about recent activation of grief on hearing of the death of a very dear friend.  Speech was very slightly dysarthric, but very easy to comprehend. Speech content was coherent and linear.  He answered all questions clearly and directly.  Insight and judgment appear to be intact.  He exhibited no evidence of distortions of thought or perception.      IMPRESSIONS AND PLAN:  Kamari Valentin is an 80-year-old man " "currently on the Acute Rehabilitation Center following a right capsule/corona radiata lacunar stroke apparently triggered by an angiogram in mid-October that was preparation for a planned transurethral aortic valve resection.      Mr. Valentin made a comment in our conversation today stating that he \"made a wrong decision\" and clarified that he was referring to the decision to undergo the angiogram.  However, he also tells me that he is not dwelling on this choice, and is pleased to see the progress he is making knowing that he will be able to discharge to home at the end of the week.  He is grieving the recent death of a man that he describes as his best friend.  He also describes himself typically as a very emotional person.  He attributes his frequent crying in response to questions about his family and his medical conditions in that context of being a highly emotional person.  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  It is likely that the threshold for emotional reactivity has been lowered in the context of his 2 strokes.  It is also likely that it is currently exacerbated by the very fresh grief on learning of his friend's death over the past weekend.  At the same time, Mr. Valnetin denies significant symptoms of depression.  He describes a sense of being thankful and motivated within his rehabilitation therapies.  He observed the positive progress that he is making and is motivated to continue working to gain more progress.  He does not feel that he requires additional emotional support during this ARC admission.  At the same time, he is aware that should he feel that it would be helpful at any time to be able to speak again, but he can request additional contact with Health " Psychology.      DIAGNOSES:   1.  Emotional lability (R45.86).   2.  Bereavement (Z63.4).         ROXI PAL, PHD, LP       *In accordance with the Rules of the [place] Board of [employer], it is noted that psychological descriptions and scientific procedures underlying psychological evaluations have limitations.  Absolute predictions cannot be made based on information in this report.       D: 2020   T: 2020   MT: JULIAN      Name:     SHERRY GARCÍA   MRN:      [id number]        Account:       [id number]   :      1940           Consult Date:  2020      Document: [id number]       cc: Gus Lanier MD

## 2020-11-02 NOTE — PROGRESS NOTES
"  [facility]   Acute Rehabilitation Unit  Daily progress note  CC:     Stroke Ischemic 01.1 (L) Body Involvement (R) Brain Stroke; lacunar stroke in the Right internal capsule/corona radiata in setting of microvascular disease    S: In good spirits. H/O Emotional 10/30/2020. Writes in the planner. Willing to see Psychology. Wife supportive. Reports sleep not good. Reviewed strategies to help. Added Melatonin.    ROS: Denies headache,. Nausea, sob, or urgency.    Currently the patient requires Min A for bed mobility, transfers, and gait x up to 200 feet. Pt requires Min A with any task involving the L UE due to weakness including LB dressing, LB bathing. The patient also is noted to have difficulty with safety with FWW, and possible new cognitive deficits requiring a full cognitive/linguistic assessment at [facility].         [unfilled]   Scheduled meds    aspirin  81 mg Oral Daily     hydrALAZINE  25 mg Oral BID     losartan  100 mg Oral Daily     rivaroxaban ANTICOAGULANT  20 mg Oral Daily with supper     simvastatin  40 mg Oral At Bedtime       PRN meds:  acetaminophen, melatonin, - MEDICATION INSTRUCTIONS -      PHYSICAL EXAM  /61 (BP Location: Right arm)   Pulse 75   Temp 96.5  F (35.8  C) (Oral)   Resp 18   Ht 1.702 m (5' 7\")   Wt 75.9 kg (167 lb 4.8 oz)   SpO2 96%   BMI 26.20 kg/m    Gen: Alert and oriented  HEENT: EOMI, face symmetric  CV: S1, S2 RRR  Pulm: Clear to auscultation  Abd: Soft, non tender  Ext: Calves non tender  Neuro/MSK: Moves all four, mild dysmetria  And weakness left side  Responds to touch.  Teary at times.    LABS  Last Comprehensive Metabolic Panel:  Sodium   Date Value Ref Range Status   10/29/2020 141 133 - 144 mmol/L Final     Potassium   Date Value Ref Range Status   10/29/2020 4.9 3.4 - 5.3 mmol/L Final     Comment:     Specimen moderately hemolyzed, Potassium may be falsely elevated     Chloride   Date Value Ref Range Status "   10/29/2020 108 94 - 109 mmol/L Final     Carbon Dioxide   Date Value Ref Range Status   10/29/2020 30 20 - 32 mmol/L Final     Anion Gap   Date Value Ref Range Status   10/29/2020 3 3 - 14 mmol/L Final     Glucose   Date Value Ref Range Status   10/29/2020 98 70 - 99 mg/dL Final     Urea Nitrogen   Date Value Ref Range Status   10/29/2020 25 7 - 30 mg/dL Final     Creatinine   Date Value Ref Range Status   10/29/2020 0.96 0.66 - 1.25 mg/dL Final     GFR Estimate   Date Value Ref Range Status   10/29/2020 73 >60 mL/min/[1.73_m2] Final     Comment:     Non  GFR Calc  Starting 12/18/2018, serum creatinine based estimated GFR (eGFR) will be   calculated using the Chronic Kidney Disease Epidemiology Collaboration   (CKD-EPI) equation.       Calcium   Date Value Ref Range Status   10/29/2020 9.0 8.5 - 10.1 mg/dL Final        CBC RESULTS:   Recent Labs   Lab Test 10/29/20  0550   WBC 6.7   RBC 4.54   HGB 14.5   HCT 45.4      MCH 31.9   MCHC 31.9   RDW 12.7           ASSESSMENT AND PLAN    Kamari Valentin is a 80 year old right hand dominant male with Stroke Ischemic 01.1 (L) Body Involvement (R) Brain Stroke; lacunar stroke in the Right internal capsule/corona radiata in setting of microvascular disease     The patient is admitted to [facility] for rehabilitation.  Patient requires an intensive inpatient rehab program to address the following acute impairments:impaired strength, impaired activity tolerance, impaired tone, impaired balance, impaired coordination, impaired judgement and safety awareness, impaired weight shifting and dysphagia        Admission to acute inpatient rehab:    Impairment group code: Stroke Ischemic 01.1 (L) Body Involvement (R) Brain Stroke; lacunar stroke in the Right internal capsule/corona radiata in setting of microvascular disease        1. PT, OT and SLP 60 minutes of each on a daily basis, in addition to rehab nursing and close management of physiatrist.        2. Impairment of ADL's: OT for 60 minutes daily to work on ADL re-training such as grooming, self cares and bathing.       3. Impairment of mobility: PT for 60 minutes daily to work on neuromuscular re-education focusing on strength, balance, coordination, and endurance.       4. Impairment of cognition/language/swallow:  SLP for 60 minutes daily to work on cognitive and linguistic deficits.     5. Rehab RN for med administration, bowel regimen, glucose monitoring and wound care.       6. Medical Conditions     Lacunar infarct with associated left-sided weakness.   Patient underwent elective angiogram on 10/22 for TAVR work-up and subsequently noted left lower extremity weakness that did not improve.  Resented with difficulty ambulating.  MRI showed recent ischemic stroke involving the posterolateral right thalamus and posterior limb of the right internal capsule.    CT angiogram of the head neck showed approximate 50% luminal diameter stenosis of the origin of the left internal carotid artery and mild narrowing on the right.    Recent carotid ultrasound on 10/22 showed less than 50% stenosis on the right and greater than 70% stenosis on the left internal carotid artery.    Echo 10/12/2020 with EF is 60-65%.  Severe AS, moderate mitral annular calcification. No pericardial effusion.  CK 63, troponin undetectable, TSH 1.56  LDL 63, HDL 40.  Hemoglobin A1c 6.2.  Evaluated by stroke neurology, recommend aspirin 81 mg oral daily in addition to Xarelto.  Continue PTA simvastatin therapy.  Home antihypertensive regimen as below.  PT, OT recommending ARU for rehabilitation.  Discussed with patient's wife in agreement with ARU.     Chronic atrial fibrillation on Xarelto.  Continue PTA Xarelto.  PTA not on rate control medication.  Telemetry monitoring no acute events.     Hypernatremia. Sodium 145 on admission.  Sodium levels normalized.     Coronary artery disease.  Severe aortic stenosis.  Coronary angiogram on 10/22  with CAD, LMCA : Distal 60% eccentric  modeately calcified lesion, TAVR is likely not a good first option given complexity of CAD  CV surgery follows patient for AVR CABG and maze procedure.   Per neurology defer nonemergent surgery to at least 3 months after stroke/TIA.     Hypertension.  Continue PTA losartan [restarted 10/27].  Will restart PTA hydralazine today.  [10/28].  Restart PTA clonidine, hydrochlorothiazide gradually in the next couple of days.  Must monitor blood pressure readings and review with provider prior weight to restarting medication.     Hyperlipidemia.  LDL has been well controlled.  Continue home simvastatin.     Prediabetes hemoglobin A1c of 6.2.  Need to consider lifestyle modification with diet and exercise as able to.     7. Adjustment to disability:  Clinical psychology to eval and treat as appropriate.  8. FEN: Regular with thins, low salt, low fat mod carb controlled diet  9. Bowel: Monitor  10. Bladder: Monitor  11. DVT Prophylaxis: On Xarelto  12. GI Prophylaxis: Monitor. May need Pepcid  13. Code: No CPR, DNI  14. Disposition: Home with family  15. ELOS: 7-10 days. Likely needs 10 days.  16. Rehab prognosis:  Fair  Follow up Appointments on Discharge:      Needs age-appropriate health maintenance on PCP visit,  blood sugars periodically.  Follow-up with neurology in 6 to 8 weeks or earlier if symptomatic.  Follow-up with CV surgery per schedule.     Chris Melara MD   Physical Medicine & Rehabilitation

## 2020-11-02 NOTE — CONSULTS
Stroke Education Note    The following information has been reviewed with the patient and family:    1. Warning signs of stroke    2. Calling 911 if having warning signs of stroke    3. All modifiable risk factors: hypertension, CAD, atrial fib, diabetes, hypercholesterolemia, smoking, substance abuse, diet, physical inactivity, obesity, sleep apnea.    4. Patient's risk factors for stroke which include: HTN, HLD, A fib, heart disease, and carotid disease    5. Follow-up plan for after discharge    6. Discharge medications which include: Xarelto, Aspirin, Losartan, hydralazine, and simvastatin     In addition, the [facility] Stroke Class Handout has been given to the patient.    Learner's response to risk factors / lifestyle modification education: Taking steps     CORAL Merida RN

## 2020-11-03 ENCOUNTER — APPOINTMENT (OUTPATIENT)
Dept: PHYSICAL THERAPY | Facility: CLINIC | Age: 80
End: 2020-11-03
Payer: COMMERCIAL

## 2020-11-03 ENCOUNTER — APPOINTMENT (OUTPATIENT)
Dept: OCCUPATIONAL THERAPY | Facility: CLINIC | Age: 80
End: 2020-11-03
Payer: COMMERCIAL

## 2020-11-03 LAB — GLUCOSE BLDC GLUCOMTR-MCNC: 128 MG/DL (ref 70–99)

## 2020-11-03 PROCEDURE — 97116 GAIT TRAINING THERAPY: CPT | Mod: GP

## 2020-11-03 PROCEDURE — 99233 SBSQ HOSP IP/OBS HIGH 50: CPT | Performed by: PHYSICAL MEDICINE & REHABILITATION

## 2020-11-03 PROCEDURE — 250N000013 HC RX MED GY IP 250 OP 250 PS 637: Performed by: PHYSICAL MEDICINE & REHABILITATION

## 2020-11-03 PROCEDURE — 97112 NEUROMUSCULAR REEDUCATION: CPT | Mod: GO | Performed by: OCCUPATIONAL THERAPIST

## 2020-11-03 PROCEDURE — 97112 NEUROMUSCULAR REEDUCATION: CPT | Mod: GP

## 2020-11-03 PROCEDURE — 97530 THERAPEUTIC ACTIVITIES: CPT | Mod: GP

## 2020-11-03 PROCEDURE — 97530 THERAPEUTIC ACTIVITIES: CPT | Mod: GO | Performed by: OCCUPATIONAL THERAPIST

## 2020-11-03 PROCEDURE — 97110 THERAPEUTIC EXERCISES: CPT | Mod: GP

## 2020-11-03 PROCEDURE — 97535 SELF CARE MNGMENT TRAINING: CPT | Mod: GO | Performed by: OCCUPATIONAL THERAPIST

## 2020-11-03 PROCEDURE — 250N000013 HC RX MED GY IP 250 OP 250 PS 637: Performed by: PHYSICIAN ASSISTANT

## 2020-11-03 PROCEDURE — 128N000003 HC R&B REHAB

## 2020-11-03 PROCEDURE — 999N001017 HC STATISTIC GLUCOSE BY METER IP

## 2020-11-03 RX ORDER — HYDRALAZINE HYDROCHLORIDE 10 MG/1
10 TABLET, FILM COATED ORAL 2 TIMES DAILY
Status: DISCONTINUED | OUTPATIENT
Start: 2020-11-03 | End: 2020-11-09 | Stop reason: HOSPADM

## 2020-11-03 RX ADMIN — ASPIRIN 81 MG: 81 TABLET ORAL at 07:46

## 2020-11-03 RX ADMIN — HYDRALAZINE HYDROCHLORIDE 10 MG: 10 TABLET ORAL at 21:04

## 2020-11-03 RX ADMIN — LOSARTAN POTASSIUM 100 MG: 100 TABLET, FILM COATED ORAL at 07:46

## 2020-11-03 RX ADMIN — SIMVASTATIN 40 MG: 20 TABLET, FILM COATED ORAL at 21:04

## 2020-11-03 RX ADMIN — HYDRALAZINE HYDROCHLORIDE 25 MG: 25 TABLET ORAL at 07:46

## 2020-11-03 RX ADMIN — RIVAROXABAN 20 MG: 10 TABLET, FILM COATED ORAL at 17:22

## 2020-11-03 NOTE — PLAN OF CARE
Pt uses call light appropriately. Denies pain. VSS. CGA with FWW for mobility. Continent of bowel and bladder, had large bm in toilet, min assist w/ toileting tasks. Pt participating in therapies.

## 2020-11-03 NOTE — PROGRESS NOTES
OT: Pt demonstrating increased tone in LUE compared to previous sessions. Pt demonstrating lack of carryover with safety techniques; specifically observing significant decline with carryover when pt is fatigued.

## 2020-11-03 NOTE — PROGRESS NOTES
Discharge Planner Post-Acute Rehab OT:     Discharge Plan: Home with HC OT Services.    Precautions: Falls, L sided weakness     Current Status:  ADLs: Pt requires SBA to don/doff overhead shirt while seated. Pt requires Min A to thread pants and underwear. Pt requires SBA with FWW to don/doff and and pull up pants while standing. Pt requires SBA to don/doff socks and shoes with LHSH. Pt requires SBA for G/H standing at EOS with FWW. Pt requires CGA with FWW and grab bars and  verbal cueing for safe approach with walker and proper positioning to lower body to toilet.   IADLs: To assess at a later date.  Vision/Cognition: Pt wears trifocal glasses and is nearsighted. Further assess cogniiton.    Assessment: Pt motivated and engaged throughout session. Pt unable to demonstrate carry over of safety techniques for functional transfers at this time. Writer implemented visual aides in attempt to increase safety with walker and STS/toilet transfers. Pt demonstrating increase in L sided inattention compared to previous sessions.  Pt presents with L sided weakness, decreased LUE FMC skills, and decreased functional standing tolerance. Pt would benefit from continued OT services to improve the deficits listed above and teach compensatory methods.    Other Barriers to Discharge (DME, Family Training, etc): FWW and extended tub bench upon discharge. Pt has raised toilet at home, but will likely benefit from grab bars.

## 2020-11-03 NOTE — PLAN OF CARE
FOCUS/GOAL  Psychosocial needs and Safety management    ASSESSMENT, INTERVENTIONS AND CONTINUING PLAN FOR GOAL:    Pt is alert and oriented by 4. No complaints of pain. Systolic below 110 at bedtime, hydralazine held. Pt was asked by writer if he wanted to take melatonin to help with sleep but pt stated he doesn't need it. Continent of bladder, no BM this shift. Remains CGA with a walker and gait belt. Continue with plan of care.

## 2020-11-03 NOTE — PLAN OF CARE
FOCUS/GOAL  Medical management    ASSESSMENT, INTERVENTIONS AND CONTINUING PLAN FOR GOAL:  Pt is alert and oriented. No complaints of pain. Assist of 1 with walker. Continent of bladder using toilet in the bathroom. Using call light appropriately.

## 2020-11-03 NOTE — PROGRESS NOTES
"  [facility]   Acute Rehabilitation Unit  Daily progress note  CC:     Stroke Ischemic 01.1 (L) Body Involvement (R) Brain Stroke; lacunar stroke in the Right internal capsule/corona radiata in setting of microvascular disease    S: In good spirits. H/O Emotional 10/30/2020. Some emotionality when talking about home.    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 Wife supportive. Reports sleep not good. Reviewed strategies to help. Added Melatonin.    BP trending down. Hydralazine reduced. Monitor.    ROS: Denies headache,. Nausea, sob, or urgency.    Currently the patient requires Min A for bed mobility, transfers, and gait x up to 200 feet. Pt requires Min A with any task involving the L UE due to weakness including LB dressing, LB bathing. The patient also is noted to have difficulty with safety with FWW, and possible new cognitive deficits requiring a full cognitive/linguistic assessment at [facility].         [unfilled]   Scheduled meds    aspirin  81 mg Oral Daily     hydrALAZINE  10 mg Oral BID     losartan  100 mg Oral Daily     rivaroxaban ANTICOAGULANT  20 mg Oral Daily with supper     simvastatin  40 mg Oral At Bedtime       PRN meds:  acetaminophen, melatonin, - MEDICATION INSTRUCTIONS -      PHYSICAL EXAM  /65 (BP Location: Right arm)   Pulse 65   Temp 95.7  F (35.4  C) (Oral)   Resp 18   Ht 1.702 m (5' 7\")   Wt 75.9 kg (167 lb 4.8 oz)   SpO2 94%   BMI 26.20 kg/m    Gen: Alert and oriented  HEENT: EOMI, face symmetric  CV: S1, S2 RRR  Pulm: Clear to auscultation  Abd: Soft, non tender  Ext: Calves non tender  Neuro/MSK: Moves all four, mild dysmetria  And weakness left side  Responds to touch.  Teary at times.    LABS  Last Comprehensive Metabolic Panel:  Sodium   Date Value Ref Range Status   10/29/2020 141 133 - 144 mmol/L Final     Potassium   Date Value Ref Range Status   10/29/2020 4.9 3.4 - 5.3 mmol/L Final     Comment:     Specimen moderately hemolyzed, " Potassium may be falsely elevated     Chloride   Date Value Ref Range Status   10/29/2020 108 94 - 109 mmol/L Final     Carbon Dioxide   Date Value Ref Range Status   10/29/2020 30 20 - 32 mmol/L Final     Anion Gap   Date Value Ref Range Status   10/29/2020 3 3 - 14 mmol/L Final     Glucose   Date Value Ref Range Status   10/29/2020 98 70 - 99 mg/dL Final     Urea Nitrogen   Date Value Ref Range Status   10/29/2020 25 7 - 30 mg/dL Final     Creatinine   Date Value Ref Range Status   10/29/2020 0.96 0.66 - 1.25 mg/dL Final     GFR Estimate   Date Value Ref Range Status   10/29/2020 73 >60 mL/min/[1.73_m2] Final     Comment:     Non  GFR Calc  Starting 12/18/2018, serum creatinine based estimated GFR (eGFR) will be   calculated using the Chronic Kidney Disease Epidemiology Collaboration   (CKD-EPI) equation.       Calcium   Date Value Ref Range Status   10/29/2020 9.0 8.5 - 10.1 mg/dL Final        CBC RESULTS:   Recent Labs   Lab Test 10/29/20  0550   WBC 6.7   RBC 4.54   HGB 14.5   HCT 45.4      MCH 31.9   MCHC 31.9   RDW 12.7           ASSESSMENT AND PLAN    Kamari Valentin is a 80 year old right hand dominant male with Stroke Ischemic 01.1 (L) Body Involvement (R) Brain Stroke; lacunar stroke in the Right internal capsule/corona radiata in setting of microvascular disease     The patient is admitted to [facility] for rehabilitation.  Patient requires an intensive inpatient rehab program to address the following acute impairments:impaired strength, impaired activity tolerance, impaired tone, impaired balance, impaired coordination, impaired judgement and safety awareness, impaired weight shifting and dysphagia        Admission to acute inpatient rehab:    Impairment group code: Stroke Ischemic 01.1 (L) Body Involvement (R) Brain Stroke; lacunar stroke in the Right internal capsule/corona radiata in setting of microvascular disease        1. PT, OT and SLP 60 minutes of each on a daily basis,  in addition to rehab nursing and close management of physiatrist.       2. Impairment of ADL's: OT for 60 minutes daily to work on ADL re-training such as grooming, self cares and bathing.       3. Impairment of mobility: PT for 60 minutes daily to work on neuromuscular re-education focusing on strength, balance, coordination, and endurance.       4. Impairment of cognition/language/swallow:  SLP for 60 minutes daily to work on cognitive and linguistic deficits.     5. Rehab RN for med administration, bowel regimen, glucose monitoring and wound care.       6. Medical Conditions     Lacunar infarct with associated left-sided weakness.   Patient underwent elective angiogram on 10/22 for TAVR work-up and subsequently noted left lower extremity weakness that did not improve.  Resented with difficulty ambulating.  MRI showed recent ischemic stroke involving the posterolateral right thalamus and posterior limb of the right internal capsule.    CT angiogram of the head neck showed approximate 50% luminal diameter stenosis of the origin of the left internal carotid artery and mild narrowing on the right.    Recent carotid ultrasound on 10/22 showed less than 50% stenosis on the right and greater than 70% stenosis on the left internal carotid artery.    Echo 10/12/2020 with EF is 60-65%.  Severe AS, moderate mitral annular calcification. No pericardial effusion.  CK 63, troponin undetectable, TSH 1.56  LDL 63, HDL 40.  Hemoglobin A1c 6.2.  Evaluated by stroke neurology, recommend aspirin 81 mg oral daily in addition to Xarelto.  Continue PTA simvastatin therapy.  Home antihypertensive regimen as below.  PT, OT recommending ARU for rehabilitation.  Discussed with patient's wife in agreement with ARU.     Chronic atrial fibrillation on Xarelto.  Continue PTA Xarelto.  PTA not on rate control medication.  Telemetry monitoring no acute events.     Hypernatremia. Sodium 145 on admission.  Sodium levels normalized.     Coronary  artery disease.  Severe aortic stenosis.  Coronary angiogram on 10/22 with CAD, LMCA : Distal 60% eccentric  modeately calcified lesion, TAVR is likely not a good first option given complexity of CAD  CV surgery follows patient for AVR CABG and maze procedure.   Per neurology defer nonemergent surgery to at least 3 months after stroke/TIA.     Hypertension.  Continue PTA losartan [restarted 10/27].  hydralazine dose reduced. May not need it?  Restart PTA clonidine, hydrochlorothiazide gradually in the next couple of days if needed, has not.  Must monitor blood pressure readings and review with provider prior weight to restarting medication.     Hyperlipidemia.  LDL has been well controlled.  Continue home simvastatin.     Prediabetes hemoglobin A1c of 6.2.  Need to consider lifestyle modification with diet and exercise as able to.     7. Adjustment to disability:  Clinical psychology to eval and treat as appropriate.  8. FEN: Regular with thins, low salt, low fat mod carb controlled diet  9. Bowel: Monitor  10. Bladder: Monitor  11. DVT Prophylaxis: On Xarelto  12. GI Prophylaxis: Monitor. May need Pepcid  13. Code: No CPR, DNI  14. Disposition: Home with family  15. ELOS: 7-10 days. Likely needs 10 days.  16. Rehab prognosis:  Fair  Follow up Appointments on Discharge:      Needs age-appropriate health maintenance on PCP visit,  blood sugars periodically.  Follow-up with neurology in 6 to 8 weeks or earlier if symptomatic.  Follow-up with CV surgery per schedule.     Chris Melara MD   Physical Medicine & Rehabilitation

## 2020-11-04 ENCOUNTER — APPOINTMENT (OUTPATIENT)
Dept: OCCUPATIONAL THERAPY | Facility: CLINIC | Age: 80
End: 2020-11-04
Payer: COMMERCIAL

## 2020-11-04 ENCOUNTER — APPOINTMENT (OUTPATIENT)
Dept: PHYSICAL THERAPY | Facility: CLINIC | Age: 80
End: 2020-11-04
Payer: COMMERCIAL

## 2020-11-04 PROCEDURE — 999N000125 HC STATISTIC PATIENT MED CONFERENCE < 30 MIN: Performed by: OCCUPATIONAL THERAPIST

## 2020-11-04 PROCEDURE — 99233 SBSQ HOSP IP/OBS HIGH 50: CPT | Performed by: PHYSICAL MEDICINE & REHABILITATION

## 2020-11-04 PROCEDURE — 128N000003 HC R&B REHAB

## 2020-11-04 PROCEDURE — 97530 THERAPEUTIC ACTIVITIES: CPT | Mod: GO | Performed by: OCCUPATIONAL THERAPIST

## 2020-11-04 PROCEDURE — 97110 THERAPEUTIC EXERCISES: CPT | Mod: GP

## 2020-11-04 PROCEDURE — 999N000150 HC STATISTIC PT MED CONFERENCE < 30 MIN

## 2020-11-04 PROCEDURE — 250N000013 HC RX MED GY IP 250 OP 250 PS 637: Performed by: PHYSICAL MEDICINE & REHABILITATION

## 2020-11-04 PROCEDURE — 97535 SELF CARE MNGMENT TRAINING: CPT | Mod: GO | Performed by: OCCUPATIONAL THERAPIST

## 2020-11-04 PROCEDURE — 250N000013 HC RX MED GY IP 250 OP 250 PS 637: Performed by: PHYSICIAN ASSISTANT

## 2020-11-04 PROCEDURE — 97530 THERAPEUTIC ACTIVITIES: CPT | Mod: GP

## 2020-11-04 RX ADMIN — RIVAROXABAN 20 MG: 10 TABLET, FILM COATED ORAL at 18:57

## 2020-11-04 RX ADMIN — HYDRALAZINE HYDROCHLORIDE 10 MG: 10 TABLET ORAL at 20:15

## 2020-11-04 RX ADMIN — HYDRALAZINE HYDROCHLORIDE 10 MG: 10 TABLET ORAL at 08:08

## 2020-11-04 RX ADMIN — ASPIRIN 81 MG: 81 TABLET ORAL at 08:08

## 2020-11-04 RX ADMIN — SIMVASTATIN 40 MG: 20 TABLET, FILM COATED ORAL at 20:15

## 2020-11-04 RX ADMIN — LOSARTAN POTASSIUM 100 MG: 100 TABLET, FILM COATED ORAL at 08:08

## 2020-11-04 NOTE — PLAN OF CARE
Discharge Planner Post-Acute Rehab PT:      Discharge Plan: Home w/ home health PT. pt has FWW and SEC for discharge home. Expected 11/09.       Precautions: Falls, L toe up orthosis for ambulation     Current Status:  Transfer: SBA with FWW     Gait: SBA with FWW, decreased L toe drag with addition of L toe up orthosis      Stairs: 3 x 4 six inch steps with B hands on L rail. Max cues for safety and sequencing, CGA only.     Balance: 33/45 on Ferguson 11/1     Assessment: Decreased consistency with STS when fatigued during session and towards end of day. Will consult with wife to determine how close pt is to his PLOF.     Other Barriers to Discharge (DME, Family Training, etc): Family training, stairs, L hemiparesis.

## 2020-11-04 NOTE — PLAN OF CARE
FOCUS/GOAL  Medical management    ASSESSMENT, INTERVENTIONS AND CONTINUING PLAN FOR GOAL:  Pt is alert and oriented. No complaints of pain. CGA with walker. Continent of bladder using toilet overnight. Pt called appropriately. Appeared to sleep well overnight.

## 2020-11-04 NOTE — PLAN OF CARE
Had a team rounds. Plan to start MAP tomorrow. Medication list given and reviewed lift with patient. Pt verbalized understanding and will call for medication tomorrow am. CGA with FWW for mobility. Continent of bowel and bladder, used toilet. LBM today. Denies pain. VSS.

## 2020-11-04 NOTE — PLAN OF CARE
Alert and oriented x4. Continent of bladder and bowel on toilet. LBM 11/3. Denies pain. Assist of 1 with walker. Regular thin diet, good appetite and ate 100% of dinner. No acute complaints this shift; able to make needs known. Bed alarm on for safety, call light within reach, Ok to continue with care plan.

## 2020-11-04 NOTE — CARE CONFERENCE
Acute Rehab Care Conference/Team Rounds      Type: Team Rounds    Present: Dr Chris Melara, Pippa Villegas RN, Irma Carmichael PT, Socorro Bhandari OT, Sandra John SW, Harris Kulkarni SLP, Eunice Chase Mgr, Sienna Gorman Dietician, Neeraj-O Shavonne Ceja       Discharge Barriers/Treatment/Education    Rehab Diagnosis: Stroke Ischemic 01.1 (L) Body Involvement (R) Brain Stroke; lacunar stroke in the Right internal capsule/corona radiata in setting of microvascular disease     Active Medical Co-morbidities/Prognosis:   Patient Active Problem List   Diagnosis     Stroke (H)     Hyperlipidemia     Atrial fibrillation (H)     Essential hypertension, benign     Aortic stenosis-moderate, mean gradient 25mmhg echo 2017     Aortic valve stenosis, etiology of cardiac valve disease unspecified     Status post coronary angiogram     Left leg weakness     Ischemic stroke (H)     Stroke (cerebrum) (H)        Safety: Alert and oriented. Calls appropriately.     Pain: Denies    Medications, Skin, Tubes/Lines: Medications whole. No skin concerns. No lines/drains.    Swallowing/Nutrition:    Bowel/Bladder: Continent of bowel and bladder. LBM 11/3.    Psychosocial: Lives in a house with spouse. 5 JOSE, STI (1 flight to top floor, 1 flight to basement). Bedroom/bathroom on the main level with a bath tub/shower combo. Railing on the stair cases. Pt IND w/ ADLs and receives A from wife w/ IADLs. Uses cane to walk at home. Two sons and 1 dtr, multiple grandchildren. Pt tearful thinking about his family. Reported great support from his wife, children and neighbors. Denied financial concerns or substance abuse.     ADLs/IADLs: Pt making slow progress d/t poor carry-over and limited insight. Pt requires SBA to don/doff overhead shirt while seated. Pt requires Min A to thread pants and underwear. Pt requires SBA with FWW to don/doff and and pull up pants while standing. Pt requires SBA to don/doff socks and shoes with LHSH. Pt  requires SBA for G/H standing at EOS with FWW. Pt requires CGA with FWW and grab bars and verbal cueing for safe approach with walker and proper positioning to lower body to toilet. HC OT services and will need FT with spouse prior.     Mobility: Slow progress with functional IND. Limited insight into deficits and performance. Decreased consistency with STS when fatigued during session and towards end of day. Will consult with wife to determine how close pt is to his PLOF.    -Transfer: SBA with FWW   -Gait: SBA with FWW, decreased L toe drag with addition of L toe up orthosis   -Stairs: 3 x 4 six inch steps with B hands on L rail. Max cues for safety and sequencing, CGA only.  -Balance: 33/45 on Ferguson 11/1    Cognition/Language:    Community Re-Entry:    Transportation: Not a  - car transfer to be practiced in family vehicle.    Decision maker: self    Plan of Care and goals reviewed and updated.    Discharge Plan/Recommendations    Fall Precautions: continue    Patient/Family input to goals: Yes    Anticipated rehab needs following discharge: Home with home care    Anticipated care giver support after discharge: Spouse    Estimated length of stay: 10 days    Overall plan for the patient: Continue IP rehabilitation      Utilization Review and Continued Stay Justification    Medical Necessity Criteria:    For any criteria that is not met, please document reason and plan for discharge, transfer, or modification of plan of care to address.    Requires intensive rehabilitation program to treat functional deficits?: Yes    Requires 3x per week or greater involvement of rehabilitation physician to oversee rehabilitation program?: Yes    Requires rehabilitation nursing interventions?: Yes    Patient is making functional progress?: Yes    There is a potential for additional functional progress? Yes    Patient is participating in therapy 3 hours per day a minimum of 5 days per week or 15 hours per week in 7 day  period?:Yes    Has discharge needs that require coordinated discharge planning approach?:Yes        Final Physician Sign off    Statement of Approval: I concur with plan of care    Patient Goals  Social Work Goals:Confirm discharge recommendations with therapy, coordinate safe discharge plan and remain available to support and assist as needed.    OT Frequency: daily for  minutes  OT goal: hygiene/grooming: modified independent, using adaptive equipment, while standing  OT goal: upper body dressing: Modified independent, using adaptive equipment, including set-up/clothing retrieval  OT goal: lower body dressing: Modified independent, using adaptive equipment, including set-up/clothing retrieval  OT goal: upper body bathing: using adaptive equipment, Modified independent  OT goal: lower body bathing: Supervision/stand-by assist, using adaptive equipment  OT goal: bed mobility: Modified independent, supine to/from sitting, rolling  OT Goal: transfer: Modified independent, with assistive device  OT goal: toilet transfer/toileting: Modified independent, toilet transfer, cleaning and garment management, using adaptive equipment  OT goal: meal preparation: using adaptive equipment, with simple meal preparation, ambulatory level, Modified independent  OT goal: home management: Modified independent, with light demand household tasks, using adaptive equipment, ambulatory level  OT goal: cognitive: Patient/caregiver will verbalize understanding of cognitive assessment results/recommendations as needed for safe discharge planning  OT goal 1: OT: Pt will safely complete tub/shower transfer with SBA utilizing appropriate AE/DME.  OT goal 2: Pt will demonstrated IND with LUE HEP to promote FMC skills needed for ADLs/IADLs.  PT Frequency: Daily, 60-90 mins  PT goal: bed mobility: Independent, Supine to/from sit, Rolling  PT goal: transfers: Modified independent, Sit to/from stand, Bed to/from chair(FWW vs SEC)  PT goal:  gait: Greater than 200 feet, Modified independent, Assistive device(FWW vs SEC)  PT goal: stairs: 5 stairs, Rail on left, Modified independent  PT goal: perform aerobic activity with stable cardiovascular response: NuStep, 20 minutes  PT goal 1: Indep w/BLE strengthening HEP  PT Goal 2: Car transfer, SBA w/FWW vs SEC  PT Goal 3: Complete Ferguson  PT Goal 4: Complete floor transfer, SBA w/furniture assist for improved safety upon discharge home                                                       RN goals:  Goal 1. Medication management: Patient will pass MAP and demonstrate knowledge of current medications prior to discharge.    Goal 2. Skin integrity: Patient will be free from new pressure injuries related to immobility and state two methods to prevent pressure injuries related to immobility prior to discharge.     Goal 3. Safety management: Patient will make needs known and use the call light appropriately for mobilization prior to discharge.

## 2020-11-04 NOTE — PROGRESS NOTES
Discharge Planner Post-Acute Rehab OT:     Discharge Plan: Home with HC OT services     Precautions: Falls, L sided weakness     Current Status:  ADLs: Pt requires SBA to don/doff overhead shirt while seated. Pt requires Min A to thread pants and underwear. Pt requires SBA with FWW to don/doff and and pull up pants while standing. Pt requires SBA to don/doff socks and shoes with LHSH. Pt requires SBA for G/H standing at EOS with FWW. Pt requires CGA with FWW and grab bars and verbal cueing for safe approach with walker and proper positioning to lower body to toilet.   IADLs: Pt able to locate 10 item grocery list in a timely manner while standing with FWW with CGA. Pt demonstrating difficulty categorizing grocery items. Further assess IADLs.  Vision/Cognition: Pt wears trifocal glasses and is nearsighted. Further assess cogniiton.    Assessment: Pt motivated throughout session. Implemented visual aids to increase safety with walker and body positioning when transferring. Pt demonstrating carryover of safety techniques for STS and FWW>chair from AM session to PM. Implemented dycem for STS d/t LLE sliding and pt demonstrating steadier and safer STS. Pt benefiting from repetition/rythmic approach and teachback method. Pt presenting with cognitive deficits and increased tone in LUE impacting ADLs and IADLS, therefore pt would benefit from continued OT services to address these deficits.     Other Barriers to Discharge (DME, Family Training, etc): FWW and extended tub bench upon discharge. Pt has raised toilet at home, but will need grab bars.

## 2020-11-04 NOTE — PROGRESS NOTES
[facility]   Acute Rehabilitation Unit  Daily progress note  CC:     Stroke Ischemic 01.1 (L) Body Involvement (R) Brain Stroke; lacunar stroke in the Right internal capsule/corona radiata in setting of microvascular disease    S: In good spirits. H/O Emotional 10/30/2020. Some emotionality when talking about home.    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 Wife supportive. Reports sleep not good. Reviewed strategies to help. Added Melatonin.    BP trending down. Hydralazine reduced. Monitor.    ROS: Denies headache,. Nausea, sob, or urgency.    TR held 11/4/2020    Bowel/Bladder: Continent of bowel and bladder. LBM 11/3.     Psychosocial: Lives in a house with spouse. 5 JOSE, STI (1 flight to top floor, 1 flight to basement). Bedroom/bathroom on the main level with a bath tub/shower combo. Railing on the stair cases. Pt IND w/ ADLs and receives A from wife w/ IADLs. Uses cane to walk at home. Two sons and 1 dtr, multiple grandchildren. Pt tearful thinking about his family. Reported great support from his wife, children and neighbors. Denied financial concerns or substance abuse.      ADLs/IADLs: Pt making slow progress d/t poor carry-over and limited insight. Pt requires SBA to don/doff overhead shirt while seated. Pt requires Min A to thread pants and underwear. Pt requires SBA with FWW to don/doff and and pull up pants while standing. Pt requires SBA to don/doff socks and shoes with LHSH. Pt requires SBA for G/H standing at EOS with FWW. Pt requires CGA with FWW and grab bars and verbal cueing for safe approach with walker and proper positioning to lower body to toilet. HC OT services and will need FT with spouse prior.      Mobility: Slow progress with functional IND. Limited insight into deficits and performance. Decreased consistency with STS when fatigued during session and towards end of day. Will consult with wife to determine how close pt is to his  "PLOF.     -Transfer: SBA with FWW   -Gait: SBA with FWW, decreased L toe drag with addition of L toe up orthosis   -Stairs: 3 x 4 six inch steps with B hands on L rail. Max cues for safety and sequencing, CGA only.  -Balance: 33/45 on Ferguson 11/1      [unfilled]   Scheduled meds    - Medication Assessment Program - Rehab Services   Does not apply See Admin Instructions     aspirin  81 mg Oral Daily     hydrALAZINE  10 mg Oral BID     losartan  100 mg Oral Daily     rivaroxaban ANTICOAGULANT  20 mg Oral Daily with supper     simvastatin  40 mg Oral At Bedtime       PRN meds:  acetaminophen, melatonin, - MEDICATION INSTRUCTIONS -      PHYSICAL EXAM  /71 (BP Location: Left arm)   Pulse 84   Temp 95.6  F (35.3  C) (Oral)   Resp 20   Ht 1.702 m (5' 7\")   Wt 75.9 kg (167 lb 4.8 oz)   SpO2 92%   BMI 26.20 kg/m    Gen: Alert and oriented  HEENT: EOMI, face symmetric  CV: S1, S2 RRR  Pulm: Clear to auscultation  Abd: Soft, non tender  Ext: Calves non tender  Neuro/MSK: Moves all four, mild dysmetria  And weakness left side  Responds to touch.  Teary at times.    LABS  Last Comprehensive Metabolic Panel:  Sodium   Date Value Ref Range Status   10/29/2020 141 133 - 144 mmol/L Final     Potassium   Date Value Ref Range Status   10/29/2020 4.9 3.4 - 5.3 mmol/L Final     Comment:     Specimen moderately hemolyzed, Potassium may be falsely elevated     Chloride   Date Value Ref Range Status   10/29/2020 108 94 - 109 mmol/L Final     Carbon Dioxide   Date Value Ref Range Status   10/29/2020 30 20 - 32 mmol/L Final     Anion Gap   Date Value Ref Range Status   10/29/2020 3 3 - 14 mmol/L Final     Glucose   Date Value Ref Range Status   10/29/2020 98 70 - 99 mg/dL Final     Urea Nitrogen   Date Value Ref Range Status   10/29/2020 25 7 - 30 mg/dL Final     Creatinine   Date Value Ref Range Status   10/29/2020 0.96 0.66 - 1.25 mg/dL Final     GFR Estimate   Date Value Ref Range Status   10/29/2020 73 >60 " mL/min/[1.73_m2] Final     Comment:     Non  GFR Calc  Starting 12/18/2018, serum creatinine based estimated GFR (eGFR) will be   calculated using the Chronic Kidney Disease Epidemiology Collaboration   (CKD-EPI) equation.       Calcium   Date Value Ref Range Status   10/29/2020 9.0 8.5 - 10.1 mg/dL Final        CBC RESULTS:   Recent Labs   Lab Test 10/29/20  0550   WBC 6.7   RBC 4.54   HGB 14.5   HCT 45.4      MCH 31.9   MCHC 31.9   RDW 12.7           ASSESSMENT AND PLAN    Kamari Valentin is a 80 year old right hand dominant male with Stroke Ischemic 01.1 (L) Body Involvement (R) Brain Stroke; lacunar stroke in the Right internal capsule/corona radiata in setting of microvascular disease     The patient is admitted to [facility] for rehabilitation.  Patient requires an intensive inpatient rehab program to address the following acute impairments:impaired strength, impaired activity tolerance, impaired tone, impaired balance, impaired coordination, impaired judgement and safety awareness, impaired weight shifting and dysphagia        Admission to acute inpatient rehab:    Impairment group code: Stroke Ischemic 01.1 (L) Body Involvement (R) Brain Stroke; lacunar stroke in the Right internal capsule/corona radiata in setting of microvascular disease        1. PT, OT and SLP 60 minutes of each on a daily basis, in addition to rehab nursing and close management of physiatrist.       2. Impairment of ADL's: OT for 60 minutes daily to work on ADL re-training such as grooming, self cares and bathing.       3. Impairment of mobility: PT for 60 minutes daily to work on neuromuscular re-education focusing on strength, balance, coordination, and endurance.       4. Impairment of cognition/language/swallow:  SLP for 60 minutes daily to work on cognitive and linguistic deficits.     5. Rehab RN for med administration, bowel regimen, glucose monitoring and wound care.       6. Medical Conditions     Lacunar  infarct with associated left-sided weakness.   Patient underwent elective angiogram on 10/22 for TAVR work-up and subsequently noted left lower extremity weakness that did not improve.  Resented with difficulty ambulating.  MRI showed recent ischemic stroke involving the posterolateral right thalamus and posterior limb of the right internal capsule.    CT angiogram of the head neck showed approximate 50% luminal diameter stenosis of the origin of the left internal carotid artery and mild narrowing on the right.    Recent carotid ultrasound on 10/22 showed less than 50% stenosis on the right and greater than 70% stenosis on the left internal carotid artery.    Echo 10/12/2020 with EF is 60-65%.  Severe AS, moderate mitral annular calcification. No pericardial effusion.  CK 63, troponin undetectable, TSH 1.56  LDL 63, HDL 40.  Hemoglobin A1c 6.2.  Evaluated by stroke neurology, recommend aspirin 81 mg oral daily in addition to Xarelto.  Continue PTA simvastatin therapy.  Home antihypertensive regimen as below.  PT, OT recommending ARU for rehabilitation.  Discussed with patient's wife in agreement with ARU.     Chronic atrial fibrillation on Xarelto.  Continue PTA Xarelto.  PTA not on rate control medication.  Telemetry monitoring no acute events.     Hypernatremia. Sodium 145 on admission.  Sodium levels normalized.     Coronary artery disease.  Severe aortic stenosis.  Coronary angiogram on 10/22 with CAD, LMCA : Distal 60% eccentric  modeately calcified lesion, TAVR is likely not a good first option given complexity of CAD  CV surgery follows patient for AVR CABG and maze procedure.   Per neurology defer nonemergent surgery to at least 3 months after stroke/TIA.     Hypertension.  Continue PTA losartan [restarted 10/27].  hydralazine dose reduced. May not need it?  Restart PTA clonidine, hydrochlorothiazide gradually in the next couple of days if needed, has not.  Must monitor blood pressure readings and review  with provider prior weight to restarting medication.     Hyperlipidemia.  LDL has been well controlled.  Continue home simvastatin.     Prediabetes hemoglobin A1c of 6.2.  Need to consider lifestyle modification with diet and exercise as able to.     7. Adjustment to disability:  Clinical psychology to eval and treat as appropriate.  8. FEN: Regular with thins, low salt, low fat mod carb controlled diet  9. Bowel: Monitor  10. Bladder: Monitor  11. DVT Prophylaxis: On Xarelto  12. GI Prophylaxis: Monitor. May need Pepcid  13. Code: No CPR, DNI  14. Disposition: Home with family  15. ELOS: 11/9/20  16. Rehab prognosis:  Fair   Follow up Appointments on Discharge:      Needs age-appropriate health maintenance on PCP visit,  blood sugars periodically.  Follow-up with neurology in 6 to 8 weeks or earlier if symptomatic.  Follow-up with CV surgery per schedule.     Chris Melara MD   Physical Medicine & Rehabilitation

## 2020-11-04 NOTE — PLAN OF CARE
Discharge Planner Post-Acute Rehab PT:      Discharge Plan: Home w/ home health PT. pt has FWW and SEC for discharge home. Expected 11/09.       Precautions: Falls, L toe up orthosis for ambulation     Current Status:  Transfer: SBA with FWW     Gait: SBA with FWW, decreased L toe drag with addition of L toe up orthosis      Stairs: 3 x 4 six inch steps with B hands on L rail. MOD cues for safety and sequencing, CGA only.     Balance: 33/45 on [place] 11/1     Assessment: Improving carryover with gait and transfer safety. Will need F.T. with wife to assess safety and closeness to baseline with mobility. Likely will recommend supervision on stairs for home.     Other Barriers to Discharge (DME, Family Training, etc): Family training, WW

## 2020-11-05 ENCOUNTER — APPOINTMENT (OUTPATIENT)
Dept: OCCUPATIONAL THERAPY | Facility: CLINIC | Age: 80
End: 2020-11-05
Payer: COMMERCIAL

## 2020-11-05 ENCOUNTER — APPOINTMENT (OUTPATIENT)
Dept: PHYSICAL THERAPY | Facility: CLINIC | Age: 80
End: 2020-11-05
Payer: COMMERCIAL

## 2020-11-05 PROCEDURE — 97530 THERAPEUTIC ACTIVITIES: CPT | Mod: GP

## 2020-11-05 PROCEDURE — 97116 GAIT TRAINING THERAPY: CPT | Mod: GP

## 2020-11-05 PROCEDURE — 97535 SELF CARE MNGMENT TRAINING: CPT | Mod: GO | Performed by: OCCUPATIONAL THERAPIST

## 2020-11-05 PROCEDURE — 97110 THERAPEUTIC EXERCISES: CPT | Mod: GO | Performed by: OCCUPATIONAL THERAPIST

## 2020-11-05 PROCEDURE — 99233 SBSQ HOSP IP/OBS HIGH 50: CPT | Performed by: PHYSICAL MEDICINE & REHABILITATION

## 2020-11-05 PROCEDURE — 97110 THERAPEUTIC EXERCISES: CPT | Mod: GP

## 2020-11-05 PROCEDURE — 128N000003 HC R&B REHAB

## 2020-11-05 PROCEDURE — 250N000013 HC RX MED GY IP 250 OP 250 PS 637: Performed by: PHYSICIAN ASSISTANT

## 2020-11-05 PROCEDURE — 250N000013 HC RX MED GY IP 250 OP 250 PS 637: Performed by: PHYSICAL MEDICINE & REHABILITATION

## 2020-11-05 RX ADMIN — HYDRALAZINE HYDROCHLORIDE 10 MG: 10 TABLET ORAL at 08:25

## 2020-11-05 RX ADMIN — HYDRALAZINE HYDROCHLORIDE 10 MG: 10 TABLET ORAL at 20:05

## 2020-11-05 RX ADMIN — ASPIRIN 81 MG: 81 TABLET ORAL at 08:25

## 2020-11-05 RX ADMIN — RIVAROXABAN 20 MG: 10 TABLET, FILM COATED ORAL at 18:00

## 2020-11-05 RX ADMIN — SIMVASTATIN 40 MG: 20 TABLET, FILM COATED ORAL at 20:05

## 2020-11-05 RX ADMIN — LOSARTAN POTASSIUM 100 MG: 100 TABLET, FILM COATED ORAL at 08:25

## 2020-11-05 NOTE — PLAN OF CARE
Map started today. Pt called for medications on time and picked out all correct meds and verbalized indications well. VSS. Pt reports little discomfort on bilateral feet, no intervention needed per pt. CGA and FWW for mobility. Continent of bowel and bladder, LBM yesterday.

## 2020-11-05 NOTE — PLAN OF CARE
Discharge Planner Post-Acute Rehab OT:      Discharge Plan: Home with HC OT services      Precautions: Falls, L sided weakness      Current Status:  ADLs: SBA with UB/LB dressing after setup.  SBA/CGA with toileting.  Selvin with sit-stand from toilet/lower surfaces.  Assessment: Pt. Amb. With FWW and close SBA throughout room/bathroom. Completed g/h tasks standing a sink with cues to walk up to counter/feet forward with standing. Pt. motivated indep. working on LUE ROM/FM coordination exercises when not in therapy.     Other Barriers to Discharge (DME, Family Training, etc): FWW and extended tub bench upon discharge. Pt has raised toilet at home, but will need grab bars.

## 2020-11-05 NOTE — PLAN OF CARE
FOCUS/GOAL  Bowel management, Bladder management, Pain management, and Mobility    ASSESSMENT, INTERVENTIONS AND CONTINUING PLAN FOR GOAL:    Alert and oriented X 4, calling appropriately to verbalize needs. VSS. Denies pain. Continent of B/B ambulating to the bathroom. LBM 11/4.MAP initiated and has to be stated in AM shift, patient called appropriately for his meds and was able to pick the right medication and right dose. CGA with a walker and gait belt for transfers. Call light within reach. Alarms on for safety purpose. Continue with POC.

## 2020-11-05 NOTE — PLAN OF CARE
"Discharge Planner Post-Acute Rehab PT:      Discharge Plan: Home w/ home health PT. pt has FWW and SEC for discharge home. Expected 11/09.       Precautions: Falls, L toe up orthosis for ambulation     Current Status:  Transfer: SBA with FWW     Gait: SBA with FWW, decreased L toe drag with addition of L toe up orthosis.  Gait with FWW without \"toe up\" close SBA, pt demonstrating L toe clearance >75% of the time without cues.      Stairs: 3 x 4 six inch steps with B hands on L rail. MOD cues for safety and sequencing, CGA only.     Balance: 33/45 on [place] 11/1     Assessment:  Falls recovery educated provided.  Instruction and demonstration of floor<>stand transfer with use of B UE support on furniture.  Patient completed x2 with verbal cues to use R LE (stronger LE) to lower himself down and to lift up off the floor.  Verbal cues to lift/use L LE.  Pt required min to CGA to complete.  Patient continues to demonstrate good participation and progress towards established goals, benefiting from continued IP PT to maximize functional mobility for safe discharge to home.   Will need F.T. with wife to assess safety and closeness to baseline with mobility. Likely will recommend supervision on stairs for home.     Other Barriers to Discharge (DME, Family Training, etc): Family training, WW          "

## 2020-11-05 NOTE — PROGRESS NOTES
[facility]   Acute Rehabilitation Unit  Daily progress note  CC:     Stroke Ischemic 01.1 (L) Body Involvement (R) Brain Stroke; lacunar stroke in the Right internal capsule/corona radiata in setting of microvascular disease    S: In good spirits. H/O Emotional 10/30/2020. Some emotionality when talking about home.  Wants to avoid home care due to Covid concerns. O/P may be reasonable.    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 Wife supportive. Reports sleep not good. Reviewed strategies to help. Added Melatonin.    BP trending down. Hydralazine reduced. Monitor.    ROS: Denies headache,. Nausea, sob, or urgency.    TR held 11/4/2020    Bowel/Bladder: Continent of bowel and bladder. LBM 11/3.     Psychosocial: Lives in a house with spouse. 5 JOSE, STI (1 flight to top floor, 1 flight to basement). Bedroom/bathroom on the main level with a bath tub/shower combo. Railing on the stair cases. Pt IND w/ ADLs and receives A from wife w/ IADLs. Uses cane to walk at home. Two sons and 1 dtr, multiple grandchildren. Pt tearful thinking about his family. Reported great support from his wife, children and neighbors. Denied financial concerns or substance abuse.      ADLs/IADLs: Pt making slow progress d/t poor carry-over and limited insight. Pt requires SBA to don/doff overhead shirt while seated. Pt requires Min A to thread pants and underwear. Pt requires SBA with FWW to don/doff and and pull up pants while standing. Pt requires SBA to don/doff socks and shoes with LHSH. Pt requires SBA for G/H standing at EOS with FWW. Pt requires CGA with FWW and grab bars and verbal cueing for safe approach with walker and proper positioning to lower body to toilet. HC OT services and will need FT with spouse prior.      Mobility: Slow progress with functional IND. Limited insight into deficits and performance. Decreased consistency with STS when fatigued during session and towards end of day. Will  "consult with wife to determine how close pt is to his PLOF.     -Transfer: SBA with FWW   -Gait: SBA with FWW, decreased L toe drag with addition of L toe up orthosis   -Stairs: 3 x 4 six inch steps with B hands on L rail. Max cues for safety and sequencing, CGA only.  -Balance: 33/45 on [place] 11/1      [unfilled]   Scheduled meds    - Medication Assessment Program - Rehab Services   Does not apply See Admin Instructions     aspirin  81 mg Oral Daily     hydrALAZINE  10 mg Oral BID     losartan  100 mg Oral Daily     rivaroxaban ANTICOAGULANT  20 mg Oral Daily with supper     simvastatin  40 mg Oral At Bedtime       PRN meds:  acetaminophen, melatonin, - MEDICATION INSTRUCTIONS -      PHYSICAL EXAM  /65 (BP Location: Left arm)   Pulse 73   Temp 95.9  F (35.5  C) (Oral)   Resp 20   Ht 1.702 m (5' 7\")   Wt 75.9 kg (167 lb 4.8 oz)   SpO2 97%   BMI 26.20 kg/m    Gen: Alert and oriented  HEENT: EOMI, face symmetric  CV: S1, S2 RRR  Pulm: Clear to auscultation  Abd: Soft, non tender  Ext: Calves non tender  Neuro/MSK: Moves all four, mild dysmetria  And weakness left side  Responds to touch.  Teary at times.    LABS  Last Comprehensive Metabolic Panel:  Sodium   Date Value Ref Range Status   10/29/2020 141 133 - 144 mmol/L Final     Potassium   Date Value Ref Range Status   10/29/2020 4.9 3.4 - 5.3 mmol/L Final     Comment:     Specimen moderately hemolyzed, Potassium may be falsely elevated     Chloride   Date Value Ref Range Status   10/29/2020 108 94 - 109 mmol/L Final     Carbon Dioxide   Date Value Ref Range Status   10/29/2020 30 20 - 32 mmol/L Final     Anion Gap   Date Value Ref Range Status   10/29/2020 3 3 - 14 mmol/L Final     Glucose   Date Value Ref Range Status   10/29/2020 98 70 - 99 mg/dL Final     Urea Nitrogen   Date Value Ref Range Status   10/29/2020 25 7 - 30 mg/dL Final     Creatinine   Date Value Ref Range Status   10/29/2020 0.96 0.66 - 1.25 mg/dL Final     GFR Estimate   Date " Value Ref Range Status   10/29/2020 73 >60 mL/min/[1.73_m2] Final     Comment:     Non  GFR Calc  Starting 12/18/2018, serum creatinine based estimated GFR (eGFR) will be   calculated using the Chronic Kidney Disease Epidemiology Collaboration   (CKD-EPI) equation.       Calcium   Date Value Ref Range Status   10/29/2020 9.0 8.5 - 10.1 mg/dL Final        CBC RESULTS:   Recent Labs   Lab Test 10/29/20  0550   WBC 6.7   RBC 4.54   HGB 14.5   HCT 45.4      MCH 31.9   MCHC 31.9   RDW 12.7           ASSESSMENT AND PLAN    Kamari Valentin is a 80 year old right hand dominant male with Stroke Ischemic 01.1 (L) Body Involvement (R) Brain Stroke; lacunar stroke in the Right internal capsule/corona radiata in setting of microvascular disease     The patient is admitted to [facility] for rehabilitation.  Patient requires an intensive inpatient rehab program to address the following acute impairments:impaired strength, impaired activity tolerance, impaired tone, impaired balance, impaired coordination, impaired judgement and safety awareness, impaired weight shifting and dysphagia        Admission to acute inpatient rehab:    Impairment group code: Stroke Ischemic 01.1 (L) Body Involvement (R) Brain Stroke; lacunar stroke in the Right internal capsule/corona radiata in setting of microvascular disease        1. PT, OT and SLP 60 minutes of each on a daily basis, in addition to rehab nursing and close management of physiatrist.       2. Impairment of ADL's: OT for 60 minutes daily to work on ADL re-training such as grooming, self cares and bathing.       3. Impairment of mobility: PT for 60 minutes daily to work on neuromuscular re-education focusing on strength, balance, coordination, and endurance.       4. Impairment of cognition/language/swallow:  SLP for 60 minutes daily to work on cognitive and linguistic deficits.     5. Rehab RN for med administration, bowel regimen, glucose monitoring and wound  care.       6. Medical Conditions     Lacunar infarct with associated left-sided weakness.   Patient underwent elective angiogram on 10/22 for TAVR work-up and subsequently noted left lower extremity weakness that did not improve.  Resented with difficulty ambulating.  MRI showed recent ischemic stroke involving the posterolateral right thalamus and posterior limb of the right internal capsule.    CT angiogram of the head neck showed approximate 50% luminal diameter stenosis of the origin of the left internal carotid artery and mild narrowing on the right.    Recent carotid ultrasound on 10/22 showed less than 50% stenosis on the right and greater than 70% stenosis on the left internal carotid artery.    Echo 10/12/2020 with EF is 60-65%.  Severe AS, moderate mitral annular calcification. No pericardial effusion.  CK 63, troponin undetectable, TSH 1.56  LDL 63, HDL 40.  Hemoglobin A1c 6.2.  Evaluated by stroke neurology, recommend aspirin 81 mg oral daily in addition to Xarelto.  Continue PTA simvastatin therapy.  Home antihypertensive regimen as below.  PT, OT recommending ARU for rehabilitation.  Discussed with patient's wife in agreement with ARU.     Chronic atrial fibrillation on Xarelto.  Continue PTA Xarelto.  PTA not on rate control medication.  Telemetry monitoring no acute events.     Hypernatremia. Sodium 145 on admission.  Sodium levels normalized.     Coronary artery disease.  Severe aortic stenosis.  Coronary angiogram on 10/22 with CAD, LMCA : Distal 60% eccentric  modeately calcified lesion, TAVR is likely not a good first option given complexity of CAD  CV surgery follows patient for AVR CABG and maze procedure.   Per neurology defer nonemergent surgery to at least 3 months after stroke/TIA.     Hypertension.  Continue PTA losartan [restarted 10/27].  hydralazine dose reduced. BP OK.  Restart PTA clonidine, hydrochlorothiazide gradually if needed, has not.  Must monitor blood pressure readings and  review with provider prior weight to restarting medication.     Hyperlipidemia.  LDL has been well controlled.  Continue home simvastatin.     Prediabetes hemoglobin A1c of 6.2.  Need to consider lifestyle modification with diet and exercise as able to.     7. Adjustment to disability:  Clinical psychology to eval and treat as appropriate.  8. FEN: Regular with thins, low salt, low fat mod carb controlled diet  9. Bowel: Monitor  10. Bladder: Monitor  11. DVT Prophylaxis: On Xarelto  12. GI Prophylaxis: Monitor. May need Pepcid  13. Code: No CPR, DNI  14. Disposition: Home with family  15. ELOS: 11/9/20  16. Rehab prognosis:  Fair   Follow up Appointments on Discharge:      Needs age-appropriate health maintenance on PCP visit,  blood sugars periodically.  Follow-up with neurology in 6 to 8 weeks or earlier if symptomatic.  Follow-up with CV surgery per schedule.     Chris Melara MD   Physical Medicine & Rehabilitation

## 2020-11-05 NOTE — PLAN OF CARE
FOCUS/GOAL  Medical management    ASSESSMENT, INTERVENTIONS AND CONTINUING PLAN FOR GOAL:    Pt is alert and oriented. Uses the call light appropriately for needs. Denies any sob or tingling/numbness. Did say bottom of left foot is sore due to not wearing any footwear when doing the pedal exercises the other day. Offered tylenol and other alternatives but refused and said is tolerable. Ambulates to the bathroom  with the walker for toileting. A CGAo1 in transfers with the gait belt and walker. Will continue POC.

## 2020-11-06 ENCOUNTER — APPOINTMENT (OUTPATIENT)
Dept: PHYSICAL THERAPY | Facility: CLINIC | Age: 80
End: 2020-11-06
Payer: COMMERCIAL

## 2020-11-06 ENCOUNTER — APPOINTMENT (OUTPATIENT)
Dept: OCCUPATIONAL THERAPY | Facility: CLINIC | Age: 80
End: 2020-11-06
Payer: COMMERCIAL

## 2020-11-06 PROCEDURE — 99233 SBSQ HOSP IP/OBS HIGH 50: CPT | Performed by: PHYSICAL MEDICINE & REHABILITATION

## 2020-11-06 PROCEDURE — 97530 THERAPEUTIC ACTIVITIES: CPT | Mod: GO

## 2020-11-06 PROCEDURE — 97116 GAIT TRAINING THERAPY: CPT | Mod: GP

## 2020-11-06 PROCEDURE — 97112 NEUROMUSCULAR REEDUCATION: CPT | Mod: GP

## 2020-11-06 PROCEDURE — 128N000003 HC R&B REHAB

## 2020-11-06 PROCEDURE — 97530 THERAPEUTIC ACTIVITIES: CPT | Mod: GP

## 2020-11-06 PROCEDURE — 250N000013 HC RX MED GY IP 250 OP 250 PS 637: Performed by: PHYSICAL MEDICINE & REHABILITATION

## 2020-11-06 PROCEDURE — 97535 SELF CARE MNGMENT TRAINING: CPT | Mod: GO

## 2020-11-06 PROCEDURE — 250N000013 HC RX MED GY IP 250 OP 250 PS 637: Performed by: PHYSICIAN ASSISTANT

## 2020-11-06 RX ORDER — HYDRALAZINE HYDROCHLORIDE 10 MG/1
10 TABLET, FILM COATED ORAL 2 TIMES DAILY
Qty: 60 TABLET | Refills: 0 | Status: SHIPPED | OUTPATIENT
Start: 2020-11-06 | End: 2022-06-30

## 2020-11-06 RX ORDER — SIMVASTATIN 40 MG
40 TABLET ORAL DAILY
Qty: 30 TABLET | Refills: 0 | Status: SHIPPED | OUTPATIENT
Start: 2020-11-06

## 2020-11-06 RX ORDER — LOSARTAN POTASSIUM 100 MG/1
100 TABLET ORAL DAILY
Qty: 30 TABLET | Refills: 0 | Status: SHIPPED | OUTPATIENT
Start: 2020-11-06 | End: 2022-01-10

## 2020-11-06 RX ORDER — ACETAMINOPHEN 325 MG/1
650 TABLET ORAL EVERY 6 HOURS PRN
Start: 2020-11-06

## 2020-11-06 RX ADMIN — HYDRALAZINE HYDROCHLORIDE 10 MG: 10 TABLET ORAL at 20:35

## 2020-11-06 RX ADMIN — HYDRALAZINE HYDROCHLORIDE 10 MG: 10 TABLET ORAL at 08:06

## 2020-11-06 RX ADMIN — ASPIRIN 81 MG: 81 TABLET ORAL at 08:06

## 2020-11-06 RX ADMIN — LOSARTAN POTASSIUM 100 MG: 100 TABLET, FILM COATED ORAL at 08:06

## 2020-11-06 RX ADMIN — SIMVASTATIN 40 MG: 20 TABLET, FILM COATED ORAL at 20:35

## 2020-11-06 RX ADMIN — RIVAROXABAN 20 MG: 10 TABLET, FILM COATED ORAL at 18:09

## 2020-11-06 ASSESSMENT — MIFFLIN-ST. JEOR: SCORE: 1421.13

## 2020-11-06 NOTE — PROGRESS NOTES
"  VS: BP (!) 140/81   Pulse 78   Temp 96.2  F (35.7  C) (Oral)   Resp 20   Ht 1.702 m (5' 7\")   Wt 76.2 kg (168 lb 1.6 oz)   SpO2 95%   BMI 26.33 kg/m     O2: Room air   Output: Voiding in bathroom    Last BM: Patient reports 11/5, passing gas   Activity: 1 person SBA with gait belt    Skin: Intact, dry scab on left elbow   Pain: Denies pain   CMS: No numbness/tingling. L side hemiparesis.   Dressing: N/A   Diet: Regular, Thin liquid, pills whole   LDA: N/A   Equipment: Walker, shoes, gait belt, Leg brace   Plan: Pt planned to discharge 11/9 to home, continue rehab until full IND passed   Additional Info: MAP successfully initiated and performed 0800 & 1800.  100% accurate.       "

## 2020-11-06 NOTE — PLAN OF CARE
Discharge Planner Post-Acute Rehab PT:      Discharge Plan: Home w/ home health PT. pt has FWW and SEC for discharge home. Expected 11/09.       Precautions: Falls, L toe up orthosis for ambulation     Current Status:  Transfer: SBA with FWW     Gait: SBA with FWW, L toe up orthosis for decreased toe drag     Stairs: 4 x 4 six inch steps with B hands on L rail. MIN cues for safety and sequencing, CGA only.     Balance: 33/45 on [place] 11/1     Assessment: Improving carryover with STS and stairs but requires MIN-MOD cueing when fatigued. Continue to recommend F.T. with wife for safety with mobility and supervision on stairs.       Other Barriers to Discharge (DME, Family Training, etc): Family training, WW, Toe up brace

## 2020-11-06 NOTE — PLAN OF CARE
Discharge Planner Post-Acute Rehab OT:      Discharge Plan: Home with HC OT services 11/9     Precautions: Falls, L sided weakness      Current Status:  ADLs: SBA with UB/LB dressing and clothing management, SBA with toileting, SBA with h/g tasks using FWW. SBA/CGA with toileting. Occasional assist with L foot up brace    Assessment: Overall pt requires SBA with ADLs and mobility with FWW, continues to require reminders for placement of LLE during STS especially in AM. Continues to have impaired FMC of L hand but near baseline d/t previous injuries. Need family training and confirm appropriate DME for discharge Monday     Other Barriers to Discharge (DME, Family Training, etc): Needs family training over the weekend FWW and extended tub bench upon discharge. Pt has raised toilet at home, but will need grab bars.

## 2020-11-06 NOTE — PLAN OF CARE
Pt is alert&oriented x4. VSS, Pt denies chest pain and SOB. Pt also denies numbness and tingling, appetite is well pt eating 100% of meals. No pain Ex soreness in Left foot due to not wearing shoes during pedal exercise in PT. No pain medications given, pt refused. Ambulated to the bathroom with a walker and gait belt for toileting. Will continue plan of care.   Tyler Manuel on 11/5/2020 at 9:06 PM

## 2020-11-06 NOTE — DISCHARGE INSTRUCTIONS
Your risk factors for stroke or TIA (transient ischemic attack):    Your Risk Factors Your Results Normal Ranges   High blood pressure BP Readings from Last 1 Encounters:   11/06/20 (!) 140/81    Less than 120/80   Cholesterol              Total Lab Results   Component Value Date    CHOL 119 10/26/2020      Less than 150    Triglycerides   Lab Results   Component Value Date    TRIG 81 10/26/2020    Less than 150   LDL Lab Results   Component Value Date    LDL 63 10/26/2020       Less than 70   HDL Lab Results   Component Value Date    HDL 40 10/26/2020            Greater than 40 (men)  Greater than 50 (women)   Diabetes No results for input(s): GLC in the last 168 hours. Fasting blood glucose    Smoking/tobacco use  Quit smoking and tobacco   Overweight  Lose 1-2 pounds a week   Lack of exercise  30 minutes moderate activity each day   Other risk factors include carotid (neck) artery disease, atrial fibrillation and stress. You may be on new medicine to treat high blood pressure, cholesterol, diabetes or atrial fibrillation.    Understanding Stroke Booklet given to patient. Please refer to booklet for further information.    Stroke warning signs and symptoms - CALL 911 right away for:  - Sudden numbness or weakness in the face, arm or leg (often on one side of the body).  - Sudden confusion or trouble understanding what is going on.  - Sudden blurred or decreased vision in one or both eyes.  - Sudden trouble speaking, loss of balance, dizziness or problems with coordination.  - Sudden, severe headache for no reason.  - Fainting or seizures.  - Symptoms may go away then come back suddenly.      - Follow up appointments    - Primary Care  You are scheduled to see Dr. Annita Proctor on Tuesday, November 17th at 11:00 AM.    Address  [facility]                          [address] [place] [id number]  Phone   [phone]  Fax                  [phone] -  Follow up with neurology in 4 - 6 weeks.   You are scheduled to see Danielle Ortiz PA-C, on Thursday, December 10th at 10:00 AM.                         Address  [facility]     [address]     [place] [address]  Phone              [phone]    - Follow up with CV Surgery per schedule     Address  [facility]                          [address]  ?                        [id number] [address]                          [place] [address]  Phone   [phone]    ----------------------------------------------------------------------------------------------------    [place] [facility] [place] [facility]  Additional resources and contact information online   [email or web address]  PH: [phone]     Home Health Care:   [facility] Ph: [phone]  One home PT and one home OT visit  Representative from the home care agency will call you after your discharge from acute rehab to schedule the home visit.     ----------------------------------------------------------------------------------------------------

## 2020-11-06 NOTE — PROGRESS NOTES
[facility]   Acute Rehabilitation Unit  Daily progress note  CC:     Stroke Ischemic 01.1 (L) Body Involvement (R) Brain Stroke; lacunar stroke in the Right internal capsule/corona radiata in setting of microvascular disease    S: In good spirits. H/O Emotional 10/30/2020. Some emotionality when talking about home.  Wants to avoid home care due to Covid concerns. Home eval x 1 with OT/PT planned now. O/P thereafter.    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 Wife supportive. Reports sleep not good. Reviewed strategies to help. Added Melatonin.    BP trending down. Hydralazine reduced. Monitor.    ROS: Denies headache,. Nausea, sob, or urgency.    TR held 11/4/2020    Bowel/Bladder: Continent of bowel and bladder.      Psychosocial: Lives in a house with spouse. 5 JOSE, STI (1 flight to top floor, 1 flight to basement). Bedroom/bathroom on the main level with a bath tub/shower combo. Railing on the stair cases. Pt IND w/ ADLs and receives A from wife w/ IADLs. Uses cane to walk at home. Two sons and 1 dtr, multiple grandchildren. Pt tearful thinking about his family. Reported great support from his wife, children and neighbors. Denied financial concerns or substance abuse.      ADLs/IADLs: Pt making slow progress d/t poor carry-over and limited insight. Pt requires SBA to don/doff overhead shirt while seated. Pt requires Min A to thread pants and underwear. Pt requires SBA with FWW to don/doff and and pull up pants while standing. Pt requires SBA to don/doff socks and shoes with LHSH. Pt requires SBA for G/H standing at EOS with FWW. Pt requires CGA with FWW and grab bars and verbal cueing for safe approach with walker and proper positioning to lower body to toilet. HC OT services and will need FT with spouse prior.      Mobility: Slow progress with functional IND. Limited insight into deficits and performance. Decreased consistency with STS when fatigued during session and  "towards end of day. Will consult with wife to determine how close pt is to his PLOF.     -Transfer: SBA with FWW   -Gait: SBA with FWW, decreased L toe drag with addition of L toe up orthosis   -Stairs: 3 x 4 six inch steps with B hands on L rail. Max cues for safety and sequencing, CGA only.  -Balance: 33/45 on Ferguson 11/1      [unfilled]   Scheduled meds    - Medication Assessment Program - Rehab Services   Does not apply See Admin Instructions     aspirin  81 mg Oral Daily     hydrALAZINE  10 mg Oral BID     losartan  100 mg Oral Daily     rivaroxaban ANTICOAGULANT  20 mg Oral Daily with supper     simvastatin  40 mg Oral At Bedtime       PRN meds:  acetaminophen, melatonin, - MEDICATION INSTRUCTIONS -      PHYSICAL EXAM  BP (!) 140/81   Pulse 78   Temp 96.2  F (35.7  C) (Oral)   Resp 20   Ht 1.702 m (5' 7\")   Wt 76.2 kg (168 lb 1.6 oz)   SpO2 95%   BMI 26.33 kg/m    Gen: Alert and oriented  HEENT: EOMI, face symmetric  CV: S1, S2 RRR  Pulm: Clear to auscultation  Abd: Soft, non tender  Ext: Calves non tender  Neuro/MSK: Moves all four, mild dysmetria  And weakness left side  Responds to touch.  Teary at times.    LABS  Last Comprehensive Metabolic Panel:  Sodium   Date Value Ref Range Status   10/29/2020 141 133 - 144 mmol/L Final     Potassium   Date Value Ref Range Status   10/29/2020 4.9 3.4 - 5.3 mmol/L Final     Comment:     Specimen moderately hemolyzed, Potassium may be falsely elevated     Chloride   Date Value Ref Range Status   10/29/2020 108 94 - 109 mmol/L Final     Carbon Dioxide   Date Value Ref Range Status   10/29/2020 30 20 - 32 mmol/L Final     Anion Gap   Date Value Ref Range Status   10/29/2020 3 3 - 14 mmol/L Final     Glucose   Date Value Ref Range Status   10/29/2020 98 70 - 99 mg/dL Final     Urea Nitrogen   Date Value Ref Range Status   10/29/2020 25 7 - 30 mg/dL Final     Creatinine   Date Value Ref Range Status   10/29/2020 0.96 0.66 - 1.25 mg/dL Final     GFR Estimate   Date " Value Ref Range Status   10/29/2020 73 >60 mL/min/[1.73_m2] Final     Comment:     Non  GFR Calc  Starting 12/18/2018, serum creatinine based estimated GFR (eGFR) will be   calculated using the Chronic Kidney Disease Epidemiology Collaboration   (CKD-EPI) equation.       Calcium   Date Value Ref Range Status   10/29/2020 9.0 8.5 - 10.1 mg/dL Final        CBC RESULTS:   Recent Labs   Lab Test 10/29/20  0550   WBC 6.7   RBC 4.54   HGB 14.5   HCT 45.4      MCH 31.9   MCHC 31.9   RDW 12.7           ASSESSMENT AND PLAN    Kamari Valentin is a 80 year old right hand dominant male with Stroke Ischemic 01.1 (L) Body Involvement (R) Brain Stroke; lacunar stroke in the Right internal capsule/corona radiata in setting of microvascular disease     The patient is admitted to [facility] for rehabilitation.  Patient requires an intensive inpatient rehab program to address the following acute impairments:impaired strength, impaired activity tolerance, impaired tone, impaired balance, impaired coordination, impaired judgement and safety awareness, impaired weight shifting and dysphagia        Admission to acute inpatient rehab:    Impairment group code: Stroke Ischemic 01.1 (L) Body Involvement (R) Brain Stroke; lacunar stroke in the Right internal capsule/corona radiata in setting of microvascular disease        1. PT, OT and SLP 60 minutes of each on a daily basis, in addition to rehab nursing and close management of physiatrist.       2. Impairment of ADL's: OT for 60 minutes daily to work on ADL re-training such as grooming, self cares and bathing.       3. Impairment of mobility: PT for 60 minutes daily to work on neuromuscular re-education focusing on strength, balance, coordination, and endurance.       4. Impairment of cognition/language/swallow:  SLP for 60 minutes daily to work on cognitive and linguistic deficits.     5. Rehab RN for med administration, bowel regimen, glucose monitoring and wound  care.       6. Medical Conditions     Lacunar infarct with associated left-sided weakness.   Patient underwent elective angiogram on 10/22 for TAVR work-up and subsequently noted left lower extremity weakness that did not improve.  Resented with difficulty ambulating.  MRI showed recent ischemic stroke involving the posterolateral right thalamus and posterior limb of the right internal capsule.    CT angiogram of the head neck showed approximate 50% luminal diameter stenosis of the origin of the left internal carotid artery and mild narrowing on the right.    Recent carotid ultrasound on 10/22 showed less than 50% stenosis on the right and greater than 70% stenosis on the left internal carotid artery.    Echo 10/12/2020 with EF is 60-65%.  Severe AS, moderate mitral annular calcification. No pericardial effusion.  CK 63, troponin undetectable, TSH 1.56  LDL 63, HDL 40.  Hemoglobin A1c 6.2.  Evaluated by stroke neurology, recommend aspirin 81 mg oral daily in addition to Xarelto.  Continue PTA simvastatin therapy.  Home antihypertensive regimen as below.  PT, OT recommending ARU for rehabilitation.  Discussed with patient's wife in agreement with ARU.     Chronic atrial fibrillation on Xarelto.  Continue PTA Xarelto.  PTA not on rate control medication.  Telemetry monitoring no acute events.     Hypernatremia. Sodium 145 on admission.  Sodium levels normalized.     Coronary artery disease.  Severe aortic stenosis.  Coronary angiogram on 10/22 with CAD, LMCA : Distal 60% eccentric  modeately calcified lesion, TAVR is likely not a good first option given complexity of CAD  CV surgery follows patient for AVR CABG and maze procedure.   Per neurology defer nonemergent surgery to at least 3 months after stroke/TIA.     Hypertension.  Continue PTA losartan [restarted 10/27].  hydralazine dose reduced. BP OK.  Restart PTA clonidine, hydrochlorothiazide gradually if needed, has not.  Must monitor blood pressure readings and  review with provider prior weight to restarting medication.     Hyperlipidemia.  LDL has been well controlled.  Continue home simvastatin.     Prediabetes hemoglobin A1c of 6.2.  Need to consider lifestyle modification with diet and exercise as able to.     7. Adjustment to disability:  Clinical psychology to eval and treat as appropriate.  8. FEN: Regular with thins, low salt, low fat mod carb controlled diet  9. Bowel: Monitor  10. Bladder: Monitor  11. DVT Prophylaxis: On Xarelto  12. GI Prophylaxis: Monitor. May need Pepcid  13. Code: No CPR, DNI  14. Disposition: Home with family  15. ELOS: 11/9/20  16. Rehab prognosis:  Fair   Follow up Appointments on Discharge:      Needs age-appropriate health maintenance on PCP visit,  blood sugars periodically.  Follow-up with neurology in 6 to 8 weeks or earlier if symptomatic.  Follow-up with CV surgery per schedule.       Discharge  Paper work done. Total Time 70 minutes, >50% was for counseling/care coordination.  Chris Melara MD   Physical Medicine & Rehabilitation

## 2020-11-06 NOTE — PROGRESS NOTES
On Wednesday during team rounds, pt and pt wife were notified and agreeable to HHC. As of yesterday, pt preferred OP as he was not comfortable with ppl coming into his home. SWer spoke with pt this morning, discussed recommendation and reason for HHC. Pt was agreeable. Discussed options, pt denied preference and with permission, referral sent to [facility] for RN, PT, OT and HHA (HHA based on pt wife request). Offered to call pt wife to discuss further, pt gave SW permission.     SW called pt wife. Pt wife now also hesitant to do HHC as they want to be safe during pandemic. Discussed HHC vs OP. Also discussed why HHC recommended, per OT. Suggested that we have [facility] PT/OT come out to assess the home and cancel the HHC RN and HHA to eliminate as many ppl coming into the home. Pt wife more agreeable to this. Pt wife prefers only one home eval and no further HHC. Discussed with pt, pt agreeable to this as well. [facility] willing and able to do one home PT and one home OT visit.    Encouraged pt wife to discuss further with therapy during FT on Sunday. SWer plans to follow up with pt and pt wife to confirm discharge plans and to provide IMM prior to discharge. Pt and pt wife agreeable to plan and denied additional needs at this time. Therapy updated.     Pt wife has a apt on Monday 11/09 at 11am, plan to  after.     Home Health Care:   [facility] Ph: [phone]  PT/OT (only one home eval)    Sandra John, MARILOU, [facility] Acute Rehab Unit   Phone: [phone]  I   Pager: [phone]

## 2020-11-06 NOTE — PROGRESS NOTES
"CLINICAL NUTRITION SERVICES - ASSESSMENT NOTE     Nutrition Prescription    RECOMMENDATIONS FOR MDs/PROVIDERS TO ORDER:  None today    Malnutrition Status:    Patient does not meet two of the established criteria necessary for diagnosing malnutrition    Recommendations already ordered by Registered Dietitian (RD):  Diet education    Future/Additional Recommendations:  Monitor intakes and wt  Order snacks per pt request     REASON FOR ASSESSMENT  Kamari García is an 80 year old male assessed by the dietitian for LOS  PMH significant for CVA, HTN, HLD and a-fib admitted for CVA.  NUTRITION HISTORY  Spoke to pt and wife at bedside. Reports very good appetite PTA. Reports \"fine\" appetite at home. Usually eats 3 meals/day and \"lots\" of snacks. UBW is around 173#.     CURRENT NUTRITION ORDERS  Diet: Regular  Intake/Tolerance: Pt stated appetite has been \"too good\" since admit. Consistently eating 100% of most meals per flowsheet Offered snacks however pt declined at this time.     LABS  Labs reviewed:  Results for KAMARI GARCÍA (MRN [id number]) as of 11/6/2020 09:13   Ref. Range 10/29/2020 05:50   Sodium Latest Ref Range: 133 - 144 mmol/L 141   Potassium Latest Ref Range: 3.4 - 5.3 mmol/L 4.9   Chloride Latest Ref Range: 94 - 109 mmol/L 108   Carbon Dioxide Latest Ref Range: 20 - 32 mmol/L 30   Urea Nitrogen Latest Ref Range: 7 - 30 mg/dL 25   Creatinine Latest Ref Range: 0.66 - 1.25 mg/dL 0.96   GFR Estimate Latest Ref Range: >60 mL/min/1.73_m2 73   GFR Estimate If Black Latest Ref Range: >60 mL/min/1.73_m2 84   Calcium Latest Ref Range: 8.5 - 10.1 mg/dL 9.0   Anion Gap Latest Ref Range: 3 - 14 mmol/L 3     MEDICATIONS  Medications reviewed    ANTHROPOMETRICS  Height: 170.2 cm (5' 7\")  Most Recent Weight: 76.2 kg (168 lb 1.6 oz)    IBW: 67.2 kg  BMI: 26.33 kg/m^2, Overweight BMI 25-29.9  Weight History: 4# (2.3%)wt loss since admit and over the last month   Wt Readings from Last 10 Encounters:   11/06/20 76.2 kg " (168 lb 1.6 oz)   11/06/20 76.2 kg (168 lb 1.6 oz)   11/02/20 75.9 kg (167 lb 4.8 oz)   10/28/20 78 kg (172 lb)-admit wt   10/22/20 79.4 kg (175 lb 1.6 oz)   10/15/20 77.8 kg (171 lb 8 oz)   10/12/20 78 kg (172 lb)   08/27/20 81.9 kg (180 lb 9.6 oz)   04/16/20 79.4 kg (175 lb)   07/28/19 83.5 kg (184 lb 3 oz)   04/02/19 83.9 kg (185 lb)   09/27/18 77.1 kg (170 lb)   03/23/18 86.2 kg (190 lb)     Dosing Weight: 76.2 kg (current)    ASSESSED NUTRITION NEEDS  Estimated Energy Needs: 1197-8763 kcals/day (25 - 30 kcals/kg)  Justification: Maintenance  Estimated Protein Needs: 76-91 grams protein/day (1 - 1.2 grams of pro/kg)  Justification: Maintenance  Estimated Fluid Needs: 1298-9611 mL/day (1 mL/kcal)   Justification: Maintenance or Per provider pending fluid status    PHYSICAL FINDINGS  See malnutrition section below.    MALNUTRITION  % Intake: No decreased intake noted  % Weight Loss: Weight loss does not meet criteria  Subcutaneous Fat Loss: None observed (visual)  Muscle Loss: None observed (visual)  Fluid Accumulation/Edema: None noted  Malnutrition Diagnosis: Patient does not meet two of the established criteria necessary for diagnosing malnutrition    NUTRITION DIAGNOSIS  Predicted inadequate protein-energy intake related to variable appetite as evidenced by pt reliant on PO intakes to meet 100% of nutritional needs with potential for variation        INTERVENTIONS  Implementation  Nutrition Education: Discussed role of RD, menu ordering and available snacks/supplements. Discussed calorie/protein needs. Encourage intakes to meet estimated needs.       Goals  Patient to consume % of nutritionally adequate meal trays TID, or the equivalent with supplements/snacks.     Monitoring/Evaluation  Progress toward goals will be monitored and evaluated per protocol.    Praveena Arzate MS, RD, LDN  Unit Pager [phone]

## 2020-11-06 NOTE — PLAN OF CARE
Pt alert and oriented x4. VSS. No complaints of shortness of breath or chest pain. Did have some pain of his bialt fee r/t therapies. Denied any medication. Just wanted to rest. Up with one assist, gait belt and walker. Regular diet, thin liquids. Takes pills whole. Continent of bowel and bladder. LBM: 11/2. Senna given this evening. No abdominal pain. Pt able to make needs known. Will continue with plan of care.     No changes this shift. Pt slept well through the night. Was continent of bladder. Was up to the bathroom with 1 assist x2.

## 2020-11-07 ENCOUNTER — APPOINTMENT (OUTPATIENT)
Dept: OCCUPATIONAL THERAPY | Facility: CLINIC | Age: 80
End: 2020-11-07
Payer: COMMERCIAL

## 2020-11-07 ENCOUNTER — APPOINTMENT (OUTPATIENT)
Dept: PHYSICAL THERAPY | Facility: CLINIC | Age: 80
End: 2020-11-07
Payer: COMMERCIAL

## 2020-11-07 PROCEDURE — 250N000013 HC RX MED GY IP 250 OP 250 PS 637: Performed by: PHYSICAL MEDICINE & REHABILITATION

## 2020-11-07 PROCEDURE — 97535 SELF CARE MNGMENT TRAINING: CPT | Mod: GO | Performed by: OCCUPATIONAL THERAPIST

## 2020-11-07 PROCEDURE — 97530 THERAPEUTIC ACTIVITIES: CPT | Mod: GP

## 2020-11-07 PROCEDURE — 128N000003 HC R&B REHAB

## 2020-11-07 PROCEDURE — 250N000013 HC RX MED GY IP 250 OP 250 PS 637: Performed by: PHYSICIAN ASSISTANT

## 2020-11-07 PROCEDURE — 97110 THERAPEUTIC EXERCISES: CPT | Mod: GP

## 2020-11-07 PROCEDURE — 97530 THERAPEUTIC ACTIVITIES: CPT | Mod: GO | Performed by: OCCUPATIONAL THERAPIST

## 2020-11-07 PROCEDURE — 99231 SBSQ HOSP IP/OBS SF/LOW 25: CPT | Performed by: PHYSICAL MEDICINE & REHABILITATION

## 2020-11-07 RX ADMIN — RIVAROXABAN 20 MG: 10 TABLET, FILM COATED ORAL at 20:30

## 2020-11-07 RX ADMIN — LOSARTAN POTASSIUM 100 MG: 100 TABLET, FILM COATED ORAL at 09:53

## 2020-11-07 RX ADMIN — HYDRALAZINE HYDROCHLORIDE 10 MG: 10 TABLET ORAL at 09:53

## 2020-11-07 RX ADMIN — SIMVASTATIN 40 MG: 20 TABLET, FILM COATED ORAL at 20:33

## 2020-11-07 RX ADMIN — HYDRALAZINE HYDROCHLORIDE 10 MG: 10 TABLET ORAL at 20:31

## 2020-11-07 RX ADMIN — ASPIRIN 81 MG: 81 TABLET ORAL at 09:53

## 2020-11-07 NOTE — PLAN OF CARE
"Discharge Planner Post-Acute Rehab PT:      Discharge Plan: Home w/ home health PT. pt has FWW and SEC for discharge home. Expected 11/09.       Precautions: Falls, L toe up orthosis for ambulation     Current Status:  Transfer: SBA with FWW with inconsistency with foot placement and forward weight shift     Gait: SBA with FWW, L toe up orthosis for decreased toe drag     Stairs: 4 x 4 six inch steps with B hands on L rail close SBA.      Balance: 33/45 on Ferguson 11/1     Assessment:    Pt continues to demonstrate inconsistencies with set up and performance of safe approach, STS, and 6\" stairs. Pt will likely require cueing for STS and stair performance at home. Will discuss with wife tomorrow during family training.           Other Barriers to Discharge (DME, Family Training, etc): Family training, WW, Toe up brace  "

## 2020-11-07 NOTE — PROGRESS NOTES
"  [facility]   Acute Rehabilitation Unit  Daily progress note  Interval history  Kamari was seen and examined at bedside. He was doing well. Pleased with his progress in therapies. He asked very appropriate questions about his meds.       Medications  Scheduled meds    - Medication Assessment Program - Rehab Services   Does not apply See Admin Instructions     aspirin  81 mg Oral Daily     hydrALAZINE  10 mg Oral BID     losartan  100 mg Oral Daily     rivaroxaban ANTICOAGULANT  20 mg Oral Daily with supper     simvastatin  40 mg Oral At Bedtime       PRN meds:  acetaminophen, melatonin, - MEDICATION INSTRUCTIONS -      PHYSICAL EXAM  /64 (BP Location: Left arm)   Pulse 88   Temp 97.5  F (36.4  C) (Oral)   Resp 18   Ht 1.702 m (5' 7\")   Wt 76.2 kg (168 lb 1.6 oz)   SpO2 94%   BMI 26.33 kg/m      Gen: NAD, sitting in chair   CV: RRR  Pulm: clear breath sounds b/l   Abd: Soft, non tender  Ext: no edema in bilateral lower extremities    Neuro/MSK: mild dysarthria but speech was clear and coherent. Mild left roberto.       ASSESSMENT AND PLAN  Kamari Valentin is a 80 year old right hand dominant male with right internal capsule/corona radiata lacunar infarct. Admitted to ARU 10/28.       --Vitals stable. No lab today.     --Continue ongoing medical management. Changed the timing of xarelto to 8pm to match other PM meds per his request.     --Continue therapies and plan of care. SBA for ADLs and mobility; on track for discharge to home on Monday.     Floresita Crabtree MD  Physical Medicine & Rehabilitation     Time Spent on this Encounter   I spent a total of 15 minutes face to face and coordinating care of Kamari Valentin.  Over 50% of my time on the unit was spent counseling the patient and /or coordinating care; see note for details.          "

## 2020-11-07 NOTE — PLAN OF CARE
"FOCUS/GOAL  Medication management and Mobility    ASSESSMENT, INTERVENTIONS AND CONTINUING PLAN FOR GOAL:  Ao1 with walker/gait belt for transfers, continent of B/B today using toilet in bathroom. Continued on MAP, pt did not call for meds this morning; however, when writer in to administer meds, pt reported \"I was just getting ready to call you for my meds, I've only been back from therapy for a few minutes\". Pt easily able to select the correct meds and indicate how many of each to take; pt also reported he spoke with Dr Crabtree re: switching Xarelto time from 6pm to HS, so he only has to take meds twice a day. See MAR for updates. Planning for discharge on Monday, will continue with POC.   "

## 2020-11-07 NOTE — PROGRESS NOTES
Discharge Planner Post-Acute Rehab OT:     Discharge Plan: Home with HC OT services 11/9; pt expressing concern of HC services d/t to Covid    Precautions: Falls, L sided weakness    Current Status:  ADLs: SBA with UB/LB dressing and clothing management, supervision/SBA with toilet transfer, SBA with g/h tasks using FWW. Pt demo'ing occasional LOB with STS however pt aware when not able to perform STS on first trial and will sit down immediately and reposition prior to standing again.   IADLs: To assess at a later date. Will need supervision with IADLs upon discharge; pt spouse able to assist.  Vision/Cognition: Pt wears trifocal glasses and is nearsighted. Pt spouse states current cognitive function is near baseline.     Assessment: Pt progressing towards goals. Pt demo'ing safer approach with toilet transfer compared to previous sessions. Pt benefiting from repetition/rythmic VC for safe approach with functional transfers and side stepping with FWW, which pt must utilize to perform toilet transfer at home. Pt presenting with cognitive deficits and increased tone in LUE impacting ADLs and IADLS, therefore pt would benefit from continued OT services to address these deficits.      Other Barriers to Discharge (DME, Family Training, etc): FWW and extended tub bench upon discharge. Pt has raised toilet at home, but will need grab bars. FT scheduled with spouse tomorrow PM.

## 2020-11-07 NOTE — PLAN OF CARE
A&Ox4. Pt in good mood this shift. Charge nurse helped pt with Bedtime meds- he called for meds appropriately and was able to pick out his meds (pt on MAP). Uses call light appropriately and asks for help to bathroom. Assist of 1 with walker and gait belt. Denies pain, SOB, dizziness, N/V, constipation or diarrhea. LBM 11/6 per pt report. HR irregular- afib. No skin issues other than scab on L elbow. L side weak. Pt slept through out night.

## 2020-11-08 ENCOUNTER — APPOINTMENT (OUTPATIENT)
Dept: PHYSICAL THERAPY | Facility: CLINIC | Age: 80
End: 2020-11-08
Payer: COMMERCIAL

## 2020-11-08 ENCOUNTER — APPOINTMENT (OUTPATIENT)
Dept: OCCUPATIONAL THERAPY | Facility: CLINIC | Age: 80
End: 2020-11-08
Payer: COMMERCIAL

## 2020-11-08 PROCEDURE — 128N000003 HC R&B REHAB

## 2020-11-08 PROCEDURE — 250N000013 HC RX MED GY IP 250 OP 250 PS 637: Performed by: PHYSICAL MEDICINE & REHABILITATION

## 2020-11-08 PROCEDURE — 97116 GAIT TRAINING THERAPY: CPT | Mod: GP

## 2020-11-08 PROCEDURE — 97535 SELF CARE MNGMENT TRAINING: CPT | Mod: GO | Performed by: OCCUPATIONAL THERAPIST

## 2020-11-08 PROCEDURE — 97530 THERAPEUTIC ACTIVITIES: CPT | Mod: GP

## 2020-11-08 PROCEDURE — 250N000013 HC RX MED GY IP 250 OP 250 PS 637: Performed by: PHYSICIAN ASSISTANT

## 2020-11-08 PROCEDURE — 97110 THERAPEUTIC EXERCISES: CPT | Mod: GO | Performed by: OCCUPATIONAL THERAPIST

## 2020-11-08 PROCEDURE — 97110 THERAPEUTIC EXERCISES: CPT | Mod: GP

## 2020-11-08 RX ADMIN — RIVAROXABAN 20 MG: 10 TABLET, FILM COATED ORAL at 19:59

## 2020-11-08 RX ADMIN — ASPIRIN 81 MG: 81 TABLET ORAL at 09:34

## 2020-11-08 RX ADMIN — HYDRALAZINE HYDROCHLORIDE 10 MG: 10 TABLET ORAL at 09:34

## 2020-11-08 RX ADMIN — SIMVASTATIN 40 MG: 20 TABLET, FILM COATED ORAL at 19:59

## 2020-11-08 RX ADMIN — HYDRALAZINE HYDROCHLORIDE 10 MG: 10 TABLET ORAL at 19:59

## 2020-11-08 RX ADMIN — LOSARTAN POTASSIUM 100 MG: 100 TABLET, FILM COATED ORAL at 09:34

## 2020-11-08 NOTE — PLAN OF CARE
FOCUS/GOAL  Psychosocial needs and Safety management    ASSESSMENT, INTERVENTIONS AND CONTINUING PLAN FOR GOAL:    Pt is alert and oriented by 4. No complaints of pain. Continues to be on MAP, call appropriately and was able to explain dosages and times of the medications. Remains an assist of one with a gait belt and walker with all transfers. Pt is being discharged on Monday 11/9.    Continue with plan of care.

## 2020-11-08 NOTE — PROGRESS NOTES
Physical Therapy Discharge Summary    Reason for therapy discharge:    Discharged to home with home therapy.    Progress towards therapy goal(s). See goals on Care Plan in [facility] electronic health record for goal details.  Goals partially met.  Barriers to achieving goals:   cognition/memory impairment..    Therapy recommendation(s):    Continued therapy is recommended.  Rationale/Recommendations:  Patient is now SBA with all mobility using WW. He has intermittent toe drag on L d/t attentional deficit and acute on chronic hemiparesis. He uses walker for balance and safety, but needs cues 15% of the time during mobility for safety. .     Wife was present for family training today. She verbalized confidence with discharge plan and demonstrated competence in cueing patient. States this memory impairment is not new. Recommending HHPT safety eval to optimize home setup to maximize safety. Ongoing OP PT after discharge to maximize LLE function and gait safety.

## 2020-11-08 NOTE — PROGRESS NOTES
Physical Therapy Discharge Summary    Reason for therapy discharge:    Discharged to home with home therapy.    Progress towards therapy goal(s). See goals on Care Plan in [facility] electronic health record for goal details.  Goals partially met.  Barriers to achieving goals:   Cognition/memory impairment.    Therapy recommendation(s):    Continued therapy is recommended.  Rationale/Recommendations:  Skilled home PT is indicated to assist pt in recovery of function with use of FWW in home, reduction of falls risk and reduction of caregiver burdern as well as developing safe strategies for mobility in home given cognitive impairment. .

## 2020-11-08 NOTE — PLAN OF CARE
A&Ox4. Pt in good mood this shift. Slept well through out night. Assist of 1 with walker and gait belt. Denies pain, SOB, dizziness, N/V, constipation or diarrhea. LBM small 11/7 per pt report. HR irregular- afib. No skin issues other than scab on L elbow. L side weak. Plan is to discharge 11/9.

## 2020-11-08 NOTE — PLAN OF CARE
FOCUS/GOAL  Medication management and Mobility    ASSESSMENT, INTERVENTIONS AND CONTINUING PLAN FOR GOAL:  Ao1 with walker/gaitbelt for transfers, up in chair for most of shift with chair alarm on. No attempts at self-transfer noted, uses call light appropriately. Called to request scheduled meds at appropriate time this morning, demonstrates understanding of med regimine. Pt requested to have his toenails trimmed today; writer unable to perform on this shift, reported to oncoming evening shift RN for completion. Planning for discharge tomorrow 11/9, pt reported has already received a flu shot. Will continue with POC.

## 2020-11-08 NOTE — PROGRESS NOTES
Discharge Planner Post-Acute Rehab OT:     Discharge Plan: Home with HC OT services 11/9;    Precautions: Falls, L sided weakness/inattention       Current Status:  ADLs: SBA with UB/LB dressing and clothing management, supervision/SBA with toilet transfer, SBA with g/h tasks using FWW. CGA for tub transfer with extended tub bench with FWW.   IADLs: Will need supervision with IADLs upon discharge; pt spouse able to assist.  Vision/Cognition: Pt wears trifocal glasses and is nearsighted. Pt spouse states current cognitive function is near baseline  Assessment: Pt continues to make slow but steady progress with ADLs. Completed FT with pt and pt spouse educating and simulating toilet transfer, shower transfer, and don/doffing LBD and left foot up. Pt spouse demo'ing safety and ability to assist pt at home. Educated pt and pt spouse on recommendations including supervision with FB dressing, toileting, and bathing and, all IADLs. Both agreeable and pt spouse can provide assistance. Advocated and educated pt and pt spouse on importance of continuing skilled HC OT services prior to progressing to OP at [facility] which pt and spouse are agreeable to.     Other Barriers to Discharge (DME, Family Training, etc):  FWW and extended tub bench and shower/bathroom grab bars, foot up for LLE. Completed FT with pt and pt spouse today.

## 2020-11-09 ENCOUNTER — APPOINTMENT (OUTPATIENT)
Dept: OCCUPATIONAL THERAPY | Facility: CLINIC | Age: 80
End: 2020-11-09
Payer: COMMERCIAL

## 2020-11-09 VITALS
OXYGEN SATURATION: 95 % | SYSTOLIC BLOOD PRESSURE: 118 MMHG | HEIGHT: 67 IN | HEART RATE: 85 BPM | RESPIRATION RATE: 16 BRPM | BODY MASS INDEX: 26.38 KG/M2 | DIASTOLIC BLOOD PRESSURE: 79 MMHG | WEIGHT: 168.1 LBS | TEMPERATURE: 97.9 F

## 2020-11-09 PROCEDURE — 99239 HOSP IP/OBS DSCHRG MGMT >30: CPT | Performed by: PHYSICAL MEDICINE & REHABILITATION

## 2020-11-09 PROCEDURE — 250N000013 HC RX MED GY IP 250 OP 250 PS 637: Performed by: PHYSICAL MEDICINE & REHABILITATION

## 2020-11-09 PROCEDURE — 250N000013 HC RX MED GY IP 250 OP 250 PS 637: Performed by: PHYSICIAN ASSISTANT

## 2020-11-09 PROCEDURE — 97535 SELF CARE MNGMENT TRAINING: CPT | Mod: GO | Performed by: OCCUPATIONAL THERAPIST

## 2020-11-09 RX ADMIN — HYDRALAZINE HYDROCHLORIDE 10 MG: 10 TABLET ORAL at 08:18

## 2020-11-09 RX ADMIN — LOSARTAN POTASSIUM 100 MG: 100 TABLET, FILM COATED ORAL at 08:18

## 2020-11-09 RX ADMIN — ASPIRIN 81 MG: 81 TABLET ORAL at 08:18

## 2020-11-09 NOTE — DISCHARGE SUMMARY
[facility]   Acute Rehabilitation Unit  Discharge summary     Date of Admission: 10/28/2020  Date of Discharge: 11/9/2020   Disposition: Home with family  Primary Care Physician: Gus Lanier  Attending physician: Chris Melara MD  Other significant physician provider(s): Psychologist: Bonnie Dent, PhD LP      discharge diagnosis    Stroke Ischemic 01.1 (L) Body Involvement (R) Brain Stroke; lacunar stroke in the Right internal capsule/corona radiata in setting of microvascular disease        brief summary  Kamari Valentin is an 81 y/o male with past medical history of stroke with mild residual left sided weakness, HTN, HLD, a-fib on Xarelto who admitted to the hospital for worsened L sided weakness since returning home from angiogram last week. He was at angiogram procedure in prep for planned upcoming TAVR procedure and was noted to have a L facial droop, however no other neuro signs noted. He was discharged via WC to his car. Upon returning home, he had increased difficulty with L sided weakness. He called his surgeon team who said to monitor it and come in if it does not improve. L sided weakness persisted and he did have a fall at home, and so he presented for evaluation. MRI shows a subacute lacunar stroke in the R internal capsule/corona radiata in the setting of microvascular disease.      During his acute hospitalization, patient was seen and evaluated by  PT, OT, SLP service.  All specialties collectively recommended that patient would benefit from ongoing therapies in the acute inpatient rehabilitation setting.      The patient initially required SLP services for mild dysphagia, however currently has been upgraded to regular diet with thin liquids. Anticipate will need SLP only for diet tolerance follow up, and also will benefit from a full cognitive/linguistic assessment as the pt is noted to not use FWW . Currently the patient requires Min A for  bed mobility, transfers, and gait x up to 200 feet. Pt requires Min A with any task involving the L UE due to weakness including LB dressing, LB bathing. The patient also is noted to have difficulty with safety with FWW, and possible new cognitive deficits requiring a full cognitive/linguistic assessment at [facility].       rehabilitaiton course  Discharged to home with home therapy.     Progress towards therapy goal(s). See goals on Care Plan in [facility] electronic health record for goal details.  Goals partially met.  Barriers to achieving goals:   cognition/memory impairment..     Therapy recommendation(s):    Continued therapy is recommended.  Rationale/Recommendations:  Patient is now SBA with all mobility using WW. He has intermittent toe drag on L d/t attentional deficit and acute on chronic hemiparesis. He uses walker for balance and safety, but needs cues 15% of the time during mobility for safety. .      Wife was present for family training today. She verbalized confidence with discharge plan and demonstrated competence in cueing patient. States this memory impairment is not new. Recommending HHPT safety eval to optimize home setup to maximize safety. Ongoing OP PT after discharge to maximize LLE function and gait safety.    Discharge Plan: Home with HC OT services 11/9;     Precautions: Falls, L sided weakness/inattention        Current Status:  ADLs: SBA with UB/LB dressing and clothing management, supervision/SBA with toilet transfer, SBA with g/h tasks using FWW. CGA for tub transfer with extended tub bench with FWW.   IADLs: Will need supervision with IADLs upon discharge; pt spouse able to assist.  Vision/Cognition: Pt wears trifocal glasses and is nearsighted. Pt spouse states current cognitive function is near baseline  Assessment: Pt continues to make slow but steady progress with ADLs. Completed FT with pt and pt spouse educating and simulating toilet transfer, shower transfer, and don/doffing LBD and left  foot up. Pt spouse demo'ing safety and ability to assist pt at home. Educated pt and pt spouse on recommendations including supervision with FB dressing, toileting, and bathing and, all IADLs. Both agreeable and pt spouse can provide assistance. Advocated and educated pt and pt spouse on importance of continuing skilled HC OT services prior to progressing to OP at [facility] which pt and spouse are agreeable to.      Other Barriers to Discharge (DME, Family Training, etc):  FWW and extended tub bench and shower/bathroom grab bars, foot up for LLE. Completed FT with pt and pt spouse today.        Reason for therapy discharge:    All goals and outcomes met, no further needs identified.     Progress towards therapy goal(s). See goals on Care Plan in [facility] electronic health record for goal details.  Goals met     Therapy recommendation(s):    No further therapy is recommended. Patient currently tolerating a regular diet and tin liquids.  mEDICAL COURSE     Lacunar infarct with associated left-sided weakness.   Patient underwent elective angiogram on 10/22 for TAVR work-up and subsequently noted left lower extremity weakness that did not improve.  Resented with difficulty ambulating.  MRI showed recent ischemic stroke involving the posterolateral right thalamus and posterior limb of the right internal capsule.    CT angiogram of the head neck showed approximate 50% luminal diameter stenosis of the origin of the left internal carotid artery and mild narrowing on the right.    Recent carotid ultrasound on 10/22 showed less than 50% stenosis on the right and greater than 70% stenosis on the left internal carotid artery.    Echo 10/12/2020 with EF is 60-65%.  Severe AS, moderate mitral annular calcification. No pericardial effusion.  CK 63, troponin undetectable, TSH 1.56  LDL 63, HDL 40.  Hemoglobin A1c 6.2.  Evaluated by stroke neurology, recommend aspirin 81 mg oral daily in addition to Xarelto.  Continue PTA simvastatin  therapy.  Home antihypertensive regimen as below.  PT, OT recommending ARU for rehabilitation.  Discussed with patient's wife in agreement with ARU.     Chronic atrial fibrillation on Xarelto.  Continue PTA Xarelto.  PTA not on rate control medication.  Telemetry monitoring no acute events.     Hypernatremia. Sodium 145 on admission.  Sodium levels normalized.     Coronary artery disease.  Severe aortic stenosis.  Coronary angiogram on 10/22 with CAD, LMCA : Distal 60% eccentric  modeately calcified lesion, TAVR is likely not a good first option given complexity of CAD  CV surgery follows patient for AVR CABG and maze procedure.   Per neurology defer nonemergent surgery to at least 3 months after stroke/TIA.     Hypertension.  Continue PTA losartan [restarted 10/27].  hydralazine dose reduced. BP OK.  Restart PTA clonidine, hydrochlorothiazide gradually if needed, has not.  Must monitor blood pressure readings and review with provider prior weight to restarting medication.     Hyperlipidemia.  LDL has been well controlled.  Continue home simvastatin.     Prediabetes hemoglobin A1c of 6.2.  Need to consider lifestyle modification with diet and exercise as able to.     7. Adjustment to disability:  Clinical psychology to eval and treat as appropriate.  8. FEN: Regular with thins, low salt, low fat mod carb controlled diet  9. Bowel: Monitor  10. Bladder: Monitor  11. DVT Prophylaxis: On Xarelto  12. GI Prophylaxis: Monitor. May need Pepcid  13. Code: No CPR, DNI  14. Disposition: Home with family  15. ELOS: 11/9/20  16. Rehab prognosis:  Fair       dISCHARGE MEDICATIONS  Current Discharge Medication List      START taking these medications    Details   acetaminophen (TYLENOL) 325 MG tablet Take 2 tablets (650 mg) by mouth every 6 hours as needed for mild pain or fever (> 101 F)  Qty:      Associated Diagnoses: Cerebrovascular accident (CVA), unspecified mechanism (H)         CONTINUE these medications which have  CHANGED    Details   aspirin (ASA) 81 MG EC tablet Take 1 tablet (81 mg) by mouth daily  Qty: 30 tablet, Refills: 0    Associated Diagnoses: Cerebrovascular accident (CVA), unspecified mechanism (H)      hydrALAZINE (APRESOLINE) 10 MG tablet Take 1 tablet (10 mg) by mouth 2 times daily  Qty: 60 tablet, Refills: 0    Associated Diagnoses: Cerebrovascular accident (CVA), unspecified mechanism (H)      losartan (COZAAR) 100 MG tablet Take 1 tablet (100 mg) by mouth daily  Qty: 30 tablet, Refills: 0    Associated Diagnoses: Essential hypertension      rivaroxaban ANTICOAGULANT (XARELTO) 20 MG TABS tablet Take 1 tablet (20 mg) by mouth daily (with dinner)  Qty: 30 tablet, Refills: 0    Associated Diagnoses: Cerebrovascular accident (CVA), unspecified mechanism (H)      simvastatin (ZOCOR) 40 MG tablet Take 1 tablet (40 mg) by mouth daily  Qty: 30 tablet, Refills: 0    Associated Diagnoses: Cerebrovascular accident (CVA), unspecified mechanism (H)         STOP taking these medications       cloNIDine (CATAPRES) 0.1 MG tablet Comments:   Reason for Stopping:         hydrochlorothiazide (HYDRODIURIL) 25 MG tablet Comments:   Reason for Stopping:                 DISCHARGE INSTRUCTIONS AND FOLLOW UP  Discharge Procedure Orders   Home Care PT Referral for Hospital Discharge   Referral Priority: Routine Referral Type: Home Health Therapies & Aides   Number of Visits Requested: 1     Home Care OT Referral for Hospital Discharge   Referral Priority: Routine   Number of Visits Requested: 1     Physical Therapy Referral   Standing Status: Future   Referral Priority: Routine Referral Type: Rehab Therapy Physical Therapy   Number of Visits Requested: 1     Occupational Therapy Referral   Standing Status: Future   Referral Priority: Routine Referral Type: Occupational Therapy   Number of Visits Requested: 1     Physical Therapy Referral   Standing Status: Future   Referral Priority: Routine Referral Type: Rehab Therapy Physical  Therapy   Number of Visits Requested: 1     Occupational Therapy Referral   Standing Status: Future   Referral Priority: Routine Referral Type: Occupational Therapy   Number of Visits Requested: 1     Reason for your hospital stay   Order Comments: Stroke resulting weakness and impaired function     Activity   Order Comments: Your activity upon discharge: activity as tolerated     Order Specific Question Answer Comments   Is discharge order? Yes      Monitor and record   Order Comments: blood pressure daily  pulse daily     MD face to face encounter   Order Comments: Documentation of Face to Face and Certification for Home Health Services    I certify that patient: Kamari Valentin is under my care and that I, or a nurse practitioner or physician's assistant working with me, had a face-to-face encounter that meets the physician face-to-face encounter requirements with this patient on: 11/6/2020.    This encounter with the patient was in whole, or in part, for the following medical condition, which is the primary reason for home health care: Stroke.    I certify that, based on my findings, the following services are medically necessary home health services: Occupational Therapy and Physical Therapy.    My clinical findings support the need for the above services because: Occupational Therapy Services are needed to assess and treat cognitive ability and address ADL safety due to impairment in function. and Physical Therapy Services are needed to assess and treat the following functional impairments: gait and mobility.    Further, I certify that my clinical findings support that this patient is homebound (i.e. absences from home require considerable and taxing effort and are for medical reasons or Hindu services or infrequently or of short duration when for other reasons) because: Requires assistance of another person or specialized equipment to access medical services because patient:  Home safety evaluation  required.     Based on the above findings. I certify that this patient is confined to the home and needs intermittent skilled nursing care, physical therapy and/or speech therapy.  The patient is under my care, and I have initiated the establishment of the plan of care.  This patient will be followed by a physician who will periodically review the plan of care.  Physician/Provider to provide follow up care: Gus Lanier    Attending hospital physician (the Medicare certified [place] provider): Chris Melara MD  Physician Signature: See electronic signature associated with these discharge orders.  Date: 11/6/2020     Follow Up   Order Comments: PMD in 1 week  PM&R in 8 weeks  Neurology: in 4 weeks  CV Surgery as per their advice     No CPR- Do NOT Intubate     Order Specific Question Answer Comments   Code status determined by: Other (please document)      Diet   Order Comments: Follow this diet upon discharge: Orders Placed This Encounter      Room Service      Combination Diet Regular Diet Adult     Order Specific Question Answer Comments   Is discharge order? Yes           physical examination    Most recent Vital Signs:   Vitals:    11/08/20 0934 11/08/20 1646 11/08/20 1959 11/09/20 0818   BP: 130/67 119/63 125/69 118/79   BP Location:  Right arm  Left arm   Pulse:  80  85   Resp:  18  16   Temp:  97.5  F (36.4  C)  97.9  F (36.6  C)   TempSrc:  Oral  Oral   SpO2:  95%  95%   Weight:       Height:           Gen: Alert and oriented  HEENT: EOMI, face symmetric  CV: S1, S2 RRR  Pulm: Clear to auscultation  Abd: Soft, non tender  Ext: Calves non tender  Neuro/MSK: Moves all four, mild dysmetria  And weakness left side  Responds to touch.  Teary at times.    Discharge summary was forwarded to Gus Lanier (PCP) at the time of discharge, so as to bridge from hospital to outpatient care.     It was our pleasure to care for Kamari Valentin during this hospitalization. Please do not hesitate to contact me  should there be questions regarding the hospital course or discharge plan.        Chris Melara MD   Physical Medicine & Rehabilitation      I, Chris Melara MD, saw and evaluated this patient prior to discharge. 35 minutes spent in discharge, including >50% in counseling and coordination of care, medication review and plan of care recommended on follow up.

## 2020-11-09 NOTE — PLAN OF CARE
FOCUS/GOAL  Bladder management, Psychosocial needs, and Safety management    ASSESSMENT, INTERVENTIONS AND CONTINUING PLAN FOR GOAL:    Pt is alert and oriented by 4. No complaints of pain. Remains an assist of one with a walker and gait belt. Discharging tomorrow. Continue with plan of care.

## 2020-11-09 NOTE — PLAN OF CARE
Occupational Therapy Discharge Summary    Reason for therapy discharge:    Discharged to home with home therapy.    Progress towards therapy goal(s). See [facility] for goal details.  Goals partially met.  Barriers to achieving goals:   Pt demonstrating cognitive deficits impacting safety with IND in ADL and IADL. Pt still requiring supervision/assistance with all ADL and IADL    Therapy recommendation(s):    Continued therapy is recommended.  Rationale/Recommendations:  Recommending pt continues with skilled HC OT services to assess safety and set up of home environment specifically bathroom, continue to increase IND and safety in all ADL and IADLS, assess functional cognition, and improve LUE FMC d/t increased tone.     Pt requiring supervision for G/H tasks standing at EOS with FWW. Pt requiring supervision to don/doff overhead shirt while seated. Pt requiring SBA to min A to don/doff underwear and pants while seated and standing with FWW d/t at times needing assistance with threading LLE. Pt requiring supervision to don/doff socks and shoes with utilization of LHSH while seated. Pt requiring SBA/Min A to don/doff foot up. Pt able to tie shoes when seated. Pt requiring SBA for toilet transfer with FWW. Pt requires SBA with tub transfers utilizing extended tub bench. Recommending AM/PM cares, SBA with toileting and assistance with bathing upon discharge. Recommending assistance with all IADLs upon discharge d/t cognitive deficits. Recommending continued skilled HC OT services to assess safety and IND in the home environment specifically the set up of the bathroom, further assess functional cognition, improve LUE FMC,and guide return to driving. Recommending spouse provide assistance with transportation at this time.

## 2020-11-09 NOTE — PROGRESS NOTES
"OT: Pt participated in the established \"Transitions Group\" for stroke pts. Highlighting topics such as return to meaningful activities, rehab course, neuroplasticity, follow up therapy, fall prevention, health/wellness, fatigue, depression/anxiety, bowel/bladder considerations w/ community re-integration and caregiver wellness/support. Pt very receptive to class. Pt reports personal goals after discharge are to increase function of L side of body and return to driving.  "

## 2020-11-09 NOTE — PROGRESS NOTES
Followed up with pt wife Bonnie this morning, as planned. Bonnie stated that FT yesterday went well and she feels comfortable with discharge plan. Still in agreement for one PT and one OT eval at home and then transition to OP. Pt wife [facility] to coordinate [facility]. [facility] updated and pt wife cell number added to face sheet. Pt wife denied additional needs.     Met with pt at bedside. Pt signed IMM. Denied additional needs at this time. Aware and agreeable to plan.     Home Health Care:   [facility] Ph: [phone]  1-PT and 1-OT    Sandra John, MARILOU, [facility]   Phone: [phone]  I   Pager: [phone]

## 2020-11-09 NOTE — PLAN OF CARE
FOCUS/GOAL  Bowel management, Bladder management, Pain management, and Mobility    ASSESSMENT, INTERVENTIONS AND CONTINUING PLAN FOR GOAL    Patient was alert and oriented x 4. He's calm, pleasant at staff. He's able to make his needs known. Used call light appropriately. Denied pain. Used the bathroom and was continent with both bladder and bowel.  Ambulated with a walker with assist of 1 staff (gait belt was used.) order for discharge in place. Discharge instructions reviewed to both patient and his wife. They both expressed understanding and no questions were asked. Patient was discharged with his belongings and prescribed medications. Assisted to the car with 1 staff.

## 2020-11-09 NOTE — PLAN OF CARE
A&Ox4. Pt in good mood this shift. Slept well through out night. Assist of 1 with walker and gait belt. Denies pain, SOB, dizziness, N/V, constipation or diarrhea. LBM small 11/8 per pt report. No skin issues other than scab on L elbow. L side weak. Plan is to discharge 11/9. Around [id number] pt told writer that he asked to get toe nails cut 11/8 and nobody cut them for him. Writer was not informed about this request and apologized. Asked pt if he would like them cut now, or if he wants to go back to sleep and have them done later in AM sometime before discharge. He said he would rather have them done in AM before discharge. Pt went back to sleep- will pass on to AM nurse to make sure this is done. Pt lying in bed in lowest position, bed locked, alarms on, call light within reach. Continue POC.

## 2020-11-10 ENCOUNTER — APPOINTMENT (OUTPATIENT)
Dept: CARE COORDINATION | Facility: CLINIC | Age: 80
End: 2020-11-10
Payer: COMMERCIAL

## 2020-11-10 ENCOUNTER — PATIENT OUTREACH (OUTPATIENT)
Dept: CARE COORDINATION | Facility: CLINIC | Age: 80
End: 2020-11-10

## 2020-11-10 DIAGNOSIS — I63.9 CEREBRAL VASCULAR ACCIDENT (H): Primary | ICD-10-CM

## 2020-11-10 NOTE — PROGRESS NOTES
Clinic Care Coordination Contact    Clinic Care Coordination Contact  OUTREACH    Referral Information: ED d/c            Chief Complaint   Patient presents with     Clinic Care Coordination - Initial        Universal Utilization: None  Clinic Utilization  Difficulty keeping appointments:: No  Compliance Concerns: No  No-Show Concerns: No  No PCP office visit in Past Year: No  Utilization    Last refreshed: 11/9/2020  5:31 PM: Hospital Admissions 3           Last refreshed: 11/9/2020  5:31 PM: ED Visits 1           Last refreshed: 11/9/2020  5:31 PM: No Show Count (past year) 1              Current as of: 11/9/2020  5:31 PM              Clinical Concerns:  Current Medical Concerns:  Recent stroke ischemic, L, body involvement, R brain stroke, lacunar stroke in the right internal capsule/corona radiata in setting of microvascular diseases    Current Behavioral Concerns: none    Education Provided to patient: none   Pain  Pain (GOAL):: No  Health Maintenance Reviewed: Due/Overdue Due  Clinical Pathway: None    Medication Management:  Discontinue medicatins include: Acetaminophen 325 mg 2 tabs,po q 6 hours, aspirin 81 mg, hydraalazine 10mg 1 tab po 2 times daily, losartan 1oomg , rivaroxaban 20mg I tab every day,  Simvastatin 40 mg po every day,    Functional Status: Feeling well       Living Situation: Lives with wife       Lifestyle & Psychosocial Needs: Retired, lives with wife in [place].        Diet:: Regular  Inadequate nutrition (GOAL):: No  Tube Feeding: No  Inadequate activity/exercise (GOAL):: No  Significant changes in sleep pattern (GOAL): No  Transportation means:: Regular car         Socioeconomic History     Marital status:      Spouse name: Not on file     Number of children: Not on file     Years of education: Not on file     Highest education level: Not on file     Tobacco Use     Smoking status: Former Smoker     Years: 20.00     Types: Cigars, Pipe     Quit date: 9/1/2010     Years since  quitting: 10.2     Smokeless tobacco: Never Used   Substance and Sexual Activity     Alcohol use: No     Alcohol/week: 0.0 standard drinks     Frequency: Never     Drug use: No               Resources and Interventions:  Current Resources:                )   )               Goals:       Patient/Caregiver understanding: Yes       Future Appointments              In 2 weeks Rand Rice OT [facility]    In 2 weeks Praveena Andersen PT [facility]          Plan: [facility] to request PCP to call to schedule routine maintenance check up and [facility] will follow up with pt. In two days to ensure appt. Has been made..

## 2020-11-12 ENCOUNTER — PATIENT OUTREACH (OUTPATIENT)
Dept: CARE COORDINATION | Facility: CLINIC | Age: 80
End: 2020-11-12

## 2020-11-12 NOTE — PROGRESS NOTES
Clinic Care Coordination Contact    Situation: Patient chart reviewed by care coordinator.    [facility]9 with a discharge diagnosis of   Stroke Ischemic 01.1 (L) Body Involvement (R) Brain Stroke; lacunar stroke in the Right internal capsule/corona radiata in setting of microvascular disease.     Follow up with PCP recommended for Wellness check    Assessment: chart review at [facility] noted a follow up appt for 11/17    Plan/Recommendations: Kamari to attend appt on 11/17, [facility] to outreach in one week.

## 2020-12-10 ENCOUNTER — TRANSFERRED RECORDS (OUTPATIENT)
Dept: HEALTH INFORMATION MANAGEMENT | Facility: CLINIC | Age: 80
End: 2020-12-10

## 2020-12-10 ENCOUNTER — MEDICAL CORRESPONDENCE (OUTPATIENT)
Dept: HEALTH INFORMATION MANAGEMENT | Facility: CLINIC | Age: 80
End: 2020-12-10

## 2020-12-22 ENCOUNTER — TELEPHONE (OUTPATIENT)
Dept: OTHER | Facility: CLINIC | Age: 80
End: 2020-12-22

## 2020-12-22 DIAGNOSIS — I65.23 CAROTID STENOSIS, ASYMPTOMATIC, BILATERAL: Primary | ICD-10-CM

## 2020-12-28 NOTE — TELEPHONE ENCOUNTER
Pt is due for 1 yr follow up for carotids. However just was in ER on 10/2020 with CVA and following neuro and cardiovascular.    10/26/20 CTA head/neck results reviewed.     Discussed with Dr. Lagunas and verified pt does not need to see or follow up with vascular surgery.     Recall not entered.     MONA KennyN, RN    [facility]  Office:  [phone] Fax: [phone]

## 2021-03-09 ENCOUNTER — TRANSFERRED RECORDS (OUTPATIENT)
Dept: HEALTH INFORMATION MANAGEMENT | Facility: CLINIC | Age: 81
End: 2021-03-09
Payer: COMMERCIAL

## 2021-05-12 ENCOUNTER — TELEPHONE (OUTPATIENT)
Dept: CARDIOLOGY | Facility: CLINIC | Age: 81
End: 2021-05-12

## 2021-05-12 NOTE — TELEPHONE ENCOUNTER
Patient due for follow-up. Has not been seen by cardiology after stroke, acute rehab stay. Left message on home phone requesting call back.    Tiffani Roman RN  [facility]

## 2021-05-17 NOTE — TELEPHONE ENCOUNTER
"Spoke with Gene regarding follow-up. He is unhappy with the [facility] system, had a stroke post-angiogram and felt he was being rushed into open heart surgery. He is frustrated to be \"back in stroke recovery mode\" as he has previously had a stroke and felt he recovered well. He has not re-established with a cardiologist after his stroke, but is seeing his primary every 2-3 months, working on blood pressure control. I recommended he see a cardiologist, and he stated he would ask his primary for recommendations but unsure if he would have open heart surgery. He appreciated my call, outstanding orders removed as patient will be transferring care.    Tiffani Roman RN  [facility]  "

## 2022-01-10 ENCOUNTER — HOSPITAL ENCOUNTER (OUTPATIENT)
Facility: CLINIC | Age: 82
Setting detail: OBSERVATION
Discharge: HOME | End: 2022-01-11
Attending: EMERGENCY MEDICINE | Admitting: INTERNAL MEDICINE
Payer: COMMERCIAL

## 2022-01-10 ENCOUNTER — APPOINTMENT (OUTPATIENT)
Dept: MRI IMAGING | Facility: CLINIC | Age: 82
End: 2022-01-10
Attending: PSYCHIATRY & NEUROLOGY
Payer: COMMERCIAL

## 2022-01-10 ENCOUNTER — APPOINTMENT (OUTPATIENT)
Dept: CT IMAGING | Facility: CLINIC | Age: 82
End: 2022-01-10
Attending: EMERGENCY MEDICINE
Payer: COMMERCIAL

## 2022-01-10 DIAGNOSIS — R42 DIZZINESS: ICD-10-CM

## 2022-01-10 DIAGNOSIS — G81.90 HEMIPARESIS, UNSPECIFIED HEMIPARESIS ETIOLOGY, UNSPECIFIED LATERALITY (H): ICD-10-CM

## 2022-01-10 DIAGNOSIS — Z86.73 HISTORY OF STROKE: ICD-10-CM

## 2022-01-10 LAB
ANION GAP SERPL CALCULATED.3IONS-SCNC: 7 MMOL/L (ref 3–14)
APTT PPP: 32 SECONDS (ref 22–38)
ATRIAL RATE - MUSE: 65 BPM
BASOPHILS # BLD AUTO: 0 10E3/UL (ref 0–0.2)
BASOPHILS NFR BLD AUTO: 0 %
BUN SERPL-MCNC: 16 MG/DL (ref 7–30)
CALCIUM SERPL-MCNC: 8.1 MG/DL (ref 8.5–10.1)
CHLORIDE BLD-SCNC: 104 MMOL/L (ref 94–109)
CO2 SERPL-SCNC: 22 MMOL/L (ref 20–32)
CREAT SERPL-MCNC: 0.74 MG/DL (ref 0.66–1.25)
DIASTOLIC BLOOD PRESSURE - MUSE: NORMAL MMHG
EOSINOPHIL # BLD AUTO: 0.1 10E3/UL (ref 0–0.7)
EOSINOPHIL NFR BLD AUTO: 2 %
ERYTHROCYTE [DISTWIDTH] IN BLOOD BY AUTOMATED COUNT: 12.5 % (ref 10–15)
GFR SERPL CREATININE-BSD FRML MDRD: 90 ML/MIN/1.73M2
GLUCOSE BLD-MCNC: 91 MG/DL (ref 70–99)
GLUCOSE BLDC GLUCOMTR-MCNC: 91 MG/DL (ref 70–99)
HCT VFR BLD AUTO: 42.8 % (ref 40–53)
HGB BLD-MCNC: 13.7 G/DL (ref 13.3–17.7)
HOLD SPECIMEN: NORMAL
IMM GRANULOCYTES # BLD: 0 10E3/UL
IMM GRANULOCYTES NFR BLD: 0 %
INR PPP: 1.27 (ref 0.85–1.15)
INTERPRETATION ECG - MUSE: NORMAL
LYMPHOCYTES # BLD AUTO: 1.3 10E3/UL (ref 0.8–5.3)
LYMPHOCYTES NFR BLD AUTO: 18 %
MCH RBC QN AUTO: 31.5 PG (ref 26.5–33)
MCHC RBC AUTO-ENTMCNC: 32 G/DL (ref 31.5–36.5)
MCV RBC AUTO: 98 FL (ref 78–100)
MONOCYTES # BLD AUTO: 0.6 10E3/UL (ref 0–1.3)
MONOCYTES NFR BLD AUTO: 9 %
NEUTROPHILS # BLD AUTO: 5.3 10E3/UL (ref 1.6–8.3)
NEUTROPHILS NFR BLD AUTO: 71 %
NRBC # BLD AUTO: 0 10E3/UL
NRBC BLD AUTO-RTO: 0 /100
P AXIS - MUSE: NORMAL DEGREES
PLATELET # BLD AUTO: 199 10E3/UL (ref 150–450)
POTASSIUM BLD-SCNC: 4.3 MMOL/L (ref 3.4–5.3)
PR INTERVAL - MUSE: NORMAL MS
QRS DURATION - MUSE: 92 MS
QT - MUSE: 426 MS
QTC - MUSE: 446 MS
R AXIS - MUSE: -17 DEGREES
RBC # BLD AUTO: 4.35 10E6/UL (ref 4.4–5.9)
SARS-COV-2 RNA RESP QL NAA+PROBE: NEGATIVE
SODIUM SERPL-SCNC: 133 MMOL/L (ref 133–144)
SYSTOLIC BLOOD PRESSURE - MUSE: NORMAL MMHG
T AXIS - MUSE: 75 DEGREES
TROPONIN I SERPL HS-MCNC: 25 NG/L
VENTRICULAR RATE- MUSE: 66 BPM
WBC # BLD AUTO: 7.4 10E3/UL (ref 4–11)

## 2022-01-10 PROCEDURE — A9585 GADOBUTROL INJECTION: HCPCS | Performed by: EMERGENCY MEDICINE

## 2022-01-10 PROCEDURE — 250N000011 HC RX IP 250 OP 636: Performed by: EMERGENCY MEDICINE

## 2022-01-10 PROCEDURE — 70496 CT ANGIOGRAPHY HEAD: CPT

## 2022-01-10 PROCEDURE — 85610 PROTHROMBIN TIME: CPT | Performed by: EMERGENCY MEDICINE

## 2022-01-10 PROCEDURE — G0378 HOSPITAL OBSERVATION PER HR: HCPCS

## 2022-01-10 PROCEDURE — C9803 HOPD COVID-19 SPEC COLLECT: HCPCS

## 2022-01-10 PROCEDURE — 99285 EMERGENCY DEPT VISIT HI MDM: CPT | Mod: 25

## 2022-01-10 PROCEDURE — 87635 SARS-COV-2 COVID-19 AMP PRB: CPT | Performed by: EMERGENCY MEDICINE

## 2022-01-10 PROCEDURE — 85025 COMPLETE CBC W/AUTO DIFF WBC: CPT | Performed by: EMERGENCY MEDICINE

## 2022-01-10 PROCEDURE — 83735 ASSAY OF MAGNESIUM: CPT | Performed by: INTERNAL MEDICINE

## 2022-01-10 PROCEDURE — 70498 CT ANGIOGRAPHY NECK: CPT

## 2022-01-10 PROCEDURE — 80061 LIPID PANEL: CPT | Performed by: INTERNAL MEDICINE

## 2022-01-10 PROCEDURE — G0427 INPT/ED TELECONSULT70: HCPCS | Mod: G0 | Performed by: PSYCHIATRY & NEUROLOGY

## 2022-01-10 PROCEDURE — 250N000009 HC RX 250: Performed by: EMERGENCY MEDICINE

## 2022-01-10 PROCEDURE — 93005 ELECTROCARDIOGRAM TRACING: CPT

## 2022-01-10 PROCEDURE — 82310 ASSAY OF CALCIUM: CPT | Performed by: EMERGENCY MEDICINE

## 2022-01-10 PROCEDURE — 85730 THROMBOPLASTIN TIME PARTIAL: CPT | Performed by: EMERGENCY MEDICINE

## 2022-01-10 PROCEDURE — 70553 MRI BRAIN STEM W/O & W/DYE: CPT

## 2022-01-10 PROCEDURE — 70450 CT HEAD/BRAIN W/O DYE: CPT

## 2022-01-10 PROCEDURE — 36415 COLL VENOUS BLD VENIPUNCTURE: CPT | Performed by: EMERGENCY MEDICINE

## 2022-01-10 PROCEDURE — 99220 PR INITIAL OBSERVATION CARE,LEVEL III: CPT | Performed by: INTERNAL MEDICINE

## 2022-01-10 PROCEDURE — 255N000002 HC RX 255 OP 636: Performed by: EMERGENCY MEDICINE

## 2022-01-10 PROCEDURE — 84484 ASSAY OF TROPONIN QUANT: CPT | Performed by: EMERGENCY MEDICINE

## 2022-01-10 PROCEDURE — 83036 HEMOGLOBIN GLYCOSYLATED A1C: CPT | Performed by: INTERNAL MEDICINE

## 2022-01-10 RX ORDER — LOSARTAN POTASSIUM 100 MG/1
50 TABLET ORAL EVERY EVENING
COMMUNITY
End: 2022-06-23

## 2022-01-10 RX ORDER — CHOLECALCIFEROL (VITAMIN D3) 50 MCG
1 TABLET ORAL DAILY
COMMUNITY

## 2022-01-10 RX ORDER — TAMSULOSIN HYDROCHLORIDE 0.4 MG/1
0.4 CAPSULE ORAL 2 TIMES DAILY
COMMUNITY
Start: 2021-12-14 | End: 2024-01-01

## 2022-01-10 RX ORDER — LOSARTAN POTASSIUM 100 MG/1
100 TABLET ORAL EVERY MORNING
Status: ON HOLD | COMMUNITY
End: 2022-06-30

## 2022-01-10 RX ORDER — IOPAMIDOL 755 MG/ML
120 INJECTION, SOLUTION INTRAVASCULAR ONCE
Status: COMPLETED | OUTPATIENT
Start: 2022-01-10 | End: 2022-01-10

## 2022-01-10 RX ORDER — GADOBUTROL 604.72 MG/ML
10 INJECTION INTRAVENOUS ONCE
Status: COMPLETED | OUTPATIENT
Start: 2022-01-10 | End: 2022-01-10

## 2022-01-10 RX ADMIN — GADOBUTROL 10 ML: 604.72 INJECTION INTRAVENOUS at 19:45

## 2022-01-10 RX ADMIN — SODIUM CHLORIDE 100 ML: 900 INJECTION INTRAVENOUS at 18:15

## 2022-01-10 RX ADMIN — IOPAMIDOL 70 ML: 755 INJECTION, SOLUTION INTRAVENOUS at 18:14

## 2022-01-10 NOTE — ED TRIAGE NOTES
Family reports pt had dizziness and left sided weakness at 1645, pt has had past strokes and has left sided weakness at baseline.

## 2022-01-10 NOTE — ED NOTES
Bed: ED12  Expected date:   Expected time:   Means of arrival:   Comments:  GA when clean possible stroke

## 2022-01-10 NOTE — ED PROVIDER NOTES
History   Chief Complaint:  One-sided Weakness and dizziness    HPI  History supplemented by electronic chart review and wife at bedside    Kamari Valentin is a 81 year old male with history of untreated aortic stenosis, ischemic strokes on Xarelto (most recent dose yesterday evening) with residual left sided weakness at baseline who presents with his wife for evaluation of dizziness. The patient reported approximately 1630 today he sat down in his usual chair too quickly and after a moment stood up causing him to feel very dizzy and unsteady on his feet. He asked his swife to bring him their home blood pressure monitor and was 214/105. He does not think his weakness is worse than baseline but he isn't sure and his wife does not endorse any changes to his left facial droop. He denied any confusion, fever, cough, headache or recent falls or injury. He uses a walker or cane for ambulation.       Review of Systems  Please see HPI. All other systems reviewed and negative.     Allergies:  Lisinopril    Medications:  Aspirin 81   Hydralazine   Losartan   Xarelto   Simvastatin     Past Medical History:     Atrial fibrillation   Carotid stenosis   Chronic renal insufficiency   hypertension   hyperlipidemia    Ischemic stroke   Stroke     Past Surgical History:    Coronary angiogram   Right heart cath   Septoplasty   Tonsillectomy   Hernia repair    ORIF left wrist     Family History:    Respiratory   CAD   AAA     Social History:   Presents to the ED: With his wife    PCP: Annita Proctor      Physical Exam     Patient Vitals for the past 24 hrs:   BP Temp Temp src Pulse Resp SpO2 Weight   01/10/22 2315 (!) 174/92 -- -- 55 20 95 % --   01/10/22 2310 (!) 181/117 -- -- 57 23 97 % --   01/10/22 2300 (!) 181/100 -- -- 70 27 95 % --   01/10/22 2259 (!) 179/95 -- -- 63 22 95 % --   01/10/22 2250 (!) 158/99 -- -- 60 22 96 % --   01/10/22 2240 (!) 158/99 -- -- 63 11 94 % --   01/10/22 2230 (!) 150/77 -- -- 58 27 94 % --   01/10/22  2220 (!) 166/99 -- -- 58 24 94 % --   01/10/22 2215 (!) 166/99 -- -- 70 23 95 % --   01/10/22 2200 (!) 160/90 -- -- 60 20 95 % --   01/10/22 2145 (!) 159/92 -- -- 52 21 94 % --   01/10/22 2130 (!) 173/83 -- -- 61 20 97 % --   01/10/22 2115 (!) 167/99 -- -- 53 21 96 % --   01/10/22 2100 (!) 175/101 -- -- 96 (!) 35 96 % --   01/10/22 2045 (!) 170/81 -- -- 64 23 96 % --   01/10/22 2030 (!) 188/85 -- -- 65 28 96 % --   01/10/22 1930 (!) 189/89 -- -- 67 19 96 % --   01/10/22 1915 (!) 187/100 -- -- 63 20 95 % --   01/10/22 1845 (!) 174/106 -- -- 58 19 96 % --   01/10/22 1830 (!) 196/110 -- -- 56 21 96 % --   01/10/22 1811 -- -- -- -- -- -- 76.2 kg (168 lb)   01/10/22 1751 (!) 194/84 98.6  F (37  C) Temporal 70 18 98 % --     Physical Exam  General: Male sitting upright in room 12, wife at bedside  HENT: mucous membranes moist, face nontender  CV: rate as above, irregular rhythm, 5/6 murmur audible, no LE edema, normal radial pulses  Resp: normal effort, speaks in full phrases, no stridor, no cough observed  GI: abdomen soft and nontender, no guarding  MSK: no bony tenderness  Skin: appropriately warm and dry  Neuro: awake, alert, clear speech, fully oriented, moderate left lower facial droop, left  slightly weaker than the right, mild left pronator drift, no nuchal rigidity, ambulation not initially tested  Psych: Cooperates as able, no apparent hallucinations    Emergency Department Course   ECG  ECG obtained at 1831, ECG read at 1856  Atrial Fibrillation with a competing junctional pacemaker.  Rate 66 bpm. GA interval * ms. QRS duration 92 ms. QT/QTc 426/446 ms. P-R-T axes * -17 75.     Imaging:  MR Brain w/o & w Contrast   Final Result   IMPRESSION:   1.  No evidence of acute ischemia or hemorrhage.   2.  Old right basal ganglia infarcts.   3.  Small old right cerebellar infarct.   4.  Volume loss and white matter T2 hyperintensities which likely represent chronic small vessel ischemic change.      CTA Head Neck  with Contrast   Final Result   IMPRESSION:       HEAD CTA:    1.  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   2.  Poor visualization of the right vertebral artery at the V4 segments, probably occluded, worsened compared to the prior exam. Left vertebral artery terminates in the posteroinferior cerebellar artery.   3.  Small vertebrobasilar system with scattered atherosclerotic plaquing and normal variant fetal origin of the bilateral posterior cerebral arteries.   4.  Background of scattered atherosclerotic plaquing with multiple mild stenoses.      NECK CTA:   1.  Approximately 60% stenosis at the bilateral carotid bifurcations.   2.  Severe stenosis at the bilateral vertebral artery origins.      CT Head w/o Contrast   Final Result   IMPRESSION:   1.  No CT evidence of acute ischemia or hemorrhage.   2.  Infarct involving the right basal ganglia.   3.  Volume loss and white matter hypoattenuation which likely represents chronic small vessel ischemic change.      Findings were discussed by phone between Dr. Way and Dr. Camacho at 6:28 PM on 01/10/2022.        Report per radiology    Laboratory:  Labs Ordered and Resulted from Time of ED Arrival to Time of ED Departure   BASIC METABOLIC PANEL - Abnormal       Result Value    Sodium 133      Potassium 4.3      Chloride 104      Carbon Dioxide (CO2) 22      Anion Gap 7      Urea Nitrogen 16      Creatinine 0.74      Calcium 8.1 (*)     Glucose 91      GFR Estimate 90     INR - Abnormal    INR 1.27 (*)    CBC WITH PLATELETS AND DIFFERENTIAL - Abnormal    WBC Count 7.4      RBC Count 4.35 (*)     Hemoglobin 13.7      Hematocrit 42.8      MCV 98      MCH 31.5      MCHC 32.0      RDW 12.5      Platelet Count 199      % Neutrophils 71      % Lymphocytes 18      % Monocytes 9      % Eosinophils 2      % Basophils 0      % Immature Granulocytes 0      NRBCs per 100 WBC 0      Absolute  Neutrophils 5.3      Absolute Lymphocytes 1.3      Absolute Monocytes 0.6      Absolute Eosinophils 0.1      Absolute Basophils 0.0      Absolute Immature Granulocytes 0.0      Absolute NRBCs 0.0     COVID-19 VIRUS (CORONAVIRUS) BY PCR - Normal    SARS CoV2 PCR Negative     PARTIAL THROMBOPLASTIN TIME - Normal    aPTT 32     TROPONIN I - Normal    Troponin I High Sensitivity 25     GLUCOSE BY METER - Normal    GLUCOSE BY METER POCT 91     GLUCOSE MONITOR NURSING POCT   GLUCOSE MONITOR NURSING POCT      Emergency Department Course:  Reviewed:  I reviewed nursing notes, vitals, past medical history and Care Everywhere    Assessments:  1759 I obtained history and examined the patient as noted above.   1801 Tier 1 code stroke initiated.   1851 Code stroke deescalated.   1903 Code stroke reescalated.   1905 I rechecked the patient and explained findings.     Consults:  1806 I consulted with Dr. Petersen, Stroke Neurology, regarding the patient's history and presentation here in the emergency department.    1829 I consulted with Dr. Way, Radiology, regarding their interpretation of the patient's imaging studies.   1843 I consulted with Dr. Way, Radiology, regarding their interpretation of the patient's imaging studies.   1846 I spoke with Dr. Petersen again. He recommends admission for this patient.   1902 I spoke with Dr. Alberto who recommended reinstating the stroke code.   2202 I consulted with Dr. Petersen, Stroke Neurology, regarding the patient's history and presentation here in the emergency department. Code stroke can be deescalated again.    2252  I consulted with Dr. Howe of the hospitalist services. He is in agreement to accept the patient for admission to an Obs bed. He will board in the ED until a bed becomes available.     Disposition:  The patient was admitted to the hospital under the care of Dr. Howe.     Impression & Plan     Medical Decision Making:  Given the acute onset of what seems to be  ataxia as well as possible worsening of pre-existing left-sided weakness, code stroke was activated and I am grateful for the detailed involvement of the stroke neurology service, who ultimately recommended hospitalization for close monitoring and further care, even though imaging ultimately did not reveal confirmation of an acute ischemic stroke.  He has worsening of his pre-existing significant posterior circulation atherosclerosis.  Symptoms persist.  Permissive hypertension will be allowed, as was discussed specifically with the neurology service.  Alternate etiologies including intracranial hemorrhage, metabolic abnormality, infection, medication effect, orthostasis, and others were considered.  I have arranged for admission to the hospitalist service after discussion of the above.    Diagnosis:    ICD-10-CM    1. Dizziness  R42    2. History of stroke  Z86.73    3. Hemiparesis, unspecified hemiparesis etiology, unspecified laterality (H)  G81.90        Scribe Disclosure:  I, Ramonainna Parisi, am serving as a scribe at 5:54 PM on 1/10/2022 to document services personally performed by Micky Camacho MD based on my observations and the provider's statements to me.     This note was completed in part using Dragon voice recognition software. Although reviewed after completion, some word and grammatical errors may occur.            Micky Camacho MD  01/11/22 0143

## 2022-01-11 ENCOUNTER — APPOINTMENT (OUTPATIENT)
Dept: PHYSICAL THERAPY | Facility: CLINIC | Age: 82
End: 2022-01-11
Attending: INTERNAL MEDICINE
Payer: COMMERCIAL

## 2022-01-11 VITALS
TEMPERATURE: 98.3 F | RESPIRATION RATE: 21 BRPM | BODY MASS INDEX: 26.31 KG/M2 | HEART RATE: 63 BPM | DIASTOLIC BLOOD PRESSURE: 68 MMHG | WEIGHT: 168 LBS | SYSTOLIC BLOOD PRESSURE: 129 MMHG | OXYGEN SATURATION: 96 %

## 2022-01-11 LAB
CHOLEST SERPL-MCNC: 94 MG/DL
HBA1C MFR BLD: 5.7 % (ref 0–5.6)
HDLC SERPL-MCNC: 37 MG/DL
LDLC SERPL CALC-MCNC: 45 MG/DL
MAGNESIUM SERPL-MCNC: 1.8 MG/DL (ref 1.6–2.3)
NONHDLC SERPL-MCNC: 57 MG/DL
TRIGL SERPL-MCNC: 60 MG/DL
TROPONIN I SERPL HS-MCNC: 32 NG/L

## 2022-01-11 PROCEDURE — 36415 COLL VENOUS BLD VENIPUNCTURE: CPT | Performed by: INTERNAL MEDICINE

## 2022-01-11 PROCEDURE — 250N000011 HC RX IP 250 OP 636: Performed by: INTERNAL MEDICINE

## 2022-01-11 PROCEDURE — 999N000226 HC STATISTIC SLP IP EVAL DEFER: Performed by: SPEECH-LANGUAGE PATHOLOGIST

## 2022-01-11 PROCEDURE — 97530 THERAPEUTIC ACTIVITIES: CPT | Mod: GP | Performed by: PHYSICAL THERAPIST

## 2022-01-11 PROCEDURE — 84484 ASSAY OF TROPONIN QUANT: CPT | Performed by: INTERNAL MEDICINE

## 2022-01-11 PROCEDURE — G0378 HOSPITAL OBSERVATION PER HR: HCPCS

## 2022-01-11 PROCEDURE — 97116 GAIT TRAINING THERAPY: CPT | Mod: GP | Performed by: PHYSICAL THERAPIST

## 2022-01-11 PROCEDURE — 97161 PT EVAL LOW COMPLEX 20 MIN: CPT | Mod: GP | Performed by: PHYSICAL THERAPIST

## 2022-01-11 PROCEDURE — 250N000013 HC RX MED GY IP 250 OP 250 PS 637: Performed by: INTERNAL MEDICINE

## 2022-01-11 PROCEDURE — 99226 PR SUBSEQUENT OBSERVATION CARE,LEVEL III: CPT | Mod: GC | Performed by: PSYCHIATRY & NEUROLOGY

## 2022-01-11 PROCEDURE — 96374 THER/PROPH/DIAG INJ IV PUSH: CPT

## 2022-01-11 PROCEDURE — 99217 PR OBSERVATION CARE DISCHARGE: CPT | Performed by: INTERNAL MEDICINE

## 2022-01-11 RX ORDER — ASPIRIN 81 MG/1
81 TABLET ORAL DAILY
Status: DISCONTINUED | OUTPATIENT
Start: 2022-01-11 | End: 2022-01-11 | Stop reason: HOSPADM

## 2022-01-11 RX ORDER — SODIUM CHLORIDE 9 MG/ML
INJECTION, SOLUTION INTRAVENOUS CONTINUOUS PRN
Status: DISCONTINUED | OUTPATIENT
Start: 2022-01-11 | End: 2022-01-11 | Stop reason: HOSPADM

## 2022-01-11 RX ORDER — ATORVASTATIN CALCIUM 80 MG/1
80 TABLET, FILM COATED ORAL EVERY EVENING
Status: DISCONTINUED | OUTPATIENT
Start: 2022-01-11 | End: 2022-01-11 | Stop reason: HOSPADM

## 2022-01-11 RX ORDER — ACETAMINOPHEN 325 MG/1
650 TABLET ORAL EVERY 4 HOURS PRN
Status: DISCONTINUED | OUTPATIENT
Start: 2022-01-11 | End: 2022-01-11 | Stop reason: HOSPADM

## 2022-01-11 RX ORDER — LIDOCAINE 40 MG/G
CREAM TOPICAL
Status: DISCONTINUED | OUTPATIENT
Start: 2022-01-11 | End: 2022-01-11 | Stop reason: HOSPADM

## 2022-01-11 RX ORDER — TAMSULOSIN HYDROCHLORIDE 0.4 MG/1
0.4 CAPSULE ORAL EVERY EVENING
Status: DISCONTINUED | OUTPATIENT
Start: 2022-01-11 | End: 2022-01-11 | Stop reason: HOSPADM

## 2022-01-11 RX ORDER — ONDANSETRON 2 MG/ML
4 INJECTION INTRAMUSCULAR; INTRAVENOUS EVERY 6 HOURS PRN
Status: DISCONTINUED | OUTPATIENT
Start: 2022-01-11 | End: 2022-01-11 | Stop reason: HOSPADM

## 2022-01-11 RX ORDER — HYDRALAZINE HYDROCHLORIDE 20 MG/ML
10 INJECTION INTRAMUSCULAR; INTRAVENOUS EVERY 4 HOURS PRN
Status: DISCONTINUED | OUTPATIENT
Start: 2022-01-11 | End: 2022-01-11 | Stop reason: HOSPADM

## 2022-01-11 RX ORDER — ONDANSETRON 4 MG/1
4 TABLET, ORALLY DISINTEGRATING ORAL EVERY 6 HOURS PRN
Status: DISCONTINUED | OUTPATIENT
Start: 2022-01-11 | End: 2022-01-11 | Stop reason: HOSPADM

## 2022-01-11 RX ORDER — PROCHLORPERAZINE MALEATE 5 MG
5 TABLET ORAL EVERY 6 HOURS PRN
Status: DISCONTINUED | OUTPATIENT
Start: 2022-01-11 | End: 2022-01-11 | Stop reason: HOSPADM

## 2022-01-11 RX ORDER — PROCHLORPERAZINE 25 MG
12.5 SUPPOSITORY, RECTAL RECTAL EVERY 12 HOURS PRN
Status: DISCONTINUED | OUTPATIENT
Start: 2022-01-11 | End: 2022-01-11 | Stop reason: HOSPADM

## 2022-01-11 RX ADMIN — HYDRALAZINE HYDROCHLORIDE 10 MG: 20 INJECTION INTRAMUSCULAR; INTRAVENOUS at 12:31

## 2022-01-11 RX ADMIN — ASPIRIN 81 MG: 81 TABLET, COATED ORAL at 07:50

## 2022-01-11 NOTE — ED NOTES
[facility]  ED Nurse Handoff Report    ED Chief complaint: One-sided Weakness (Family reports pt had dizziness and left sided weakness at 1645, pt has had past strokes and has left sided weakness at baseline.)      ED Diagnosis:   Final diagnoses:   None       Code Status: MD to address    Allergies:   Allergies   Allergen Reactions     Lisinopril        Patient Story: A&Ox4. Previous CVA year ago. On xeralto daily. LKW 16:30. Patient then began feeling dizzy and unsteady on feet. Has left sided deficit from previous CVA that he reports has remained unchanged. Left sided facial droop is at baseline per spouse. Called as tier 1 code stroke. Patient had stat CT and CTA. Stroke neuro requested stat MRI. See results below. Patient typically ambulates with walker at home. Have stood at bedside with no complications SBA to use urinal.   MR Brain w/o & w Contrast   Final Result   IMPRESSION:   1.  No evidence of acute ischemia or hemorrhage.   2.  Old right basal ganglia infarcts.   3.  Small old right cerebellar infarct.   4.  Volume loss and white matter T2 hyperintensities which likely represent chronic small vessel ischemic change.      CTA Head Neck with Contrast   Final Result   IMPRESSION:       HEAD CTA:    1.  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   2.  Poor visualization of the right vertebral artery at the V4 segments, probably occluded, worsened compared to the prior exam. Left vertebral artery terminates in the posteroinferior cerebellar artery.   3.  Small vertebrobasilar system with scattered atherosclerotic plaquing and normal variant fetal origin of the bilateral posterior cerebral arteries.   4.  Background of scattered atherosclerotic plaquing with multiple mild stenoses.      NECK CTA:   1.  Approximately 60% stenosis at the bilateral carotid bifurcations.   2.  Severe stenosis at the bilateral  vertebral artery origins.      CT Head w/o Contrast   Final Result   IMPRESSION:   1.  No CT evidence of acute ischemia or hemorrhage.   2.  Infarct involving the right basal ganglia.   3.  Volume loss and white matter hypoattenuation which likely represents chronic small vessel ischemic change.      Findings were discussed by phone between Dr. Way and Dr. Camacho at 6:28 PM on 01/10/2022.        Focused Assessment:  See above     Treatments and/or interventions provided: IV, meds, POC BG, CT, MRI  Patient's response to treatments and/or interventions: Stable    To be done/followed up on inpatient unit:  All inpatient orders     Does this patient have any cognitive concerns?: None    Activity level - Baseline/Home:  Walker  Activity Level - Current:   Stand with Assist    Patient's Preferred language: English   Needed?: No    Isolation: None  Infection: Not Applicable  Patient tested for COVID 19 prior to admission: YES  Bariatric?: No    Vital Signs:   Vitals:    01/10/22 2130 01/10/22 2145 01/10/22 2200 01/10/22 2215   BP: (!) 173/83 (!) 159/92 (!) 160/90 (!) 166/99   Pulse: 61 52 60 70   Resp: 20 21 20 23   Temp:       TempSrc:       SpO2: 97% 94% 95% 95%   Weight:           Cardiac Rhythm:     Was the PSS-3 completed:   Yes  What interventions are required if any?               Family Comments:   OBS brochure/video discussed/provided to patient/family: N/A              Name of person given brochure if not patient:               Relationship to patient:     For the majority of the shift this patient's behavior was Green.   Behavioral interventions performed were .    ED NURSE PHONE NUMBER: *[phone]

## 2022-01-11 NOTE — PROGRESS NOTES
SLP - Swallow evaluation orders received per stroke protocol. Chart reviewed, discussed with RN. Pt is 81yoM w history of prior stroke, residual L sided weakness presents with slightly worse L sided weakness. No evidence of stroke or obvious perfusion deficit on MRI. Does have possible progression of intracranial atherosclerosis without associated ischemia.    Pt passed bedside swallow screen, regular/combination diet ordered with no difficulty noted per RN.     Discussed with pt. Previous admission Oct 2020 for new CVA with mild oral-pharyngeal diet at that time, slightly modified diet. Pt denies s/sx of aspiration, dysphagia at home. Tolerates regular diet.    Formal swallow evaluation not indicated at this time as pt with no new neuro symptoms/dx, at baseline for PO tolerance with no difficulty noted. Will complete SLP orders.    Please re-consult if new speech/language and/or swallow difficulty.     PT/OT ordered and scheduled evaluations this afternoon although may not be indicated.

## 2022-01-11 NOTE — PLAN OF CARE
OT: Orders received. Chart reviewed and discussed with care team.  Per PT note, pt mobilizing with SBA and gait aid; pt has assist at home with ADLs. Pt hoping to discharge this PM, OT evaluation not necessary at this time. Defer discharge recommendations to PT.  Will complete orders.

## 2022-01-11 NOTE — PLAN OF CARE
[facility]      OUTPATIENT PHYSICAL THERAPY EVALUATION  PLAN OF TREATMENT FOR OUTPATIENT REHABILITATION  (COMPLETE FOR INITIAL CLAIMS ONLY)  Patient's Last Name, First Name, M.I.  YOB: 1940  Kamari Valentin                        Provider's Name  [facility] Medical Record No.  [id number]                               Onset Date:  01/11/22   Start of Care Date:  01/11/22      Type:     _X_PT   ___OT   ___SLP Medical Diagnosis:  Dizziness                        PT Diagnosis:  Impaired gait   Visits from SOC:  1   _________________________________________________________________________________  Plan of Treatment/Functional Goals    Planned Interventions: balance training,bed mobility training,gait training,home exercise program,patient/family education,stair training,strengthening,transfer training     Goals: See Physical Therapy Goals on Care Plan in [employer] electronic health record.    Therapy Frequency: Daily  Predicted Duration of Therapy Intervention: 4 days  _________________________________________________________________________________    I CERTIFY THE NEED FOR THESE SERVICES FURNISHED UNDER        THIS PLAN OF TREATMENT AND WHILE UNDER MY CARE     (Physician co-signature of this document indicates review and certification of the therapy plan).              Certification date from: 01/11/22, Certification date to: 01/18/22    Referring Physician: Sam Howe MD            Initial Assessment        See Physical Therapy evaluation dated 01/11/22 in Epic electronic health record.

## 2022-01-11 NOTE — DISCHARGE SUMMARY
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 He presents to the emergency department after an episode of lightheadedness when standing largely consistent with orthostasis, though has severe basilar artery stenosis and history of posterior circulation strokes.      Lightheadedness: By history, seems most consistent with orthostasis as patient describes standing up, feeling lightheaded.  Wife checked blood pressure, initially noted to be quite elevated in the 200 range.  Subsequently downtrending on serial check at home with normalization of symptoms.  Brought to the emergency department as code stroke.  No acute event noted on MRI, though being watched closely on observation status at recommendation of neurology given prior posterior circulation stroke and known stenosis.   -Patient was formally evaluated by neurology, no new recommendation at this time.  They are okay with discontinuing aspirin from neurology standpoint however patient also has a 60% left main disease based on previous cath.  Please see discussion below  His cause for lightheadedness was likely multifactorial, patient had a elevated blood pressure at presentation which subsequently improved with his outpatient regimen.  Certainly aortic valve stenosis could have also contributed.  -He was monitored overnight, symptoms have completely resolved, he was able to ambulate without any further lightheadedness or dizziness and therefore will be discharged home today.     Aortic valve  stenosis: Patient with known aortic valve stenosis.  He initially declined any kind of intervention for his valve.  He is hesitant to undergo open heart surgery as was the recommendation for his left main disease and aortic valve stenosis, however unfortunately after his coronary angiogram in October patient suffered from a stroke.  It appears like the patient does not have a good understanding of what his cardiac issues exactly are in the procedures needed.  I had a long discussion with the patient, his spouse and his daughter Rachael on the phone.  After I explained to them all the findings he appeared to be interested in talking to cardiology to see if there is a minimally invasive option like TAVR and stenting to fix his cardiac issues versus open heart surgery.  He did not want to see a cardiologist right away however he was open to following up with them as outpatient.  This has been set up for next Wednesday.  Per previous discharge summary from October, neurology had recommended deferring nonemergent surgery for at least 3 months given the stroke.  At this time I suggest he follow-up with cardiology to discuss further options for severe aortic stenosis as his symptoms of lightheadedness could very well be due to aortic stenosis.  He denies any passing out spells or chest pain however.  Given his known 60% lesion of his left main coronary artery at this time I have decided to continue his aspirin along with Xarelto which he takes for atrial fibrillation.  This can be discussed further during cardiology follow-up visit.  He is also to continue his prior to admission simvastatin       BPH:  -Continue prior to mission Flomax 0.4 at bedtime     Kyaw Reddy MD    Significant Results and Procedures   Please see below    Pending Results     Unresulted Labs Ordered in the Past 30 Days of this Admission     No orders found for last 31 day(s).          Code Status   Full Code       Primary Care Physician   Annita MCKNIGHT  Hitesh    Physical Exam   Temp: 98.3  F (36.8  C) Temp src: Oral BP: 129/68 Pulse: 63   Resp: 21 SpO2: 96 % O2 Device: None (Room air)      Constitutional: AAOX3, NAD, Appears comfortable  Neck- Supple, Good ROM, No JVD  Respiratory: CTA B/L, Normal WOB, No crackles or wheezes  Cardiovascular: RRR, No murmur  GI: Soft, Non- tender, BS- normoactive  Skin/Integument: Warm and dry, no rashes  MSK: No joint deformity or swelling, no edema  Neuro: CN- grossly intact, Motor strength 5/5 on all 4 extremities.     Discharge Disposition   Discharged to home  Condition at discharge: Stable    Consultations This Hospital Stay   PATIENT [facility] IP CONSULT  NEUROLOGY IP CONSULT  SPEECH LANGUAGE PATH ADULT IP CONSULT  PHARMACY IP CONSULT  PHARMACY IP CONSULT  PHARMACY IP CONSULT  PHYSICAL THERAPY ADULT IP CONSULT  OCCUPATIONAL THERAPY ADULT IP CONSULT  REHAB ADMISSIONS LIAISON IP CONSULT  CARE MANAGEMENT / SOCIAL WORK IP CONSULT  SMOKING CESSATION PROGRAM IP CONSULT    Time Spent on this Encounter   IKyaw MD, personally saw the patient today and spent less than or equal to 30 minutes discharging this patient.    Discharge Orders      Reason for your hospital stay    Dizziness     Follow-up and recommended labs and tests     Follow up with primary care provider, Annita Proctor, within 7 days for hospital follow- up.   Cardiology in 1-2 weeks     Activity    Your activity upon discharge: activity as tolerated     Diet    Follow this diet upon discharge: Orders Placed This Encounter      Regular Diet Adult     Discharge Medications   Current Discharge Medication List      CONTINUE these medications which have NOT CHANGED    Details   acetaminophen (TYLENOL) 325 MG tablet Take 2 tablets (650 mg) by mouth every 6 hours as needed for mild pain or fever (> 101 F)  Qty:      Associated Diagnoses: Cerebrovascular accident (CVA), unspecified mechanism (H)      aspirin (ASA) 81 MG EC tablet Take 1 tablet (81 mg) by  mouth daily  Qty: 30 tablet, Refills: 0    Associated Diagnoses: Cerebrovascular accident (CVA), unspecified mechanism (H)      hydrALAZINE (APRESOLINE) 10 MG tablet Take 1 tablet (10 mg) by mouth 2 times daily  Qty: 60 tablet, Refills: 0    Associated Diagnoses: Cerebrovascular accident (CVA), unspecified mechanism (H)      !! losartan (COZAAR) 100 MG tablet Take 100 mg by mouth every morning In addition to 50 mg in the evening      !! losartan (COZAAR) 100 MG tablet Take 50 mg by mouth every evening In addition to 100 mg in the morning      rivaroxaban ANTICOAGULANT (XARELTO) 20 MG TABS tablet Take 1 tablet (20 mg) by mouth daily (with dinner)  Qty: 30 tablet, Refills: 0    Associated Diagnoses: Cerebrovascular accident (CVA), unspecified mechanism (H)      simvastatin (ZOCOR) 40 MG tablet Take 1 tablet (40 mg) by mouth daily  Qty: 30 tablet, Refills: 0    Associated Diagnoses: Cerebrovascular accident (CVA), unspecified mechanism (H)      tamsulosin (FLOMAX) 0.4 MG capsule Take 0.4 mg by mouth every evening      vitamin B-12 (CYANOCOBALAMIN) 1000 MCG tablet Take 3,000 mcg by mouth daily      vitamin D3 (CHOLECALCIFEROL) 50 mcg (2000 units) tablet Take 1 tablet by mouth daily       !! - Potential duplicate medications found. Please discuss with provider.        Allergies   Allergies   Allergen Reactions     Lisinopril      Data   Most Recent 3 CBC's:  Recent Labs   Lab Test 01/10/22  1811 10/29/20  0550 10/26/20  0907   WBC 7.4 6.7 7.1   HGB 13.7 14.5 14.9   MCV 98 100 97    175 199      Most Recent 3 BMP's:  Recent Labs   Lab Test 01/10/22  1909 01/10/22  1810 10/29/20  0550 10/27/20  0844     --  141 139   POTASSIUM 4.3  --  4.9 3.9   CHLORIDE 104  --  108 107   CO2 22  --  30 28   BUN 16  --  25 18   CR 0.74  --  0.96 0.91   ANIONGAP 7  --  3 4   CLAY 8.1*  --  9.0 9.4   GLC 91 91 98 123*     Most Recent 2 LFT's:  Recent Labs   Lab Test 10/27/20  0844 10/22/20  0810   AST 19 17   ALT 23 22   ALKPHOS  51 44   BILITOTAL 0.8 1.0     Most Recent INR's and Anticoagulation Dosing History:  Anticoagulation Dose History     Recent Dosing and Labs Latest Ref Rng & Units 9/21/2010 11/30/2010 10/22/2020 1/10/2022    INR 0.85 - 1.15 0.92 1.10 1.05 1.27(H)        Most Recent 3 Troponin's:  Recent Labs   Lab Test 10/27/20  0844 09/27/18  0600   TROPI <0.015 <0.015     Most Recent Cholesterol Panel:  Recent Labs   Lab Test 01/10/22  1909   CHOL 94   LDL 45   HDL 37*   TRIG 60     Most Recent 6 Bacteria Isolates From Any Culture (See EPIC Reports for Culture Details):No lab results found.  Most Recent TSH, T4 and A1c Labs:  Recent Labs   Lab Test 01/10/22  1811 10/27/20  0844   TSH  --  1.56   A1C 5.7*  --        Results for orders placed or performed during the hospital encounter of 01/10/22   CT Head w/o Contrast    Narrative    EXAM: CT HEAD W/O CONTRAST  LOCATION: [facility]  DATE/TIME: 1/10/2022 6:14 PM    INDICATION: Code stroke.  COMPARISON: Head MRI 10/26/2020.  TECHNIQUE: Routine CT Head without IV contrast. Multiplanar reformats. Dose reduction techniques were used.    FINDINGS:  No CT evidence of acute ischemia or hemorrhage. Old infarcts involving the right basal ganglia and periventricular white matter with associated wallerian degeneration. Volume loss and white matter hypoattenuation which likely represents chronic small   vessel ischemic change.    The visualized calvarium, tympanic cavities, and mastoid cavities are unremarkable. Mild paranasal sinus mucosal thickening. Bilateral lens replacements.      Impression    IMPRESSION:  1.  No CT evidence of acute ischemia or hemorrhage.  2.  Infarct involving the right basal ganglia.  3.  Volume loss and white matter hypoattenuation which likely represents chronic small vessel ischemic change.    Findings were discussed by phone between Dr. Way and Dr. Camacho at 6:28 PM on 01/10/2022.   CTA Head Neck with Contrast    Narrative    EXAM:  CTA  HEAD NECK WITH CONTRAST  LOCATION: [facility]  DATE/TIME: 1/10/2022 6:12 PM    INDICATION: Stroke, Upper extremity weakness.  COMPARISON: None  TECHNIQUE: Axial helical CT images of the head and neck vessels obtained during the arterial phase of intravenous contrast administration. Axial 2D reconstructed images and multiplanar 3D MIP reconstructed images of the head and neck vessels were   performed by the technologist. Dose reduction techniques were used. All stenosis measurements made according to NASCET criteria unless otherwise specified.    FINDINGS:     HEAD CTA:  Bilateral vertebral arteries are poorly visualized at the V4 segments. The basilar artery is small with short segment occlusion versus high-grade stenosis involving the proximal basilar artery, worsened compared to the prior exam. Fetal origin of the   bilateral posterior cerebral arteries. The internal carotid arteries, anterior cerebral arteries, and middle cerebral arteries are patent. Scattered atherosclerotic plaquing is present. No evidence of aneurysm or vascular malformation.    NECK CTA:  Three-vessel aortic arch is present. The bilateral common carotid, internal carotid, external carotid, and vertebral arteries are patent. Heavy plaquing at the bilateral carotid bifurcations with approximately 60% stenosis of the small internal carotid   arteries bilaterally. Severe stenosis at the bilateral vertebral artery origins.    Scattered pulmonary atelectasis.      Impression    IMPRESSION:     HEAD CTA:   1.  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  2.  Poor visualization of the right vertebral artery at the V4 segments, probably occluded, worsened compared to the prior exam. Left vertebral artery terminates in the posteroinferior cerebellar artery.  3.  Small vertebrobasilar system with scattered atherosclerotic plaquing and normal  variant fetal origin of the bilateral posterior cerebral arteries.  4.  Background of scattered atherosclerotic plaquing with multiple mild stenoses.    NECK CTA:  1.  Approximately 60% stenosis at the bilateral carotid bifurcations.  2.  Severe stenosis at the bilateral vertebral artery origins.   MR Brain w/o & w Contrast    Narrative    EXAM: MR BRAIN W/O and W CONTRAST  LOCATION: [facility]  DATE/TIME: 1/10/2022 7:44 PM    INDICATION: Stroke, follow up.  COMPARISON: Head CT, MRI 10/26/2020.  CONTRAST: 10 mL Gadavist.  TECHNIQUE: Routine multiplanar multisequence head MRI without and with intravenous contrast.    FINDINGS:  INTRACRANIAL CONTENTS: No acute or subacute infarct. No mass, acute hemorrhage, or extra-axial fluid collections. Patchy and confluent nonspecific T2/FLAIR hyperintensities within the cerebral white matter most consistent with moderate chronic   microvascular ischemic change. Old infarcts involving the right basal ganglia with hemosiderin deposition. Wallerian degeneration. Small old right cerebellar infarct. Mild generalized cerebral atrophy. No hydrocephalus. Normal position of the cerebellar   tonsils. No pathologic contrast enhancement.    SELLA: No abnormality accounting for technique.    OSSEOUS STRUCTURES/SOFT TISSUES: Normal marrow signal. The major intracranial vascular flow voids are maintained.     ORBITS: Prior bilateral cataract surgery. Visualized portions of the orbits are otherwise unremarkable.     SINUSES/MASTOIDS: Mild left maxillary sinus mucosal thickening. Trace opacification of the right mastoid cavity.      Impression    IMPRESSION:  1.  No evidence of acute ischemia or hemorrhage.  2.  Old right basal ganglia infarcts.  3.  Small old right cerebellar infarct.  4.  Volume loss and white matter T2 hyperintensities which likely represent chronic small vessel ischemic change.

## 2022-01-11 NOTE — CONSULTS
"[facility]    Stroke Consult Note    Reason for Consult: Stroke C  Chief Complaint: Dizziness  HPI  The patient is a 81 year old male hx HTN, HLD, R MCA infarct with residual L weakness, carotid and aorotic stenosis, afib on Xarelto, comes in with LKN 1630 worsening L weakness and new ataxia.    1630 felt dizzy after standing up from chair, felt unsteady on feet, bp was 214/105. May have had worsening of chronic L facial and LUE weakness but was close to baseline in ED.    Imaging Findings  CTH chronic RBG infarct now new bleed or large infarct  CTA 60% bilat carotid bifurcation stenosis, bilat vert severe origin stenosis, severe stenosis vs occlusion basilar, appears worst from prior.  R V4 occluded. L vert terminates PICA. Mostly fetal bilat PCA.   MRI brain No acute infarct, known R BG infarcts     Thrombolytic Treatment   Not given due to DOAC dose within 48 hours or INR > 1.7.    Endovascular Treatment  No new target for thrombectomy    Impression     Likely hypertensive emergency    MRI brain shows no new infarct patient appears back to baseline, suspect hypertensive emergency. No further neurology workup, we asked MRI to push rapid on perfusion imaging. Patient on Aspirin and Xarelto since last CVA, from stroke perspective can be on DOAC only but we defer to cards/PCP.    Recommendations    -No further workup can be discharged from stroke perspective  -Continue home Xarelto  -Aspirin can be stopped from neuro perspective if cards/PCP sees no need  -Continue home statin  -Follow-up op neuro    Patient Follow-up    -No further workup    The Stroke Staff is Dr. Cano.    Avel Petersen MD  Vascular Neurology Fellow  To page me or covering stroke neurology team member, click here: [facility]COM   Choose \"On Call\" tab at top, then search dropdown box for \"Neurology Adult\", select location, press Enter, then look for stroke/neuro " ICU/telestroke.    ______________________________________________________    Clinically Significant Risk Factors Present on Admission            # Coagulation Defect: home medication list includes an anticoagulant medication  # Platelet Defect: home medication list includes an antiplatelet medication          Past Medical History   Past Medical History:   Diagnosis Date     Aortic stenosis     mild-moderate     Atrial fibrillation (H)     paroxysmal     Carotid stenosis     mild on right, moderate on left     Chronic renal insufficiency      Essential hypertension, benign      Hyperlipidaemia      Hypertrophy of prostate without urinary obstruction and other lower urinary tract symptoms (LUTS)      Rheumatic fever      Unspecified cerebral artery occlusion with cerebral infarction      Past Surgical History   Past Surgical History:   Procedure Laterality Date     CV CORONARY ANGIOGRAM N/A 10/22/2020    Procedure: Coronary Angiogram;  Surgeon: Alexandru Doe MD;  Location:  HEART CARDIAC CATH LAB     CV RIGHT HEART CATH MEASUREMENTS RECORDED N/A 10/22/2020    Procedure: Right Heart Cath;  Surgeon: Alexandru Doe MD;  Location:  HEART CARDIAC CATH LAB     ENT SURGERY      SEPTOPLASTY     ENT SURGERY      TONSILLECTOMY     HERNIA REPAIR       HERNIORRHAPHY INGUINAL  2/15/2012    Procedure:HERNIORRHAPHY INGUINAL; LEFT INGUINAL HERNIA REPAIR WITH MESH; Surgeon:SHILO READ; Location:[facility]     ORTHOPEDIC SURGERY  left ankle 2/2013    ORIF LEFT WRIST FRACTURE     Medications   Home Meds  Prior to Admission medications    Medication Sig Start Date End Date Taking? Authorizing Provider   acetaminophen (TYLENOL) 325 MG tablet Take 2 tablets (650 mg) by mouth every 6 hours as needed for mild pain or fever (> 101 F) 11/6/20  Yes Chris Melara MD   aspirin (ASA) 81 MG EC tablet Take 1 tablet (81 mg) by mouth daily 11/6/20  Yes Chris Melara MD   hydrALAZINE (APRESOLINE) 10 MG tablet Take 1  tablet (10 mg) by mouth 2 times daily 20  Yes Chris Melara MD   losartan (COZAAR) 100 MG tablet Take 100 mg by mouth every morning In addition to 50 mg in the evening   Yes Unknown, Entered By History   losartan (COZAAR) 100 MG tablet Take 50 mg by mouth every evening In addition to 100 mg in the morning   Yes Unknown, Entered By History   rivaroxaban ANTICOAGULANT (XARELTO) 20 MG TABS tablet Take 1 tablet (20 mg) by mouth daily (with dinner) 20  Yes Chris Melara MD   simvastatin (ZOCOR) 40 MG tablet Take 1 tablet (40 mg) by mouth daily 20  Yes Chris Melara MD   tamsulosin (FLOMAX) 0.4 MG capsule Take 0.4 mg by mouth every evening 21  Yes Unknown, Entered By History   vitamin B-12 (CYANOCOBALAMIN) 1000 MCG tablet Take 3,000 mcg by mouth daily   Yes Unknown, Entered By History   vitamin D3 (CHOLECALCIFEROL) 50 mcg (2000 units) tablet Take 1 tablet by mouth daily   Yes Unknown, Entered By History       Scheduled Meds      Infusion Meds      PRN Meds      Allergies   Allergies   Allergen Reactions     Lisinopril      Family History   Family History   Problem Relation Age of Onset     Respiratory Brother      Coronary Artery Disease Early Onset Mother      Abdominal Aortic Aneurysm Father      Social History   Social History     Tobacco Use     Smoking status: Former Smoker     Years: 20.00     Types: Cigars, Pipe     Quit date: 2010     Years since quittin.3     Smokeless tobacco: Never Used   Substance Use Topics     Alcohol use: No     Alcohol/week: 0.0 standard drinks     Drug use: No       Review of Systems   The 10 point Review of Systems is negative other than noted in the HPI or here.        PHYSICAL EXAMINATION  Temp:  [98.3  F (36.8  C)-98.6  F (37  C)] 98.3  F (36.8  C)  Pulse:  [51-96] 72  Resp:  [9-35] 18  BP: (124-196)/() 124/110  SpO2:  [91 %-98 %] 95 %     Neuro Exam  Mental Status:  alert, oriented x 3, follows commands, speech clear and  fluent, naming and repetition normal  Cranial Nerves:  visual fields intact (tested by nurse), EOMI with normal smooth pursuit, facial sensation intact and symmetric (tested by nurse), L facial droop, hearing not formally tested but intact to conversation  Motor:  Mild LUE drift otherwise 5/5  Reflexes:  unable to test (telestroke)  Sensory:  light touch sensation intact and symmetric throughout upper and lower extremities (assessed by nurse), no extinction on double simultaneous stimulation (assessed by nurse)  Coordination:  LUE ataxia to finger nose  Station/Gait:  unable to test due to telestroke    Dysphagia Screen  Per Nursing    Stroke Scales    NIHSS  Interval     Interval Comments     1a. Level of Consciousness 0-->Alert, keenly responsive   1b. LOC Questions 0-->Answers both questions correctly   1c. LOC Commands 0-->Performs both tasks correctly   2.   Best Gaze 0-->Normal   3.   Visual 0-->No visual loss   4.   Facial Palsy 1-->Minor paralysis (flattened nasolabial fold, asymmetry on smiling)   5a. Motor Arm, Left 0-->No drift, limb holds 90 (or 45) degrees for full 10 secs   5b. Motor Arm, Right 0-->No drift, limb holds 90 (or 45) degrees for full 10 secs   6a. Motor Leg, Left 0-->No drift, leg holds 30 degree position for full 5 secs   6b. Motor Leg, right 0-->No drift, leg holds 30 degree position for full 5 secs   7.   Limb Ataxia 1-->Present in one limb   8.   Sensory 0-->Normal, no sensory loss   9.   Best Language 0-->No aphasia, normal   10. Dysarthria 1-->Mild-to-moderate dysarthria, patient slurs at least some words and, at worst, can be understood with some difficulty   11. Extinction and Inattention  0-->No abnormality   Total 3 (01/10/22 1932)       Modified Tensas Score (Pre-morbid)  3-Moderate disability; requiring some help, but able to walk without assistance    Imaging  I personally reviewed all imaging; relevant findings per HPI.     Lab Results Data   CBC  Recent Labs   Lab  01/10/22  1811   WBC 7.4   RBC 4.35*   HGB 13.7   HCT 42.8        Basic Metabolic Panel    Recent Labs   Lab 01/10/22  1909 01/10/22  1810     --    POTASSIUM 4.3  --    CHLORIDE 104  --    CO2 22  --    BUN 16  --    CR 0.74  --    GLC 91 91   CLAY 8.1*  --      Liver Panel  No results for input(s): PROTTOTAL, ALBUMIN, BILITOTAL, ALKPHOS, AST, ALT, BILIDIRECT in the last 168 hours.  INR    Recent Labs   Lab Test 01/10/22  1811 10/22/20  0810   INR 1.27* 1.05      Lipid Profile    Recent Labs   Lab Test 01/10/22  1909 10/26/20  0907 02/03/16  0000   CHOL 94 119 130   HDL 37* 40 41   LDL 45 63 75   TRIG 60 81 70     A1C    Recent Labs   Lab Test 01/10/22  1811 10/22/20  0810   A1C 5.7* 6.2*     Troponin I  No results for input(s): TROPONIN, GHTROP in the last 168 hours.

## 2022-01-11 NOTE — ED NOTES
I was asked to assist this patient with  Cardiology follow up.  I was able to get this patient in Wednesday 11/19/22.  The appt details are on the discharge paperwork.  I informed the bedside RN to highlight the appt..  Hospitalist was updated.

## 2022-01-11 NOTE — CONSULTS
"      [facility]    Stroke Consult Note    Reason for Consult: Stroke Code     Chief Complaint: One-sided Weakness (Family reports pt had dizziness and left sided weakness at 1645, pt has had past strokes and has left sided weakness at baseline.)      HPI  I was called by Micky Camacho on 01/10/22 regarding patient Kamari Valentin. The patient is a 81 year old male hx HTN, HLD, R MCA infarct with residual L weakness, carotid and aorotic stenosis, afib on Xarelto, comes in with LKN 1630 worsening L weakness and new ataxia.    1630 felt dizzy after standing up from chair, felt unsteady on feet, bp was 214/105. May have had worsening of L facial and LUE weakness but currently appears close to baseline except for dizziness.    Imaging Findings  CTH chronic RBG infarct now new bleed or large infarct  CTA 60% bilat carotid bifurcation stenosis, bilat vert severe origin stenosis, severe stenosis vs occlusion basilar, appears worst from prior.  R V4 occluded. L vert terminates PICA. Mostly fetal bilat PCA.     Thrombolytic Treatment   Not given due to DOAC dose within 48 hours or INR > 1.7.    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 NIHSS 3 with only LLE ataxia as new findings, low likelihood of IR intervention but reasonable to obtain emergent MRI and MR perfusion ro new infarct. Maximize perfusion pressure.    Recommendations    - MRI brain hyperacute, MR perfusion    - Use orderset: \"Ischemic Stroke Routine Admission\" or \"Ischemic Stroke No Thrombolytics/No Thrombectomy ICU Admission\"  - Neurochecks and Vital Signs every q2h   - Permissive HTN; goal SBP < 140-200, would hold antihypertensives, treat conservativly PRN  - Head of bed down   - " "Daily aspirin 81 mg for secondary stroke prevention  - Continue Xarelto if no bleed or new stroke on MRI  - Statin: Atorvastatin 80mg qhs  - TTE (with Bubble Study if age 60 yrs or less) - only if new infarct on MRI  - Telemetry, EKG  - Bedside Glucose Monitoring  - A1c, Lipid Panel, Troponin x 3  - PT/OT/SLP  - Stroke Education  - Euthermia, Euglycemia    Patient Follow-up    - final recommendation pending work-up    Thank you for this consult. We will continue to follow.     The Stroke Staff is Dr. Cano.    Avel Petersen MD  Vascular Neurology Fellow  To page me or covering stroke neurology team member, click here: [facility]   Choose \"On Call\" tab at top, then search dropdown box for \"Neurology Adult\", select location, press Enter, then look for stroke/neuro ICU/telestroke.    ______________________________________________________    Clinically Significant Risk Factors Present on Admission            # Coagulation Defect: home medication list includes an anticoagulant medication  # Platelet Defect: home medication list includes an antiplatelet medication          Past Medical History   Past Medical History:   Diagnosis Date     Aortic stenosis     mild-moderate     Atrial fibrillation (H)     paroxysmal     Carotid stenosis     mild on right, moderate on left     Chronic renal insufficiency      Essential hypertension, benign      Hyperlipidaemia      Hypertrophy of prostate without urinary obstruction and other lower urinary tract symptoms (LUTS)      Rheumatic fever      Unspecified cerebral artery occlusion with cerebral infarction      Past Surgical History   Past Surgical History:   Procedure Laterality Date     CV CORONARY ANGIOGRAM N/A 10/22/2020    Procedure: Coronary Angiogram;  Surgeon: Alexandru Doe MD;  Location: [facility] CARDIAC CATH LAB     CV RIGHT HEART CATH MEASUREMENTS RECORDED N/A 10/22/2020    Procedure: Right Heart Cath;  Surgeon: Alexandru Doe MD;  Location: [facility] CARDIAC " CATH LAB     ENT SURGERY      SEPTOPLASTY     ENT SURGERY      TONSILLECTOMY     HERNIA REPAIR       HERNIORRHAPHY INGUINAL  2/15/2012    Procedure:HERNIORRHAPHY INGUINAL; LEFT INGUINAL HERNIA REPAIR WITH MESH; Surgeon:SHILO READ; Location:[facility]     ORTHOPEDIC SURGERY  left ankle 2/2013    ORIF LEFT WRIST FRACTURE     Medications   Home Meds  Prior to Admission medications    Medication Sig Start Date End Date Taking? Authorizing Provider   acetaminophen (TYLENOL) 325 MG tablet Take 2 tablets (650 mg) by mouth every 6 hours as needed for mild pain or fever (> 101 F) 11/6/20  Yes Chris Melara MD   aspirin (ASA) 81 MG EC tablet Take 1 tablet (81 mg) by mouth daily 11/6/20  Yes Chris Melara MD   hydrALAZINE (APRESOLINE) 10 MG tablet Take 1 tablet (10 mg) by mouth 2 times daily 11/6/20  Yes Chris Melara MD   losartan (COZAAR) 100 MG tablet Take 100 mg by mouth every morning In addition to 50 mg in the evening   Yes Unknown, Entered By History   losartan (COZAAR) 100 MG tablet Take 50 mg by mouth every evening In addition to 100 mg in the morning   Yes Unknown, Entered By History   rivaroxaban ANTICOAGULANT (XARELTO) 20 MG TABS tablet Take 1 tablet (20 mg) by mouth daily (with dinner) 11/6/20  Yes Chris Melara MD   simvastatin (ZOCOR) 40 MG tablet Take 1 tablet (40 mg) by mouth daily 11/6/20  Yes Chris Melara MD   tamsulosin (FLOMAX) 0.4 MG capsule Take 0.4 mg by mouth every evening 12/14/21  Yes Unknown, Entered By History   vitamin B-12 (CYANOCOBALAMIN) 1000 MCG tablet Take 3,000 mcg by mouth daily   Yes Unknown, Entered By History   vitamin D3 (CHOLECALCIFEROL) 50 mcg (2000 units) tablet Take 1 tablet by mouth daily   Yes Unknown, Entered By History       Scheduled Meds      Infusion Meds      PRN Meds      Allergies   Allergies   Allergen Reactions     Lisinopril      Family History   Family History   Problem Relation Age of Onset     Respiratory Brother       Coronary Artery Disease Early Onset Mother      Abdominal Aortic Aneurysm Father      Social History   Social History     Tobacco Use     Smoking status: Former Smoker     Years: 20.00     Types: Cigars, Pipe     Quit date: 2010     Years since quittin.3     Smokeless tobacco: Never Used   Substance Use Topics     Alcohol use: No     Alcohol/week: 0.0 standard drinks     Drug use: No       Review of Systems   The 10 point Review of Systems is negative other than noted in the HPI or here.        PHYSICAL EXAMINATION  Temp:  [98.6  F (37  C)] 98.6  F (37  C)  Pulse:  [56-70] 58  Resp:  [18-21] 19  BP: (174-196)/() 174/106  SpO2:  [96 %-98 %] 96 %     Neuro Exam  Mental Status:  alert, oriented x 3, follows commands, speech clear and fluent, naming and repetition normal  Cranial Nerves:  visual fields intact (tested by nurse), EOMI with normal smooth pursuit, facial sensation intact and symmetric (tested by nurse), L facial droop, hearing not formally tested but intact to conversation  Motor:  no abnormal movements, able to move all limbs antigravity spontaneously with no signs of hemiparesis observed, no pronator drift  Reflexes:  unable to test (telestroke)  Sensory:  light touch sensation intact and symmetric throughout upper and lower extremities (assessed by nurse), no extinction on double simultaneous stimulation (assessed by nurse)  Coordination:  LLE ataxia  Station/Gait:  unable to test due to telestroke    Dysphagia Screen  Per Nursing    Stroke Scales    NIHSS  Interval     Interval Comments     1a. Level of Consciousness 0-->Alert, keenly responsive   1b. LOC Questions 0-->Answers both questions correctly   1c. LOC Commands 0-->Performs both tasks correctly   2.   Best Gaze 0-->Normal   3.   Visual 0-->No visual loss   4.   Facial Palsy 1-->Minor paralysis (flattened nasolabial fold, asymmetry on smiling)   5a. Motor Arm, Left 0-->No drift, limb holds 90 (or 45) degrees for full 10 secs   5b.  Motor Arm, Right 0-->No drift, limb holds 90 (or 45) degrees for full 10 secs   6a. Motor Leg, Left 0-->No drift, leg holds 30 degree position for full 5 secs   6b. Motor Leg, right 0-->No drift, leg holds 30 degree position for full 5 secs   7.   Limb Ataxia 1-->Present in one limb   8.   Sensory 0-->Normal, no sensory loss   9.   Best Language 0-->No aphasia, normal   10. Dysarthria 1-->Mild-to-moderate dysarthria, patient slurs at least some words and, at worst, can be understood with some difficulty   11. Extinction and Inattention  0-->No abnormality   Total 3 (01/10/22 1932)       Modified Rosanna Score (Pre-morbid)  3-Moderate disability; requiring some help, but able to walk without assistance    Imaging  I personally reviewed all imaging; relevant findings per HPI.     Lab Results Data   CBC  Recent Labs   Lab 01/10/22  1811   WBC 7.4   RBC 4.35*   HGB 13.7   HCT 42.8        Basic Metabolic Panel    Recent Labs   Lab 01/10/22  1810   GLC 91     Liver Panel  No results for input(s): PROTTOTAL, ALBUMIN, BILITOTAL, ALKPHOS, AST, ALT, BILIDIRECT in the last 168 hours.  INR    Recent Labs   Lab Test 01/10/22  1811 10/22/20  0810   INR 1.27* 1.05      Lipid Profile    Recent Labs   Lab Test 10/26/20  0907 02/03/16  0000   CHOL 119 130   HDL 40 41   LDL 63 75   TRIG 81 70     A1C    Recent Labs   Lab Test 10/22/20  0810   A1C 6.2*     Troponin I  No results for input(s): TROPONIN, GHTROP in the last 168 hours.       Stroke Code Data Data   Stroke Code Data  (for stroke code with tele)  Stroke code activated 01/10/22   1803   First stroke provider response 01/10/22   1806   Video start time 01/10/22   1905   Video end time 01/10/22   1920   Last known normal 01/10/22   1821   Time of discovery  (or onset of symptoms)  01/10/22   1821   Head CT read by Stroke Neuro Dr/Provider 01/10/22   1821   Was stroke code de-escalated?   01/10/22 1852           Telestroke Service Details  Type of service telemedicine  diagnostic assessment of acute neurological changes   Reason telemedicine is appropriate patient requires assessment with a specialist for diagnosis and treatment of neurological symptoms   Mode of transmission secure interactive audio and video communication per Aggie   Originating site (patient location) [facility]    Distant site (provider location) Provider remote site

## 2022-01-11 NOTE — PHARMACY-ADMISSION MEDICATION HISTORY
Pharmacy Medication History  Admission medication history interview status for the 1/10/2022  admission is complete. See EPIC admission navigator for prior to admission medications     Location of Interview: Patient room  Medication history sources: Patient, Patient's family/friend (spouse), Surescripts and Patient's home med list    Significant changes made to the medication list:  Modified losartan dose, added tamsulosin, vitamin D, vitamin B12    In the past week, patient estimated taking medication this percent of the time: greater than 90%    Additional medication history information:   None    Medication reconciliation completed by provider prior to medication history? No    Time spent in this activity: 15 min    Prior to Admission medications    Medication Sig Last Dose Taking? Auth Provider   acetaminophen (TYLENOL) 325 MG tablet Take 2 tablets (650 mg) by mouth every 6 hours as needed for mild pain or fever (> 101 F)  at prn Yes Chris Melara MD   aspirin (ASA) 81 MG EC tablet Take 1 tablet (81 mg) by mouth daily 1/10/2022 at am Yes Chris Melara MD   hydrALAZINE (APRESOLINE) 10 MG tablet Take 1 tablet (10 mg) by mouth 2 times daily 1/10/2022 at am x1 Yes Chris Melara MD   losartan (COZAAR) 100 MG tablet Take 100 mg by mouth every morning In addition to 50 mg in the evening 1/10/2022 at am Yes Unknown, Entered By History   losartan (COZAAR) 100 MG tablet Take 50 mg by mouth every evening In addition to 100 mg in the morning 1/9/2022 at pm Yes Unknown, Entered By History   rivaroxaban ANTICOAGULANT (XARELTO) 20 MG TABS tablet Take 1 tablet (20 mg) by mouth daily (with dinner) 1/9/2022 at pm Yes Chris Melara MD   simvastatin (ZOCOR) 40 MG tablet Take 1 tablet (40 mg) by mouth daily 1/9/2022 at pm Yes Chris Melara MD   tamsulosin (FLOMAX) 0.4 MG capsule Take 0.4 mg by mouth every evening 1/9/2022 at pm Yes Unknown, Entered By History   vitamin B-12  (CYANOCOBALAMIN) 1000 MCG tablet Take 3,000 mcg by mouth daily 1/10/2022 at am Yes Unknown, Entered By History   vitamin D3 (CHOLECALCIFEROL) 50 mcg (2000 units) tablet Take 1 tablet by mouth daily 1/10/2022 at am Yes Unknown, Entered By History       The information provided in this note is only as accurate as the sources available at the time of update(s)

## 2022-01-11 NOTE — PLAN OF CARE
Physical Therapy Discharge Summary    Reason for therapy discharge:    Discharged to home.    Progress towards therapy goal(s). See goals on Care Plan in [facility] electronic health record for goal details.  Goals partially met.  Barriers to achieving goals:   discharge from facility.    Therapy recommendation(s):    Continue home exercise program.

## 2022-01-11 NOTE — PROGRESS NOTES
"   01/11/22 1100   Quick Adds   Quick Adds Certification   Type of Visit Initial PT Evaluation       Present no   Living Environment   People in home spouse   Current Living Arrangements house   Home Accessibility stairs to enter home   Number of Stairs, Main Entrance 4   Stair Railings, Main Entrance railing on left side (ascending)   Transportation Anticipated family or friend will provide   Living Environment Comments Pt reports living in a house with his spouse. Pt reports needing to negotiate stairs to enter the home but once inside, all needs met on the main floor. Pt reports his spouse will pick him up upon discharge and provide 24/7 assist as needed.   Self-Care   Usual Activity Tolerance good   Current Activity Tolerance fair   Regular Exercise No   Activity/Exercise/Self-Care Comment Pt reports being IND at baseline with all ADLs. Pt reports using a FWW for all ambulation at baseline. Pt reports stairs are not difficult for pt. Pt reports being able to ambulate ~100' w/ a FWW at baseline. Pt's spouse drives and does errands IND.    Disability/Function   Hearing Difficulty or Deaf no   Wear Glasses or Blind no   Concentrating, Remembering or Making Decisions Difficulty no   Difficulty Communicating no   Walking or Climbing Stairs Difficulty no   Doing Errands Independently Difficulty (such as shopping) yes   Errands Management Pt's spouse assists with errand management   Fall history within last six months no   Change in Functional Status Since Onset of Current Illness/Injury no   General Information   Onset of Illness/Injury or Date of Surgery 01/11/22   Referring Physician Howe, Sam Lindsay MD   Patient/Family Therapy Goals Statement (PT) \"To go home\"   Pertinent History of Current Problem (include personal factors and/or comorbidities that impact the POC) Per Chart: Kamari Valentin is a 81 year old male admitted on 1/10/2022. He presents to the emergency department after an episode " of lightheadedness when standing largely consistent with orthostasis, though has severe basilar artery stenosis and history of posterior circulation strokes.  Not noted to necessarily have a new acute stroke, though severe stenosis which appears to be worsening and is quite concerning   Existing Precautions/Restrictions fall   Weight-Bearing Status - LLE full weight-bearing   Weight-Bearing Status - RLE full weight-bearing   Cognition   Orientation Status (Cognition) oriented x 3   Pain Assessment   Patient Currently in Pain No   Integumentary/Edema   Integumentary/Edema no deficits were identifed   Posture    Posture Forward head position;Protracted shoulders   Range of Motion (ROM)   ROM Quick Adds ROM WFL   Strength   Manual Muscle Testing Quick Adds Able to perform R SLR;Able to perform L SLR   Strength Comments R Hip Flexion: 5/5; L Hip Flexion: 4/5   Bed Mobility   Comment (Bed Mobility) Supine>sit w/ SBA   Transfers   Transfer Safety Comments Sit>stand w/ FWW and SBA   Gait/Stairs (Locomotion)   [place] Level (Gait) supervision   Assistive Device (Gait) walker, front-wheeled   Distance in Feet (Required for LE Total Joints) 100'  (10' eval)   Comment (Gait/Stairs) Pt ambulated ~100' w/ FWW and SBA. Pt ambulated with decreased gait speed, downward gaze, heavy use of BUEs on FWW, and steady. Verbal cues for upright gaze and posture, to increase step length, and to reduce force through BUEs on FWW for improved WBing. Additional verbal cues to stay within DC of FWW at all times, especially with turns. Pt was steady throughout and had no LOB.   Balance   Balance Comments Pt able to sit at EOB unsupported without LOB. Pt ambulates using a FWW for added stability and support and was steady.    Sensory Examination   Sensory Perception patient reports no sensory changes   Clinical Impression   Criteria for Skilled Therapeutic Intervention yes, treatment indicated   PT Diagnosis (PT) Impaired gait   Influenced by  the following impairments Decreased activity tolerance; decreased balance; decreased strength   Functional limitations due to impairments Impaired functional mobility   Clinical Presentation Stable/Uncomplicated   Clinical Presentation Rationale Clinical Judgement   Clinical Decision Making (Complexity) low complexity   Therapy Frequency (PT) Daily   Predicted Duration of Therapy Intervention (days/wks) 4 days   Planned Therapy Interventions (PT) balance training;bed mobility training;gait training;home exercise program;patient/family education;stair training;strengthening;transfer training   Risk & Benefits of therapy have been explained evaluation/treatment results reviewed;care plan/treatment goals reviewed;risks/benefits reviewed;current/potential barriers reviewed;participants voiced agreement with care plan;participants included;patient;spouse/significant other   PT Discharge Planning    PT Discharge Recommendation (DC Rec) home with assist   PT Rationale for DC Rec Pt is near baseline for mobility. Anticipate at time of discharge pt will be SBA for bed mobility, functional transfers, gait w/ FWW, and min A x 1 for stairs. Pt will have 24/7 assist at home as needed.   PT Brief overview of current status  Supine>sit w/ SBA; sit>stand w/ FWW and SBA; gait w/ FWW and SBA   Therapy Certification   Start of care date 01/11/22   Certification date from 01/11/22   Certification date to 01/18/22   Medical Diagnosis Dizziness   Total Evaluation Time   Total Evaluation Time (Minutes) 10

## 2022-01-11 NOTE — H&P
[facility]    History and Physical - Hospitalist Service       Date of Admission:  1/10/2022    Assessment & Plan      Kamari Valentin is a 81 year old male admitted on 1/10/2022. He presents to the emergency department after an episode of lightheadedness when standing largely consistent with orthostasis, though has severe basilar artery stenosis and history of posterior circulation strokes.  Not noted to necessarily have a new acute stroke, though severe stenosis which appears to be worsening and is quite concerning    Lightheadedness: By history, seems most consistent with orthostasis as patient describes standing up, feeling lightheaded.  Wife checked blood pressure, initially noted to be quite elevated in the 200 range.  Subsequently downtrending on serial check at home with normalization of symptoms.  Brought to the emergency department as code stroke.  No acute event noted on MRI, though being watched closely on observation status at recommendation of neurology given prior posterior circulation stroke and known stenosis.  Note that patient also has severe aortic stenosis which might be resulting in:  History of posterior lateral right thalamus and posterior limb right internal capsule stroke: Occurred in the setting of TAVR evaluation off of Xarelto 3 days prior to event.  Follows with [facility] neurology as an outpatient.  Has some mild residual left-sided weakness, largely of his lower extremity, as well as persistent facial droop which is stable and baseline.  Known basilar artery stenosis and intracranial stenoses, left carotid stenosis.  MRI with severe stenosis versus short segment occlusion involving proximal basilar artery.  Worsened as compared to prior exam by MRI, though again, no clear stroke noted by neurology service.  -Blood pressure goal 140-180  -Every 2 hours neurochecks and vital signs per neurology; page to stroke neurology if changes/new deficits  -Stroke  neurology consulted, aware of patient from emergency department  -Atorvastatin 80 mg daily  -Continue prior to admission Xarelto, aspirin 81 mg daily  -Orthostatic blood pressure and placement of compression stockings to bilateral lower extremities  -Normal saline 100/h  -Discussed with patient's wife at bedside regarding blood pressure monitoring; encouraged her to make note of blood pressure medicine.  Patient asymptomatic as there likely is a range where patient needs a blood pressure high enough to perfuse intracranial stenoses, and low enough that his aortic stenosis and valvular regurgitation do not result in impaired perfusion  -Physical therapy, Occupational Therapy, speech pathology    Aortic valve stenosis: Patient with known aortic valve stenosis.  He declines intervention on his valve.  In October 2020 patient with ejection fraction of 60 to 65%, though aortic valve area estimated at 0.5 cm .  Patient has not had syncope or overt heart failure symptoms, no chest pain.  Today's episode of lightheadedness seems less consistent with severe aortic stenosis history, though may have some indirect contribution to symptoms from uncontrolled hypertension  -Repeating TTE.  Patient's murmur has a systolic as well as diastolic component.  Recognized that patient has no plan to pursue intervention on aortic valve stenosis    BPH:  -Continue prior to mission Flomax 0.4 at bedtime    Other: Discussed care everywhere with patient as well as wife at bedside.  Historically there have been issues with details of hospitalization and studies being conveyed to patient, ability to access records might assist with formulating questions and follow-up of results for patient and wife.  Described process for enrolling with care everywhere, patient/family may need some assistance from staff and case requested.       Diet: NPO for Medical/Clinical Reasons Except for: No Exceptions    DVT Prophylaxis: continuing prior to admission  xarelto  Shen Catheter: Not present  Central Lines: None  Code Status:   DNR/DNI, confirmed with patient on admission    Clinically Significant Risk Factors Present on Admission             # Coagulation Defect: home medication list includes an anticoagulant medication  # Platelet Defect: home medication list includes an antiplatelet medication       Disposition Plan   Expected Discharge:  anticipated discharge 1/11/2022 pending neurology follow-up  Anticipated discharge location:  Awaiting care coordination huddle  Delays:            The patient's care was discussed with the Patient and Patient's wife at bedside, Dr. Camacho the emergency department..    Sam Howe MD  [facility]  Securely message with the Vocera Web Console (learn more here)  Text page via [facility] Paging/Directory        ______________________________________________________________________    Chief Complaint   Lightheadedness    History is obtained from patient, chart review, patient's wife at bedside, discussion with ER provider.    History of Present Illness   Kamari Valentin is a 81 year old male who presents to the emergency department after an episode of lightheadedness at home while standing up.    Patient has a history of severe aortic stenosis as well as right-sided lacunar and internal capsule stroke with microvascular disease, some left-sided carotid stenosis.    During work-up for TAVR, off of anticoagulation for atrial fibrillation for 3 days, patient underwent coronary angiogram 10/22/2021 and subsequently had left lower extremity weakness that did not improve at discharge resulting in return to the emergency department and hospitalization from 10/26 - 10/28/2021.  Patient subsequently discharged to acute rehabilitation for ongoing therapies.  He tells me that he largely recovered.  Has some chronic facial droop as well as some left foot drop which is more notable when he is tired.  Has not had issues  with lightheadedness similar to what he experienced tonight previously, however.    During patient's lightheadedness spell, wife obtained patient's blood pressure and noted to be elevated in the 200 systolic range.  Given his history of stroke, was brought in with his family as a code stroke.  Stroke neurology involved early in case.  MRI without clear new stroke etiology, and patient's symptoms have resolved.  Given prior posterior infarct and stenoses, recommendation was for every 2-hour neurochecks and observation admission.    Of note, patient's blood pressures, by wife's notes at bedside, improved to his baseline as his symptoms resolved.    At this time, patient has no complaints.  He confirms his DNR/DNI status as well as his desire not to pursue intervention on aortic valve stenosis.  Discussed potential etiologies including uncontrolled hypertension resulting in perfusion impairment related to aortic valve disease versus decreased perfusion associated with orthostasis.  It is somewhat reassuring and suggestive of orthostasis that patient's symptoms occurred when he stood up.  He has some borderline bradycardia, wife documented bradycardia in the 50s around the time of symptoms, though he also had a heart rate in the 70s around the time of symptoms.  It is possible that he had more significant bradycardia arrhythmia resulting in symptoms as well.  Monitoring with cardiac telemetry.    Review of Systems    The 10 point Review of Systems is negative other than noted in the HPI or here.  No fevers or chills  No shortness of breath    Past Medical History    I have reviewed this patient's medical history and updated it with pertinent information if needed.   Past Medical History:   Diagnosis Date     Aortic stenosis     mild-moderate     Atrial fibrillation (H)     paroxysmal     Carotid stenosis     mild on right, moderate on left     Chronic renal insufficiency      Essential hypertension, benign       Hyperlipidaemia      Hypertrophy of prostate without urinary obstruction and other lower urinary tract symptoms (LUTS)      Rheumatic fever      Unspecified cerebral artery occlusion with cerebral infarction        Past Surgical History   I have reviewed this patient's surgical history and updated it with pertinent information if needed.  Past Surgical History:   Procedure Laterality Date     CV CORONARY ANGIOGRAM N/A 10/22/2020    Procedure: Coronary Angiogram;  Surgeon: Alexandru Doe MD;  Location:  HEART CARDIAC CATH LAB     CV RIGHT HEART CATH MEASUREMENTS RECORDED N/A 10/22/2020    Procedure: Right Heart Cath;  Surgeon: Alexandru Doe MD;  Location:  HEART CARDIAC CATH LAB     ENT SURGERY      SEPTOPLASTY     ENT SURGERY      TONSILLECTOMY     HERNIA REPAIR       HERNIORRHAPHY INGUINAL  2/15/2012    Procedure:HERNIORRHAPHY INGUINAL; LEFT INGUINAL HERNIA REPAIR WITH MESH; Surgeon:SHILO READ; Location:[facility]     ORTHOPEDIC SURGERY  left ankle 2013    ORIF LEFT WRIST FRACTURE       Social History   I have reviewed this patient's social history and updated it with pertinent information if needed.  Social History     Tobacco Use     Smoking status: Former Smoker     Years: 20.00     Types: Cigars, Pipe     Quit date: 2010     Years since quittin.3     Smokeless tobacco: Never Used   Substance Use Topics     Alcohol use: No     Alcohol/week: 0.0 standard drinks     Drug use: No       Family History   I have reviewed this patient's family history and updated it with pertinent information if needed.  Family History   Problem Relation Age of Onset     Respiratory Brother      Coronary Artery Disease Early Onset Mother      Abdominal Aortic Aneurysm Father        Prior to Admission Medications   Prior to Admission Medications   Prescriptions Last Dose Informant Patient Reported? Taking?   acetaminophen (TYLENOL) 325 MG tablet  at prn  No Yes   Sig: Take 2 tablets (650 mg) by mouth every 6  hours as needed for mild pain or fever (> 101 F)   aspirin (ASA) 81 MG EC tablet 1/10/2022 at am  No Yes   Sig: Take 1 tablet (81 mg) by mouth daily   hydrALAZINE (APRESOLINE) 10 MG tablet 1/10/2022 at am x1  No Yes   Sig: Take 1 tablet (10 mg) by mouth 2 times daily   losartan (COZAAR) 100 MG tablet 1/10/2022 at am  Yes Yes   Sig: Take 100 mg by mouth every morning In addition to 50 mg in the evening   losartan (COZAAR) 100 MG tablet 1/9/2022 at pm  Yes Yes   Sig: Take 50 mg by mouth every evening In addition to 100 mg in the morning   rivaroxaban ANTICOAGULANT (XARELTO) 20 MG TABS tablet 1/9/2022 at pm  No Yes   Sig: Take 1 tablet (20 mg) by mouth daily (with dinner)   simvastatin (ZOCOR) 40 MG tablet 1/9/2022 at pm  No Yes   Sig: Take 1 tablet (40 mg) by mouth daily   tamsulosin (FLOMAX) 0.4 MG capsule 1/9/2022 at pm  Yes Yes   Sig: Take 0.4 mg by mouth every evening   vitamin B-12 (CYANOCOBALAMIN) 1000 MCG tablet 1/10/2022 at am  Yes Yes   Sig: Take 3,000 mcg by mouth daily   vitamin D3 (CHOLECALCIFEROL) 50 mcg (2000 units) tablet 1/10/2022 at am  Yes Yes   Sig: Take 1 tablet by mouth daily      Facility-Administered Medications: None     Allergies   Allergies   Allergen Reactions     Lisinopril        Physical Exam   Vital Signs: Temp: 98.6  F (37  C) Temp src: Temporal BP: (!) 181/117 Pulse: 57   Resp: 23 SpO2: 97 % O2 Device: None (Room air)    Weight: 168 lbs 0 oz    General Appearance: Generally well-appearing 81-year-old male.  Appears robust for age.  Eyes: No scleral icterus or injection  HEENT: Normocephalic, atraumatic, left facial droop present  Respiratory: Breath sounds are clear bilateral to auscultation.  No wheezes, no crackles, no increased work of breathing  Cardiovascular: 2/6 across precordium, though best appreciated at apex.  Patient also with a musical/squeaking diastolic murmur present.  I see a note of a 3/6 musical murmur during last admission suggesting diastolic component was not  present, though musical quality could have been related to diastolic component currently identified  GI: Abdomen soft, nontender palpation.  No palpable mass.  Lymph/Hematologic: No lower extremity edema  Genitourinary: Not examined   Skin: No jaundice, petechiae  Musculoskeletal: Muscular tone and bulk intact in all extremities without any wasting or fasciculation.  Does have some chronic bony deformity of his dorsal right foot which he reports is secondary to rheumatic fever as a child, associated pes cavus  Neurologic: Equivocal Babinski bilaterally.  Mild left facial droop.  Strength in upper extremities is 5/5 and equal bilaterally.  Psychiatric: Very pleasant, normal affect    Data   Data reviewed today: I reviewed all medications, new labs and imaging results over the last 24 hours. I personally reviewed no images or EKG's today.    Recent Labs   Lab 01/10/22  1909 01/10/22  1811 01/10/22  1810   WBC  --  7.4  --    HGB  --  13.7  --    MCV  --  98  --    PLT  --  199  --    INR  --  1.27*  --      --   --    POTASSIUM 4.3  --   --    CHLORIDE 104  --   --    CO2 22  --   --    BUN 16  --   --    CR 0.74  --   --    ANIONGAP 7  --   --    CLAY 8.1*  --   --    GLC 91  --  91

## 2022-01-19 ENCOUNTER — OFFICE VISIT (OUTPATIENT)
Dept: CARDIOLOGY | Facility: CLINIC | Age: 82
End: 2022-01-19
Payer: COMMERCIAL

## 2022-01-19 VITALS
HEIGHT: 67 IN | BODY MASS INDEX: 26.68 KG/M2 | HEART RATE: 57 BPM | SYSTOLIC BLOOD PRESSURE: 155 MMHG | WEIGHT: 170 LBS | DIASTOLIC BLOOD PRESSURE: 78 MMHG

## 2022-01-19 DIAGNOSIS — I25.10 CORONARY ARTERY DISEASE INVOLVING NATIVE CORONARY ARTERY OF NATIVE HEART WITHOUT ANGINA PECTORIS: ICD-10-CM

## 2022-01-19 DIAGNOSIS — I35.0 AORTIC VALVE STENOSIS, ETIOLOGY OF CARDIAC VALVE DISEASE UNSPECIFIED: Primary | ICD-10-CM

## 2022-01-19 PROCEDURE — 99215 OFFICE O/P EST HI 40 MIN: CPT | Performed by: NURSE PRACTITIONER

## 2022-01-19 RX ORDER — FLUTICASONE PROPIONATE 50 MCG
1 SPRAY, SUSPENSION (ML) NASAL 2 TIMES DAILY PRN
COMMUNITY

## 2022-01-19 ASSESSMENT — MIFFLIN-ST. JEOR: SCORE: 1424.74

## 2022-01-19 NOTE — LETTER
1/19/2022    [facility] [address]  [facility] [address]    RE: Kamari Valentin       Dear Colleague,     I had the pleasure of seeing Kamari Valentin in the [facility].    Cardiology Clinic Progress Note  Kamari Valentin MRN# [id number]   YOB: 1940 Age: 81 year old     Primary cardiologist: Dr. Curtis    Reason for visit: Discharge follow up    History of presenting illness:    Kamari Valentin, a pleasant 81 year old patient who has a past medical history significant for persistent atrial fibrillation on chronic anticoagulation, hypertension, HLP, CAD, CVA, carotid artery disease and severe aortic stenosis.    He previously underwent a coronary angiogram as work-up for TAVR versus SAVR and unfortunately suffered a CVA post procedure.  After the stroke the patient had declined further cardiac follow-up as he wanted to recover from his CVA.    Today he presents after a recent admission to [facility] where he presented with lightheadedness that was thought to be consistent with orthostasis.  His home blood pressure was elevated at around 200 but downtrending on subsequent checks.  He was brought to the emergency department and a code stroke was called.  No acute events were noted on MRI and he was evaluated by neurology without any new recommendations.    Today he presents with his wife and daughter.  We discussed the pathophysiology and symptoms to be aware of with progression of his aortic stenosis including syncope which is a concerning sign.  He was very clear as well as his wife and daughter that he does not want any intervention either coronary or valvular surgical or transcatheter.            Assessment and Plan:     ASSESSMENT:    1. Lightheadedness and concern for syncope in the setting of severe aortic stenosis    2. Severe aortic stenosis    Echocardiogram from 12/2020: LVEF of 60 to 65% with severe AS with a  mean gradient of 45, KARYNA 0.5 cm     3. Coronary artery disease    Coronary angiogram 11/2020 showed a 60% left main lesion with a heavily calcified ostial circumflex 80% lesion with bifurcation into the proximal circumflex and 60% calcification of his RCA.    He was referred on for SAVR and CABG: The patient declined surgical intervention    PLAN:     1. At this time the patient was very clear that he does not want any invasive intervention either coronary or valvular.    2. We discussed that he would continue his follow-up with his primary care provider who has provided excellent care and he is very confident compassionate care  3. We also discussed a palliative care consult which is very appropriate and situation as patient and his family has multiple questions regarding the progression of his symptoms including to notify EMS       Orders this Visit:  No orders of the defined types were placed in this encounter.    Orders Placed This Encounter   Medications     fluticasone (FLONASE) 50 MCG/ACT nasal spray     Sig: Spray 1 spray into both nostrils 2 times daily as needed for rhinitis or allergies     There are no discontinued medications.    Today's clinic visit entailed:  Review of the result(s) of each unique test - Echo, cath, MRI  Assessment requiring an independent historian(s) - family - Wife and daughter  Prescription drug management  60 minutes spent on the date of the encounter doing chart review, history and exam, documentation and further activities per the note  Provider  Link to [facility] Help Grid     The level of medical decision making during this visit was of moderate complexity.           Review of Systems:     Review of Systems:  Skin:  Negative     Eyes:  Negative    ENT:  Negative    Respiratory:  Negative    Cardiovascular:    lightheadedness;Positive for  Gastroenterology: Negative    Genitourinary:  Negative    Musculoskeletal:  Negative    Neurologic:  Positive for stroke  Psychiatric:  Negative  "   Heme/Lymph/Imm:  Negative    Endocrine:  Negative              Physical Exam:   Vitals: BP (!) 155/78   Pulse 57   Ht 1.702 m (5' 7\")   Wt 77.1 kg (170 lb)   BMI 26.63 kg/m    Constitutional:  cooperative, alert and oriented, well developed, well nourished, in no acute distress        Skin:  warm and dry to the touch, no apparent skin lesions or masses noted        Head:  normocephalic, no masses or lesions        Eyes:  pupils equal and round;conjunctivae and lids unremarkable   wear eye glasses    ENT:  no pallor or cyanosis        Neck:    transmitted murmur      Chest:  clear to auscultation        Cardiac: regular rhythm       grade 3;radiation to the carotid;late systolic murmur     regular rhythm, normal heart sounds, III/VI systolic murmur over the aortic valve area    Abdomen:  abdomen soft        Vascular: pulses full and equal                                      Extremities and Back:  no deformities, clubbing, cyanosis, erythema observed;no edema        Neurological:  no gross motor deficits             Medications:     Current Outpatient Medications   Medication Sig Dispense Refill     acetaminophen (TYLENOL) 325 MG tablet Take 2 tablets (650 mg) by mouth every 6 hours as needed for mild pain or fever (> 101 F)       aspirin (ASA) 81 MG EC tablet Take 1 tablet (81 mg) by mouth daily 30 tablet 0     fluticasone (FLONASE) 50 MCG/ACT nasal spray Spray 1 spray into both nostrils 2 times daily as needed for rhinitis or allergies       hydrALAZINE (APRESOLINE) 10 MG tablet Take 1 tablet (10 mg) by mouth 2 times daily 60 tablet 0     losartan (COZAAR) 100 MG tablet Take 100 mg by mouth every morning In addition to 50 mg in the evening       losartan (COZAAR) 100 MG tablet Take 50 mg by mouth every evening In addition to 100 mg in the morning       rivaroxaban ANTICOAGULANT (XARELTO) 20 MG TABS tablet Take 1 tablet (20 mg) by mouth daily (with dinner) 30 tablet 0     simvastatin (ZOCOR) 40 MG tablet Take " 1 tablet (40 mg) by mouth daily 30 tablet 0     vitamin B-12 (CYANOCOBALAMIN) 1000 MCG tablet Take 3,000 mcg by mouth daily       vitamin D3 (CHOLECALCIFEROL) 50 mcg (2000 units) tablet Take 1 tablet by mouth daily       tamsulosin (FLOMAX) 0.4 MG capsule Take 0.4 mg by mouth every evening (Patient not taking: Reported on 2022)         Family History   Problem Relation Age of Onset     Respiratory Brother      Coronary Artery Disease Early Onset Mother      Abdominal Aortic Aneurysm Father        Social History     Socioeconomic History     Marital status:      Spouse name: Not on file     Number of children: Not on file     Years of education: Not on file     Highest education level: Not on file   Occupational History     Not on file   Tobacco Use     Smoking status: Former Smoker     Years: 20.00     Types: Cigars, Pipe     Quit date: 2010     Years since quittin.3     Smokeless tobacco: Never Used   Substance and Sexual Activity     Alcohol use: No     Alcohol/week: 0.0 standard drinks     Drug use: No     Sexual activity: Not on file   Other Topics Concern     Parent/sibling w/ CABG, MI or angioplasty before 65F 55M? Not Asked   Social History Narrative     Not on file     Social Determinants of Health     Financial Resource Strain: Not on file   Food Insecurity: Not on file   Transportation Needs: Not on file   Physical Activity: Not on file   Stress: Not on file   Social Connections: Not on file   Intimate Partner Violence: Not on file   Housing Stability: Not on file            Past Medical History:     Past Medical History:   Diagnosis Date     Aortic stenosis     mild-moderate     Atrial fibrillation (H)     paroxysmal     Carotid stenosis     mild on right, moderate on left     Chronic renal insufficiency      Essential hypertension, benign      Hyperlipidaemia      Hypertrophy of prostate without urinary obstruction and other lower urinary tract symptoms (LUTS)      Rheumatic fever       Unspecified cerebral artery occlusion with cerebral infarction               Past Surgical History:     Past Surgical History:   Procedure Laterality Date     CV CORONARY ANGIOGRAM N/A 10/22/2020    Procedure: Coronary Angiogram;  Surgeon: Alexandru Doe MD;  Location:  HEART CARDIAC CATH LAB     CV RIGHT HEART CATH MEASUREMENTS RECORDED N/A 10/22/2020    Procedure: Right Heart Cath;  Surgeon: Alexandru Doe MD;  Location:  HEART CARDIAC CATH LAB     ENT SURGERY      SEPTOPLASTY     ENT SURGERY      TONSILLECTOMY     HERNIA REPAIR       HERNIORRHAPHY INGUINAL  2/15/2012    Procedure:HERNIORRHAPHY INGUINAL; LEFT INGUINAL HERNIA REPAIR WITH MESH; Surgeon:SHILO READ; Location:[facility]     ORTHOPEDIC SURGERY  left ankle 2/2013    ORIF LEFT WRIST FRACTURE              Allergies:   Lisinopril       Data:   All laboratory data reviewed:    Recent Labs   Lab Test 01/10/22  1909 10/27/20  0844 10/26/20  0907 02/03/16  0000   LDL 45  --  63 75   HDL 37*  --  40 41   NHDL 57  --  79  --    CHOL 94  --  119 130   TRIG 60  --  81 70   TSH  --  1.56  --   --        Lab Results   Component Value Date    WBC 7.4 01/10/2022    WBC 6.7 10/29/2020    RBC 4.35 (L) 01/10/2022    RBC 4.54 10/29/2020    HGB 13.7 01/10/2022    HGB 14.5 10/29/2020    HCT 42.8 01/10/2022    HCT 45.4 10/29/2020    MCV 98 01/10/2022     10/29/2020    MCH 31.5 01/10/2022    MCH 31.9 10/29/2020    MCHC 32.0 01/10/2022    MCHC 31.9 10/29/2020    RDW 12.5 01/10/2022    RDW 12.7 10/29/2020     01/10/2022     10/29/2020       Lab Results   Component Value Date     01/10/2022     10/29/2020    POTASSIUM 4.3 01/10/2022    POTASSIUM 4.9 10/29/2020    CHLORIDE 104 01/10/2022    CHLORIDE 108 10/29/2020    CO2 22 01/10/2022    CO2 30 10/29/2020    ANIONGAP 7 01/10/2022    ANIONGAP 3 10/29/2020    GLC 91 01/10/2022    GLC 91 01/10/2022    GLC 98 10/29/2020    BUN 16 01/10/2022    BUN 25 10/29/2020    CR 0.74  01/10/2022    CR 0.96 10/29/2020    GFRESTIMATED 90 01/10/2022    GFRESTIMATED 73 10/29/2020    GFRESTBLACK 84 10/29/2020    CLAY 8.1 (L) 01/10/2022    CLAY 9.0 10/29/2020      Lab Results   Component Value Date    AST 19 10/27/2020    ALT 23 10/27/2020       Lab Results   Component Value Date    A1C 5.7 (H) 01/10/2022    A1C 6.2 (H) 10/22/2020       Lab Results   Component Value Date    INR 1.27 (H) 01/10/2022    INR 1.05 10/22/2020    INR 1.10 11/30/2010         PAULINA PUCKETT CNP  [facility]  Pager: [phone]  RN phone: [phone]      Thank you for allowing me to participate in the care of your patient.      Sincerely,     PAULINA PUCKETT CNP     [facility]  cc:   No referring provider defined for this encounter.

## 2022-01-19 NOTE — PROGRESS NOTES
Cardiology Clinic Progress Note  Kamari Valentin MRN# [id number]   YOB: 1940 Age: 81 year old     Primary cardiologist: Dr. Curtis    Reason for visit: Discharge follow up    History of presenting illness:    Kamari Valentin, a pleasant 81 year old patient who has a past medical history significant for persistent atrial fibrillation on chronic anticoagulation, hypertension, HLP, CAD, CVA, carotid artery disease and severe aortic stenosis.    He previously underwent a coronary angiogram as work-up for TAVR versus SAVR and unfortunately suffered a CVA post procedure.  After the stroke the patient had declined further cardiac follow-up as he wanted to recover from his CVA.    Today he presents after a recent admission to [facility] where he presented with lightheadedness that was thought to be consistent with orthostasis.  His home blood pressure was elevated at around 200 but downtrending on subsequent checks.  He was brought to the emergency department and a code stroke was called.  No acute events were noted on MRI and he was evaluated by neurology without any new recommendations.    Today he presents with his wife and daughter.  We discussed the pathophysiology and symptoms to be aware of with progression of his aortic stenosis including syncope which is a concerning sign.  He was very clear as well as his wife and daughter that he does not want any intervention either coronary or valvular surgical or transcatheter.            Assessment and Plan:     ASSESSMENT:    1. Lightheadedness and concern for syncope in the setting of severe aortic stenosis    2. Severe aortic stenosis    Echocardiogram from 12/2020: LVEF of 60 to 65% with severe AS with a mean gradient of 45, KARYNA 0.5 cm     3. Coronary artery disease    Coronary angiogram 11/2020 showed a 60% left main lesion with a heavily calcified ostial circumflex 80% lesion with bifurcation into the proximal circumflex and 60%  "calcification of his RCA.    He was referred on for SAVR and CABG: The patient declined surgical intervention    PLAN:     1. At this time the patient was very clear that he does not want any invasive intervention either coronary or valvular.    2. We discussed that he would continue his follow-up with his primary care provider who has provided excellent care and he is very confident compassionate care  3. We also discussed a palliative care consult which is very appropriate and situation as patient and his family has multiple questions regarding the progression of his symptoms including to notify EMS       Orders this Visit:  No orders of the defined types were placed in this encounter.    Orders Placed This Encounter   Medications     fluticasone (FLONASE) 50 MCG/ACT nasal spray     Sig: Spray 1 spray into both nostrils 2 times daily as needed for rhinitis or allergies     There are no discontinued medications.    Today's clinic visit entailed:  Review of the result(s) of each unique test - Echo, cath, MRI  Assessment requiring an independent historian(s) - family - Wife and daughter  Prescription drug management  60 minutes spent on the date of the encounter doing chart review, history and exam, documentation and further activities per the note  Provider  Link to [facility] Help Grid     The level of medical decision making during this visit was of moderate complexity.           Review of Systems:     Review of Systems:  Skin:  Negative     Eyes:  Negative    ENT:  Negative    Respiratory:  Negative    Cardiovascular:    lightheadedness;Positive for  Gastroenterology: Negative    Genitourinary:  Negative    Musculoskeletal:  Negative    Neurologic:  Positive for stroke  Psychiatric:  Negative    Heme/Lymph/Imm:  Negative    Endocrine:  Negative              Physical Exam:   Vitals: BP (!) 155/78   Pulse 57   Ht 1.702 m (5' 7\")   Wt 77.1 kg (170 lb)   BMI 26.63 kg/m    Constitutional:  cooperative, alert and " oriented, well developed, well nourished, in no acute distress        Skin:  warm and dry to the touch, no apparent skin lesions or masses noted        Head:  normocephalic, no masses or lesions        Eyes:  pupils equal and round;conjunctivae and lids unremarkable   wear eye glasses    ENT:  no pallor or cyanosis        Neck:    transmitted murmur      Chest:  clear to auscultation        Cardiac: regular rhythm       grade 3;radiation to the carotid;late systolic murmur     regular rhythm, normal heart sounds, III/VI systolic murmur over the aortic valve area    Abdomen:  abdomen soft        Vascular: pulses full and equal                                      Extremities and Back:  no deformities, clubbing, cyanosis, erythema observed;no edema        Neurological:  no gross motor deficits             Medications:     Current Outpatient Medications   Medication Sig Dispense Refill     acetaminophen (TYLENOL) 325 MG tablet Take 2 tablets (650 mg) by mouth every 6 hours as needed for mild pain or fever (> 101 F)       aspirin (ASA) 81 MG EC tablet Take 1 tablet (81 mg) by mouth daily 30 tablet 0     fluticasone (FLONASE) 50 MCG/ACT nasal spray Spray 1 spray into both nostrils 2 times daily as needed for rhinitis or allergies       hydrALAZINE (APRESOLINE) 10 MG tablet Take 1 tablet (10 mg) by mouth 2 times daily 60 tablet 0     losartan (COZAAR) 100 MG tablet Take 100 mg by mouth every morning In addition to 50 mg in the evening       losartan (COZAAR) 100 MG tablet Take 50 mg by mouth every evening In addition to 100 mg in the morning       rivaroxaban ANTICOAGULANT (XARELTO) 20 MG TABS tablet Take 1 tablet (20 mg) by mouth daily (with dinner) 30 tablet 0     simvastatin (ZOCOR) 40 MG tablet Take 1 tablet (40 mg) by mouth daily 30 tablet 0     vitamin B-12 (CYANOCOBALAMIN) 1000 MCG tablet Take 3,000 mcg by mouth daily       vitamin D3 (CHOLECALCIFEROL) 50 mcg (2000 units) tablet Take 1 tablet by mouth daily        tamsulosin (FLOMAX) 0.4 MG capsule Take 0.4 mg by mouth every evening (Patient not taking: Reported on 2022)         Family History   Problem Relation Age of Onset     Respiratory Brother      Coronary Artery Disease Early Onset Mother      Abdominal Aortic Aneurysm Father        Social History     Socioeconomic History     Marital status:      Spouse name: Not on file     Number of children: Not on file     Years of education: Not on file     Highest education level: Not on file   Occupational History     Not on file   Tobacco Use     Smoking status: Former Smoker     Years: 20.00     Types: Cigars, Pipe     Quit date: 2010     Years since quittin.3     Smokeless tobacco: Never Used   Substance and Sexual Activity     Alcohol use: No     Alcohol/week: 0.0 standard drinks     Drug use: No     Sexual activity: Not on file   Other Topics Concern     Parent/sibling w/ CABG, MI or angioplasty before 65F 55M? Not Asked   Social History Narrative     Not on file     Social Determinants of Health     Financial Resource Strain: Not on file   Food Insecurity: Not on file   Transportation Needs: Not on file   Physical Activity: Not on file   Stress: Not on file   Social Connections: Not on file   Intimate Partner Violence: Not on file   Housing Stability: Not on file            Past Medical History:     Past Medical History:   Diagnosis Date     Aortic stenosis     mild-moderate     Atrial fibrillation (H)     paroxysmal     Carotid stenosis     mild on right, moderate on left     Chronic renal insufficiency      Essential hypertension, benign      Hyperlipidaemia      Hypertrophy of prostate without urinary obstruction and other lower urinary tract symptoms (LUTS)      Rheumatic fever      Unspecified cerebral artery occlusion with cerebral infarction               Past Surgical History:     Past Surgical History:   Procedure Laterality Date     CV CORONARY ANGIOGRAM N/A 10/22/2020    Procedure: Coronary  Angiogram;  Surgeon: Alexandru Doe MD;  Location:  HEART CARDIAC CATH LAB     CV RIGHT HEART CATH MEASUREMENTS RECORDED N/A 10/22/2020    Procedure: Right Heart Cath;  Surgeon: Alexandru oDe MD;  Location:  HEART CARDIAC CATH LAB     ENT SURGERY      SEPTOPLASTY     ENT SURGERY      TONSILLECTOMY     HERNIA REPAIR       HERNIORRHAPHY INGUINAL  2/15/2012    Procedure:HERNIORRHAPHY INGUINAL; LEFT INGUINAL HERNIA REPAIR WITH MESH; Surgeon:SHILO READ; Location: [facility]     ORTHOPEDIC SURGERY  left ankle 2/2013    ORIF LEFT WRIST FRACTURE              Allergies:   Lisinopril       Data:   All laboratory data reviewed:    Recent Labs   Lab Test 01/10/22  1909 10/27/20  0844 10/26/20  0907 02/03/16  0000   LDL 45  --  63 75   HDL 37*  --  40 41   NHDL 57  --  79  --    CHOL 94  --  119 130   TRIG 60  --  81 70   TSH  --  1.56  --   --        Lab Results   Component Value Date    WBC 7.4 01/10/2022    WBC 6.7 10/29/2020    RBC 4.35 (L) 01/10/2022    RBC 4.54 10/29/2020    HGB 13.7 01/10/2022    HGB 14.5 10/29/2020    HCT 42.8 01/10/2022    HCT 45.4 10/29/2020    MCV 98 01/10/2022     10/29/2020    MCH 31.5 01/10/2022    MCH 31.9 10/29/2020    MCHC 32.0 01/10/2022    MCHC 31.9 10/29/2020    RDW 12.5 01/10/2022    RDW 12.7 10/29/2020     01/10/2022     10/29/2020       Lab Results   Component Value Date     01/10/2022     10/29/2020    POTASSIUM 4.3 01/10/2022    POTASSIUM 4.9 10/29/2020    CHLORIDE 104 01/10/2022    CHLORIDE 108 10/29/2020    CO2 22 01/10/2022    CO2 30 10/29/2020    ANIONGAP 7 01/10/2022    ANIONGAP 3 10/29/2020    GLC 91 01/10/2022    GLC 91 01/10/2022    GLC 98 10/29/2020    BUN 16 01/10/2022    BUN 25 10/29/2020    CR 0.74 01/10/2022    CR 0.96 10/29/2020    GFRESTIMATED 90 01/10/2022    GFRESTIMATED 73 10/29/2020    GFRESTBLACK 84 10/29/2020    CLAY 8.1 (L) 01/10/2022    CLAY 9.0 10/29/2020      Lab Results   Component Value Date    AST 19 10/27/2020     ALT 23 10/27/2020       Lab Results   Component Value Date    A1C 5.7 (H) 01/10/2022    A1C 6.2 (H) 10/22/2020       Lab Results   Component Value Date    INR 1.27 (H) 01/10/2022    INR 1.05 10/22/2020    INR 1.10 11/30/2010         PAULINA PUCKETT [facility]  Pager: [phone]  RN phone: [phone]

## 2022-01-31 NOTE — PROGRESS NOTES
No history TIA  CVA Amaurosis  Fugax  Since last  Check.  Carotids 4/4 no  Bruits motor/sensory  Intact Cr. N. 2-12 intact  Carotid ultrasound stable over the last  Year 50-69% stenosis bilaterally by calculation however peak velocities 128 R and 228L.  Continue present medications F/U  One year.  He also has some ankle and foot edema on the right non tender no history  Of trauma.  ? Venous valvular incompetence if it becomes symptomatic consider venous duplex scan discussed--he  Will  Let us know.   2 = A lot of assistance

## 2022-06-16 ENCOUNTER — TRANSFERRED RECORDS (OUTPATIENT)
Dept: HEALTH INFORMATION MANAGEMENT | Facility: CLINIC | Age: 82
End: 2022-06-16
Payer: COMMERCIAL

## 2022-06-16 ENCOUNTER — TELEPHONE (OUTPATIENT)
Dept: CARDIOLOGY | Facility: CLINIC | Age: 82
End: 2022-06-16
Payer: COMMERCIAL

## 2022-06-16 NOTE — TELEPHONE ENCOUNTER
Received call from ADRIEL North at [facility] asking for Dr Curtis to review EKG completed at  today.  Patient was seen for diarrhea and mentioned to PA that he was having BAE but not any worse than at baseline.  EKG was faxed over to clinic.  She is requesting a call back with any recommendations.  Will have Dr Curtis review EKG.  CAMRON Coates

## 2022-06-16 NOTE — TELEPHONE ENCOUNTER
VM received from Shanda Adams PA-C with [facility], calling to inquire some further information about patient's cardiac status. Shanda is wanting to speak with Linda and possibly have our clinic review an EKG. Linda is out of office for the remainder of the week. Patient's primary cardiologist is Dr. Curtis. Contacted Shanda to review further. Reviewed with Shanda that Linda Ekromario is out of office, but she could contact patient's primary cardiologist, Dr. Curtis, to review. Provided Shanda with RN phone number and fax number.

## 2022-06-16 NOTE — TELEPHONE ENCOUNTER
Spoke to Isaura MOREL to review EKG results per Dr Curtis--AF with controled HR .  No significant changes from the recent past.    She did confirm she does have most recent note 1/19/2022 with plan and patient wishes.  Sent copy of EKG from [facility] to HIM to scan.   CAMRON Coates

## 2022-06-20 ENCOUNTER — HOSPITAL ENCOUNTER (EMERGENCY)
Facility: CLINIC | Age: 82
Discharge: HOME | End: 2022-06-20
Attending: EMERGENCY MEDICINE | Admitting: EMERGENCY MEDICINE
Payer: COMMERCIAL

## 2022-06-20 VITALS
OXYGEN SATURATION: 99 % | DIASTOLIC BLOOD PRESSURE: 111 MMHG | SYSTOLIC BLOOD PRESSURE: 132 MMHG | HEART RATE: 71 BPM | TEMPERATURE: 98.1 F | RESPIRATION RATE: 16 BRPM

## 2022-06-20 DIAGNOSIS — R19.7 DIARRHEA, UNSPECIFIED TYPE: ICD-10-CM

## 2022-06-20 DIAGNOSIS — E87.6 HYPOKALEMIA: ICD-10-CM

## 2022-06-20 LAB
ALBUMIN SERPL-MCNC: 3.4 G/DL (ref 3.4–5)
ALP SERPL-CCNC: 53 U/L (ref 40–150)
ALT SERPL W P-5'-P-CCNC: 18 U/L (ref 0–70)
ANION GAP SERPL CALCULATED.3IONS-SCNC: 5 MMOL/L (ref 3–14)
AST SERPL W P-5'-P-CCNC: 29 U/L (ref 0–45)
BASOPHILS # BLD AUTO: 0 10E3/UL (ref 0–0.2)
BASOPHILS NFR BLD AUTO: 1 %
BILIRUB SERPL-MCNC: 0.8 MG/DL (ref 0.2–1.3)
BUN SERPL-MCNC: 14 MG/DL (ref 7–30)
CALCIUM SERPL-MCNC: 8.9 MG/DL (ref 8.5–10.1)
CHLORIDE BLD-SCNC: 110 MMOL/L (ref 94–109)
CO2 SERPL-SCNC: 28 MMOL/L (ref 20–32)
CREAT SERPL-MCNC: 1.18 MG/DL (ref 0.66–1.25)
EOSINOPHIL # BLD AUTO: 0.1 10E3/UL (ref 0–0.7)
EOSINOPHIL NFR BLD AUTO: 2 %
ERYTHROCYTE [DISTWIDTH] IN BLOOD BY AUTOMATED COUNT: 12.9 % (ref 10–15)
GFR SERPL CREATININE-BSD FRML MDRD: 61 ML/MIN/1.73M2
GLUCOSE BLD-MCNC: 111 MG/DL (ref 70–99)
HCT VFR BLD AUTO: 41.1 % (ref 40–53)
HGB BLD-MCNC: 14.2 G/DL (ref 13.3–17.7)
HOLD SPECIMEN: NORMAL
IMM GRANULOCYTES # BLD: 0 10E3/UL
IMM GRANULOCYTES NFR BLD: 0 %
LYMPHOCYTES # BLD AUTO: 1.2 10E3/UL (ref 0.8–5.3)
LYMPHOCYTES NFR BLD AUTO: 20 %
MAGNESIUM SERPL-MCNC: 1.8 MG/DL (ref 1.6–2.3)
MCH RBC QN AUTO: 33 PG (ref 26.5–33)
MCHC RBC AUTO-ENTMCNC: 34.5 G/DL (ref 31.5–36.5)
MCV RBC AUTO: 96 FL (ref 78–100)
MONOCYTES # BLD AUTO: 1.1 10E3/UL (ref 0–1.3)
MONOCYTES NFR BLD AUTO: 17 %
NEUTROPHILS # BLD AUTO: 3.8 10E3/UL (ref 1.6–8.3)
NEUTROPHILS NFR BLD AUTO: 60 %
NRBC # BLD AUTO: 0 10E3/UL
NRBC BLD AUTO-RTO: 0 /100
PLATELET # BLD AUTO: 229 10E3/UL (ref 150–450)
POTASSIUM BLD-SCNC: 3.1 MMOL/L (ref 3.4–5.3)
PROT SERPL-MCNC: 6.3 G/DL (ref 6.8–8.8)
RBC # BLD AUTO: 4.3 10E6/UL (ref 4.4–5.9)
SODIUM SERPL-SCNC: 143 MMOL/L (ref 133–144)
WBC # BLD AUTO: 6.2 10E3/UL (ref 4–11)

## 2022-06-20 PROCEDURE — 36415 COLL VENOUS BLD VENIPUNCTURE: CPT

## 2022-06-20 PROCEDURE — 83735 ASSAY OF MAGNESIUM: CPT

## 2022-06-20 PROCEDURE — 80053 COMPREHEN METABOLIC PANEL: CPT

## 2022-06-20 PROCEDURE — 93005 ELECTROCARDIOGRAM TRACING: CPT

## 2022-06-20 PROCEDURE — 99284 EMERGENCY DEPT VISIT MOD MDM: CPT

## 2022-06-20 PROCEDURE — 85025 COMPLETE CBC W/AUTO DIFF WBC: CPT

## 2022-06-20 PROCEDURE — 250N000013 HC RX MED GY IP 250 OP 250 PS 637

## 2022-06-20 RX ORDER — LOPERAMIDE HYDROCHLORIDE 2 MG/1
2 TABLET ORAL 4 TIMES DAILY PRN
Qty: 28 TABLET | Refills: 0 | Status: ON HOLD | OUTPATIENT
Start: 2022-06-20 | End: 2022-06-30

## 2022-06-20 RX ORDER — POTASSIUM CHLORIDE 1.5 G/1.58G
40 POWDER, FOR SOLUTION ORAL ONCE
Status: COMPLETED | OUTPATIENT
Start: 2022-06-20 | End: 2022-06-20

## 2022-06-20 RX ORDER — POTASSIUM CHLORIDE 1500 MG/1
20 TABLET, EXTENDED RELEASE ORAL DAILY
Qty: 7 TABLET | Refills: 0 | Status: ON HOLD | OUTPATIENT
Start: 2022-06-20 | End: 2022-06-30

## 2022-06-20 RX ADMIN — POTASSIUM CHLORIDE 40 MEQ: 1.5 POWDER, FOR SOLUTION ORAL at 14:21

## 2022-06-20 ASSESSMENT — ENCOUNTER SYMPTOMS
NAUSEA: 0
BLOOD IN STOOL: 0
FEVER: 0
VOMITING: 0
SHORTNESS OF BREATH: 0
LIGHT-HEADEDNESS: 1
DYSURIA: 0
ABDOMINAL PAIN: 0
DIARRHEA: 1

## 2022-06-20 NOTE — ED PROVIDER NOTES
ED ATTENDING PHYSICIAN NOTE:   I evaluated this patient in conjunction with Milly Don PA-C  I have participated in the care of the patient and personally performed key elements of the history, exam, and medical decision making.      HPI:   Kamari Valentin is a 82 year old male presenting with diarrhea.  Reports diarrhea since May 31 (typically 6-8 episodes), increased stool production over the past 24 hours (12 episodes today).  Dropped off stool sample at his PCP, mentioned lower BP today so they sent him here.  No fevers, n/v, CP, SOB, abdominal pain, bloody stools, recent antibiotic use.  Reports testing negative for c diff last week through his PCP.  He has tried Imodium without relief     EXAM:   General: Alert, no acute distress; well appearing  HEENT:  Moist mucous membranes.  Conjunctiva normal.   CV:  skin warm and well perfused  Pulm: No acute distress, breathing comfortably  GI:  Soft, nontender, nondistended.  No rebound or guarding.    MSK:  Moving all extremities.  No focal areas of edema, erythema  Skin:  WWP, no lower extremity edema, skin color normal     MEDICAL DECISION MAKING/ASSESSMENT AND PLAN:    82-year-old with history of atrial fibrillation on anticoagulation presenting with diarrhea since May 31.  Please see above for details of HPI and exam.  He is afebrile and vitally stable.  Denies abdominal pain and has completely benign abdominal exam here.  No signs of acute abdomen or intra-abdominal catastrophe requiring imaging.  Has stool sample pending at PCP, recently tested negative for C. difficile.  Denies recent antibiotic use or bloody stools.  Doubt bacterial cause for his diarrhea.  Basic lab studies remarkable for mild hypokalemia.  EKG shows atrial fibrillation, no signs of QT prolongation.  The rest of his electrolytes are normal.  Potassium orally replaced here in the ER.  With reasonable certainty, patient is safe to discharge home with plan for continued fluid hydration,  follow-up with stool studies sent to PCP.  We will trial loperamide as he has tried Imodium without relief.  Discussed signs and symptoms that should prompt patient's urgent turn to the ER.  Patient and wife comfortable with this plan.  All questions answered prior to discharge.     DIAGNOSIS:     ICD-10-CM    1. Diarrhea, unspecified type  R19.7    2. Hypokalemia  E87.6             DISPOSITION:   Home       6/20/2022  [facility] EMERGENCY DEPT       Irvin Beckman MD  06/20/22 [id number]

## 2022-06-20 NOTE — ED PROVIDER NOTES
History     Chief Complaint:  Diarrhea      HPI   Kamari Valentin is a 82 year old male with a history of stroke, hypertension, hyperlipidemia, atrial fibrillation on Xarelto and aortic stenosis who presents to the emergency department by EMS for evaluation of uncontrollable diarrhea that began 5/31. This started with 6-8 episodes of diarrhea daily. Patient reports he was initially prescribed Imodium, which did not offer relief. The patient notes his diarrhea is watery and somewhat brown. He had 12 episodes of diarrhea this morning. The patient measured his blood pressure today and found it to be low, though he cannot recall the measurement. He mentions he had 12 episodes of diarrhea this morning, which has increased over typical. He also experienced lightheadedness. While dropping of stool samples to his PCP at [facility], he was advised by a PA to present to the ED due to his symptoms. Denies blood in his stool, nausea, vomiting, chest pain, abdominal pain, shortness of breath more so than his baseline. No exposure to sick contacts. Not on any antibiotics recently. No recent travel.  He has an appointment to see Dr. Annita Proctor in clinic next Tuesday.     Review of Systems   Constitutional: Negative for fever.   Respiratory: Negative for shortness of breath.    Cardiovascular: Negative for chest pain.   Gastrointestinal: Positive for diarrhea. Negative for abdominal pain, blood in stool, nausea and vomiting.   Genitourinary: Negative for dysuria.   Neurological: Positive for light-headedness.   All other systems reviewed and are negative.    Allergies:  Lisinopril    Medications:    Asa  Apresoline  Xarelto  Zocor  Cozaar    Past Medical History:    Aortic stenosis  Atrial fibrillation  Carotid stenosis  Chronic renal insufficiency  Hypertension  Hyperlipidemia  Hypertrophy of prostate  Rheumatic fever  Cerebral artery occlusion with cerebral infarction  Stroke  Hemiparesis  Left leg  weakness  Primary osteoarthritis of right shoulder.      Past Surgical History:    Coronary angiogram  Right heart catheterization   Septoplasty  Tonsillectomy  Herniorrhaphy inguinal, left  ORIF, left wrist fracture  Shoulder surgery    Family History:    Mother: CAD  Father: Abdominal aortic aneurysm    Social History:  PCP: Annita Proctor  The patient presents to the ED alone via EMS.   Lives with his wife    Physical Exam     Patient Vitals for the past 24 hrs:   BP Temp Pulse Resp SpO2   06/20/22 1252 -- 98.1  F (36.7  C) -- 16 --   06/20/22 1245 (!) 145/77 -- 55 -- --   06/20/22 1230 (!) 147/62 -- (!) 49 -- 97 %   06/20/22 1215 (!) 151/80 -- 63 -- 98 %   06/20/22 1210 132/84 -- -- -- 98 %       Physical Exam  General: Pleasant elderly male sitting on [facility] smiling.  HENT: Patient wearing face mask. When taken off, mucous membranes appear moist.  Eyes: Conjunctive and sclera clear.  CV: Bradycardic rate, regular rhythm. Systolic murmur.  Resp: Lungs clear to auscultation bilaterally. Normal respiratory effort. Speaks in full sentences. No stridor or cough observed.  GI: Abdomen soft, non distended and nontender. No rebound or guarding.  MSK: No lower extremity edema.  Skin: Warm and dry. No rashes.  Neuro: Awake, alert, oriented. GCS 15.   Psych: Cooperative. Normal affect.    Emergency Department Course   ECG:  ECG taken at 1211, ECG read at 1217  Atrial fibrillation with slow ventricular response.  Left axis deviation.  Minimal voltage criteria for LVH. May be normal variant.  ST & T wave abnormality. Consider lateral ischemia.  No significant change compared to previous 1/10/22.   Rate 59 bpm. AZ interval * ms. QRS duration 96 ms. QT/QTc 418/413 ms. P-R-T axes * -30 154.     Laboratory:  Labs Ordered and Resulted from Time of ED Arrival to Time of ED Departure   COMPREHENSIVE METABOLIC PANEL - Abnormal       Result Value    Sodium 143      Potassium 3.1 (*)     Chloride 110 (*)     Carbon Dioxide  (CO2) 28      Anion Gap 5      Urea Nitrogen 14      Creatinine 1.18      Calcium 8.9      Glucose 111 (*)     Alkaline Phosphatase 53      AST 29      ALT 18      Protein Total 6.3 (*)     Albumin 3.4      Bilirubin Total 0.8      GFR Estimate 61     CBC WITH PLATELETS AND DIFFERENTIAL - Abnormal    WBC Count 6.2      RBC Count 4.30 (*)     Hemoglobin 14.2      Hematocrit 41.1      MCV 96      MCH 33.0      MCHC 34.5      RDW 12.9      Platelet Count 229      % Neutrophils 60      % Lymphocytes 20      % Monocytes 17      % Eosinophils 2      % Basophils 1      % Immature Granulocytes 0      NRBCs per 100 WBC 0      Absolute Neutrophils 3.8      Absolute Lymphocytes 1.2      Absolute Monocytes 1.1      Absolute Eosinophils 0.1      Absolute Basophils 0.0      Absolute Immature Granulocytes 0.0      Absolute NRBCs 0.0     MAGNESIUM - Normal    Magnesium 1.8         Emergency Department Course:    Reviewed:  I reviewed nursing notes, vitals, past medical history and Care Everywhere    Assessments:  1140 I obtained history and examined the patient as noted above. Blood pressure on the monitor was 155 systolic.    1345   I rechecked the patient and explained findings. Wife states he tested negative for C diff as an outpatient.    Interventions:  Medications   potassium chloride (KLOR-CON) Packet 40 mEq (has no administration in time range)     Disposition:  The patient was discharged to home.     Impression & Plan      Medical Decision Making:  Kamari is a pleasant 82 year old male who presented today by ambulance for evaluation of diarrhea per HPI above.  EMS measured his systolic blood pressure en route at 128.  He was 155 systolic on initial presentation here and 145 (stable) at time for discharge.  He was afebrile, appeared well on exam and had no leukocytosis on CBC concerning for infection. No recent antibiotic use and negative C-diff outpatient testing per wife at bedside. Abdominal exam benign and no indication  for advanced imaging.  He takes a blood thinner, but has not had any blood in his stools and had no anemia on blood work today.  Metabolic panel did show a very mild hypokalemia and potassium replacement was initiated here in the department and prescribed for him at home.  EKG showed no changes from his previous one earlier this year.  Magnesium was normal.  He had stool studies dropped off today at [facility], so we did not feel it was indicated to repeat these here in the ED today.  He does have an upcoming appointment next week with his primary care provider Annita Proctor at [facility].  He will follow-up on this.   He remained hemodynamically stable throughout his course here and my attending provider and I felt he was appropriate for discharge home with close outpatient follow-up.  He understands if he develops any new or worsening symptoms including fever or abdominal pain, he can return to the emergency department. In the meantime, I did also prescribe him a course of Loperamide to try as an outpatient for symptomatic care.    Diagnosis:    ICD-10-CM    1. Diarrhea, unspecified type  R19.7    2. Hypokalemia  E87.6        Discharge Medications:  New Prescriptions    LOPERAMIDE (IMODIUM A-D) 2 MG TABLET    Take 1 tablet (2 mg) by mouth 4 times daily as needed for diarrhea    POTASSIUM CHLORIDE ER (K-TAB) 20 MEQ CR TABLET    Take 1 tablet (20 mEq) by mouth daily for 7 days       Milly STANTON APC-T  I saw and evaluated this patient under attending provider Dr. Beckman.       Milly Don PA-C  06/20/22 0837

## 2022-06-20 NOTE — ED NOTES
Bed: ED10  Expected date:   Expected time:   Means of arrival: Ambulance  Comments:  [facility] 82 m

## 2022-06-20 NOTE — ED TRIAGE NOTES
Pt arrives by EMS with complaints of chronic diarrhea since April. Per EMS- . 20G in left hand. Pt family told EMS they are concerned with low BP. VSS     Triage Assessment     Row Name 06/20/22 1138       Triage Assessment (Adult)    Airway WDL WDL       Respiratory WDL    Respiratory WDL WDL       Skin Circulation/Temperature WDL    Skin Circulation/Temperature WDL WDL       Cardiac WDL    Cardiac WDL WDL       Peripheral/Neurovascular WDL    Peripheral Neurovascular WDL WDL       Cognitive/Neuro/Behavioral WDL    Cognitive/Neuro/Behavioral WDL WDL

## 2022-06-21 LAB
ATRIAL RATE - MUSE: 65 BPM
DIASTOLIC BLOOD PRESSURE - MUSE: NORMAL MMHG
INTERPRETATION ECG - MUSE: NORMAL
P AXIS - MUSE: NORMAL DEGREES
PR INTERVAL - MUSE: NORMAL MS
QRS DURATION - MUSE: 96 MS
QT - MUSE: 418 MS
QTC - MUSE: 413 MS
R AXIS - MUSE: -30 DEGREES
SYSTOLIC BLOOD PRESSURE - MUSE: NORMAL MMHG
T AXIS - MUSE: 154 DEGREES
VENTRICULAR RATE- MUSE: 59 BPM

## 2022-06-22 ENCOUNTER — HOSPITAL ENCOUNTER (OUTPATIENT)
Dept: CT IMAGING | Facility: CLINIC | Age: 82
Discharge: HOME | DRG: 391 | End: 2022-06-22
Attending: FAMILY MEDICINE | Admitting: FAMILY MEDICINE
Payer: COMMERCIAL

## 2022-06-22 DIAGNOSIS — R19.7 DIARRHEA: ICD-10-CM

## 2022-06-22 PROCEDURE — 250N000009 HC RX 250: Performed by: FAMILY MEDICINE

## 2022-06-22 PROCEDURE — 250N000011 HC RX IP 250 OP 636: Performed by: FAMILY MEDICINE

## 2022-06-22 PROCEDURE — 74177 CT ABD & PELVIS W/CONTRAST: CPT

## 2022-06-22 RX ORDER — IOPAMIDOL 755 MG/ML
75 INJECTION, SOLUTION INTRAVASCULAR ONCE
Status: COMPLETED | OUTPATIENT
Start: 2022-06-22 | End: 2022-06-22

## 2022-06-22 RX ORDER — IOPAMIDOL 755 MG/ML
83 INJECTION, SOLUTION INTRAVASCULAR ONCE
Status: DISCONTINUED | OUTPATIENT
Start: 2022-06-22 | End: 2022-06-22

## 2022-06-22 RX ADMIN — SODIUM CHLORIDE 64 ML: 9 INJECTION, SOLUTION INTRAVENOUS at 17:38

## 2022-06-22 RX ADMIN — IOPAMIDOL 75 ML: 755 INJECTION, SOLUTION INTRAVENOUS at 17:38

## 2022-06-23 ENCOUNTER — HOSPITAL ENCOUNTER (INPATIENT)
Facility: CLINIC | Age: 82
LOS: 7 days | Discharge: NURSING, REHAB OR LONG-TERM CARE | DRG: 391 | End: 2022-06-30
Attending: EMERGENCY MEDICINE | Admitting: HOSPITALIST
Payer: COMMERCIAL

## 2022-06-23 DIAGNOSIS — K52.832 LYMPHOCYTIC COLITIS: Primary | ICD-10-CM

## 2022-06-23 DIAGNOSIS — R11.2 NAUSEA VOMITING AND DIARRHEA: ICD-10-CM

## 2022-06-23 DIAGNOSIS — K52.9 ENTEROCOLITIS: ICD-10-CM

## 2022-06-23 DIAGNOSIS — R19.7 DIARRHEA, UNSPECIFIED TYPE: ICD-10-CM

## 2022-06-23 DIAGNOSIS — R19.7 NAUSEA VOMITING AND DIARRHEA: ICD-10-CM

## 2022-06-23 DIAGNOSIS — N17.9 ACUTE KIDNEY INJURY (H): ICD-10-CM

## 2022-06-23 DIAGNOSIS — E87.6 HYPOKALEMIA: ICD-10-CM

## 2022-06-23 LAB
ALBUMIN SERPL-MCNC: 2.6 G/DL (ref 3.4–5)
ALBUMIN UR-MCNC: 30 MG/DL
ALP SERPL-CCNC: 62 U/L (ref 40–150)
ALT SERPL W P-5'-P-CCNC: 21 U/L (ref 0–70)
ANION GAP SERPL CALCULATED.3IONS-SCNC: 6 MMOL/L (ref 3–14)
APPEARANCE UR: CLEAR
AST SERPL W P-5'-P-CCNC: 29 U/L (ref 0–45)
BASOPHILS # BLD AUTO: 0 10E3/UL (ref 0–0.2)
BASOPHILS NFR BLD AUTO: 0 %
BILIRUB SERPL-MCNC: 0.7 MG/DL (ref 0.2–1.3)
BILIRUB UR QL STRIP: NEGATIVE
BUN SERPL-MCNC: 36 MG/DL (ref 7–30)
CALCIUM SERPL-MCNC: 8.3 MG/DL (ref 8.5–10.1)
CHLORIDE BLD-SCNC: 106 MMOL/L (ref 94–109)
CO2 SERPL-SCNC: 24 MMOL/L (ref 20–32)
COLOR UR AUTO: ABNORMAL
CREAT SERPL-MCNC: 1.93 MG/DL (ref 0.66–1.25)
EOSINOPHIL # BLD AUTO: 0 10E3/UL (ref 0–0.7)
EOSINOPHIL NFR BLD AUTO: 0 %
ERYTHROCYTE [DISTWIDTH] IN BLOOD BY AUTOMATED COUNT: 12.8 % (ref 10–15)
GFR SERPL CREATININE-BSD FRML MDRD: 34 ML/MIN/1.73M2
GLUCOSE BLD-MCNC: 116 MG/DL (ref 70–99)
GLUCOSE UR STRIP-MCNC: NEGATIVE MG/DL
HCT VFR BLD AUTO: 33.8 % (ref 40–53)
HGB BLD-MCNC: 11.5 G/DL (ref 13.3–17.7)
HGB UR QL STRIP: ABNORMAL
HOLD SPECIMEN: NORMAL
IMM GRANULOCYTES # BLD: 0.1 10E3/UL
IMM GRANULOCYTES NFR BLD: 1 %
KETONES UR STRIP-MCNC: NEGATIVE MG/DL
LACTATE SERPL-SCNC: 1 MMOL/L (ref 0.7–2)
LEUKOCYTE ESTERASE UR QL STRIP: ABNORMAL
LYMPHOCYTES # BLD AUTO: 0.5 10E3/UL (ref 0.8–5.3)
LYMPHOCYTES NFR BLD AUTO: 4 %
MAGNESIUM SERPL-MCNC: 1.8 MG/DL (ref 1.6–2.3)
MCH RBC QN AUTO: 32.4 PG (ref 26.5–33)
MCHC RBC AUTO-ENTMCNC: 34 G/DL (ref 31.5–36.5)
MCV RBC AUTO: 95 FL (ref 78–100)
MONOCYTES # BLD AUTO: 0.9 10E3/UL (ref 0–1.3)
MONOCYTES NFR BLD AUTO: 8 %
NEUTROPHILS # BLD AUTO: 9.7 10E3/UL (ref 1.6–8.3)
NEUTROPHILS NFR BLD AUTO: 87 %
NITRATE UR QL: NEGATIVE
NRBC # BLD AUTO: 0 10E3/UL
NRBC BLD AUTO-RTO: 0 /100
PH UR STRIP: 5.5 [PH] (ref 5–7)
PLATELET # BLD AUTO: 193 10E3/UL (ref 150–450)
POTASSIUM BLD-SCNC: 2.9 MMOL/L (ref 3.4–5.3)
POTASSIUM BLD-SCNC: 3.1 MMOL/L (ref 3.4–5.3)
PROT SERPL-MCNC: 5.7 G/DL (ref 6.8–8.8)
RBC # BLD AUTO: 3.55 10E6/UL (ref 4.4–5.9)
RBC URINE: 1 /HPF
SARS-COV-2 RNA RESP QL NAA+PROBE: NEGATIVE
SODIUM SERPL-SCNC: 136 MMOL/L (ref 133–144)
SP GR UR STRIP: 1.02 (ref 1–1.03)
SPERM #/AREA URNS HPF: PRESENT /HPF
SQUAMOUS EPITHELIAL: <1 /HPF
UROBILINOGEN UR STRIP-MCNC: NORMAL MG/DL
WBC # BLD AUTO: 11.2 10E3/UL (ref 4–11)
WBC CLUMPS #/AREA URNS HPF: PRESENT /HPF
WBC URINE: 80 /HPF

## 2022-06-23 PROCEDURE — 96361 HYDRATE IV INFUSION ADD-ON: CPT

## 2022-06-23 PROCEDURE — 250N000011 HC RX IP 250 OP 636: Performed by: HOSPITALIST

## 2022-06-23 PROCEDURE — 81001 URINALYSIS AUTO W/SCOPE: CPT | Performed by: EMERGENCY MEDICINE

## 2022-06-23 PROCEDURE — 83735 ASSAY OF MAGNESIUM: CPT | Performed by: EMERGENCY MEDICINE

## 2022-06-23 PROCEDURE — 250N000011 HC RX IP 250 OP 636: Performed by: EMERGENCY MEDICINE

## 2022-06-23 PROCEDURE — 96367 TX/PROPH/DG ADDL SEQ IV INF: CPT

## 2022-06-23 PROCEDURE — 99285 EMERGENCY DEPT VISIT HI MDM: CPT | Mod: 25

## 2022-06-23 PROCEDURE — 80053 COMPREHEN METABOLIC PANEL: CPT | Performed by: EMERGENCY MEDICINE

## 2022-06-23 PROCEDURE — 36415 COLL VENOUS BLD VENIPUNCTURE: CPT | Performed by: EMERGENCY MEDICINE

## 2022-06-23 PROCEDURE — 258N000003 HC RX IP 258 OP 636: Performed by: HOSPITALIST

## 2022-06-23 PROCEDURE — 96365 THER/PROPH/DIAG IV INF INIT: CPT

## 2022-06-23 PROCEDURE — 87086 URINE CULTURE/COLONY COUNT: CPT | Performed by: EMERGENCY MEDICINE

## 2022-06-23 PROCEDURE — U0003 INFECTIOUS AGENT DETECTION BY NUCLEIC ACID (DNA OR RNA); SEVERE ACUTE RESPIRATORY SYNDROME CORONAVIRUS 2 (SARS-COV-2) (CORONAVIRUS DISEASE [COVID-19]), AMPLIFIED PROBE TECHNIQUE, MAKING USE OF HIGH THROUGHPUT TECHNOLOGIES AS DESCRIBED BY CMS-2020-01-R: HCPCS | Performed by: EMERGENCY MEDICINE

## 2022-06-23 PROCEDURE — 99223 1ST HOSP IP/OBS HIGH 75: CPT | Mod: AI | Performed by: HOSPITALIST

## 2022-06-23 PROCEDURE — 250N000013 HC RX MED GY IP 250 OP 250 PS 637: Performed by: HOSPITALIST

## 2022-06-23 PROCEDURE — 96366 THER/PROPH/DIAG IV INF ADDON: CPT

## 2022-06-23 PROCEDURE — 120N000001 HC R&B MED SURG/OB

## 2022-06-23 PROCEDURE — 93005 ELECTROCARDIOGRAM TRACING: CPT

## 2022-06-23 PROCEDURE — C9803 HOPD COVID-19 SPEC COLLECT: HCPCS

## 2022-06-23 PROCEDURE — 85025 COMPLETE CBC W/AUTO DIFF WBC: CPT | Performed by: EMERGENCY MEDICINE

## 2022-06-23 PROCEDURE — 83605 ASSAY OF LACTIC ACID: CPT | Performed by: EMERGENCY MEDICINE

## 2022-06-23 PROCEDURE — 258N000003 HC RX IP 258 OP 636: Performed by: EMERGENCY MEDICINE

## 2022-06-23 RX ORDER — ACETAMINOPHEN 325 MG/1
650 TABLET ORAL EVERY 6 HOURS PRN
Status: DISCONTINUED | OUTPATIENT
Start: 2022-06-23 | End: 2022-06-26

## 2022-06-23 RX ORDER — HYDRALAZINE HYDROCHLORIDE 10 MG/1
10 TABLET, FILM COATED ORAL 2 TIMES DAILY
Status: DISCONTINUED | OUTPATIENT
Start: 2022-06-23 | End: 2022-06-30 | Stop reason: HOSPADM

## 2022-06-23 RX ORDER — ONDANSETRON 2 MG/ML
4 INJECTION INTRAMUSCULAR; INTRAVENOUS EVERY 6 HOURS PRN
Status: DISCONTINUED | OUTPATIENT
Start: 2022-06-23 | End: 2022-06-30 | Stop reason: HOSPADM

## 2022-06-23 RX ORDER — POTASSIUM CHLORIDE 7.45 MG/ML
10 INJECTION INTRAVENOUS ONCE
Status: COMPLETED | OUTPATIENT
Start: 2022-06-23 | End: 2022-06-23

## 2022-06-23 RX ORDER — ONDANSETRON 4 MG/1
4 TABLET, ORALLY DISINTEGRATING ORAL EVERY 6 HOURS PRN
Status: DISCONTINUED | OUTPATIENT
Start: 2022-06-23 | End: 2022-06-30 | Stop reason: HOSPADM

## 2022-06-23 RX ORDER — TAMSULOSIN HYDROCHLORIDE 0.4 MG/1
0.4 CAPSULE ORAL EVERY EVENING
Status: DISCONTINUED | OUTPATIENT
Start: 2022-06-23 | End: 2022-06-30 | Stop reason: HOSPADM

## 2022-06-23 RX ORDER — LIDOCAINE 40 MG/G
CREAM TOPICAL
Status: DISCONTINUED | OUTPATIENT
Start: 2022-06-23 | End: 2022-06-30 | Stop reason: HOSPADM

## 2022-06-23 RX ORDER — CIPROFLOXACIN 500 MG/1
500 TABLET, FILM COATED ORAL 2 TIMES DAILY
Status: ON HOLD | COMMUNITY
End: 2022-06-30

## 2022-06-23 RX ORDER — SIMVASTATIN 40 MG
40 TABLET ORAL EVERY EVENING
Status: DISCONTINUED | OUTPATIENT
Start: 2022-06-24 | End: 2022-06-30 | Stop reason: HOSPADM

## 2022-06-23 RX ORDER — ONDANSETRON 2 MG/ML
4 INJECTION INTRAMUSCULAR; INTRAVENOUS ONCE
Status: COMPLETED | OUTPATIENT
Start: 2022-06-23 | End: 2022-06-23

## 2022-06-23 RX ORDER — POTASSIUM CHLORIDE 7.45 MG/ML
10 INJECTION INTRAVENOUS ONCE
Status: COMPLETED | OUTPATIENT
Start: 2022-06-23 | End: 2022-06-24

## 2022-06-23 RX ORDER — SODIUM CHLORIDE 9 MG/ML
INJECTION, SOLUTION INTRAVENOUS CONTINUOUS
Status: DISCONTINUED | OUTPATIENT
Start: 2022-06-23 | End: 2022-06-27

## 2022-06-23 RX ORDER — ASPIRIN 81 MG/1
81 TABLET ORAL DAILY
Status: DISCONTINUED | OUTPATIENT
Start: 2022-06-24 | End: 2022-06-30 | Stop reason: HOSPADM

## 2022-06-23 RX ADMIN — TAMSULOSIN HYDROCHLORIDE 0.4 MG: 0.4 CAPSULE ORAL at 23:08

## 2022-06-23 RX ADMIN — SODIUM CHLORIDE 1000 ML: 9 INJECTION, SOLUTION INTRAVENOUS at 18:34

## 2022-06-23 RX ADMIN — ONDANSETRON 4 MG: 2 INJECTION INTRAMUSCULAR; INTRAVENOUS at 18:34

## 2022-06-23 RX ADMIN — POTASSIUM CHLORIDE 10 MEQ: 7.46 INJECTION, SOLUTION INTRAVENOUS at 19:11

## 2022-06-23 RX ADMIN — RIVAROXABAN 20 MG: 20 TABLET, FILM COATED ORAL at 23:08

## 2022-06-23 RX ADMIN — POTASSIUM CHLORIDE 10 MEQ: 7.46 INJECTION, SOLUTION INTRAVENOUS at 20:52

## 2022-06-23 RX ADMIN — POTASSIUM CHLORIDE 10 MEQ: 7.46 INJECTION, SOLUTION INTRAVENOUS at 23:05

## 2022-06-23 RX ADMIN — SODIUM CHLORIDE: 9 INJECTION, SOLUTION INTRAVENOUS at 23:07

## 2022-06-23 ASSESSMENT — ENCOUNTER SYMPTOMS
SORE THROAT: 0
DIARRHEA: 1
FEVER: 0
COUGH: 0
ABDOMINAL PAIN: 0
VOMITING: 1
BLOOD IN STOOL: 0
NAUSEA: 1
FREQUENCY: 1
SHORTNESS OF BREATH: 0
DYSURIA: 0

## 2022-06-23 ASSESSMENT — ACTIVITIES OF DAILY LIVING (ADL)
ADLS_ACUITY_SCORE: 39
ADLS_ACUITY_SCORE: 39

## 2022-06-23 NOTE — ED PROVIDER NOTES
History     Chief Complaint:  Nausea, Vomiting, & Diarrhea    HPI   Kamari Valentin is a 82 year old male with a history of aortic stenosis, stroke, hypertension, hyperlipidemia, atrial fibrillation anticoagulated on rivaroxaban accompanied by his daughter Eloise who presents to the emergency department via EMS for evaluation of nausea, vomiting, and diarrhea. The reports an onset of water, loose stools on 05/29/22 [place]. He endorsed he has been using imodium with no relief to his diarrhea, but he continued to have 5-6 episodes a day. Kamari was seen at [facility] emergency department two days ago where he was started on potassium and imodium. He was discharged and advised to keep his upcoming appointment with his primary care provider. The patient had a CT completed yesterday, please see the impression below. He was also reported to be negative for C. Difficile with other pending stool studies. He was started on ciprofloxacin yesterday.  His daughter endorsed that he took his first dose at 2100 last night, then woke this morning at approximately 0300 with an episode of emesis. Then today he took another dose at 1230, and had another episode of emesis at 1530. He has not been able to tolerate food or liquid since onset. With the new onset of vomiting and no improvement of his diarrhea he presented to the emergency department for further evaluation. Here, the patient report frequent urination and bowel movement. He denied nay blood in his stool or abdominal pain. He denied any chest pain, shortness of breath, or fever. He denied any recent travel or known sick contacts. Additionally, Kamari denied any recent antibiotic use outside of his recently prescribed medication. He also denied any sore throat, cough, or dysuria.     CT IMPRESSION 06/22/2022:   1.  Enterocolitis, correlate for infectious etiologies.   2.  Cystitis.   3.  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   4.  Mildly enlarged heart with  coronary artery disease and atherosclerotic vascular disease.    Review of Systems   Constitutional: Negative for fever.   HENT: Negative for sore throat.    Respiratory: Negative for cough and shortness of breath.    Cardiovascular: Negative for chest pain.   Gastrointestinal: Positive for diarrhea, nausea and vomiting. Negative for abdominal pain and blood in stool.   Genitourinary: Positive for frequency. Negative for dysuria.   All other systems reviewed and are negative.    Allergies:  Lisinopril    Medications:    acetaminophen (TYLENOL) 325 MG tablet  aspirin (ASA) 81 MG EC tablet  fluticasone (FLONASE) 50 MCG/ACT nasal spray  hydrALAZINE (APRESOLINE) 10 MG tablet  loperamide (IMODIUM A-D) 2 MG tablet  losartan (COZAAR) 100 MG tablet  losartan (COZAAR) 100 MG tablet  potassium chloride ER (K-TAB) 20 MEQ CR tablet  rivaroxaban ANTICOAGULANT (XARELTO) 20 MG TABS tablet  simvastatin (ZOCOR) 40 MG tablet  tamsulosin (FLOMAX) 0.4 MG capsule  vitamin B-12 (CYANOCOBALAMIN) 1000 MCG tablet  vitamin D3 (CHOLECALCIFEROL) 50 mcg (2000 units) tablet    Past Medical History:    Aortic stenosis   Atrial fibrillation    Carotid stenosis   Chronic renal insufficiency   Essential hypertension, benign   Hyperlipidaemia   Hypertrophy of prostate without urinary obstruction and other lower urinary tract symptoms (LUTS)   Rheumatic fever   Unspecified cerebral artery occlusion with cerebral infarction   Hemiparesis, unspecified hemiparesis etiology, unspecified laterality  Stroke (cerebrum)  Left leg weakness  Hypertension     Past Surgical History:    Coronary Angiogram   Septoplasty   Tonsillectomy  Hernia repair   Herniorrhaphy inguinal   ORIF left wrist fracture     Family History:    Coronary artery disease early onset (mother)   Abdominal aortic aneurysm (father)     Social History:  Primary care provider: Annita Proctor  The patient presents to the ED with his daughter Eloise.    Physical Exam     Patient Vitals for the  past 24 hrs:   BP Temp Temp src Pulse Resp SpO2 Weight   06/23/22 1743 103/62 98.2  F (36.8  C) Oral 81 20 95 % 77.1 kg (170 lb)     Physical Exam  General: Alert, appear elderly, otherwise well-developed and well-nourished. Cooperative.     In mild distress  HEENT:  Head:  Atraumatic  Ears:  External ears are normal  Mouth/Throat:  Oropharynx is without erythema or exudate and mucous membranes are dry.   Eyes:   Conjunctivae normal and EOM are normal. No scleral icterus.  CV:  Normal rate, regular rhythm, normal heart sounds and radial pulses are 2+ and symmetric.  Systolic murmur.  Resp:  Breath sounds are clear bilaterally    Non-labored, no retractions or accessory muscle use  GI:  Abdomen is soft, no distension, no tenderness. No rebound or guarding.  No CVA tenderness bilaterally  MS:  Normal range of motion. No edema.    Normal strength in all 4 extremities.     Back atraumatic.    No midline cervical, thoracic, or lumbar tenderness  Skin:  Warm and dry.  No rash or lesions noted.  Neuro: Alert. Normal strength.  GCS: 15  Psych:  Normal mood and affect.    Emergency Department Course   ECG:   ECG results from 06/20/22   EKG 12 lead     Value    Systolic Blood Pressure     Diastolic Blood Pressure     Ventricular Rate 59    Atrial Rate 65    FL Interval     QRS Duration 96        QTc 413    P Axis     R AXIS -30    T Axis 154    Interpretation ECG      Atrial fibrillation with slow ventricular response  Left axis deviation  Minimal voltage criteria for LVH, may be normal variant  ST & T wave abnormality, consider lateral ischemia  Abnormal ECG  When compared with ECG of 10-OLMAN-2022 18:31,  Inverted T waves have replaced nonspecific T wave abnormality in Lateral leads  Confirmed by - EMERGENCY ROOM, PHYSICIAN ([id number]),  IHLTON COTE ([id number]) on 6/21/2022 7:24:27 AM       Imaging:  No orders to display   Report per radiology    Laboratory:  Labs Ordered and Resulted from Time of ED Arrival to Time  of ED Departure   COMPREHENSIVE METABOLIC PANEL - Abnormal       Result Value    Sodium 136      Potassium 2.9 (*)     Chloride 106      Carbon Dioxide (CO2) 24      Anion Gap 6      Urea Nitrogen 36 (*)     Creatinine 1.93 (*)     Calcium 8.3 (*)     Glucose 116 (*)     Alkaline Phosphatase 62      AST 29      ALT 21      Protein Total 5.7 (*)     Albumin 2.6 (*)     Bilirubin Total 0.7      GFR Estimate 34 (*)    CBC WITH PLATELETS AND DIFFERENTIAL - Abnormal    WBC Count 11.2 (*)     RBC Count 3.55 (*)     Hemoglobin 11.5 (*)     Hematocrit 33.8 (*)     MCV 95      MCH 32.4      MCHC 34.0      RDW 12.8      Platelet Count 193      % Neutrophils 87      % Lymphocytes 4      % Monocytes 8      % Eosinophils 0      % Basophils 0      % Immature Granulocytes 1      NRBCs per 100 WBC 0      Absolute Neutrophils 9.7 (*)     Absolute Lymphocytes 0.5 (*)     Absolute Monocytes 0.9      Absolute Eosinophils 0.0      Absolute Basophils 0.0      Absolute Immature Granulocytes 0.1      Absolute NRBCs 0.0     LACTIC ACID WHOLE BLOOD - Normal    Lactic Acid 1.0     COVID-19 VIRUS (CORONAVIRUS) BY PCR - Normal    SARS CoV2 PCR Negative     MAGNESIUM - Normal    Magnesium 1.8     ROUTINE UA WITH MICROSCOPIC REFLEX TO CULTURE   ENTERIC BACTERIA AND VIRUS PANEL BY LEONEL STOOL   CLOSTRIDIUM DIFFICILE TOXIN B     Procedures:    Emergency Department Course:    Reviewed:  I reviewed nursing notes, vitals, past medical history, Care Everywhere and [facility]    Assessments:  1801 I obtained history and examined the patient as noted above.   1825 I rechecked the patient and explained findings.     Consults:   1905  I spoke with Dr. Romano with the Hospitalist Service on the phone.     Interventions:  1834  0.9% sodium chloride BOLUS 1000 mL, IV  1834  ondansetron (ZOFRAN) injection 4 mg, IV  1911  potassium chloride 10 mEq in 100 mL sterile water intermittent infusion (premix), IV    Disposition:  The patient was admitted to the hospital under  the care of Dr. Dr. Romano.     Impression & Plan      Medical Decision Making:  Patient is a 82-year-old male who presents with ongoing nausea, vomiting, and diarrhea.  Patient symptoms most consistent with enterocolitis.  This was confirmed by CT scan yesterday.  I have been unable to confirm C. difficile or stool studies from the outpatient setting.  We will plan to resend them at this time.  Patient is afebrile.  He also has hypokalemia and acute kidney injury, likely secondary to ongoing losses from vomiting and diarrhea.  Symptoms controlled with IV fluids and Zofran.  We will plan to admit for further management of enterocolitis and acute kidney injury.  Will defer antibiotics to stool study results.  Spoke with Dr. Romano who agreed to admission.    Critical Care time:  none    Diagnosis:    ICD-10-CM    1. Nausea vomiting and diarrhea  R11.2     R19.7    2. Enterocolitis  K52.9    3. Hypokalemia  E87.6    4. Acute kidney injury (H)  N17.9      Scribe Disclosure:  I, Racheljeffrey Fisher, am serving as a scribe at 6:01 PM on 6/23/2022 to document services personally performed by Neftali See MD based on my observations and the provider's statements to me.      Neftali See MD  06/23/22 2051

## 2022-06-23 NOTE — ED TRIAGE NOTES
Patient comes via EMS from home.  He has had several weeks of diarrhea, seen by PCP today and found to have UTI so he was started on abx.  He has vomited his abx and is unable to keep much down.  He says he gave a stool sample at clinic today as well.  Denies abdominal pain, fevers, similar issues or diverticulitis.       Triage Assessment     Row Name 06/23/22 5608       Triage Assessment (Adult)    Airway WDL WDL       Respiratory WDL    Respiratory WDL WDL       Skin Circulation/Temperature WDL    Skin Circulation/Temperature WDL WDL       Cardiac WDL    Cardiac WDL WDL       Peripheral/Neurovascular WDL    Peripheral Neurovascular WDL WDL       Cognitive/Neuro/Behavioral WDL    Cognitive/Neuro/Behavioral WDL WDL

## 2022-06-24 LAB
ANION GAP SERPL CALCULATED.3IONS-SCNC: 7 MMOL/L (ref 3–14)
BUN SERPL-MCNC: 30 MG/DL (ref 7–30)
C COLI+JEJUNI+LARI FUSA STL QL NAA+PROBE: NOT DETECTED
C DIFF TOX B STL QL: NEGATIVE
CALCIUM SERPL-MCNC: 8.1 MG/DL (ref 8.5–10.1)
CHLORIDE BLD-SCNC: 111 MMOL/L (ref 94–109)
CO2 SERPL-SCNC: 20 MMOL/L (ref 20–32)
CREAT SERPL-MCNC: 1.55 MG/DL (ref 0.66–1.25)
EC STX1 GENE STL QL NAA+PROBE: NOT DETECTED
EC STX2 GENE STL QL NAA+PROBE: NOT DETECTED
ERYTHROCYTE [DISTWIDTH] IN BLOOD BY AUTOMATED COUNT: 13.1 % (ref 10–15)
GFR SERPL CREATININE-BSD FRML MDRD: 44 ML/MIN/1.73M2
GLUCOSE BLD-MCNC: 100 MG/DL (ref 70–99)
HCT VFR BLD AUTO: 32.5 % (ref 40–53)
HGB BLD-MCNC: 11 G/DL (ref 13.3–17.7)
MAGNESIUM SERPL-MCNC: 1.8 MG/DL (ref 1.6–2.3)
MCH RBC QN AUTO: 32.5 PG (ref 26.5–33)
MCHC RBC AUTO-ENTMCNC: 33.8 G/DL (ref 31.5–36.5)
MCV RBC AUTO: 96 FL (ref 78–100)
NOROV GI+II ORF1-ORF2 JNC STL QL NAA+PR: NOT DETECTED
PLATELET # BLD AUTO: 188 10E3/UL (ref 150–450)
POTASSIUM BLD-SCNC: 2.9 MMOL/L (ref 3.4–5.3)
POTASSIUM BLD-SCNC: 4 MMOL/L (ref 3.4–5.3)
RBC # BLD AUTO: 3.38 10E6/UL (ref 4.4–5.9)
RVA NSP5 STL QL NAA+PROBE: NOT DETECTED
SALMONELLA SP RPOD STL QL NAA+PROBE: NOT DETECTED
SHIGELLA SP+EIEC IPAH STL QL NAA+PROBE: NOT DETECTED
SODIUM SERPL-SCNC: 138 MMOL/L (ref 133–144)
V CHOL+PARA RFBL+TRKH+TNAA STL QL NAA+PR: NOT DETECTED
WBC # BLD AUTO: 8 10E3/UL (ref 4–11)
Y ENTERO RECN STL QL NAA+PROBE: NOT DETECTED

## 2022-06-24 PROCEDURE — 258N000003 HC RX IP 258 OP 636: Performed by: HOSPITALIST

## 2022-06-24 PROCEDURE — 83735 ASSAY OF MAGNESIUM: CPT | Performed by: HOSPITALIST

## 2022-06-24 PROCEDURE — 80048 BASIC METABOLIC PNL TOTAL CA: CPT | Performed by: HOSPITALIST

## 2022-06-24 PROCEDURE — 250N000011 HC RX IP 250 OP 636: Performed by: HOSPITALIST

## 2022-06-24 PROCEDURE — 250N000013 HC RX MED GY IP 250 OP 250 PS 637: Performed by: HOSPITALIST

## 2022-06-24 PROCEDURE — 99232 SBSQ HOSP IP/OBS MODERATE 35: CPT | Performed by: HOSPITALIST

## 2022-06-24 PROCEDURE — 87506 IADNA-DNA/RNA PROBE TQ 6-11: CPT | Performed by: HOSPITALIST

## 2022-06-24 PROCEDURE — 36415 COLL VENOUS BLD VENIPUNCTURE: CPT | Performed by: HOSPITALIST

## 2022-06-24 PROCEDURE — 87493 C DIFF AMPLIFIED PROBE: CPT | Performed by: HOSPITALIST

## 2022-06-24 PROCEDURE — 85027 COMPLETE CBC AUTOMATED: CPT | Performed by: HOSPITALIST

## 2022-06-24 PROCEDURE — 120N000001 HC R&B MED SURG/OB

## 2022-06-24 PROCEDURE — 84132 ASSAY OF SERUM POTASSIUM: CPT | Performed by: HOSPITALIST

## 2022-06-24 RX ORDER — POTASSIUM CHLORIDE 7.45 MG/ML
10 INJECTION INTRAVENOUS ONCE
Status: COMPLETED | OUTPATIENT
Start: 2022-06-24 | End: 2022-06-24

## 2022-06-24 RX ORDER — POTASSIUM CHLORIDE 1500 MG/1
40 TABLET, EXTENDED RELEASE ORAL ONCE
Status: COMPLETED | OUTPATIENT
Start: 2022-06-24 | End: 2022-06-24

## 2022-06-24 RX ADMIN — POTASSIUM CHLORIDE 10 MEQ: 7.46 INJECTION, SOLUTION INTRAVENOUS at 11:24

## 2022-06-24 RX ADMIN — POTASSIUM CHLORIDE 10 MEQ: 7.46 INJECTION, SOLUTION INTRAVENOUS at 14:03

## 2022-06-24 RX ADMIN — ASPIRIN 81 MG: 81 TABLET, COATED ORAL at 09:07

## 2022-06-24 RX ADMIN — TAMSULOSIN HYDROCHLORIDE 0.4 MG: 0.4 CAPSULE ORAL at 19:38

## 2022-06-24 RX ADMIN — POTASSIUM CHLORIDE 10 MEQ: 7.46 INJECTION, SOLUTION INTRAVENOUS at 12:33

## 2022-06-24 RX ADMIN — SIMVASTATIN 40 MG: 40 TABLET, FILM COATED ORAL at 19:38

## 2022-06-24 RX ADMIN — SODIUM CHLORIDE: 9 INJECTION, SOLUTION INTRAVENOUS at 01:42

## 2022-06-24 RX ADMIN — HYDRALAZINE HYDROCHLORIDE 10 MG: 10 TABLET ORAL at 19:40

## 2022-06-24 RX ADMIN — HYDRALAZINE HYDROCHLORIDE 10 MG: 10 TABLET ORAL at 09:08

## 2022-06-24 RX ADMIN — POTASSIUM CHLORIDE 40 MEQ: 1500 TABLET, EXTENDED RELEASE ORAL at 11:01

## 2022-06-24 ASSESSMENT — ACTIVITIES OF DAILY LIVING (ADL)
CONCENTRATING,_REMEMBERING_OR_MAKING_DECISIONS_DIFFICULTY: YES
DIFFICULTY_EATING/SWALLOWING: NO
ADLS_ACUITY_SCORE: 35
DOING_ERRANDS_INDEPENDENTLY_DIFFICULTY: NO
DRESSING/BATHING_DIFFICULTY: NO
ADLS_ACUITY_SCORE: 31
ADLS_ACUITY_SCORE: 39
ADLS_ACUITY_SCORE: 31
TOILETING_ISSUES: NO
ADLS_ACUITY_SCORE: 35
ADLS_ACUITY_SCORE: 31
ADLS_ACUITY_SCORE: 31
CHANGE_IN_FUNCTIONAL_STATUS_SINCE_ONSET_OF_CURRENT_ILLNESS/INJURY: NO
WALKING_OR_CLIMBING_STAIRS: AMBULATION DIFFICULTY, REQUIRES EQUIPMENT
WEAR_GLASSES_OR_BLIND: YES
ADLS_ACUITY_SCORE: 31
ADLS_ACUITY_SCORE: 35
ADLS_ACUITY_SCORE: 29
ADLS_ACUITY_SCORE: 29
FALL_HISTORY_WITHIN_LAST_SIX_MONTHS: NO
EQUIPMENT_CURRENTLY_USED_AT_HOME: WALKER, STANDARD
WALKING_OR_CLIMBING_STAIRS_DIFFICULTY: YES
ADLS_ACUITY_SCORE: 29

## 2022-06-24 NOTE — ED NOTES
[facility]  ED Nurse Handoff Report    ED Chief complaint: Nausea, Vomiting, & Diarrhea      ED Diagnosis:   Final diagnoses:   Nausea vomiting and diarrhea   Enterocolitis   Hypokalemia   Acute kidney injury (H)       Code Status: Full Code    Allergies:   Allergies   Allergen Reactions     Lisinopril        Patient Story: Patient has several week so of diarrhea.  Denies abdominal pain or blood in his stool.   Focused Assessment:  CT done yesterday was unremarkable.  Urine at at clinic looks okay although he was prescribed prophylactic abx. Vitals have been stable.  Labs do show dehydration and low potassium.     Treatments and/or interventions provided: IV, labs   Patient's response to treatments and/or interventions: tolerated well    To be done/followed up on inpatient unit:  see in patient orders     Does this patient have any cognitive concerns?: none     Activity level - Baseline/Home:  Independent  Activity Level - Current:   Stand with assist x2    Patient's Preferred language: English   Needed?: No    Isolation: Enteric  Infection: C-Diff (Clostridium Difficile) pending   Patient tested for COVID 19 prior to admission: YES  Bariatric?: No    Vital Signs:   Vitals:    06/23/22 1743   BP: 103/62   Pulse: 81   Resp: 20   Temp: 98.2  F (36.8  C)   TempSrc: Oral   SpO2: 95%   Weight: 77.1 kg (170 lb)       Cardiac Rhythm:     Was the PSS-3 completed:   Yes  What interventions are required if any?               Family Comments: daughter here   OBS brochure/video discussed/provided to patient/family: na              Name of person given brochure if not patient:               Relationship to patient:     For the majority of the shift this patient's behavior was Green.   Behavioral interventions performed were .    ED NURSE PHONE NUMBER: *[phone]

## 2022-06-24 NOTE — PROVIDER NOTIFICATION
MD Notification    Notified Person: MD    Notified Person Name:Michelle Romano MD    Notification Date/Time: 922 6/24/22    Notification Interaction: page    Purpose of Notification: Low k+ and pt is not on K+ protocal    Orders Received: Not yet

## 2022-06-24 NOTE — PROGRESS NOTES
RECEIVING UNIT ED HANDOFF REVIEW    ED Nurse Handoff Report was reviewed by: Nasreen Chester RN on June 24, 2022 at 12:28 AM        Subjective:  32year old    Patient presents with:  Vaginal Problem    Recurrent draining cyst left vulva. Initial problem 1.5 years ago, then had recurrent in April with I&D. Now with third recurrence. Currently draining. No fever.   Sensitive,

## 2022-06-24 NOTE — PHARMACY-ADMISSION MEDICATION HISTORY
Pharmacy Medication History  Admission medication history interview status for the 6/23/2022  admission is complete. See EPIC admission navigator for prior to admission medications     Location of Interview: Patient room  Medication history sources: Patient's family/friend (wife) and Surescripts    Significant changes made to the medication list:  -Removed losartan 50mg every evening in addition to 100mg QAM - 50mg evening dose stopped by provider about 1 week ago.  -Added cipro, started 6/22/22 x 7 days    In the past week, patient estimated taking medication this percent of the time: greater than 90%    Additional medication history information:   -KCl was ordered 6/20/22 x 7 days    Medication reconciliation completed by provider prior to medication history? No    Time spent in this activity: 10 min    Prior to Admission medications    Medication Sig Last Dose Taking? Auth Provider Long Term End Date   acetaminophen (TYLENOL) 325 MG tablet Take 2 tablets (650 mg) by mouth every 6 hours as needed for mild pain or fever (> 101 F) prn Yes Chris Melara MD     aspirin (ASA) 81 MG EC tablet Take 1 tablet (81 mg) by mouth daily 6/23/2022 Yes Chris Melara MD     ciprofloxacin (CIPRO) 500 MG tablet Take 500 mg by mouth 2 times daily X 7 days 6/23/2022 at x1 - started 6/22/22 Yes Unknown, Entered By History     fluticasone (FLONASE) 50 MCG/ACT nasal spray Spray 1 spray into both nostrils 2 times daily as needed for rhinitis or allergies prn Yes Reported, Patient     hydrALAZINE (APRESOLINE) 10 MG tablet Take 1 tablet (10 mg) by mouth 2 times daily 6/23/2022 at x1 Yes Chris Melara MD Yes    loperamide (IMODIUM A-D) 2 MG tablet Take 1 tablet (2 mg) by mouth 4 times daily as needed for diarrhea 6/23/2022 at ~1200 Yes Milly Don PA-C  6/27/22   losartan (COZAAR) 100 MG tablet Take 100 mg by mouth every morning 6/23/2022 Yes Unknown, Entered By History No    potassium chloride ER (K-TAB)  20 MEQ CR tablet Take 1 tablet (20 mEq) by mouth daily for 7 days 6/23/2022 at Started 6/20/22 Yes Milly Don PA-C  6/27/22   rivaroxaban ANTICOAGULANT (XARELTO) 20 MG TABS tablet Take 1 tablet (20 mg) by mouth daily (with dinner) 6/22/2022 at pm Yes Chris Melara MD Yes    simvastatin (ZOCOR) 40 MG tablet Take 1 tablet (40 mg) by mouth daily  Patient taking differently: Take 40 mg by mouth every evening 6/22/2022 at pm Yes Chris Melara MD Yes    tamsulosin (FLOMAX) 0.4 MG capsule Take 0.4 mg by mouth every evening 6/22/2022 at pm Yes Unknown, Entered By History     vitamin B-12 (CYANOCOBALAMIN) 1000 MCG tablet Take 3,000 mcg by mouth daily  Yes Unknown, Entered By History     vitamin D3 (CHOLECALCIFEROL) 50 mcg (2000 units) tablet Take 1 tablet by mouth daily  Yes Unknown, Entered By History         The information provided in this note is only as accurate as the sources available at the time of update(s)

## 2022-06-24 NOTE — H&P
[facility]    History and Physical  Hospitalist       Date of Admission:  6/23/2022    Assessment & Plan   Kamari Valentin is a 82 year old male with a history of severe aortic stenosis, stroke, HTN, HLD, paroxysmal atrial fibrillation on Xarelto who presents with 3 weeks of diarrhea and 1 day of nausea and vomiting.    Persistent diarrhea  Enterocolitis  Nausea and vomiting  Diarrhea x3 weeks, not improving.  CT abdomen pelvis on 6/22 noted enterocolitis.  C. difficile reportedly negative a week ago.  No improvement on Imodium.  Started on ciprofloxacin on 6/22 with concern for cystitis but UA with no evidence of infection.  Developed nausea and vomiting after starting on antibiotic.  In ED noted to be in CELESTINO, stable vitals, mild leukocytosis.  Hypokalemic.  -Admit as inpatient  -Continue IV NS at 100 cc/h  -Await repeat stool studies: C. difficile testing, enteric panel  -Consult to GI given persistent diarrhea x3 weeks  -Clear liquid diet for tonight, n.p.o. at midnight pending GI recommendations tomorrow  -Hold PTA Imodium till C. difficile resulted    Hypokalemia  CELESTINO, prerenal versus ATN  Creatinine 1.93, BUN 36, GFR 34, K2.9 at admission.  Most likely prerenal versus ATN.  -Continue IV  cc/h  -Hold PTA losartan  -Avoid nephrotoxins  -Monitor BMP  -Monitor I's and O's  -Replace potassium as indicated    Aortic stenosis  Coronary artery disease  Hypertension  HLD  Chronic atrial fibrillation  Patient noted to have severe aortic stenosis, has declined invasive cardiac interventions.  -Noted  -Holding PTA losartan  -Continue PTA aspirin, hydralazine, Xarelto, simvastatin    BPH  -Continue PTA Flomax    Prior history of stroke  Noted. Continue PTA statin, aspirin, Xarelto      DVT Prophylaxis: DOAC  Code Status: DNR / DNI   pending clinical progress and resolution of symptoms    Michelle Romano MD    Primary Care Physician   Annita Proctor    Chief Complaint   Diarrhea, nausea and  vomiting    History is obtained from the patient, patient's family, ED provider and chart review    History of Present Illness   Kamari Valentin is a 82 year old male with a history of severe aortic stenosis, stroke, HTN, HLD, paroxysmal atrial fibrillation on Xarelto who presents with 3 weeks of diarrhea and 1 day of nausea and vomiting.  Patient developed diarrhea end of last month.  He has had about 10-20 stools daily, mainly watery.  No blood.  No abdominal pain.  No fevers or chills.  He has been in communication with PCP, had a C. difficile test done last week which was negative per report.  Apparently another stool sample was sent earlier this week but results awaited.  Was seen in [facility] ED 3 days ago and discharged with potassium supplement.  Had a CT scan of abdomen pelvis done yesterday, ordered by her PCP and this showed enterocolitis and cystitis.  He was prescribed oral ciprofloxacin that he started taking yesterday following which he developed nausea and vomiting through the night.  Hence presented to ED today.    In the ED, vitals noted to be stable, afebrile.  Labs significant for WBC 11.2, hemoglobin 11.5, potassium 2.9, creatinine 1.93, BUN 36.  Potassium replaced in the ED, given IV fluids and admitted for further management.  COVID PCR negative.    Remote history of smoking pipe, occasional alcohol use.    Denies any chest pain, shortness of breath, leg swelling.  He has not wanted any invasive interventions for his heart.      Past Medical History    I have reviewed this patient's medical history and updated it with pertinent information if needed.   Past Medical History:   Diagnosis Date     Aortic stenosis     mild-moderate     Atrial fibrillation (H)     paroxysmal     Carotid stenosis     mild on right, moderate on left     Chronic renal insufficiency      Essential hypertension, benign      Hyperlipidaemia      Hypertrophy of prostate without urinary obstruction and other lower urinary  tract symptoms (LUTS)      Rheumatic fever      Unspecified cerebral artery occlusion with cerebral infarction        Past Surgical History   I have reviewed this patient's surgical history and updated it with pertinent information if needed.  Past Surgical History:   Procedure Laterality Date     CV CORONARY ANGIOGRAM N/A 10/22/2020    Procedure: Coronary Angiogram;  Surgeon: Alexandru Doe MD;  Location:  HEART CARDIAC CATH LAB     CV RIGHT HEART CATH MEASUREMENTS RECORDED N/A 10/22/2020    Procedure: Right Heart Cath;  Surgeon: Alexandru Doe MD;  Location:  HEART CARDIAC CATH LAB     ENT SURGERY      SEPTOPLASTY     ENT SURGERY      TONSILLECTOMY     HERNIA REPAIR       HERNIORRHAPHY INGUINAL  2/15/2012    Procedure:HERNIORRHAPHY INGUINAL; LEFT INGUINAL HERNIA REPAIR WITH MESH; Surgeon:SHILO READ; Location:[facility]     ORTHOPEDIC SURGERY  left ankle 2/2013    ORIF LEFT WRIST FRACTURE       Prior to Admission Medications   Prior to Admission Medications   Prescriptions Last Dose Informant Patient Reported? Taking?   acetaminophen (TYLENOL) 325 MG tablet prn  No Yes   Sig: Take 2 tablets (650 mg) by mouth every 6 hours as needed for mild pain or fever (> 101 F)   aspirin (ASA) 81 MG EC tablet 6/23/2022  No Yes   Sig: Take 1 tablet (81 mg) by mouth daily   ciprofloxacin (CIPRO) 500 MG tablet 6/23/2022 at x1 - started 6/22/22  Yes Yes   Sig: Take 500 mg by mouth 2 times daily X 7 days   fluticasone (FLONASE) 50 MCG/ACT nasal spray prn  Yes Yes   Sig: Spray 1 spray into both nostrils 2 times daily as needed for rhinitis or allergies   hydrALAZINE (APRESOLINE) 10 MG tablet 6/23/2022 at x1  No Yes   Sig: Take 1 tablet (10 mg) by mouth 2 times daily   loperamide (IMODIUM A-D) 2 MG tablet 6/23/2022 at ~1200  No Yes   Sig: Take 1 tablet (2 mg) by mouth 4 times daily as needed for diarrhea   losartan (COZAAR) 100 MG tablet 6/23/2022  Yes Yes   Sig: Take 100 mg by mouth every morning   potassium chloride  ER (K-TAB) 20 MEQ CR tablet 6/23/2022 at Started 6/20/22  No Yes   Sig: Take 1 tablet (20 mEq) by mouth daily for 7 days   rivaroxaban ANTICOAGULANT (XARELTO) 20 MG TABS tablet 6/22/2022 at pm  No Yes   Sig: Take 1 tablet (20 mg) by mouth daily (with dinner)   simvastatin (ZOCOR) 40 MG tablet 6/22/2022 at pm  No Yes   Sig: Take 1 tablet (40 mg) by mouth daily   Patient taking differently: Take 40 mg by mouth every evening   tamsulosin (FLOMAX) 0.4 MG capsule 6/22/2022 at pm  Yes Yes   Sig: Take 0.4 mg by mouth every evening   vitamin B-12 (CYANOCOBALAMIN) 1000 MCG tablet   Yes Yes   Sig: Take 3,000 mcg by mouth daily   vitamin D3 (CHOLECALCIFEROL) 50 mcg (2000 units) tablet   Yes Yes   Sig: Take 1 tablet by mouth daily      Facility-Administered Medications: None     Allergies   Allergies   Allergen Reactions     Lisinopril        Social History   I have reviewed this patient's social history and updated it with pertinent information if needed. Kamari Valentin  reports that he quit smoking about 11 years ago. His smoking use included cigars and pipe. He quit after 20.00 years of use. He has never used smokeless tobacco. He reports that he does not drink alcohol and does not use drugs.    Family History   I have reviewed this patient's family history and updated it with pertinent information if needed.   Family History   Problem Relation Age of Onset     Respiratory Brother      Coronary Artery Disease Early Onset Mother      Abdominal Aortic Aneurysm Father        Review of Systems   The 10 point Review of Systems is negative other than noted in the HPI or here.     Physical Exam   Temp: 98.2  F (36.8  C) Temp src: Oral BP: 103/62 Pulse: 81   Resp: 20 SpO2: 95 % O2 Device: None (Room air)    Vital Signs with Ranges  Temp:  [98.2  F (36.8  C)] 98.2  F (36.8  C)  Pulse:  [81] 81  Resp:  [20] 20  BP: (103)/(62) 103/62  SpO2:  [95 %] 95 %  170 lbs 0 oz    Constitutional: Awake, alert, cooperative, no apparent distress.   Appears fatigued  Eyes: no icterus, EOMs intact  HEENT: Moist mucous membranes  Respiratory: Clear to auscultation bilaterally, no crackles or wheezing.  Cardiovascular: IR IR, loud systolic murmur noted, no pedal edema  GI: Soft, non-distended, non-tender, normal bowel sounds.  Skin: No rashes, no cyanosis, no edema.  Musculoskeletal: No joint swelling, erythema or tenderness.  Neurologic: Alert, oriented and engages in appropriate conversation, no facial asymmetry, moving all extremities, fluent speech  Psychiatric: Calm and pleasant, no obvious anxiety or depression.     Data   Data reviewed today:  I personally reviewed CT scan with result as noted above  Recent Labs   Lab 06/23/22  1750 06/20/22  1249 06/20/22  1144   WBC 11.2*  --  6.2   HGB 11.5*  --  14.2   MCV 95  --  96     --  229    143  --    POTASSIUM 2.9* 3.1*  --    CHLORIDE 106 110*  --    CO2 24 28  --    BUN 36* 14  --    CR 1.93* 1.18  --    ANIONGAP 6 5  --    CLAY 8.3* 8.9  --    * 111*  --    ALBUMIN 2.6* 3.4  --    PROTTOTAL 5.7* 6.3*  --    BILITOTAL 0.7 0.8  --    ALKPHOS 62 53  --    ALT 21 18  --    AST 29 29  --        No results found for this or any previous visit (from the past 24 hour(s)).

## 2022-06-24 NOTE — CONSULTS
"CLINICAL NUTRITION SERVICES - ASSESSMENT NOTE     Nutrition Prescription    Malnutrition Status:    Malnutrition Diagnosis: moderate protein-calorie malnutrition  Malnutrition Type: acute illness or injury  Weight Loss Acute Illness (Final Summary): 5% in 1 month  Body Fat Acute Illness (Final Summary):  (None)  Muscle Mass Acute Illness (Final Summary):  (None)  Energy Intake Acute Illness (Final Summary): less than 75% for greater than 7 days  Fluid Accumulation Acute Illness (Final Summary):  (None)    Future/Additional Recommendations:  Supplements as needed as diet advances. Will monitor PO as this occurs.   Noted plan for NPO tomorrow for colonoscopy     REASON FOR ASSESSMENT  Kamari Valentin is a/an 82 year old male assessed by the dietitian for Reason For Assessment: identified at risk by screening criteria    NUTRITION HISTORY  Nutrition/Diet History  Additional Information: Pt has had excessive diarrhea for several weeks, causing decreased intakes and a reported weight loss of up to 10#. Some of this is likely to be water loss with diarrhea. Nausea/vomiting began just 1 day PTA.  CURRENT NUTRITION ORDERS  Nutrition  Diet/Nutrition Received: clear liquid     LABS  Lab Results Reviewed: reviewed  Lab Results Comments: K 2.9 (L)    MEDICATIONS  Pertinent Medications Reviewed: reviewed  Pertinent Medications Comments: potassium replaced; NaCl IVF @ 75 mL/hr    ANTHROPOMETRICS  Height: 5' 7\"  Most Recent Weight: 77.1 kg (170 lb)    BMI Assessment: BMI 25-29.9: overweight   Usual Body Weight: 170 kg (374 lb 12.5 oz) (based on wt records)  Weight for Calculation (kg): 77.1 kg (169 lb 15.6 oz)     ASSESSED NUTRITION NEEDS  Estimated Calorie Requirement (kcal/day): 5283-0072 (25-30 kcal/kg) - maintenance  Estimated/Assessed Protein Need (g/day):  g/day (1.2-1.5 g/kg) - preservation of LBM  Estimated/Assessed Fluid Req (mL/day): 7909-7457 mL/day    PHYSICAL FINDINGS  See malnutrition section below.  Stooling " x2 today    MALNUTRITION  Final Malnutrition Summary  Malnutrition Criteria Met?: yes  Status: initial  Malnutrition Diagnosis: moderate protein-calorie malnutrition  Malnutrition Type: acute illness or injury  Weight Loss Acute Illness (Final Summary): 5% in 1 month  Body Fat Acute Illness (Final Summary):  (None)  Muscle Mass Acute Illness (Final Summary):  (None)  Energy Intake Acute Illness (Final Summary): less than 75% for greater than 7 days  Fluid Accumulation Acute Illness (Final Summary):  (None)    NUTRITION DIAGNOSIS  Nutrition Diagnosis: Inadequate oral intake related to altered Gi function as evidenced by 3 weeks of diarrhea resulting in poor PO and up to 10# wt loss in 1 month or less.     INTERVENTIONS  Implementation  Nutrition Interventions: other (see comments) (ADAT)    Goals  Patient Goals:Nutrition Focus  Nutrition Goals:: PO  PO Goal:: Advance PO texture as able     Monitoring/Evaluation  Monitoring/Evaluation  Monitoring: Monitor patient per protocol

## 2022-06-24 NOTE — CONSULTS
[facility]  Gastroenterology Consultation         Kamari Valentin  [address]  [facility] [address]  82 year old male    Admission Date/Time: 6/23/2022  Primary Care Provider: Annita Proctor  Referring / Attending Physician:  Dr. Michelle Romano    We were asked to see the patient in consultation by Dr. Michelle Romano for evaluation of diarrhea for 3 weeks      CC: Diarrhea    HPI:  Kmaari Valentin is a 82 year old male who has a past medical history of severe aortic stenosis, stroke, HTN, HLD, paroxysmal atrial fibrillation on Xarelto who presents with 3 weeks of diarrhea and 1 day of nausea and vomiting. Patient has had diarrhea for one month and has had 10-20 non bloody stools daily. Had seen PCP last week and reportedly had negative stool tests. No results are available. He has no history of prior similar symptoms. Reports eating a lot of cooked shrimp lately but no one else has had similar symptoms. Has had no recent travel. Has no abdominal pain. Has had a 10 pound weight loss in last week associated with lack of eating and nausea.     He was sen at [facility] ED 3 days ago and given a potassium supplement and discharged. Had CT scan of A/P done and revealed enterocolitis and cystitis. He was given rx Cipro by PCP but developed nausea and vomiting. Labs reveal potassium 2.9, Creatinine 1.55. Hemoglobin 11.0. UA notes blood. C difficile and enteric pathogens pending    ROS: A comprehensive ten point review of systems was negative aside from those in mentioned in the HPI.      PAST MED HX:  I have reviewed this patient's medical history and updated it with pertinent information if needed.   Past Medical History:   Diagnosis Date     Aortic stenosis     mild-moderate     Atrial fibrillation (H)     paroxysmal     Carotid stenosis     mild on right, moderate on left     Chronic renal insufficiency      Essential hypertension, benign      Hyperlipidaemia      Hypertrophy of prostate  without urinary obstruction and other lower urinary tract symptoms (LUTS)      Rheumatic fever      Unspecified cerebral artery occlusion with cerebral infarction        MEDICATIONS:   Prior to Admission Medications   Prescriptions Last Dose Informant Patient Reported? Taking?   acetaminophen (TYLENOL) 325 MG tablet prn  No Yes   Sig: Take 2 tablets (650 mg) by mouth every 6 hours as needed for mild pain or fever (> 101 F)   aspirin (ASA) 81 MG EC tablet 6/23/2022  No Yes   Sig: Take 1 tablet (81 mg) by mouth daily   ciprofloxacin (CIPRO) 500 MG tablet 6/23/2022 at x1 - started 6/22/22  Yes Yes   Sig: Take 500 mg by mouth 2 times daily X 7 days   fluticasone (FLONASE) 50 MCG/ACT nasal spray prn  Yes Yes   Sig: Spray 1 spray into both nostrils 2 times daily as needed for rhinitis or allergies   hydrALAZINE (APRESOLINE) 10 MG tablet 6/23/2022 at x1  No Yes   Sig: Take 1 tablet (10 mg) by mouth 2 times daily   loperamide (IMODIUM A-D) 2 MG tablet 6/23/2022 at ~1200  No Yes   Sig: Take 1 tablet (2 mg) by mouth 4 times daily as needed for diarrhea   losartan (COZAAR) 100 MG tablet 6/23/2022  Yes Yes   Sig: Take 100 mg by mouth every morning   potassium chloride ER (K-TAB) 20 MEQ CR tablet 6/23/2022 at Started 6/20/22  No Yes   Sig: Take 1 tablet (20 mEq) by mouth daily for 7 days   rivaroxaban ANTICOAGULANT (XARELTO) 20 MG TABS tablet 6/22/2022 at pm  No Yes   Sig: Take 1 tablet (20 mg) by mouth daily (with dinner)   simvastatin (ZOCOR) 40 MG tablet 6/22/2022 at pm  No Yes   Sig: Take 1 tablet (40 mg) by mouth daily   Patient taking differently: Take 40 mg by mouth every evening   tamsulosin (FLOMAX) 0.4 MG capsule 6/22/2022 at pm  Yes Yes   Sig: Take 0.4 mg by mouth every evening   vitamin B-12 (CYANOCOBALAMIN) 1000 MCG tablet   Yes Yes   Sig: Take 3,000 mcg by mouth daily   vitamin D3 (CHOLECALCIFEROL) 50 mcg (2000 units) tablet   Yes Yes   Sig: Take 1 tablet by mouth daily      Facility-Administered Medications: None        ALLERGIES:   Allergies   Allergen Reactions     Lisinopril        SOCIAL HISTORY:  Social History     Tobacco Use     Smoking status: Former Smoker     Years: 20.00     Types: Cigars, Pipe     Quit date: 2010     Years since quittin.8     Smokeless tobacco: Never Used   Substance Use Topics     Alcohol use: No     Alcohol/week: 0.0 standard drinks     Drug use: No       FAMILY HISTORY:  Family History   Problem Relation Age of Onset     Respiratory Brother      Coronary Artery Disease Early Onset Mother      Abdominal Aortic Aneurysm Father        PHYSICAL EXAM:   General  Alert, oriented and comforatble  Vital Signs with Ranges  Temp: 97.9  F (36.6  C) Temp src: Oral BP: (!) 140/70 Pulse: 75   Resp: 20 SpO2: 98 % O2 Device: Nasal cannula Oxygen Delivery: 3 LPM  I/O last 3 completed shifts:  In: 1100 [IV Piggyback:1100]  Out: 125 [Urine:125]    Constitutional: healthy, alert and no distress   Cardiovascular: negative, PMI normal. No lifts, heaves, or thrills. RRR. No murmurs, clicks gallops or rub  Respiratory: negative, Percussion normal. Good diaphragmatic excursion. Lungs clear  Abdomen: Abdomen soft, non-tender. BS normal. No masses, organomegaly          ADDITIONAL COMMENTS:   I reviewed the patient's new clinical lab test results.   Recent Labs   Lab Test 22  0657 22  1750 22  1144 01/10/22  1811 10/26/20  0907 10/22/20  0810   WBC 8.0 11.2* 6.2 7.4   < > 6.0   HGB 11.0* 11.5* 14.2 13.7   < > 13.9   MCV 96 95 96 98   < > 97    193 229 199   < > 204   INR  --   --   --  1.27*  --  1.05    < > = values in this interval not displayed.     Recent Labs   Lab Test 22  0657 22  1750 22  1249   POTASSIUM 2.9* 3.1*  2.9* 3.1*   CHLORIDE 111* 106 110*   CO2 20 24 28   BUN 30 36* 14   ANIONGAP 7 6 5     Recent Labs   Lab Test 224 22  1750 22  1249 10/27/20  0844 10/22/20  1300 10/22/20  0810 18  0700   ALBUMIN  --  2.6* 3.4 3.9  --     < >  --    BILITOTAL  --  0.7 0.8 0.8  --    < >  --    ALT  --  21 18 23  --    < >  --    AST  --  29 29 19  --    < >  --    PROTEIN 30 *  --   --   --  Negative  --  Negative    < > = values in this interval not displayed.       I reviewed the patient's new imaging results.        CONSULTATION ASSESSMENT AND PLAN:    Kamari Valentin is a 82 year odl male with diarrhea and enterocolitis    Active Problems:  Enterocolitis  Nausea vomiting and diarrhea  Ct noted enterocolitis  Has 10-20 stools daily  Concern for infectious vs ischemic vs microscopic colitis    -- Hold Xarelto  -- Run stool samples  -- Clear liquid diet  -- NPO at midnight for colonoscopy tomorrow      [facility].  Office: [phone]  Cell : [phone] (Dr. Mccormick)  Cell: [phone] (Susan Thomas PA-C)

## 2022-06-24 NOTE — PROGRESS NOTES
Pt A/O x4. NPO pending GI recommendations. A/1 GB/W. VSS on 3L O2. Continent B/B, pt reports frequency of urine. Pt denies pain, also denies N/V, but had 2x loose stools since arriving on the unit. IV infusing 100 ml NS/hr. On enteric precautions. Skin intact except for bruising on arms. Stool specimens sent to lab for testing

## 2022-06-24 NOTE — PROGRESS NOTES
[facility]    Hospitalist Progress Note    Date of Admission:  6/23/2022    Assessment & Plan     Kamari Valentin is a 82 year old male with a history of severe aortic stenosis, stroke, HTN, HLD, paroxysmal atrial fibrillation on Xarelto who presents with 3 weeks of diarrhea and 1 day of nausea and vomiting.     Persistent diarrhea  Enterocolitis  Nausea and vomiting  Diarrhea x3 weeks, not improving.  CT abdomen pelvis on 6/22 noted enterocolitis.  C. difficile reportedly negative a week ago.  No improvement on Imodium.  Started on ciprofloxacin on 6/22 with concern for cystitis but UA with no evidence of infection.  Developed nausea and vomiting after starting on antibiotic.  In ED noted to be in CELESTINO, stable vitals, mild leukocytosis.  Hypokalemic.  -Admit as inpatient  -Continue IV NS at 100 cc/h  -Await repeat stool studies: C. difficile testing, enteric panel pending  -Consult to GI given persistent diarrhea x3 weeks  -Clear liquid diet for tonight, n.p.o. at midnight for colonoscopy tomorrow.  -Hold PTA Imodium till C. difficile resulted  -Hold PTA Xarelto     Hypokalemia  CELESTINO, prerenal versus ATN, improving  Creatinine 1.93, BUN 36, GFR 34, K2.9 at admission.  Most likely prerenal versus ATN.  -Continue IV NS 75 cc/h, creat improving  -Hold PTA losartan  -Avoid nephrotoxins  -Monitor BMP  -Monitor I's and O's  -Replace potassium as indicated     Aortic stenosis  Coronary artery disease  Hypertension  HLD  Chronic atrial fibrillation  Patient noted to have severe aortic stenosis, has declined invasive cardiac interventions.  -Noted  -Holding PTA losartan  -Continue PTA aspirin, hydralazine, simvastatin  -Hold PTA Xarelto in anticipation of colonoscopy tomorrow     BPH  -Continue PTA Flomax     Prior history of stroke  Noted. Continue PTA statin, aspirin, Xarelto      Anemia, possibly due to ivf dilution  Hb stable around 11. Monitor. No evidence of bleeding.    DVT Prophylaxis:  DOAC  Code Status: DNR / DNI   pending clinical progress and resolution of symptoms             Michelle Romano MD  Text Page (7am - 6pm, M-F)  [facility]  Securely message with the Vocera Web Console (learn more here)  Text page via [employer] Paging/Directory      Interval History   Stable overnight.  Loose stools have decreased yesterday but today again has had 4-5 stools this morning.  No abdominal pain.  No fevers, chills, sweats, nausea or vomiting.  Awaiting colonoscopy tomorrow.    -Data reviewed today: I reviewed all new labs and imaging results over the last 24 hours. I personally reviewed CT scan with result as noted above    Physical Exam   Temp: 97.9  F (36.6  C) Temp src: Oral BP: (!) 140/70 Pulse: 75   Resp: 20 SpO2: 98 % O2 Device: Nasal cannula Oxygen Delivery: 3 LPM  Vitals:    06/23/22 1743   Weight: 77.1 kg (170 lb)     Vital Signs with Ranges  Temp:  [97.9  F (36.6  C)-98.2  F (36.8  C)] 97.9  F (36.6  C)  Pulse:  [69-81] 75  Resp:  [18-20] 20  BP: ()/(47-70) 140/70  SpO2:  [95 %-100 %] 98 %  I/O last 3 completed shifts:  In: 1100 [IV Piggyback:1100]  Out: 125 [Urine:125]    Constitutional: Awake, alert, cooperative, no apparent distress.  Appears fatigued  Eyes: no icterus, EOMs intact  HEENT: Moist mucous membranes  Respiratory: Clear to auscultation bilaterally, no crackles or wheezing.  Cardiovascular: IR IR, loud systolic murmur noted, no pedal edema  GI: Soft, non-distended, non-tender, normal bowel sounds.  Skin: No rashes, no cyanosis, no edema.  Musculoskeletal: No joint swelling, erythema or tenderness.  Neurologic: Alert, oriented and engages in appropriate conversation, no facial asymmetry, moving all extremities, fluent speech  Psychiatric: Calm and pleasant, no obvious anxiety or depression.        Medications     - MEDICATION INSTRUCTIONS -       sodium chloride 100 mL/hr at 06/24/22 0142       aspirin  81 mg Oral Daily     hydrALAZINE  10 mg Oral BID      potassium chloride  10 mEq Intravenous Once     potassium chloride  10 mEq Intravenous Once     potassium chloride  10 mEq Intravenous Once     potassium chloride  40 mEq Oral Once     rivaroxaban ANTICOAGULANT  20 mg Oral Daily with supper     simvastatin  40 mg Oral QPM     sodium chloride (PF)  3 mL Intracatheter Q8H     tamsulosin  0.4 mg Oral QPM       Data   Recent Labs   Lab 06/24/22  0657 06/23/22  1750 06/20/22  1249 06/20/22  1144   WBC 8.0 11.2*  --  6.2   HGB 11.0* 11.5*  --  14.2   MCV 96 95  --  96    193  --  229    136 143  --    POTASSIUM 2.9* 3.1*  2.9* 3.1*  --    CHLORIDE 111* 106 110*  --    CO2 20 24 28  --    BUN 30 36* 14  --    CR 1.55* 1.93* 1.18  --    ANIONGAP 7 6 5  --    CLAY 8.1* 8.3* 8.9  --    * 116* 111*  --    ALBUMIN  --  2.6* 3.4  --    PROTTOTAL  --  5.7* 6.3*  --    BILITOTAL  --  0.7 0.8  --    ALKPHOS  --  62 53  --    ALT  --  21 18  --    AST  --  29 29  --        Imaging  No results found for this or any previous visit (from the past 24 hour(s)).

## 2022-06-24 NOTE — PLAN OF CARE
Goal Outcome Evaluation:     Patient A&Ox4. VSS, 3L O2 sating at 95%. Clear liquids diet, NPO by midnight for colonoscopy procedure. K+ was 2.9 replacement given, awaiting for the recheck lab [id number]. Assist of 1 GB/W, voiding in the BR/urinal with frequate urination. X2 medium  loose stool on this shift, one was at around 1513. NS  Infusing at 75 mL/hr. Enteric precaution maintain, stool specimen was collected on the previous shift result Negative for C Difficile. Will continue to monitor.

## 2022-06-25 LAB
ANION GAP SERPL CALCULATED.3IONS-SCNC: 6 MMOL/L (ref 3–14)
BACTERIA UR CULT: NO GROWTH
BUN SERPL-MCNC: 20 MG/DL (ref 7–30)
CALCIUM SERPL-MCNC: 8.3 MG/DL (ref 8.5–10.1)
CHLORIDE BLD-SCNC: 113 MMOL/L (ref 94–109)
CO2 SERPL-SCNC: 23 MMOL/L (ref 20–32)
CREAT SERPL-MCNC: 1.01 MG/DL (ref 0.66–1.25)
GFR SERPL CREATININE-BSD FRML MDRD: 73 ML/MIN/1.73M2
GLUCOSE BLD-MCNC: 118 MG/DL (ref 70–99)
GLUCOSE BLDC GLUCOMTR-MCNC: 156 MG/DL (ref 70–99)
POTASSIUM BLD-SCNC: 3.3 MMOL/L (ref 3.4–5.3)
POTASSIUM BLD-SCNC: 3.7 MMOL/L (ref 3.4–5.3)
SODIUM SERPL-SCNC: 142 MMOL/L (ref 133–144)

## 2022-06-25 PROCEDURE — 36415 COLL VENOUS BLD VENIPUNCTURE: CPT | Performed by: INTERNAL MEDICINE

## 2022-06-25 PROCEDURE — 258N000003 HC RX IP 258 OP 636: Performed by: HOSPITALIST

## 2022-06-25 PROCEDURE — 120N000001 HC R&B MED SURG/OB

## 2022-06-25 PROCEDURE — 250N000013 HC RX MED GY IP 250 OP 250 PS 637: Performed by: INTERNAL MEDICINE

## 2022-06-25 PROCEDURE — 84132 ASSAY OF SERUM POTASSIUM: CPT | Performed by: INTERNAL MEDICINE

## 2022-06-25 PROCEDURE — 80048 BASIC METABOLIC PNL TOTAL CA: CPT | Performed by: HOSPITALIST

## 2022-06-25 PROCEDURE — 250N000013 HC RX MED GY IP 250 OP 250 PS 637: Performed by: HOSPITALIST

## 2022-06-25 PROCEDURE — 36415 COLL VENOUS BLD VENIPUNCTURE: CPT | Performed by: HOSPITALIST

## 2022-06-25 PROCEDURE — 99232 SBSQ HOSP IP/OBS MODERATE 35: CPT | Performed by: HOSPITALIST

## 2022-06-25 RX ORDER — MAGNESIUM CARB/ALUMINUM HYDROX 105-160MG
296 TABLET,CHEWABLE ORAL ONCE
Status: COMPLETED | OUTPATIENT
Start: 2022-06-26 | End: 2022-06-26

## 2022-06-25 RX ORDER — LIDOCAINE 40 MG/G
CREAM TOPICAL
Status: DISCONTINUED | OUTPATIENT
Start: 2022-06-25 | End: 2022-06-26

## 2022-06-25 RX ORDER — BISACODYL 5 MG
10 TABLET, DELAYED RELEASE (ENTERIC COATED) ORAL ONCE
Status: COMPLETED | OUTPATIENT
Start: 2022-06-25 | End: 2022-06-25

## 2022-06-25 RX ORDER — ONDANSETRON 2 MG/ML
4 INJECTION INTRAMUSCULAR; INTRAVENOUS
Status: DISCONTINUED | OUTPATIENT
Start: 2022-06-25 | End: 2022-06-26 | Stop reason: HOSPADM

## 2022-06-25 RX ORDER — POTASSIUM CHLORIDE 1500 MG/1
40 TABLET, EXTENDED RELEASE ORAL ONCE
Status: COMPLETED | OUTPATIENT
Start: 2022-06-25 | End: 2022-06-25

## 2022-06-25 RX ORDER — POLYETHYLENE GLYCOL 3350 17 G/17G
238 POWDER, FOR SOLUTION ORAL ONCE
Status: COMPLETED | OUTPATIENT
Start: 2022-06-25 | End: 2022-06-25

## 2022-06-25 RX ADMIN — TAMSULOSIN HYDROCHLORIDE 0.4 MG: 0.4 CAPSULE ORAL at 20:14

## 2022-06-25 RX ADMIN — HYDRALAZINE HYDROCHLORIDE 10 MG: 10 TABLET ORAL at 08:15

## 2022-06-25 RX ADMIN — Medication 238 G: at 17:08

## 2022-06-25 RX ADMIN — SIMVASTATIN 40 MG: 40 TABLET, FILM COATED ORAL at 20:13

## 2022-06-25 RX ADMIN — BISACODYL 10 MG: 5 TABLET, COATED ORAL at 09:59

## 2022-06-25 RX ADMIN — HYDRALAZINE HYDROCHLORIDE 10 MG: 10 TABLET ORAL at 20:14

## 2022-06-25 RX ADMIN — SODIUM CHLORIDE: 9 INJECTION, SOLUTION INTRAVENOUS at 21:54

## 2022-06-25 RX ADMIN — ASPIRIN 81 MG: 81 TABLET, COATED ORAL at 09:59

## 2022-06-25 RX ADMIN — POTASSIUM CHLORIDE 40 MEQ: 1500 TABLET, EXTENDED RELEASE ORAL at 10:00

## 2022-06-25 RX ADMIN — SODIUM CHLORIDE: 9 INJECTION, SOLUTION INTRAVENOUS at 03:02

## 2022-06-25 ASSESSMENT — ACTIVITIES OF DAILY LIVING (ADL)
ADLS_ACUITY_SCORE: 35

## 2022-06-25 NOTE — PLAN OF CARE
Goal Outcome Evaluation:    Patient is A&Ox4. VSS. CMS intact ex of left side weakness from previous stroke.  K+ 3.3, not Elctrolyt protocol MD was page and order was placed for one time placement given, recheck order at 1330 pm, lab was called to find out about the delay lab drawn lab personel indicated that they are running behind. PIV on the LA NS Infusing at 75 mL/hr. Colonoscope procedure was cancel today due to per colon prep want done, see the MAR for orders that start at 1700. Clear liquids/ NPO at midnight.  Isaura MOREL from this patient  Office call for previuse stool collected and the re a present  of over parasite Blastocystis Hominis organism present few, and CT did show inflamation of the bladder suspecting UTI. Provider was notified, Infectious Diseases IP Consult place. Assist of x1 GB/W to the BR, urinal at bedside, x1 loose stool on this shift.

## 2022-06-25 NOTE — PROGRESS NOTES
[facility]    Hospitalist Progress Note    Date of Admission:  6/23/2022    Assessment & Plan     Kamari Valentin is a 82 year old male with a history of severe aortic stenosis, stroke, HTN, HLD, paroxysmal atrial fibrillation on Xarelto who presents with 3 weeks of diarrhea and 1 day of nausea and vomiting.     Persistent diarrhea  Enterocolitis  Nausea and vomiting  Diarrhea x3 weeks, not improving.  CT abdomen pelvis on 6/22 noted enterocolitis.  C. difficile reportedly negative a week ago.  No improvement on Imodium.  Started on ciprofloxacin on 6/22 with concern for cystitis but UA with no evidence of infection.  Developed nausea and vomiting after starting on antibiotic.  In ED noted to be in CELESTINO, stable vitals, mild leukocytosis.  Hypokalemic.  -Admit as inpatient  -Continue IV NS at 75 cc/h  -C. difficile testing and enteric panel negative   -Consult to GI given persistent diarrhea x3 weeks  -Clear liquid diet for tonight, n.p.o. at midnight for colonoscopy tomorrow.  -Hold PTA Xarelto    ?  Blastocystis hominis in stool  -6/25: Called by ADRIEL Land [outside clinic] that stool sample patient had provided to them on 6/20 is showing few Blastocystis hominis in 1 of 3 samples.  Unclear of the significance of this.  Will consult ID regarding this.  They will fax over the results on Monday.  Final stool culture still pending.     Hypokalemia  CELESTINO, prerenal versus ATN, resolved  Creatinine 1.93, BUN 36, GFR 34, K2.9 at admission.  Most likely prerenal versus ATN.  -Continue IV NS 75 cc/h, creat improving  -Hold PTA losartan  -Avoid nephrotoxins  -Monitor BMP  -Monitor I's and O's  -Replace potassium as indicated     Aortic stenosis  Coronary artery disease  Hypertension  HLD  Chronic atrial fibrillation  Patient noted to have severe aortic stenosis, has declined invasive cardiac interventions.  -Noted  -Holding PTA losartan  -Continue PTA aspirin, hydralazine, simvastatin  -Hold PTA  Xarelto in anticipation of colonoscopy tomorrow     BPH  -Continue PTA Flomax     Prior history of stroke  Noted. Continue PTA statin, aspirin, Xarelto      Anemia, possibly due to ivf dilution  Hb stable around 11. Monitor. No evidence of bleeding.    DVT Prophylaxis: DOAC  Code Status: DNR / DNI   pending clinical progress and resolution of symptoms             Michelle Romano MD  Text Page (7am - 6pm, M-F)  [facility]  Securely message with the Vocera Web Console (learn more here)  Text page via [employer] Paging/Directory      Interval History   Stable overnight.  Expressed frustration that colonoscopy prep was not given to him last night.  Continues to have loose stools.  No nausea or vomiting.  Awaiting colonoscopy tomorrow.    -Data reviewed today: I reviewed all new labs and imaging results over the last 24 hours. I personally reviewed CT scan with result as noted above    Physical Exam   Temp: 97.9  F (36.6  C) Temp src: Oral BP: 139/82 Pulse: 78   Resp: 18 SpO2: 95 % O2 Device: None (Room air) Oxygen Delivery: 2 LPM  Vitals:    06/23/22 1743   Weight: 77.1 kg (170 lb)     Vital Signs with Ranges  Temp:  [97.9  F (36.6  C)-98.3  F (36.8  C)] 97.9  F (36.6  C)  Pulse:  [61-83] 78  Resp:  [16-18] 18  BP: (118-153)/(64-84) 139/82  SpO2:  [92 %-96 %] 95 %  I/O last 3 completed shifts:  In: 360 [P.O.:360]  Out: 1500 [Urine:1500]    Constitutional: Awake, alert, cooperative, no apparent distress.  Appears fatigued  Eyes: no icterus, EOMs intact  HEENT: Moist mucous membranes  Respiratory: Clear to auscultation bilaterally, no crackles or wheezing.  Cardiovascular: IR IR, loud systolic murmur noted, no pedal edema  GI: Soft, non-distended, non-tender, normal bowel sounds.  Skin: No rashes, no cyanosis, no edema.  Musculoskeletal: No joint swelling, erythema or tenderness.  Neurologic: Alert, oriented and engages in appropriate conversation, no facial asymmetry, moving all extremities, fluent  speech  Psychiatric: Calm and pleasant, no obvious anxiety or depression.        Medications     - MEDICATION INSTRUCTIONS -       sodium chloride 75 mL/hr at 06/25/22 0302       aspirin  81 mg Oral Daily     hydrALAZINE  10 mg Oral BID     [START ON 6/26/2022] magnesium citrate  296 mL Oral Once     [Held by provider] rivaroxaban ANTICOAGULANT  15 mg Oral Daily with supper     simvastatin  40 mg Oral QPM     sodium chloride (PF)  3 mL Intracatheter Q8H     sodium chloride (PF)  3 mL Intracatheter Q8H     sodium chloride (PF)  3 mL Intracatheter Q8H     tamsulosin  0.4 mg Oral QPM       Data   Recent Labs   Lab 06/25/22  1557 06/25/22  1258 06/25/22  0721 06/24/22  1821 06/24/22  0657 06/23/22  1750 06/20/22  1249 06/20/22  1249 06/20/22  1144   WBC  --   --   --   --  8.0 11.2*  --   --  6.2   HGB  --   --   --   --  11.0* 11.5*  --   --  14.2   MCV  --   --   --   --  96 95  --   --  96   PLT  --   --   --   --  188 193  --   --  229   NA  --   --  142  --  138 136   < > 143  --    POTASSIUM 3.7  --  3.3* 4.0 2.9* 3.1*  2.9*   < > 3.1*  --    CHLORIDE  --   --  113*  --  111* 106   < > 110*  --    CO2  --   --  23  --  20 24   < > 28  --    BUN  --   --  20  --  30 36*   < > 14  --    CR  --   --  1.01  --  1.55* 1.93*   < > 1.18  --    ANIONGAP  --   --  6  --  7 6   < > 5  --    CLAY  --   --  8.3*  --  8.1* 8.3*   < > 8.9  --    GLC  --  156* 118*  --  100* 116*   < > 111*  --    ALBUMIN  --   --   --   --   --  2.6*  --  3.4  --    PROTTOTAL  --   --   --   --   --  5.7*  --  6.3*  --    BILITOTAL  --   --   --   --   --  0.7  --  0.8  --    ALKPHOS  --   --   --   --   --  62  --  53  --    ALT  --   --   --   --   --  21  --  18  --    AST  --   --   --   --   --  29  --  29  --     < > = values in this interval not displayed.       Imaging  No results found for this or any previous visit (from the past 24 hour(s)).

## 2022-06-25 NOTE — PLAN OF CARE
Goal Outcome Evaluation:     Patient is alert and oriented X4. VSS on 2 LPM via NC with sat of 92%. Assist of 1 with gait belt and walker. On clear liquid diet, with good appetites.  Takes pills whole with thin liquids. IV NS infusing 75ml/hr.patient denies pain. NPO after midnight for colonoscopy tomorrow morning. Potassium labs draw with value of 4, no replacement needed. Scheduled next labs for tomorrow morning. Continent of bowel and bladder,ambulated to bathroom to void at times or uses urinal. Patient had loose stool X3 this shift.  Continue to monitor.

## 2022-06-25 NOTE — PROGRESS NOTES
Called by [facility]RI[facility] Land [outside clinic] that stool sample patient had provided to them on 6/20 is showing few Blastocystis hominis in 1 of 3 samples.  Unclear of the significance of this.  Will consult ID regarding this.  They will fax over the results on Monday.  Final stool culture still pending.

## 2022-06-25 NOTE — PROGRESS NOTES
VSS on 2L O2. A&Ox4. Up w Ax1, GB, walker. Baseline R sided weakness from previous stroke. NPO for colonoscopy. NS @ 75. No diarrhea noted this shift. Cdiff negative - remove precautions?  Discharge pending colonoscopy results.

## 2022-06-25 NOTE — PROGRESS NOTES
[facility]  Gastroenterology Progress Note     Kamari Valentin MRN# [id number]   YOB: 1940 Age: 82 year old          Assessment and Plan:   Kamari Valentin is a 82 year odl male with diarrhea and enterocolitis     Active Problems:  Enterocolitis  Nausea vomiting and diarrhea  Ct noted enterocolitis  Has 10-20 stools daily  Enteric pathogens and C difficile negative  Concern for ischemic vs microscopic colitis  Recommended colonoscopy today but no prep given last night    -- Hold Xarelto  -- Clear liquid diet  -- Start colon prep this afternoon  -- NPO at midnight for colonoscopy tomorrow                Interval History:   no new complaints, doing well and c/o ongong diarrhea with no abdominal pain              Review of Systems:   C: NEGATIVE for fever, chills, change in weight  E/M: NEGATIVE for ear, mouth and throat problems  R: NEGATIVE for significant cough or SOB  CV: NEGATIVE for chest pain, palpitations or peripheral edema             Medications:   I have reviewed this patient's current medications    aspirin  81 mg Oral Daily     bisacodyl  10 mg Oral Once    Followed by     polyethylene glycol  238 g Oral Once    Followed by     [START ON 6/26/2022] magnesium citrate  296 mL Oral Once     hydrALAZINE  10 mg Oral BID     potassium chloride  40 mEq Oral Once     [Held by provider] rivaroxaban ANTICOAGULANT  15 mg Oral Daily with supper     simvastatin  40 mg Oral QPM     sodium chloride (PF)  3 mL Intracatheter Q8H     sodium chloride (PF)  3 mL Intracatheter Q8H     tamsulosin  0.4 mg Oral QPM                  Physical Exam:   Vitals were reviewed  Vital Signs with Ranges  Temp:  [98  F (36.7  C)-98.3  F (36.8  C)] 98.3  F (36.8  C)  Pulse:  [57-83] 61  Resp:  [16-18] 18  BP: (107-153)/(64-84) 153/71  SpO2:  [90 %-96 %] 95 %  I/O last 3 completed shifts:  In: 360 [P.O.:360]  Out: 2100 [Urine:2100]  Constitutional: healthy, alert and no distress   Cardiovascular:  negative, PMI normal. No lifts, heaves, or thrills. RRR. No murmurs, clicks gallops or rub  Respiratory: negative, Percussion normal. Good diaphragmatic excursion. Lungs clear  Abdomen: Abdomen soft, non-tender. BS normal. No masses, organomegaly           Data:   I reviewed the patient's new clinical lab test results.   Recent Labs   Lab Test 06/24/22  0657 06/23/22  1750 06/20/22  1144 01/10/22  1811 10/26/20  0907 10/22/20  0810   WBC 8.0 11.2* 6.2 7.4   < > 6.0   HGB 11.0* 11.5* 14.2 13.7   < > 13.9   MCV 96 95 96 98   < > 97    193 229 199   < > 204   INR  --   --   --  1.27*  --  1.05    < > = values in this interval not displayed.     Recent Labs   Lab Test 06/25/22  0721 06/24/22  1821 06/24/22  0657 06/23/22  1750   POTASSIUM 3.3* 4.0 2.9* 3.1*  2.9*   CHLORIDE 113*  --  111* 106   CO2 23  --  20 24   BUN 20  --  30 36*   ANIONGAP 6  --  7 6     Recent Labs   Lab Test 06/23/22 2024 06/23/22  1750 06/20/22  1249 10/27/20  0844 10/22/20  1300 10/22/20  0810 09/27/18  0700   ALBUMIN  --  2.6* 3.4 3.9  --    < >  --    BILITOTAL  --  0.7 0.8 0.8  --    < >  --    ALT  --  21 18 23  --    < >  --    AST  --  29 29 19  --    < >  --    PROTEIN 30 *  --   --   --  Negative  --  Negative    < > = values in this interval not displayed.       I reviewed the patient's new imaging results.    All laboratory data reviewed  All imaging studies reviewed by me.    Susan Thomas PA-C,  6/25/2022  [facility]  Office : [phone]  Cell: [phone] (Dr. Mccormick)  Cell: [phone] (Susan Thomas PA-C)

## 2022-06-26 LAB
ANION GAP SERPL CALCULATED.3IONS-SCNC: 6 MMOL/L (ref 3–14)
BUN SERPL-MCNC: 10 MG/DL (ref 7–30)
CALCIUM SERPL-MCNC: 8.2 MG/DL (ref 8.5–10.1)
CHLORIDE BLD-SCNC: 113 MMOL/L (ref 94–109)
CO2 SERPL-SCNC: 22 MMOL/L (ref 20–32)
COLONOSCOPY: NORMAL
CREAT SERPL-MCNC: 0.84 MG/DL (ref 0.66–1.25)
GFR SERPL CREATININE-BSD FRML MDRD: 86 ML/MIN/1.73M2
GLUCOSE BLD-MCNC: 163 MG/DL (ref 70–99)
GLUCOSE BLDC GLUCOMTR-MCNC: 146 MG/DL (ref 70–99)
MAGNESIUM SERPL-MCNC: 2 MG/DL (ref 1.6–2.3)
POTASSIUM BLD-SCNC: 3.2 MMOL/L (ref 3.4–5.3)
POTASSIUM BLD-SCNC: 3.9 MMOL/L (ref 3.4–5.3)
SODIUM SERPL-SCNC: 141 MMOL/L (ref 133–144)

## 2022-06-26 PROCEDURE — 45385 COLONOSCOPY W/LESION REMOVAL: CPT | Performed by: INTERNAL MEDICINE

## 2022-06-26 PROCEDURE — 83735 ASSAY OF MAGNESIUM: CPT | Performed by: HOSPITALIST

## 2022-06-26 PROCEDURE — 258N000003 HC RX IP 258 OP 636: Performed by: HOSPITALIST

## 2022-06-26 PROCEDURE — 250N000011 HC RX IP 250 OP 636: Performed by: INTERNAL MEDICINE

## 2022-06-26 PROCEDURE — 36415 COLL VENOUS BLD VENIPUNCTURE: CPT | Performed by: HOSPITALIST

## 2022-06-26 PROCEDURE — 80048 BASIC METABOLIC PNL TOTAL CA: CPT | Performed by: HOSPITALIST

## 2022-06-26 PROCEDURE — 0DBK8ZZ EXCISION OF ASCENDING COLON, VIA NATURAL OR ARTIFICIAL OPENING ENDOSCOPIC: ICD-10-PCS | Performed by: INTERNAL MEDICINE

## 2022-06-26 PROCEDURE — 99233 SBSQ HOSP IP/OBS HIGH 50: CPT | Performed by: HOSPITALIST

## 2022-06-26 PROCEDURE — 84132 ASSAY OF SERUM POTASSIUM: CPT | Performed by: HOSPITALIST

## 2022-06-26 PROCEDURE — 250N000011 HC RX IP 250 OP 636: Performed by: HOSPITALIST

## 2022-06-26 PROCEDURE — 250N000013 HC RX MED GY IP 250 OP 250 PS 637: Performed by: INTERNAL MEDICINE

## 2022-06-26 PROCEDURE — 45380 COLONOSCOPY AND BIOPSY: CPT | Performed by: INTERNAL MEDICINE

## 2022-06-26 PROCEDURE — 88305 TISSUE EXAM BY PATHOLOGIST: CPT | Mod: TC | Performed by: INTERNAL MEDICINE

## 2022-06-26 PROCEDURE — 120N000001 HC R&B MED SURG/OB

## 2022-06-26 PROCEDURE — 250N000013 HC RX MED GY IP 250 OP 250 PS 637: Performed by: HOSPITALIST

## 2022-06-26 PROCEDURE — G0500 MOD SEDAT ENDO SERVICE >5YRS: HCPCS | Performed by: INTERNAL MEDICINE

## 2022-06-26 PROCEDURE — 88305 TISSUE EXAM BY PATHOLOGIST: CPT | Mod: 26 | Performed by: PATHOLOGY

## 2022-06-26 RX ORDER — NALOXONE HYDROCHLORIDE 0.4 MG/ML
0.4 INJECTION, SOLUTION INTRAMUSCULAR; INTRAVENOUS; SUBCUTANEOUS
Status: DISCONTINUED | OUTPATIENT
Start: 2022-06-26 | End: 2022-06-30 | Stop reason: HOSPADM

## 2022-06-26 RX ORDER — FENTANYL CITRATE 50 UG/ML
INJECTION, SOLUTION INTRAMUSCULAR; INTRAVENOUS PRN
Status: COMPLETED | OUTPATIENT
Start: 2022-06-26 | End: 2022-06-26

## 2022-06-26 RX ORDER — LOPERAMIDE HCL 2 MG
2 CAPSULE ORAL 4 TIMES DAILY PRN
Status: DISCONTINUED | OUTPATIENT
Start: 2022-06-26 | End: 2022-06-30 | Stop reason: HOSPADM

## 2022-06-26 RX ORDER — NALOXONE HYDROCHLORIDE 0.4 MG/ML
0.2 INJECTION, SOLUTION INTRAMUSCULAR; INTRAVENOUS; SUBCUTANEOUS
Status: DISCONTINUED | OUTPATIENT
Start: 2022-06-26 | End: 2022-06-30 | Stop reason: HOSPADM

## 2022-06-26 RX ORDER — POTASSIUM CHLORIDE 7.45 MG/ML
10 INJECTION INTRAVENOUS
Status: COMPLETED | OUTPATIENT
Start: 2022-06-26 | End: 2022-06-26

## 2022-06-26 RX ORDER — POTASSIUM CHLORIDE 1500 MG/1
40 TABLET, EXTENDED RELEASE ORAL ONCE
Status: DISCONTINUED | OUTPATIENT
Start: 2022-06-26 | End: 2022-06-26

## 2022-06-26 RX ORDER — FLUMAZENIL 0.1 MG/ML
0.2 INJECTION, SOLUTION INTRAVENOUS
Status: ACTIVE | OUTPATIENT
Start: 2022-06-26 | End: 2022-06-26

## 2022-06-26 RX ORDER — OLANZAPINE 5 MG/1
5 TABLET, ORALLY DISINTEGRATING ORAL
Status: DISCONTINUED | OUTPATIENT
Start: 2022-06-26 | End: 2022-06-30 | Stop reason: HOSPADM

## 2022-06-26 RX ORDER — NITAZOXANIDE 500 MG/1
500 TABLET ORAL EVERY 12 HOURS SCHEDULED
Status: DISCONTINUED | OUTPATIENT
Start: 2022-06-26 | End: 2022-06-27

## 2022-06-26 RX ORDER — CHOLESTYRAMINE 4 G/9G
1 POWDER, FOR SUSPENSION ORAL DAILY
Status: DISCONTINUED | OUTPATIENT
Start: 2022-06-26 | End: 2022-06-28

## 2022-06-26 RX ORDER — OLANZAPINE 5 MG/1
5 TABLET, ORALLY DISINTEGRATING ORAL AT BEDTIME
Status: DISCONTINUED | OUTPATIENT
Start: 2022-06-27 | End: 2022-06-26

## 2022-06-26 RX ORDER — LOPERAMIDE HCL 2 MG
2 CAPSULE ORAL 4 TIMES DAILY PRN
Status: DISCONTINUED | OUTPATIENT
Start: 2022-06-26 | End: 2022-06-26

## 2022-06-26 RX ADMIN — TAMSULOSIN HYDROCHLORIDE 0.4 MG: 0.4 CAPSULE ORAL at 19:22

## 2022-06-26 RX ADMIN — HYDRALAZINE HYDROCHLORIDE 10 MG: 10 TABLET ORAL at 19:22

## 2022-06-26 RX ADMIN — FENTANYL CITRATE 50 MCG: 50 INJECTION, SOLUTION INTRAMUSCULAR; INTRAVENOUS at 10:44

## 2022-06-26 RX ADMIN — ASPIRIN 81 MG: 81 TABLET, COATED ORAL at 09:36

## 2022-06-26 RX ADMIN — POTASSIUM CHLORIDE 10 MEQ: 7.46 INJECTION, SOLUTION INTRAVENOUS at 10:47

## 2022-06-26 RX ADMIN — POTASSIUM CHLORIDE 10 MEQ: 7.46 INJECTION, SOLUTION INTRAVENOUS at 13:08

## 2022-06-26 RX ADMIN — POTASSIUM CHLORIDE 10 MEQ: 7.46 INJECTION, SOLUTION INTRAVENOUS at 09:27

## 2022-06-26 RX ADMIN — POTASSIUM CHLORIDE 10 MEQ: 7.46 INJECTION, SOLUTION INTRAVENOUS at 11:57

## 2022-06-26 RX ADMIN — SODIUM CHLORIDE: 9 INJECTION, SOLUTION INTRAVENOUS at 07:41

## 2022-06-26 RX ADMIN — MAGNESIUM CITRATE 296 ML: 1.75 LIQUID ORAL at 03:21

## 2022-06-26 RX ADMIN — HYDRALAZINE HYDROCHLORIDE 10 MG: 10 TABLET ORAL at 07:42

## 2022-06-26 RX ADMIN — SIMVASTATIN 40 MG: 40 TABLET, FILM COATED ORAL at 19:25

## 2022-06-26 RX ADMIN — CHOLESTYRAMINE 4 G: 4 POWDER, FOR SUSPENSION ORAL at 13:33

## 2022-06-26 RX ADMIN — MIDAZOLAM 2 MG: 1 INJECTION INTRAMUSCULAR; INTRAVENOUS at 10:45

## 2022-06-26 ASSESSMENT — ACTIVITIES OF DAILY LIVING (ADL)
ADLS_ACUITY_SCORE: 35

## 2022-06-26 NOTE — PROGRESS NOTES
[facility]    Hospitalist Progress Note    Date of Admission:  6/23/2022    Assessment & Plan     Kamari Valentin is a 82 year old male with a history of severe aortic stenosis, stroke, HTN, HLD, paroxysmal atrial fibrillation on Xarelto who presents with 3 weeks of diarrhea and 1 day of nausea and vomiting.     Persistent diarrhea  Enterocolitis  Nausea and vomiting  Diarrhea x3 weeks, not improving.  CT abdomen pelvis on 6/22 noted enterocolitis.  C. difficile reportedly negative a week ago.  No improvement on Imodium.  Started on ciprofloxacin on 6/22 with concern for cystitis but UA with no evidence of infection.  Developed nausea and vomiting after starting on antibiotic.  In ED noted to be in CELESTINO, stable vitals, mild leukocytosis.  Hypokalemic.  -Admit as inpatient  -Continue IV NS at 75 cc/h  -C. difficile testing and enteric panel negative   -Consult to GI given persistent diarrhea x3 weeks  -Patient underwent colonoscopy on 6/26 that was mostly unremarkable without any significant colitis.  Diverticulosis noted, 1 polyp removed.  Biopsies sent.  GI recommends high-fiber diet, starting on Questran at 1 packet p.o. daily.  -Hold PTA Xarelto given polypectomy    ?  Blastocystis hominis in stool  -6/25: Called by ADRIEL Land [outside clinic] that stool sample patient had provided to them on 6/20 is showing few Blastocystis hominis in 1 of 3 samples.  Unclear of the significance of this.  Will consult ID regarding this.  They will fax over the results on Monday.  Final stool culture still pending.  -Still awaiting ID consult.  GI has started him on nitazoxanide on 6/26.  Monitor.     Hypokalemia  CELESTINO, prerenal versus ATN, resolved  Creatinine 1.93, BUN 36, GFR 34, K2.9 at admission.  Most likely prerenal versus ATN.  -Continue IV NS 75 cc/h, creat improving  -Hold PTA losartan  -Avoid nephrotoxins  -Monitor BMP  -Monitor I's and O's  -Replace potassium as indicated     Aortic  stenosis  Coronary artery disease  Hypertension  HLD  Chronic atrial fibrillation  Patient noted to have severe aortic stenosis, has declined invasive cardiac interventions.  -Noted  -Holding PTA losartan  -Continue PTA aspirin, hydralazine, simvastatin  -Hold PTA Xarelto given polypectomy     BPH  -Continue PTA Flomax     Prior history of stroke  Noted. Continue PTA statin, aspirin, Xarelto      Anemia, possibly due to ivf dilution  Hb stable around 11. Monitor. No evidence of bleeding.    DVT Prophylaxis: DOAC  Code Status: DNR / DNI   pending clinical progress and resolution of symptoms             Michelle Romano MD  Text Page (7am - 6pm, M-F)  [facility]  Securely message with the Vocera Web Console (learn more here)  Text page via [employer] Paging/Directory      Interval History   Stable overnight.  Was awaiting colonoscopy this morning when seen.  Wife at bedside.  Answered several questions.  No significant abdominal pain, just continued loose stools.  Completed prep overnight.  No fevers.    -Data reviewed today: I reviewed all new labs and imaging results over the last 24 hours. I personally reviewed CT scan with result as noted above    Physical Exam   Temp: 98.3  F (36.8  C) Temp src: Oral BP: (!) 147/92 Pulse: 90   Resp: 22 SpO2: 95 % O2 Device: Nasal cannula Oxygen Delivery: 2 LPM  Vitals:    06/23/22 1743   Weight: 77.1 kg (170 lb)     Vital Signs with Ranges  Temp:  [97.3  F (36.3  C)-98.3  F (36.8  C)] 98.3  F (36.8  C)  Pulse:  [75-96] 90  Resp:  [10-35] 22  BP: (107-148)/(70-92) 147/92  SpO2:  [87 %-99 %] 95 %  I/O last 3 completed shifts:  In: 360 [P.O.:360]  Out: 400 [Urine:400]    Constitutional: Awake, alert, cooperative, no apparent distress.  Appears fatigued  Eyes: no icterus, EOMs intact  HEENT: Moist mucous membranes  Respiratory: Clear to auscultation bilaterally, no crackles or wheezing.  Cardiovascular: IR IR, loud systolic murmur noted, no pedal edema  GI: Soft,  non-distended, non-tender, normal bowel sounds.  Skin: No rashes, no cyanosis, no edema.  Musculoskeletal: No joint swelling, erythema or tenderness.  Neurologic: Alert, oriented and engages in appropriate conversation, no facial asymmetry, moving all extremities, fluent speech  Psychiatric: Calm and pleasant, no obvious anxiety or depression.        Medications     - MEDICATION INSTRUCTIONS -       sodium chloride 50 mL/hr at 06/26/22 0933       aspirin  81 mg Oral Daily     cholestyramine  1 packet Oral Daily     hydrALAZINE  10 mg Oral BID     nitazoxanide  500 mg Oral Q12H [facility] (08/20)     [Held by provider] rivaroxaban ANTICOAGULANT  15 mg Oral Daily with supper     simvastatin  40 mg Oral QPM     sodium chloride (PF)  3 mL Intracatheter Q8H     tamsulosin  0.4 mg Oral QPM       Data   Recent Labs   Lab 06/26/22  1306 06/26/22  0739 06/25/22  1557 06/25/22  1258 06/25/22  0721 06/24/22  1821 06/24/22  0657 06/23/22  1750 06/20/22  1249 06/20/22  1249 06/20/22  1144   WBC  --   --   --   --   --   --  8.0 11.2*  --   --  6.2   HGB  --   --   --   --   --   --  11.0* 11.5*  --   --  14.2   MCV  --   --   --   --   --   --  96 95  --   --  96   PLT  --   --   --   --   --   --  188 193  --   --  229   NA  --  141  --   --  142  --  138 136   < > 143  --    POTASSIUM  --  3.2* 3.7  --  3.3*   < > 2.9* 3.1*  2.9*   < > 3.1*  --    CHLORIDE  --  113*  --   --  113*  --  111* 106   < > 110*  --    CO2  --  22  --   --  23  --  20 24   < > 28  --    BUN  --  10  --   --  20  --  30 36*   < > 14  --    CR  --  0.84  --   --  1.01  --  1.55* 1.93*   < > 1.18  --    ANIONGAP  --  6  --   --  6  --  7 6   < > 5  --    CLAY  --  8.2*  --   --  8.3*  --  8.1* 8.3*   < > 8.9  --    * 163*  --  156* 118*  --  100* 116*   < > 111*  --    ALBUMIN  --   --   --   --   --   --   --  2.6*  --  3.4  --    PROTTOTAL  --   --   --   --   --   --   --  5.7*  --  6.3*  --    BILITOTAL  --   --   --   --   --   --   --  0.7  --   0.8  --    ALKPHOS  --   --   --   --   --   --   --  62  --  53  --    ALT  --   --   --   --   --   --   --  21  --  18  --    AST  --   --   --   --   --   --   --  29  --  29  --     < > = values in this interval not displayed.       Imaging  No results found for this or any previous visit (from the past 24 hour(s)).

## 2022-06-26 NOTE — PLAN OF CARE
Goal Outcome Evaluation:   Patient is alert and oriented X4. VSS on 2 LPM via NC with sat of 92%. Assist of 1 with gait belt and walker. On clear liquid diet, with good appetites.  Takes pills whole with thin liquids. IV NS infusing 75ml/hr.patient denies pain. NPO after midnight for colonoscopy tomorrow morningm. Potassium labs draw with value of 3.7 , no replacement needed. Scheduled next labs for tomorrow. NPO after midnight for colonoscopy tomorrow   morning and bowel prep start @1700. Magnesium in patient bin in med room. Patient had loose stool X5. Continent of bowel and bladder,ambulated to bathroom to void at times or uses urinal.  this shift.  Continue to monitor.

## 2022-06-26 NOTE — PROVIDER NOTIFICATION
MD Notification    Notified Person: MD    Notified Person Name: DakotaMichelle MD    Notification Date/Time:0850 5/26/22    Notification Interaction: vocera message    Purpose of Notification: 3.2  K+    Orders Received: yes replacement of K+, and electrolytes protocol

## 2022-06-26 NOTE — PROGRESS NOTES
VSS on 2L O2, unable to wean. Up w Ax1, GB, walker, baseline L sided weakness from previous stroke. NPO for colonoscopy. Bowel prep given. ID consult for blastocystis hominis in stool sample. Around 0530 pt becoming restless, frequently taking on and off covers, stating he needed to go to the bathroom (urinate) every 5-10 minutes, with no relief. Bladder scan showing 91 mL. Vitals stable at that time, not hypoxic, afebrile, monitor for delirium. Discharge pending colonoscopy.

## 2022-06-26 NOTE — PLAN OF CARE
Goal Outcome Evaluation:    Patient is A&Ox4, Night RN mentioned of Pt been disoriented and confused. VSS. Colonoscopy done on this shift and tolerated well. Denied pain. SOB with activity. X1 loose stool before the procedure. Assit of x1 GB/W, baseline L side weakness from previous stroke. Urinal at bedside, pt have frequent urination. ALINIA med order but not verified yet by pharmacy. Regular diet and tolerated. No plan or discharge. Will continue to monitor.

## 2022-06-27 ENCOUNTER — APPOINTMENT (OUTPATIENT)
Dept: GENERAL RADIOLOGY | Facility: CLINIC | Age: 82
DRG: 391 | End: 2022-06-27
Payer: COMMERCIAL

## 2022-06-27 LAB
ALBUMIN UR-MCNC: 30 MG/DL
ANION GAP SERPL CALCULATED.3IONS-SCNC: 5 MMOL/L (ref 3–14)
APPEARANCE UR: CLEAR
BACTERIA #/AREA URNS HPF: ABNORMAL /HPF
BASE EXCESS BLDA CALC-SCNC: 1.4 MMOL/L (ref -9–1.8)
BASE EXCESS BLDV CALC-SCNC: 0.5 MMOL/L (ref -7.7–1.9)
BASOPHILS # BLD AUTO: 0 10E3/UL (ref 0–0.2)
BASOPHILS NFR BLD AUTO: 0 %
BILIRUB UR QL STRIP: NEGATIVE
BUN SERPL-MCNC: 10 MG/DL (ref 7–30)
CALCIUM SERPL-MCNC: 8.5 MG/DL (ref 8.5–10.1)
CHLORIDE BLD-SCNC: 112 MMOL/L (ref 94–109)
CO2 SERPL-SCNC: 26 MMOL/L (ref 20–32)
COLOR UR AUTO: ABNORMAL
CREAT SERPL-MCNC: 0.76 MG/DL (ref 0.66–1.25)
EOSINOPHIL # BLD AUTO: 0.1 10E3/UL (ref 0–0.7)
EOSINOPHIL NFR BLD AUTO: 1 %
ERYTHROCYTE [DISTWIDTH] IN BLOOD BY AUTOMATED COUNT: 13.3 % (ref 10–15)
GFR SERPL CREATININE-BSD FRML MDRD: 89 ML/MIN/1.73M2
GLUCOSE BLD-MCNC: 138 MG/DL (ref 70–99)
GLUCOSE BLDC GLUCOMTR-MCNC: 124 MG/DL (ref 70–99)
GLUCOSE UR STRIP-MCNC: NEGATIVE MG/DL
HCO3 BLD-SCNC: 26 MMOL/L (ref 21–28)
HCO3 BLDV-SCNC: 28 MMOL/L (ref 21–28)
HCT VFR BLD AUTO: 34.2 % (ref 40–53)
HGB BLD-MCNC: 11.4 G/DL (ref 13.3–17.7)
HGB UR QL STRIP: ABNORMAL
HYALINE CASTS: 3 /LPF
IMM GRANULOCYTES # BLD: 0.1 10E3/UL
IMM GRANULOCYTES NFR BLD: 1 %
KETONES UR STRIP-MCNC: NEGATIVE MG/DL
LEUKOCYTE ESTERASE UR QL STRIP: NEGATIVE
LYMPHOCYTES # BLD AUTO: 1 10E3/UL (ref 0.8–5.3)
LYMPHOCYTES NFR BLD AUTO: 11 %
MAGNESIUM SERPL-MCNC: 2 MG/DL (ref 1.6–2.3)
MCH RBC QN AUTO: 32.4 PG (ref 26.5–33)
MCHC RBC AUTO-ENTMCNC: 33.3 G/DL (ref 31.5–36.5)
MCV RBC AUTO: 97 FL (ref 78–100)
MONOCYTES # BLD AUTO: 1 10E3/UL (ref 0–1.3)
MONOCYTES NFR BLD AUTO: 10 %
MUCOUS THREADS #/AREA URNS LPF: PRESENT /LPF
NEUTROPHILS # BLD AUTO: 7.4 10E3/UL (ref 1.6–8.3)
NEUTROPHILS NFR BLD AUTO: 77 %
NITRATE UR QL: NEGATIVE
NRBC # BLD AUTO: 0 10E3/UL
NRBC BLD AUTO-RTO: 0 /100
NT-PROBNP SERPL-MCNC: ABNORMAL PG/ML (ref 0–1800)
O2/TOTAL GAS SETTING VFR VENT: 2 %
O2/TOTAL GAS SETTING VFR VENT: 28 %
PCO2 BLD: 38 MM HG (ref 35–45)
PCO2 BLDV: 53 MM HG (ref 40–50)
PH BLD: 7.44 [PH] (ref 7.35–7.45)
PH BLDV: 7.33 [PH] (ref 7.32–7.43)
PH UR STRIP: 5.5 [PH] (ref 5–7)
PHOSPHATE SERPL-MCNC: 1.8 MG/DL (ref 2.5–4.5)
PLATELET # BLD AUTO: 198 10E3/UL (ref 150–450)
PO2 BLD: 81 MM HG (ref 80–105)
PO2 BLDV: 35 MM HG (ref 25–47)
POTASSIUM BLD-SCNC: 3.3 MMOL/L (ref 3.4–5.3)
POTASSIUM BLD-SCNC: 3.6 MMOL/L (ref 3.4–5.3)
PROCALCITONIN SERPL-MCNC: 1.05 NG/ML
RBC # BLD AUTO: 3.52 10E6/UL (ref 4.4–5.9)
RBC URINE: 1 /HPF
SODIUM SERPL-SCNC: 143 MMOL/L (ref 133–144)
SP GR UR STRIP: 1.01 (ref 1–1.03)
SQUAMOUS EPITHELIAL: <1 /HPF
TROPONIN I SERPL HS-MCNC: 141 NG/L
TROPONIN I SERPL HS-MCNC: 153 NG/L
UROBILINOGEN UR STRIP-MCNC: NORMAL MG/DL
WBC # BLD AUTO: 9.6 10E3/UL (ref 4–11)
WBC URINE: 6 /HPF

## 2022-06-27 PROCEDURE — 250N000011 HC RX IP 250 OP 636: Performed by: HOSPITALIST

## 2022-06-27 PROCEDURE — 84484 ASSAY OF TROPONIN QUANT: CPT

## 2022-06-27 PROCEDURE — 85025 COMPLETE CBC W/AUTO DIFF WBC: CPT

## 2022-06-27 PROCEDURE — 250N000013 HC RX MED GY IP 250 OP 250 PS 637: Performed by: HOSPITALIST

## 2022-06-27 PROCEDURE — 120N000001 HC R&B MED SURG/OB

## 2022-06-27 PROCEDURE — 93010 ELECTROCARDIOGRAM REPORT: CPT | Performed by: INTERNAL MEDICINE

## 2022-06-27 PROCEDURE — 250N000013 HC RX MED GY IP 250 OP 250 PS 637: Performed by: INTERNAL MEDICINE

## 2022-06-27 PROCEDURE — 93005 ELECTROCARDIOGRAM TRACING: CPT

## 2022-06-27 PROCEDURE — 99232 SBSQ HOSP IP/OBS MODERATE 35: CPT

## 2022-06-27 PROCEDURE — 71045 X-RAY EXAM CHEST 1 VIEW: CPT

## 2022-06-27 PROCEDURE — 83880 ASSAY OF NATRIURETIC PEPTIDE: CPT | Performed by: HOSPITALIST

## 2022-06-27 PROCEDURE — 99233 SBSQ HOSP IP/OBS HIGH 50: CPT | Performed by: HOSPITALIST

## 2022-06-27 PROCEDURE — 82803 BLOOD GASES ANY COMBINATION: CPT

## 2022-06-27 PROCEDURE — 82803 BLOOD GASES ANY COMBINATION: CPT | Performed by: INTERNAL MEDICINE

## 2022-06-27 PROCEDURE — 36415 COLL VENOUS BLD VENIPUNCTURE: CPT

## 2022-06-27 PROCEDURE — 81001 URINALYSIS AUTO W/SCOPE: CPT

## 2022-06-27 PROCEDURE — 84145 PROCALCITONIN (PCT): CPT

## 2022-06-27 PROCEDURE — 36600 WITHDRAWAL OF ARTERIAL BLOOD: CPT

## 2022-06-27 PROCEDURE — 83735 ASSAY OF MAGNESIUM: CPT

## 2022-06-27 PROCEDURE — 82435 ASSAY OF BLOOD CHLORIDE: CPT

## 2022-06-27 PROCEDURE — 84100 ASSAY OF PHOSPHORUS: CPT

## 2022-06-27 PROCEDURE — 84132 ASSAY OF SERUM POTASSIUM: CPT | Performed by: HOSPITALIST

## 2022-06-27 RX ORDER — LOSARTAN POTASSIUM 100 MG/1
100 TABLET ORAL DAILY
Status: DISCONTINUED | OUTPATIENT
Start: 2022-06-27 | End: 2022-06-30 | Stop reason: HOSPADM

## 2022-06-27 RX ORDER — POTASSIUM CHLORIDE 1500 MG/1
40 TABLET, EXTENDED RELEASE ORAL ONCE
Status: DISCONTINUED | OUTPATIENT
Start: 2022-06-27 | End: 2022-06-27

## 2022-06-27 RX ORDER — AMPICILLIN AND SULBACTAM 2; 1 G/1; G/1
3 INJECTION, POWDER, FOR SOLUTION INTRAMUSCULAR; INTRAVENOUS EVERY 6 HOURS
Status: DISCONTINUED | OUTPATIENT
Start: 2022-06-27 | End: 2022-06-27

## 2022-06-27 RX ORDER — FUROSEMIDE 10 MG/ML
40 INJECTION INTRAMUSCULAR; INTRAVENOUS ONCE
Status: COMPLETED | OUTPATIENT
Start: 2022-06-27 | End: 2022-06-27

## 2022-06-27 RX ORDER — POTASSIUM CHLORIDE 1500 MG/1
40 TABLET, EXTENDED RELEASE ORAL ONCE
Status: COMPLETED | OUTPATIENT
Start: 2022-06-27 | End: 2022-06-27

## 2022-06-27 RX ORDER — METRONIDAZOLE 500 MG/1
500 TABLET ORAL 3 TIMES DAILY
Status: DISCONTINUED | OUTPATIENT
Start: 2022-06-27 | End: 2022-06-30

## 2022-06-27 RX ADMIN — HYDRALAZINE HYDROCHLORIDE 10 MG: 10 TABLET ORAL at 07:52

## 2022-06-27 RX ADMIN — OLANZAPINE 5 MG: 5 TABLET, ORALLY DISINTEGRATING ORAL at 22:25

## 2022-06-27 RX ADMIN — POTASSIUM & SODIUM PHOSPHATES POWDER PACK 280-160-250 MG 2 PACKET: 280-160-250 PACK at 19:56

## 2022-06-27 RX ADMIN — METRONIDAZOLE 500 MG: 500 TABLET, FILM COATED ORAL at 16:55

## 2022-06-27 RX ADMIN — POTASSIUM & SODIUM PHOSPHATES POWDER PACK 280-160-250 MG 2 PACKET: 280-160-250 PACK at 23:55

## 2022-06-27 RX ADMIN — ASPIRIN 81 MG: 81 TABLET, COATED ORAL at 07:52

## 2022-06-27 RX ADMIN — HYDRALAZINE HYDROCHLORIDE 10 MG: 10 TABLET ORAL at 20:01

## 2022-06-27 RX ADMIN — OLANZAPINE 5 MG: 5 TABLET, ORALLY DISINTEGRATING ORAL at 00:23

## 2022-06-27 RX ADMIN — FUROSEMIDE 40 MG: 10 INJECTION, SOLUTION INTRAVENOUS at 14:52

## 2022-06-27 RX ADMIN — CHOLESTYRAMINE 4 G: 4 POWDER, FOR SUSPENSION ORAL at 07:52

## 2022-06-27 RX ADMIN — METRONIDAZOLE 500 MG: 500 TABLET, FILM COATED ORAL at 21:56

## 2022-06-27 RX ADMIN — LOSARTAN POTASSIUM 100 MG: 100 TABLET, FILM COATED ORAL at 13:16

## 2022-06-27 RX ADMIN — RIVAROXABAN 15 MG: 15 TABLET, FILM COATED ORAL at 16:55

## 2022-06-27 RX ADMIN — POTASSIUM & SODIUM PHOSPHATES POWDER PACK 280-160-250 MG 2 PACKET: 280-160-250 PACK at 16:04

## 2022-06-27 RX ADMIN — POTASSIUM CHLORIDE 40 MEQ: 1500 TABLET, EXTENDED RELEASE ORAL at 10:02

## 2022-06-27 RX ADMIN — TAMSULOSIN HYDROCHLORIDE 0.4 MG: 0.4 CAPSULE ORAL at 19:56

## 2022-06-27 RX ADMIN — SIMVASTATIN 40 MG: 40 TABLET, FILM COATED ORAL at 19:56

## 2022-06-27 ASSESSMENT — ACTIVITIES OF DAILY LIVING (ADL)
ADLS_ACUITY_SCORE: 35
ADLS_ACUITY_SCORE: 39
ADLS_ACUITY_SCORE: 35
ADLS_ACUITY_SCORE: 35
ADLS_ACUITY_SCORE: 39
ADLS_ACUITY_SCORE: 35
ADLS_ACUITY_SCORE: 35

## 2022-06-27 NOTE — PROVIDER NOTIFICATION
MD Notification    Notified Person: MD    Notified Person Name: Conrad Olmedo MD    Notification Date/Time: 0900 6/27/22    Notification Interaction: low K+    Purpose of Notification: Vocera message    Orders Received: Yes K+ placement

## 2022-06-27 NOTE — PROVIDER NOTIFICATION
MD Notification    Notified Person: MD    Notified Person Name: Conrad Olmedo MD    Notification Date/Time: 1206 6/27/22    Notification Interaction: Vocera message    Purpose of Notification: critical lab 153    Orders Received: Recheck of Troponin

## 2022-06-27 NOTE — SIGNIFICANT EVENT
Significant Event Note    Time of event: 2245 June 26, 2022    Description of event:  Post Fall Assessment Note    S: Patient had a(n): unwitnessed, unassisted fall.    B: Patient's orientation/mental status at time of fall:  awake, alert and hearing voices..   Medications administered within 8 hours of fall:    PCA/Opiates:  No    Hypnotics:  Yes    Psychotropics: Yes    Antihypertensives:  Yes    Sedatives:  No   Location of fall: Room 2408, between the bed indoor.  Patient stated that he was getting up to address the individuals in the ron that were making too much noise.  After speaking with nurses does not appear that there were any individuals in the hallway.    A: Type of injury from fall: No injury noted   Patient status: Stable, without complaint, AxOx3 yet adamant that there were individuals arguing in the hallway   Patient was lifted from fall event:  Staff members assisted   Interventions: No interventions needed at this time   MD notified: By nursing staff   Family member/next of kin notified:  No, will defer to nursing to notify family    R: Is recommended that patient have bed alarm on at all times, call light within reach, 3 bedrails as needed.    Plan:  With patient's current hospital presentation is recommended that frequent checks be made on patient.  If patient continues to have hallucinations and difficulty sleeping he may be in the early stages of delirium.  It is encouraged that the patient have a sleep/wake routine reestablished and reevaluated on a nightly basis.    Constitutional: vs /85, temp 98.7, HR 90, RR 17 O2 95%  General:  adult pt lying in bed without acute distress  Neuro: +follows commands, face symmetric, tongue midline, speech fluent  Head, ENT & mouth: NC/AT,  mouth moist oral mucosa  Neck: supple  CV S1S2  resp: CTAB upper and lower lobes  gi: soft, nontender, nondisteded  Ext: no edema  Skin: no rashes on exposed skin  Lymph: no supraclavicular  lymphadenopathy  Musculoskeletal no bony joint deformities    Discussed with: bedside nurse    Harris Mazariegos NP

## 2022-06-27 NOTE — PROGRESS NOTES
[facility]  Gastroenterology Progress Note     Kamari Valentin MRN# [id number]   YOB: 1940 Age: 82 year old          Assessment and Plan:       Enterocolitis    Hypokalemia    Acute kidney injury (H)    Nausea vomiting and diarrhea  Patient did very well overnight with prep.  No further bleeding or any other GI complaint.  Patient was evaluated with colonoscopy this morning findings are consistent with completely normal colon except for diverticulosis and a 10 mm polyp in the ascending colon.  Patient has been off Xarelto.  Multiple biopsies were taken.  Patient's stool studies which were done in the primary care physician's office were positive for Blastocystis.  I will recommend the patient to be empirically started on cholestyramine and also on nitazoxanide 500 mg twice a day for 3 days.  Findings were discussed in detail with patient and hospitalist team GI will continue to follow along.            Nausea vomiting and diarrhea  Enterocolitis  Hypokalemia  Acute kidney injury (H)      Interval History:     doing well; no cp, sob, n/v/d, or abd pain.              Review of Systems:     C: NEGATIVE for fever, chills, change in weight  E/M: NEGATIVE for ear, mouth and throat problems  R: NEGATIVE for significant cough or SOB  CV: NEGATIVE for chest pain, palpitations or peripheral edema             Medications:   I have reviewed this patient's current medications    aspirin  81 mg Oral Daily     cholestyramine  1 packet Oral Daily     hydrALAZINE  10 mg Oral BID     [Held by provider] nitazoxanide  500 mg Oral Q12H [facility] (08/20)     [Held by provider] rivaroxaban ANTICOAGULANT  15 mg Oral Daily with supper     simvastatin  40 mg Oral QPM     sodium chloride (PF)  3 mL Intracatheter Q8H     tamsulosin  0.4 mg Oral QPM                  Physical Exam:   Vitals were reviewed  Vital Signs with Ranges  Temp:  [97.9  F (36.6  C)-98.3  F (36.8  C)] 98.3  F (36.8  C)  Pulse:  [75-96]  90  Resp:  [10-35] 22  BP: (107-148)/(70-92) 139/80  SpO2:  [87 %-99 %] 95 %  I/O last 3 completed shifts:  In: 360 [P.O.:360]  Out: 400 [Urine:400]  Constitutional: healthy, alert and no distress   Cardiovascular: negative, PMI normal. No lifts, heaves, or thrills. RRR. No murmurs, clicks gallops or rub  Respiratory: negative, Percussion normal. Good diaphragmatic excursion. Lungs clear  Neck: Neck supple. No adenopathy. Thyroid symmetric, normal size,, Carotids without bruits.  Abdomen: Abdomen soft, non-tender. BS normal. No masses, organomegaly  SKIN: no suspicious lesions or rashes             Data:   I reviewed the patient's new clinical lab test results.   Recent Labs   Lab Test 06/24/22  0657 06/23/22  1750 06/20/22  1144 01/10/22  1811 10/26/20  0907 10/22/20  0810   WBC 8.0 11.2* 6.2 7.4   < > 6.0   HGB 11.0* 11.5* 14.2 13.7   < > 13.9   MCV 96 95 96 98   < > 97    193 229 199   < > 204   INR  --   --   --  1.27*  --  1.05    < > = values in this interval not displayed.     Recent Labs   Lab Test 06/26/22  1542 06/26/22  0739 06/25/22  1557 06/25/22  0721 06/24/22  1821 06/24/22  0657   POTASSIUM 3.9 3.2* 3.7 3.3*   < > 2.9*   CHLORIDE  --  113*  --  113*  --  111*   CO2  --  22  --  23  --  20   BUN  --  10  --  20  --  30   ANIONGAP  --  6  --  6  --  7    < > = values in this interval not displayed.     Recent Labs   Lab Test 06/23/22  2024 06/23/22  1750 06/20/22  1249 10/27/20  0844 10/22/20  1300 10/22/20  0810 09/27/18  0700   ALBUMIN  --  2.6* 3.4 3.9  --    < >  --    BILITOTAL  --  0.7 0.8 0.8  --    < >  --    ALT  --  21 18 23  --    < >  --    AST  --  29 29 19  --    < >  --    PROTEIN 30 *  --   --   --  Negative  --  Negative    < > = values in this interval not displayed.       I reviewed the patient's new imaging results.    All laboratory data reviewed  All imaging studies reviewed by me.    Dany Mccormick MD,  6/26/2022  [facility]  Office : [phone]  Cell:       [facility]  Ph: [phone] Fax: [phone]

## 2022-06-27 NOTE — PROGRESS NOTES
[facility]    Hospitalist Progress Note    Date of Admission:  6/23/2022    Assessment & Plan     Kamari Valentin is a 82 year old male with a history of severe aortic stenosis, stroke, HTN, HLD, paroxysmal atrial fibrillation on Xarelto who presents with 3 weeks of diarrhea and 1 day of nausea and vomiting.       Persistent diarrhea  Enterocolitis  Nausea and vomiting  Diarrhea x3 weeks, not improving.  CT abdomen pelvis on 6/22 noted enterocolitis.  C. difficile reportedly negative a week ago.  No improvement on Imodium.  Started on ciprofloxacin on 6/22 with concern for cystitis but UA with no evidence of infection.  Developed nausea and vomiting after starting on antibiotic.    * admission C.diff and enteric panel negative  * underwent colonoscopy on 6/26 that was mostly unremarkable without any significant colitis.  Diverticulosis noted, 1 polyp removed.  Biopsies sent.      -stop IVF as below  -GI recommends high-fiber diet, Questran at 1 packet p.o. daily (initiated 6/26); feels stools slightly improved today  -ok to resume Xarelto per GI     ?  Blastocystis hominis in stool  * 6/25: Called by ADRIEL Land [outside clinic] that stool sample patient had provided to them on 6/20 is showing few Blastocystis hominis in 1 of 3 samples.  Unclear of the significance of this.   -Final stool culture still pending; awaiting faxed results  -ID consult pending  - GI has started him on nitazoxanide on 6/26    Possible acute on chronic CHF due to severe aortic stenosis  Coronary artery disease  Hypertension  HLD  Chronic atrial fibrillation  Known hx of severe aortic stenosis as well as CAD, had been recommended for CABG and valve replacement, but declined this after experiencing recurrent stroke following angiogram in Oct 2020.  Re-iterated his desire for no treatment in Cards follow up Jan 2022.  * reported lightheadedness 6/27 with EKG showing new T-wave inversion of inferior leads; trop x2 flat  about 150 - denies any chest pain/pressure  * CXR 6/27 with bibasilar edema vs infiltrate - reporting mildly productive cough and mild dyspnea started evening of 6/25, but afebrile without leukocytosis, BNP elevated at 18K    -CELESTINO resolved, resume PTA losartan 6/27  -stop further IVF  -Continue PTA aspirin, hydralazine, simvastatin  -resume Xarelto  -is not interested in any cardiac intervention no matter the findings, will not pursue TTE  -lasix 40 mg IV x1  -with absence of fever and leukocytosis, will monitor off antibiotics - discussed with ID     Hypokalemia  CELESTINO, resolved  Creatinine 1.93, K 2.9 at admission.  Most likely prerenal.  -Cr stable at 0.8  -Replace potassium as indicated     BPH  -Continue PTA Flomax     Prior history of stroke  - Continue PTA statin, aspirin  - Xarelto as above      Anemia, possibly due to ivf dilution  Hb stable around 11. No evidence of bleeding.    Goals of care  Discussed with wife at bedside and daughter via phone 6/27.  They report he does not want any sort of aggressive medical treatment and state they just want him to be kept comfortable.  Discussed option of transitioning to comfort care and stopping all other interventions.  After lengthy discussion, family would like to continue with above interventions with intent that we re-visit goals of care daily, especially if not making progress.  They would want him to return home.      DVT Prophylaxis: DOAC  Code Status: DNR / DNI   pending clinical progress and resolution of symptoms       Time Spent on this Encounter   I spent 35 minutes on the unit/floor managing the care of Kamari Valentin. Over 50% of my time was spent on the following:   - Counseling the patient and/or family regarding: diagnostic results and risks and benefits of treatment options  - Coordination of care with the: care coordinator/ and nurse    See discussion with family as noted above.     MD Conrad Cleaning MD M  [facility]  Securely message with the Vocera Web Console (learn more here)  Text page via [employer] Paging/Directory      Interval History   He reports episode of lightheadedness last night, denies any today.  Denies any chest pain/pressure.  Does note onset dyspnea evening of 6/25.  Denies fever/chills.  Reports a cough productive of mildly thick sputum.  No other complaints. He does not want any heart testing as he reports he is not interested in any intervention.      -Data reviewed today: I reviewed all new labs and imaging results over the last 24 hours. I personally reviewed EKG with result as noted above    Physical Exam   Temp: 98  F (36.7  C) Temp src: Oral BP: (!) 155/96 Pulse: 86   Resp: 22 SpO2: 100 % O2 Device: Nasal cannula Oxygen Delivery: 2 LPM  Vitals:    06/23/22 1743   Weight: 77.1 kg (170 lb)     Vital Signs with Ranges  Temp:  [97.3  F (36.3  C)-98.7  F (37.1  C)] 98  F (36.7  C)  Pulse:  [] 86  Resp:  [17-38] 22  BP: (139-171)/() 155/96  SpO2:  [93 %-100 %] 100 %  I/O last 3 completed shifts:  In: 140 [P.O.:140]  Out: 700 [Urine:700]    Constitutional: Awake, alert, cooperative, no apparent distress.  Appears fatigued  Respiratory: mild lower lobe crackles, very slight expiratory wheeze, mild tachypnea  Cardiovascular: regular rhythm, loud systolic murmur noted, no pedal edema  GI: Soft, non-distended, non-tender, normal bowel sounds.  Musculoskeletal: extremities warm and well perfused  Neurologic: Alert, oriented x3, 4/5 LUE and LLE strength  Psychiatric: Calm and pleasant, no obvious anxiety or depression.        Medications     - MEDICATION INSTRUCTIONS -       sodium chloride 50 mL/hr at 06/26/22 0933       aspirin  81 mg Oral Daily     cholestyramine  1 packet Oral Daily     hydrALAZINE  10 mg Oral BID     [Held by provider] nitazoxanide  500 mg Oral Q12H [facility] (08/20)     [Held by provider] rivaroxaban ANTICOAGULANT  15 mg Oral Daily with supper      simvastatin  40 mg Oral QPM     sodium chloride (PF)  3 mL Intracatheter Q8H     tamsulosin  0.4 mg Oral QPM       Data   Recent Labs   Lab 06/27/22  0817 06/27/22  0728 06/26/22  1542 06/26/22  1306 06/26/22  0739 06/25/22  1258 06/25/22  0721 06/24/22  1821 06/24/22  0657 06/23/22  1750 06/20/22  1249   WBC 9.6  --   --   --   --   --   --   --  8.0 11.2*  --    HGB 11.4*  --   --   --   --   --   --   --  11.0* 11.5*  --    MCV 97  --   --   --   --   --   --   --  96 95  --      --   --   --   --   --   --   --  188 193  --      --   --   --  141  --  142  --  138 136 143   POTASSIUM 3.3*  --  3.9  --  3.2*   < > 3.3*   < > 2.9* 3.1*  2.9* 3.1*   CHLORIDE 112*  --   --   --  113*  --  113*  --  111* 106 110*   CO2 26  --   --   --  22  --  23  --  20 24 28   BUN 10  --   --   --  10  --  20  --  30 36* 14   CR 0.76  --   --   --  0.84  --  1.01  --  1.55* 1.93* 1.18   ANIONGAP 5  --   --   --  6  --  6  --  7 6 5   CLAY 8.5  --   --   --  8.2*  --  8.3*  --  8.1* 8.3* 8.9   * 124*  --  146* 163*   < > 118*  --  100* 116* 111*   ALBUMIN  --   --   --   --   --   --   --   --   --  2.6* 3.4   PROTTOTAL  --   --   --   --   --   --   --   --   --  5.7* 6.3*   BILITOTAL  --   --   --   --   --   --   --   --   --  0.7 0.8   ALKPHOS  --   --   --   --   --   --   --   --   --  62 53   ALT  --   --   --   --   --   --   --   --   --  21 18   AST  --   --   --   --   --   --   --   --   --  29 29    < > = values in this interval not displayed.       Imaging  No results found for this or any previous visit (from the past 24 hour(s)).

## 2022-06-27 NOTE — PROGRESS NOTES
Notified by pharmacist that nitazoxanide only approved for cryptosporidium, giardia. Will hold till patient seen by ID, awaiting their recs.

## 2022-06-27 NOTE — PROVIDER NOTIFICATION
MD Notification    Notified Person: MD    Notified Person Name: Dr. South    Notification Date/Time: 6/27/22 0651    Notification Interaction: Web based page    Purpose of Notification: Pt having increasing confusion, increasing L sided weakness, L sided facial droop (has at baseline). Concern for blood clot? Has been off of Xarelto for scope, hx afib. Please advise. Thanks Robbie *[id number]    Orders Received:    Comments:

## 2022-06-27 NOTE — CONSULTS
[facility]    Infectious Disease Consultation     Date of Admission:  6/23/2022  Date of Consult (When I saw the patient): 06/27/22    Assessment & Plan   Kamari Valentin is a 82 year old male who was admitted on 6/23/2022.     Impression:  1. 82 y.o with multiple co morbidities   2. Admitted with 3 weeks of diarrhea and then a day of nausea and vomiting   3. Outside clinic sent stool ova and parasite.   3. 1/3 stool with blastocystis hominis     Recommendations:   Blastocystis hominis is a probable commensal organism with unclear pathogenicity   Treatment usually not indicated   Non infectious causes for presentation are also being investigated by GI     If no other clear cause for patient`s diarrhea  treatment usually  is flagyl 500 mg TID for 5- 10 days no objection to start now for a trial.     Appears to be volume up, discussed with PENELOPE Arango MD    Reason for Consult   Reason for consult: I was asked to evaluate this patient for blastocystis hominis in the stool cultures.    Primary Care Physician   Annita Proctor    Chief Complaint   Nausea and vomiting     History is obtained from the patient and medical records    History of Present Illness   Kamari Valentin is a 82 year old male with a history of severe aortic stenosis, stroke, HTN, HLD, paroxysmal atrial fibrillation on Xarelto who presents with 3 weeks of diarrhea and 1 day of nausea and vomiting.      Past Medical History   I have reviewed this patient's medical history and updated it with pertinent information if needed.   Past Medical History:   Diagnosis Date     Aortic stenosis     mild-moderate     Atrial fibrillation (H)     paroxysmal     Carotid stenosis     mild on right, moderate on left     Chronic renal insufficiency      Essential hypertension, benign      Hyperlipidaemia      Hypertrophy of prostate without urinary obstruction and other lower urinary tract symptoms (LUTS)      Rheumatic fever       Unspecified cerebral artery occlusion with cerebral infarction        Past Surgical History   I have reviewed this patient's surgical history and updated it with pertinent information if needed.  Past Surgical History:   Procedure Laterality Date     COLONOSCOPY N/A 6/26/2022    Procedure: COLONOSCOPY, WITH POLYPECTOMY AND BIOPSY;  Surgeon: Dany Mccormick MD;  Location:  GI     COLONOSCOPY N/A 6/26/2022    Procedure: COLONOSCOPY, FLEXIBLE, WITH LESION REMOVAL USING SNARE;  Surgeon: Dany Mccormick MD;  Location:  GI     CV CORONARY ANGIOGRAM N/A 10/22/2020    Procedure: Coronary Angiogram;  Surgeon: Alexandru Doe MD;  Location:  HEART CARDIAC CATH LAB     CV RIGHT HEART CATH MEASUREMENTS RECORDED N/A 10/22/2020    Procedure: Right Heart Cath;  Surgeon: Alexandru Doe MD;  Location:  HEART CARDIAC CATH LAB     ENT SURGERY      SEPTOPLASTY     ENT SURGERY      TONSILLECTOMY     HERNIA REPAIR       HERNIORRHAPHY INGUINAL  2/15/2012    Procedure:HERNIORRHAPHY INGUINAL; LEFT INGUINAL HERNIA REPAIR WITH MESH; Surgeon:SHILO READ; Location:[facility]     ORTHOPEDIC SURGERY  left ankle 2/2013    ORIF LEFT WRIST FRACTURE       Prior to Admission Medications   Prior to Admission Medications   Prescriptions Last Dose Informant Patient Reported? Taking?   acetaminophen (TYLENOL) 325 MG tablet prn  No Yes   Sig: Take 2 tablets (650 mg) by mouth every 6 hours as needed for mild pain or fever (> 101 F)   aspirin (ASA) 81 MG EC tablet 6/23/2022  No Yes   Sig: Take 1 tablet (81 mg) by mouth daily   ciprofloxacin (CIPRO) 500 MG tablet 6/23/2022 at x1 - started 6/22/22  Yes Yes   Sig: Take 500 mg by mouth 2 times daily X 7 days   fluticasone (FLONASE) 50 MCG/ACT nasal spray prn  Yes Yes   Sig: Spray 1 spray into both nostrils 2 times daily as needed for rhinitis or allergies   hydrALAZINE (APRESOLINE) 10 MG tablet 6/23/2022 at x1  No Yes   Sig: Take 1 tablet (10 mg) by mouth 2 times daily    loperamide (IMODIUM A-D) 2 MG tablet 6/23/2022 at ~1200  No Yes   Sig: Take 1 tablet (2 mg) by mouth 4 times daily as needed for diarrhea   losartan (COZAAR) 100 MG tablet 6/23/2022  Yes Yes   Sig: Take 100 mg by mouth every morning   potassium chloride ER (K-TAB) 20 MEQ CR tablet 6/23/2022 at Started 6/20/22  No Yes   Sig: Take 1 tablet (20 mEq) by mouth daily for 7 days   rivaroxaban ANTICOAGULANT (XARELTO) 20 MG TABS tablet 6/22/2022 at pm  No Yes   Sig: Take 1 tablet (20 mg) by mouth daily (with dinner)   simvastatin (ZOCOR) 40 MG tablet 6/22/2022 at pm  No Yes   Sig: Take 1 tablet (40 mg) by mouth daily   Patient taking differently: Take 40 mg by mouth every evening   tamsulosin (FLOMAX) 0.4 MG capsule 6/22/2022 at pm  Yes Yes   Sig: Take 0.4 mg by mouth every evening   vitamin B-12 (CYANOCOBALAMIN) 1000 MCG tablet   Yes Yes   Sig: Take 3,000 mcg by mouth daily   vitamin D3 (CHOLECALCIFEROL) 50 mcg (2000 units) tablet   Yes Yes   Sig: Take 1 tablet by mouth daily      Facility-Administered Medications: None     Allergies   Allergies   Allergen Reactions     Lisinopril        Immunization History   Immunization History   Administered Date(s) Administered     COVID-19PF,Pfizer (12+ Yrs) 03/05/2021, 03/26/2021, 12/14/2021     COVID-19,PF,Pfizer 12+ Yrs (2022 and After) 04/27/2022       Social History   I have reviewed this patient's social history and updated it with pertinent information if needed. Kamari Valentin  reports that he quit smoking about 11 years ago. His smoking use included cigars and pipe. He quit after 20.00 years of use. He has never used smokeless tobacco. He reports that he does not drink alcohol and does not use drugs.    Family History   I have reviewed this patient's family history and updated it with pertinent information if needed.   Family History   Problem Relation Age of Onset     Respiratory Brother      Coronary Artery Disease Early Onset Mother      Abdominal Aortic Aneurysm  Father        Review of Systems   The 10 point Review of Systems is negative other than noted in the HPI or here.     Physical Exam   Temp: 98  F (36.7  C) Temp src: Oral BP: (!) 155/96 Pulse: 86   Resp: 22 SpO2: 100 % O2 Device: Nasal cannula Oxygen Delivery: 2 LPM  Vital Signs with Ranges  Temp:  [97.3  F (36.3  C)-98.7  F (37.1  C)] 98  F (36.7  C)  Pulse:  [] 86  Resp:  [17-38] 22  BP: (139-171)/() 155/96  SpO2:  [93 %-100 %] 100 %  170 lbs 0 oz  Body mass index is 26.63 kg/m .    GENERAL APPEARANCE:  awake  EYES: Eyes grossly normal to inspection  NECK: no adenopathy  RESP: lungs coarse   CV: regular rates and rhythm  LYMPHATICS: normal ant/post cervical and supraclavicular nodes  ABDOMEN: soft, nontender  MS: extremities normal  SKIN: no suspicious lesions or rashes        Data   Lab Results   Component Value Date    WBC 9.6 06/27/2022    HGB 11.4 (L) 06/27/2022    HCT 34.2 (L) 06/27/2022     06/27/2022     06/27/2022    POTASSIUM 3.6 06/27/2022    CHLORIDE 112 (H) 06/27/2022    CO2 26 06/27/2022    BUN 10 06/27/2022    CR 0.76 06/27/2022     (H) 06/27/2022    SED 6 09/21/2010    NTBNPI 18,288 (H) 06/27/2022    TROPI <0.015 10/27/2020    AST 29 06/23/2022    ALT 21 06/23/2022    ALKPHOS 62 06/23/2022    BILITOTAL 0.7 06/23/2022    INR 1.27 (H) 01/10/2022     No results for input(s): CULT in the last 168 hours.  No lab results found.    Invalid input(s):

## 2022-06-27 NOTE — PROGRESS NOTES
VSS on 2L O2, ex tachypneic - belly breathing. ABGs done and normal. Pt A&Ox4, but seems to becoming delirious, hallucinating and hearing people/things that aren't in the room with him. Overnight becoming anxious/restless. PRN zyprexa obtained and given. Pt fell asleep around 2am. With relaxation channel on, no blankets on, socks off, PRN zyprexa given. Up w Ax1, GB, walker. Baseline L sided weakness. NS @ 50 mL. No diarrhea this shift. Awaiting ID consult. Pt had a fall on evening shift, fell onto knees. No injuries sustained. Discharge pending medical improvement.

## 2022-06-27 NOTE — PROVIDER NOTIFICATION
MD Notification    Notified Person: MD    Notified Person Name: Harris Mazariegos    Notification Date/Time:06/26/22, 10:32 PM    Notification Interaction:Phone    Purpose of Notification: Possible fall. Pt, was found kneeling down on the floor.     Orders Received:    Comments:

## 2022-06-27 NOTE — PLAN OF CARE
Goal Outcome Evaluation:    Last night per previous RN report patient was delirium and disoriented, during day shift he was A&Ox4. RRT call on the previous shift for increasing confusion and weakness, L side facil droop patient Hx of stroke that contributed to baseline L side weakness. VSS, Reoperative. Elevated Troponin 153 rechecked 141, MD notified. Lungs sound  Diminished, Wheezing with expiration, Abdominal muscle use for breathing. Xray order for SOB and Wheezing awaiting result. Patient was asked if spouse csn be call for update, patient refused wanting call himself. Pending for discharge. Will contine to monitor.

## 2022-06-27 NOTE — PROGRESS NOTES
[facility]  Gastroenterology Progress Note     Kamari Valentin MRN# [id number]   YOB: 1940 Age: 82 year old          Assessment and Plan:   Kamari Valentin is a 82 year odl male with diarrhea and enterocolitis     Active Problems:  Enterocolitis  Nausea vomiting and diarrhea  Ct noted enterocolitis  Has 10-20 stools daily  Enteric pathogens and C difficile negative  Concern for ischemic vs microscopic colitis  6/26 Colonoscopy revealed 1 10 mm polyp, diverticulosis, normal appearing colonic mucosa. Random biopsies taken- awaiting results     -- ok to restart Xarelto  -- started on questran daily. Will increase to BID tomorrow if stools still loose.  -- regular diet  -- Await pathology results                  Interval History:   no new complaints, doing well and doing well; no cp, sob, n/v or abdominal pain. Diarrhea imprved with questria- still mildly lose              Review of Systems:   C: NEGATIVE for fever, chills, change in weight  E/M: NEGATIVE for ear, mouth and throat problems  R: NEGATIVE for significant cough or SOB  CV: NEGATIVE for chest pain, palpitations or peripheral edema             Medications:   I have reviewed this patient's current medications    aspirin  81 mg Oral Daily     cholestyramine  1 packet Oral Daily     hydrALAZINE  10 mg Oral BID     [Held by provider] nitazoxanide  500 mg Oral Q12H [facility] (08/20)     potassium chloride  40 mEq Oral Once     potassium chloride  40 mEq Oral Once     [Held by provider] rivaroxaban ANTICOAGULANT  15 mg Oral Daily with supper     simvastatin  40 mg Oral QPM     sodium chloride (PF)  3 mL Intracatheter Q8H     tamsulosin  0.4 mg Oral QPM                  Physical Exam:   Vitals were reviewed  Vital Signs with Ranges  Temp:  [97.3  F (36.3  C)-98.7  F (37.1  C)] 98  F (36.7  C)  Pulse:  [] 86  Resp:  [10-38] 22  BP: (107-171)/() 155/96  SpO2:  [93 %-100 %] 100 %  I/O last 3 completed shifts:  In: 140  [P.O.:140]  Out: 700 [Urine:700]  Constitutional: healthy, alert and no distress   Cardiovascular: negative, PMI normal. No lifts, heaves, or thrills. RRR. No murmurs, clicks gallops or rub  Respiratory: negative, Percussion normal. Good diaphragmatic excursion. Lungs clear  Abdomen: Abdomen soft, non-tender. BS normal. No masses, organomegaly           Data:   I reviewed the patient's new clinical lab test results.   Recent Labs   Lab Test 06/27/22  0817 06/24/22  0657 06/23/22  1750 06/20/22  1144 01/10/22  1811 10/26/20  0907 10/22/20  0810   WBC 9.6 8.0 11.2*   < > 7.4   < > 6.0   HGB 11.4* 11.0* 11.5*   < > 13.7   < > 13.9   MCV 97 96 95   < > 98   < > 97    188 193   < > 199   < > 204   INR  --   --   --   --  1.27*  --  1.05    < > = values in this interval not displayed.     Recent Labs   Lab Test 06/27/22  0817 06/26/22  1542 06/26/22  0739 06/25/22  1557 06/25/22  0721   POTASSIUM 3.3* 3.9 3.2*   < > 3.3*   CHLORIDE 112*  --  113*  --  113*   CO2 26  --  22  --  23   BUN 10  --  10  --  20   ANIONGAP 5  --  6  --  6    < > = values in this interval not displayed.     Recent Labs   Lab Test 06/27/22  0914 06/23/22 2024 06/23/22  1750 06/20/22  1249 10/27/20  0844 10/22/20  1300   ALBUMIN  --   --  2.6* 3.4 3.9  --    BILITOTAL  --   --  0.7 0.8 0.8  --    ALT  --   --  21 18 23  --    AST  --   --  29 29 19  --    PROTEIN 30 * 30 *  --   --   --  Negative       I reviewed the patient's new imaging results.    All laboratory data reviewed  All imaging studies reviewed by me.    Susan Thomas PA-C,  6/27/2022  [facility]  Office : [phone]  Cell: [phone] (Dr. Mccormick)  Cell: [phone] (Susan Thomas PA-C)

## 2022-06-27 NOTE — PROVIDER NOTIFICATION
MD Notification    Notified Person: MD    Notified Person Name: Gini South    Notification Date/Time: 6/26/22 3584    Notification Interaction: Web based page    Purpose of Notification: Pt tachypneic 38-40, belly breathing. Lungs diminished but pt sounds wheezy upon arrival to room. Restless, attempting to get out of bed. Continues to be A&Ox4. Please advise    Orders Received: Stat ABG, PRN zyprexa, page with results    Comments:

## 2022-06-27 NOTE — CODE/RAPID RESPONSE
[facility]    RRT Note  6/27/2022   Time Called: 7:17 AM    Reason for RRT: concern for confusion and worsening left sided weakness    Code Status: No CPR- Do NOT Intubate    I was called to evaluate Kamari Valentin, who is a 82 year old male who was admitted on 6/23/2022 for 3 weeks of diarrhea and 1 day of nausea and vomiting. PMH includes severe aortic stenosis, stroke, HTN, HLD, and paroxysmal atrial fibrillation.    Assessment & Plan     Concern for AMS and worse left sided weakness  Upon arrival patient is alert and oriented x3. He is non-toxic appearing. Initial VS include:   Temp 98, HR 86, /83, RR 22, and SPO2 100% on 2L NC. On exam skin is warm and dry. Lungs are clear to auscultation. Patient did have some audible expiratory wheezing initially, however patient was repositioned and wheezing no longer appreciated. Audible murmur, no rub or gallop. No JVD. No peripheral edema.     He denies any headache, double vision, blurred vision, worsening left sided weakness, shortness of breath, chest pain, palpitations, fevers, chills, abdominal pain, nausea, vomiting, or diarrhea. Nurse reports patient has been experiencing urinary urgency and frequency, patient denies dysuria.     RN expressed concern that patient was requiring a assist of 2 with getting up, whereas yesterday he only required 1 person assist.  We help patient sit at the edge of the bed.  Patient was able to support his body without any balance issues.  He was was not favoring one side more than the other. He did endorse feeling lightheaded when sitting at the edge of the bed. BP at the time was 155/96.     Patient has hx of stroke with residual left sided weakness; Xarelto has been on hold since 6/24.     Differentials considered: ischemic stroke, SDH, intracranial hemorrhage, medication side effect from Zyprexa, hypercapnic respiratory failure, UTI, ACS       INTERVENTIONS:  - BG  - VBG  - CBC   - BMP, mg, phos  -  12-lead EKG  - UA/UC  - Consider Head CT w/o contrast if patient develops increased lethargy or new neuro deficits; patient denies hitting head during fall last night, however fall was unwitnessed.    Patient is hemodynamically stable, and no new neurological deficits appreciated. Patient will remain on current unit. Discussed with and defer further cares to Hospitalist Dr. Olmedo.     Addendum: Troponin add on came back at 153, repeat troponin ordered. When checking in on patient again, he developed some persistent wheezing. Lungs more course with rales and rhonchi, along with inspiratory and expiratory wheezing. Chest x-ray ordered; showing a new right lower lobe and left upper lobe infiltrate vs pulmonary edema. Radiology report is not back yet. Spoke with Dr. Olmedo who wants to discuss findings with patient prior to initiating antibiotics or adding diuretics as patient emphasized he was a DNR/DNI and expressed he was frustrated that we kept drawing blood. We discussed what DNR/DNI meant and the difference between restorative and comfort care. Patient expressed that he was ok with dying as he doesn't want to live a miserable life living in the hospital. Will continue restorative care at this time, with ongoing goals of care discussion. Consider echocardiogram if patient agreeable.       PAULINA Wen [facility]  Hospitalist Service - House [facility]  Pager: [phone] (7a - 6p)      Physical Exam   Vital Signs with Ranges:  Temp:  [97.3  F (36.3  C)-98.7  F (37.1  C)] 98  F (36.7  C)  Pulse:  [] 86  Resp:  [10-38] 22  BP: (107-171)/() 140/83  SpO2:  [93 %-100 %] 100 %  I/O last 3 completed shifts:  In: 140 [P.O.:140]  Out: 700 [Urine:700]        Physical Exam   General:  Appears stated age, no acute distress. A&O x 3.  Skin:  Warm, dry. No rashes or lesions on exposed skin.  HEENT:  Normocephalic, atraumatic; EOMs grossly intact.  Neck:  Supple.  Chest:  Breath sounds CTA and no increased work of  breathing on room air.  Cardiovascular:  RRR, no rub or murmur. No peripheral edema.  Abdomen:  Soft, non-tender, non-distended.  Musculoskeletal:  Moves all four extremities. No muscle atrophy.  Neurological:  Baseline left sided weakness and left facial droop.   Psychiatric:  Affect and mood congruent.    Data     EKG:  Interpreted by Regan Arango NP  Time reviewed: 10:44  Symptoms at time of EKG: None   Rhythm: atrial fibrillation - controlled  Rate: 83  Axis: Normal  Ectopy: none  Conduction: normal  ST Segments/ T Waves: T wave inversion V1, V2 and V3  Q Waves: nonspecific  Comparison to prior: T-wave inversion no longer evident in lateral leads, but present in anterior leads    Clinical Impression: non-specific EKG    IMAGING: (X-ray/CT/MRI)   No results found for this or any previous visit (from the past 24 hour(s)).    CBC with Diff:  Recent Labs   Lab Test 06/24/22  0657 06/20/22  1144 01/10/22  1811   WBC 8.0   < > 7.4   HGB 11.0*   < > 13.7   MCV 96   < > 98      < > 199   INR  --   --  1.27*    < > = values in this interval not displayed.      No results found for: RETICABSCT  No results found for: RETP    Comprehensive Metabolic Panel:  Recent Labs   Lab 06/27/22  0728 06/26/22  1542 06/26/22  1306 06/26/22  0739 06/24/22  0657 06/23/22  1750   NA  --   --   --  141   < > 136   POTASSIUM  --  3.9  --  3.2*   < > 3.1*  2.9*   CHLORIDE  --   --   --  113*   < > 106   CO2  --   --   --  22   < > 24   ANIONGAP  --   --   --  6   < > 6   *  --    < > 163*   < > 116*   BUN  --   --   --  10   < > 36*   CR  --   --   --  0.84   < > 1.93*   GFRESTIMATED  --   --   --  86   < > 34*   CLAY  --   --   --  8.2*   < > 8.3*   MAG  --   --   --  2.0   < > 1.8   PROTTOTAL  --   --   --   --   --  5.7*   ALBUMIN  --   --   --   --   --  2.6*   BILITOTAL  --   --   --   --   --  0.7   ALKPHOS  --   --   --   --   --  62   AST  --   --   --   --   --  29   ALT  --   --   --   --   --  21    < > = values  in this interval not displayed.         Time Spent on this Encounter   I spent 30 minutes on the unit/floor managing the care of Kamari Valentin. Over 50% of my time was spent counseling the patient and/or coordinating care regarding services listed in this note.      Total Visit Time: 30    Total Face-to-Face Prolonged Service Time: 20    Content of the Prolonged Time: Rapid response evaluation for neuro changes

## 2022-06-27 NOTE — PLAN OF CARE
"oal Outcome Evaluation:  Alert and oriented X4, VSS on 2L, bed alarm off and pt found kneeling down on floor, assisted back to bed.  Pt stated \"people are talking, that why I get up from sleep\".  Shay WINTER notified and pt went back to bed.  VS T 98.7, R 17, , /85.  "

## 2022-06-28 LAB
ANION GAP SERPL CALCULATED.3IONS-SCNC: 3 MMOL/L (ref 3–14)
BUN SERPL-MCNC: 9 MG/DL (ref 7–30)
CALCIUM SERPL-MCNC: 8.8 MG/DL (ref 8.5–10.1)
CHLORIDE BLD-SCNC: 108 MMOL/L (ref 94–109)
CO2 SERPL-SCNC: 33 MMOL/L (ref 20–32)
CREAT SERPL-MCNC: 0.78 MG/DL (ref 0.66–1.25)
ERYTHROCYTE [DISTWIDTH] IN BLOOD BY AUTOMATED COUNT: 13.2 % (ref 10–15)
GFR SERPL CREATININE-BSD FRML MDRD: 88 ML/MIN/1.73M2
GLUCOSE BLD-MCNC: 128 MG/DL (ref 70–99)
HCT VFR BLD AUTO: 32.2 % (ref 40–53)
HGB BLD-MCNC: 11.2 G/DL (ref 13.3–17.7)
MCH RBC QN AUTO: 32.6 PG (ref 26.5–33)
MCHC RBC AUTO-ENTMCNC: 34.8 G/DL (ref 31.5–36.5)
MCV RBC AUTO: 94 FL (ref 78–100)
PATH REPORT.COMMENTS IMP SPEC: NORMAL
PATH REPORT.COMMENTS IMP SPEC: NORMAL
PATH REPORT.FINAL DX SPEC: NORMAL
PATH REPORT.GROSS SPEC: NORMAL
PATH REPORT.MICROSCOPIC SPEC OTHER STN: NORMAL
PATH REPORT.RELEVANT HX SPEC: NORMAL
PHOSPHATE SERPL-MCNC: 2.2 MG/DL (ref 2.5–4.5)
PHOSPHATE SERPL-MCNC: 2.6 MG/DL (ref 2.5–4.5)
PHOTO IMAGE: NORMAL
PLATELET # BLD AUTO: 233 10E3/UL (ref 150–450)
POTASSIUM BLD-SCNC: 3.5 MMOL/L (ref 3.4–5.3)
RBC # BLD AUTO: 3.44 10E6/UL (ref 4.4–5.9)
SODIUM SERPL-SCNC: 144 MMOL/L (ref 133–144)
WBC # BLD AUTO: 9.4 10E3/UL (ref 4–11)

## 2022-06-28 PROCEDURE — 250N000013 HC RX MED GY IP 250 OP 250 PS 637: Performed by: INTERNAL MEDICINE

## 2022-06-28 PROCEDURE — 99232 SBSQ HOSP IP/OBS MODERATE 35: CPT | Performed by: HOSPITALIST

## 2022-06-28 PROCEDURE — 80048 BASIC METABOLIC PNL TOTAL CA: CPT | Performed by: HOSPITALIST

## 2022-06-28 PROCEDURE — 250N000011 HC RX IP 250 OP 636: Performed by: HOSPITALIST

## 2022-06-28 PROCEDURE — 84100 ASSAY OF PHOSPHORUS: CPT | Performed by: HOSPITALIST

## 2022-06-28 PROCEDURE — 250N000013 HC RX MED GY IP 250 OP 250 PS 637: Performed by: HOSPITALIST

## 2022-06-28 PROCEDURE — 36415 COLL VENOUS BLD VENIPUNCTURE: CPT | Performed by: HOSPITALIST

## 2022-06-28 PROCEDURE — 120N000001 HC R&B MED SURG/OB

## 2022-06-28 PROCEDURE — 85014 HEMATOCRIT: CPT | Performed by: HOSPITALIST

## 2022-06-28 RX ORDER — FUROSEMIDE 10 MG/ML
40 INJECTION INTRAMUSCULAR; INTRAVENOUS ONCE
Status: COMPLETED | OUTPATIENT
Start: 2022-06-28 | End: 2022-06-28

## 2022-06-28 RX ORDER — CHOLESTYRAMINE 4 G/9G
2 POWDER, FOR SUSPENSION ORAL DAILY
Status: DISCONTINUED | OUTPATIENT
Start: 2022-06-29 | End: 2022-06-30 | Stop reason: HOSPADM

## 2022-06-28 RX ORDER — BUDESONIDE 9 MG/1
9 TABLET, FILM COATED, EXTENDED RELEASE ORAL DAILY
Status: DISCONTINUED | OUTPATIENT
Start: 2022-06-28 | End: 2022-06-30 | Stop reason: HOSPADM

## 2022-06-28 RX ADMIN — ASPIRIN 81 MG: 81 TABLET, COATED ORAL at 08:23

## 2022-06-28 RX ADMIN — RIVAROXABAN 20 MG: 10 TABLET, FILM COATED ORAL at 16:31

## 2022-06-28 RX ADMIN — TAMSULOSIN HYDROCHLORIDE 0.4 MG: 0.4 CAPSULE ORAL at 20:13

## 2022-06-28 RX ADMIN — METRONIDAZOLE 500 MG: 500 TABLET, FILM COATED ORAL at 21:11

## 2022-06-28 RX ADMIN — POTASSIUM & SODIUM PHOSPHATES POWDER PACK 280-160-250 MG 1 PACKET: 280-160-250 PACK at 02:40

## 2022-06-28 RX ADMIN — METRONIDAZOLE 500 MG: 500 TABLET, FILM COATED ORAL at 15:25

## 2022-06-28 RX ADMIN — POTASSIUM & SODIUM PHOSPHATES POWDER PACK 280-160-250 MG 1 PACKET: 280-160-250 PACK at 09:52

## 2022-06-28 RX ADMIN — POTASSIUM & SODIUM PHOSPHATES POWDER PACK 280-160-250 MG 1 PACKET: 280-160-250 PACK at 06:49

## 2022-06-28 RX ADMIN — LOSARTAN POTASSIUM 100 MG: 100 TABLET, FILM COATED ORAL at 08:23

## 2022-06-28 RX ADMIN — FUROSEMIDE 40 MG: 10 INJECTION, SOLUTION INTRAVENOUS at 12:04

## 2022-06-28 RX ADMIN — HYDRALAZINE HYDROCHLORIDE 10 MG: 10 TABLET ORAL at 20:15

## 2022-06-28 RX ADMIN — SIMVASTATIN 40 MG: 40 TABLET, FILM COATED ORAL at 20:13

## 2022-06-28 RX ADMIN — HYDRALAZINE HYDROCHLORIDE 10 MG: 10 TABLET ORAL at 08:23

## 2022-06-28 RX ADMIN — Medication 1 MG: at 02:10

## 2022-06-28 RX ADMIN — CHOLESTYRAMINE 4 G: 4 POWDER, FOR SUSPENSION ORAL at 08:22

## 2022-06-28 RX ADMIN — BUDESONIDE 9 MG: 9 TABLET, EXTENDED RELEASE ORAL at 15:25

## 2022-06-28 RX ADMIN — METRONIDAZOLE 500 MG: 500 TABLET, FILM COATED ORAL at 08:23

## 2022-06-28 ASSESSMENT — ACTIVITIES OF DAILY LIVING (ADL)
ADLS_ACUITY_SCORE: 39
ADLS_ACUITY_SCORE: 41
ADLS_ACUITY_SCORE: 39
ADLS_ACUITY_SCORE: 41
ADLS_ACUITY_SCORE: 39
ADLS_ACUITY_SCORE: 39
ADLS_ACUITY_SCORE: 41
ADLS_ACUITY_SCORE: 39

## 2022-06-28 NOTE — CONSULTS
Care Management Initial Consult    General Information  Assessment completed with: Patient, Spouse or significant other, Agatha  Type of CM/SW Visit: Initial Assessment    Primary Care Provider verified and updated as needed: Yes   Readmission within the last 30 days:        Reason for Consult: discharge planning  Advance Care Planning:            Communication Assessment  Patient's communication style: spoken language (English or Bilingual)    Hearing Difficulty or Deaf: no   Wear Glasses or Blind: yes (left at home)    Cognitive  Cognitive/Neuro/Behavioral: WDL  Level of Consciousness: alert  Arousal Level: opens eyes spontaneously  Orientation: oriented x 4  Mood/Behavior: cooperative, restless  Best Language: 0 - No aphasia  Speech: clear, slow    Living Environment:   People in home: spouse  Agatha  Current living Arrangements: house      Able to return to prior arrangements: yes       Family/Social Support:  Care provided by: self, spouse/significant other  Provides care for: no one  Marital Status:   Wife  Agatha       Description of Support System: Involved, Supportive         Current Resources:   Patient receiving home care services:       Community Resources:    Equipment currently used at home: walker, standard  Supplies currently used at home:      Employment/Financial:  Employment Status: retired        Financial Concerns:             Lifestyle & Psychosocial Needs:  Social Determinants of Health     Tobacco Use: Medium Risk     Smoking Tobacco Use: Former Smoker     Smokeless Tobacco Use: Never Used   Alcohol Use: Not on file   Financial Resource Strain: Not on file   Food Insecurity: Not on file   Transportation Needs: Not on file   Physical Activity: Not on file   Stress: Not on file   Social Connections: Not on file   Intimate Partner Violence: Not on file   Depression: Not on file   Housing Stability: Not on file       Functional Status:  Prior to admission patient needed assistance:               Mental Health Status:          Chemical Dependency Status:                Values/Beliefs:  Spiritual, Cultural Beliefs, Temple Practices, Values that affect care:                 Additional Information:  Patient admitted on 6/23/22 for entercolitis with discharge date tentative 6/29/22. Writer reviewed chart and patient due to see PT and OT. Call received from wife Agatha who is anticipating that patient will need TCU at discharge and she is unsure of the process to begin. Writer explained coverage of TCU and Agatha stated that she would like referrals sent to [facility] and [place]. She is open to a shared room. Discussed transportation and she stated that she will need a ride set up. Writer explained out of pocket cost and she is in agreement. Referrals sent via [facility].    NIKOS Ha

## 2022-06-28 NOTE — PLAN OF CARE
Goal Outcome Evaluation:        Patient is alert and oriented X4, baseline left sided weakness and facial droop.VSS on 2 LPM via NC with sat of 97%. Assist of 1 with gait belt and walker. On regular diet with good appetites.  Takes pills whole with thin liquids. PIV saline locked .patient denies pain, nausea and chest pain. Patient display mild anxiety , restless and also attempting  to removed  external cath, prn Zyprexa was given with effective result. On  Phosphorus replacement Protocol labs need to be Scheduled after last dose given @12am. Patient had a long arm sitter sitting between room and outside. Incontinent of bowel and bladder, external  catheter in place with minimum output, no BM noted. Patient states, diarrhea has improved, brief wet and changed.continue to monitor.

## 2022-06-28 NOTE — PROGRESS NOTES
Shift Summary [id number]    Admitting Diagnosis: Enterocolitis.   Hypokalemia .Acute kidney injury, Nausea vomiting and diarrhea.    Patient A&Ox4, confused at times. VSS, on 2L O2 sating 95%. PIV on the LA SL.  Denied  Pain, N/V. Patient  felt better than yesterday per his words, SOB with activity, still using abdominal muscle when breathing. No electrolytes protocol. External male cath in placed. Assist of 1-2 GB/W, OOB in the chair this shift. Plan is awaiting TCU placement. Will continue to monitor.

## 2022-06-28 NOTE — PLAN OF CARE
Goal Outcome Evaluation:      A&Ox4 although patient can be confused at times. VSS on 2L O2. Primofit on with good output. Baseline L sided weakness. Phosphorus and potassium replaced overnight. Follow up labs are ordered. A1 GB/W. Took Pills whole with water. Denies pain.

## 2022-06-28 NOTE — PROGRESS NOTES
[facility]  Gastroenterology Progress Note     Kamari Valentin MRN# [id number]   YOB: 1940 Age: 82 year old          Assessment and Plan:   Kamari Valentin is a 82 year odl male with diarrhea and enterocolitis     Active Problems:  Enterocolitis  Nausea vomiting and diarrhea  Ct noted enterocolitis  Outpatient stools revealed blastocystis hominis in stools in 1 of 3 samples- ID following- recommended flagyle  Stool frequency much improved  Enteric pathogens and C difficile negative  6/26 Colonoscopy revealed 1 10 mm polyp, diverticulosis, normal appearing colonic mucosa. Random biopsies taken- awaiting results  Had RRT called- 6/27Troponin, procalcitonin and BNP elevation with abnormal EKG. Patient may not want any intervention     -- ok to restart Xarelto  -- started on questran daily- increased to two packets daily  -- high fiber diet diet  -- Await pathology results  -- Ok with GI to discharge patient with plans for outpatient f/u                  Interval History:   no new complaints, doing well and doing well; no cp, sob, n/v or abdominal pain. Diarrhea improved with questran- still mildly lose              Review of Systems:   C: NEGATIVE for fever, chills, change in weight  E/M: NEGATIVE for ear, mouth and throat problems  R: NEGATIVE for significant cough or SOB  CV: NEGATIVE for chest pain, palpitations or peripheral edema             Medications:   I have reviewed this patient's current medications    aspirin  81 mg Oral Daily     cholestyramine  1 packet Oral Daily     hydrALAZINE  10 mg Oral BID     losartan  100 mg Oral Daily     metroNIDAZOLE  500 mg Oral TID     rivaroxaban ANTICOAGULANT  15 mg Oral Daily with supper     simvastatin  40 mg Oral QPM     sodium chloride (PF)  3 mL Intracatheter Q8H     tamsulosin  0.4 mg Oral QPM                  Physical Exam:   Vitals were reviewed  Vital Signs with Ranges  Temp:  [97.8  F (36.6  C)-99.6  F (37.6  C)] 97.8  F  (36.6  C)  Pulse:  [89-95] 89  Resp:  [16-18] 16  BP: (113-158)/(66-97) 124/97  SpO2:  [95 %-98 %] 96 %  I/O last 3 completed shifts:  In: 595 [P.O.:595]  Out: 2650 [Urine:2650]  Constitutional: healthy, alert and no distress   Cardiovascular: negative, PMI normal. No lifts, heaves, or thrills. RRR. No murmurs, clicks gallops or rub  Respiratory: negative, Percussion normal. Good diaphragmatic excursion. Lungs clear  Abdomen: Abdomen soft, non-tender. BS normal. No masses, organomegaly           Data:   I reviewed the patient's new clinical lab test results.   Recent Labs   Lab Test 06/28/22  0726 06/27/22  0817 06/24/22  0657 06/20/22  1144 01/10/22  1811 10/26/20  0907 10/22/20  0810   WBC 9.4 9.6 8.0   < > 7.4   < > 6.0   HGB 11.2* 11.4* 11.0*   < > 13.7   < > 13.9   MCV 94 97 96   < > 98   < > 97    198 188   < > 199   < > 204   INR  --   --   --   --  1.27*  --  1.05    < > = values in this interval not displayed.     Recent Labs   Lab Test 06/28/22  0726 06/27/22  1326 06/27/22  0817 06/26/22  1542 06/26/22  0739   POTASSIUM 3.5 3.6 3.3*   < > 3.2*   CHLORIDE 108  --  112*  --  113*   CO2 33*  --  26 --  22   BUN 9  --  10  --  10   ANIONGAP 3  --  5  --  6    < > = values in this interval not displayed.     Recent Labs   Lab Test 06/27/22  0914 06/23/22  2024 06/23/22  1750 06/20/22  1249 10/27/20  0844 10/22/20  1300   ALBUMIN  --   --  2.6* 3.4 3.9  --    BILITOTAL  --   --  0.7 0.8 0.8  --    ALT  --   --  21 18 23  --    AST  --   --  29 29 19  --    PROTEIN 30 * 30 *  --   --   --  Negative       I reviewed the patient's new imaging results.    All laboratory data reviewed  All imaging studies reviewed by me.    Susan Thomas PA-C,  6/27/2022  [facility]  Office : [phone]  Cell: [phone] (Dr. Mccormick)  Cell: [phone] (Susan Thomas PA-C)

## 2022-06-28 NOTE — PROGRESS NOTES
[facility]    Hospitalist Progress Note    Date of Admission:  6/23/2022    Assessment & Plan     Kamari Valentin is a 82 year old male with a history of severe aortic stenosis, stroke, HTN, HLD, paroxysmal atrial fibrillation on Xarelto who presents with 3 weeks of diarrhea and 1 day of nausea and vomiting.       Persistent diarrhea, improving  Enterocolitis, improving  Nausea and vomiting, resolved  Diarrhea x3 weeks, not improving.  CT abdomen pelvis on 6/22 noted enterocolitis.  C. difficile reportedly negative a week ago.  No improvement on Imodium.  Started on ciprofloxacin on 6/22 with concern for cystitis but UA with no evidence of infection.  Developed nausea and vomiting after starting on antibiotic.    * admission C.diff and enteric panel negative  * underwent colonoscopy on 6/26 that was mostly unremarkable without any significant colitis.  Diverticulosis noted, 1 polyp removed.  Biopsies sent.    * outpatient stool studies revealed Blastocystis hominis in 1 of 3 samples; unclear pathogenicity with treatment usually not indicated per ID    - GI recommends high-fiber diet, Questran at 1 packet p.o. daily (initiated 6/26)  - initiated on flagyl 6/27 per ID  - diarrhea much improved 6/28; continue to monitor    Acute hypoxic respiratory failure due to suspected acute on chronic CHF due to severe aortic stenosis and iatrogenic IVF  Coronary artery disease  Hypertension  HLD  Chronic atrial fibrillation  Known hx of severe aortic stenosis as well as CAD, had been recommended for CABG and valve replacement, but declined this after experiencing recurrent stroke following angiogram in Oct 2020.  Re-iterated his desire for no treatment in Cards follow up Jan 2022.  * reported lightheadedness 6/27 with EKG showing new T-wave inversion of inferior leads; trop x2 flat about 150; TTE not obtained as he is not interested in any cardiac intervention  * CXR 6/27 with bibasilar edema vs  infiltrate - reporting mildly productive cough and mild dyspnea started evening of 6/25, but afebrile without leukocytosis, BNP elevated at 18K  * IVF stopped and given lasix 6/27 with improvement in symptoms    -Continue PTA aspirin, hydralazine, simvastatin, losartan (resumed 6/27), Xarelto (resumed 6/27)  -net negative 2L in past 24 hours, will repeat lasix 40 mg IV today  -wean oxygen as able     Hypokalemia  CELESTINO, resolved  Creatinine 1.93, K 2.9 at admission.  Cr normalized with IVF.  -monitor BMP daily with diuresis     BPH  -Continue PTA Flomax     Prior history of stroke  - Continue PTA statin, aspirin, Xarelto      Anemia, possibly due to ivf dilution  Hb stable around 11. No evidence of bleeding.    Goals of care  See goals of care discussion in progress note 6/27.  Not interested in aggressive interventions but ok with current medical management.   - family today reports they would be interested in TCU if recommended    DVT Prophylaxis: DOAC  Code Status: DNR / DNI  Dispo:  Anticipate 1-2 days pending ability to wean from oxygen and stools remain improved; will consult PT/OT       Conrad Olmedo MD  [facility]  Securely message with the Vocera Web Console (learn more here)  Text page via [employer] Paging/Directory      Interval History   Diarrhea has improved today, denies any abdominal pain or fever.  Feels cough is looser and bringing up more sputum but reports less tightness in the chest today.  Is ok with continuing current treatments including repeating lasix.     Wife at bedside and reports confusion is much improved today.  States they would be interested in TCU if recommended.     -Data reviewed today: I reviewed all new labs and imaging results over the last 24 hours. I personally reviewed EKG with result as noted above    Physical Exam   Temp: 97.8  F (36.6  C) Temp src: Oral BP: (!) 124/97 Pulse: 89   Resp: 16 SpO2: 96 % O2 Device: Nasal cannula Oxygen Delivery: 2  LPM  Vitals:    06/23/22 1743   Weight: 77.1 kg (170 lb)     Vital Signs with Ranges  Temp:  [97.8  F (36.6  C)-99.6  F (37.6  C)] 97.8  F (36.6  C)  Pulse:  [89-95] 89  Resp:  [16-18] 16  BP: (113-158)/(66-97) 124/97  SpO2:  [95 %-98 %] 96 %  I/O last 3 completed shifts:  In: 595 [P.O.:595]  Out: 2650 [Urine:2650]    Constitutional: Awake, alert, cooperative, no apparent distress.   Respiratory: mildly diminished basilar lung sounds, mild tachypnea, no wheezing or crackles  Cardiovascular: regular rhythm, loud systolic murmur noted, trace pedal edema  GI: Soft, non-distended, non-tender, normal bowel sounds.  Musculoskeletal: extremities warm and well perfused  Neurologic: Alert and appropriate, mild lower facial droop present  Psychiatric: Calm and pleasant, no obvious anxiety or depression.        Medications     - MEDICATION INSTRUCTIONS -         aspirin  81 mg Oral Daily     [START ON 6/29/2022] cholestyramine  2 packet Oral Daily     furosemide  40 mg Intravenous Once     hydrALAZINE  10 mg Oral BID     losartan  100 mg Oral Daily     metroNIDAZOLE  500 mg Oral TID     rivaroxaban ANTICOAGULANT  15 mg Oral Daily with supper     simvastatin  40 mg Oral QPM     sodium chloride (PF)  3 mL Intracatheter Q8H     tamsulosin  0.4 mg Oral QPM       Data   Recent Labs   Lab 06/28/22  0726 06/27/22  1326 06/27/22  0817 06/27/22  0728 06/26/22  1306 06/26/22  0739 06/24/22  1821 06/24/22  0657 06/23/22  1750   WBC 9.4  --  9.6  --   --   --   --  8.0 11.2*   HGB 11.2*  --  11.4*  --   --   --   --  11.0* 11.5*   MCV 94  --  97  --   --   --   --  96 95     --  198  --   --   --   --  188 193     --  143  --   --  141   < > 138 136   POTASSIUM 3.5 3.6 3.3*  --    < > 3.2*   < > 2.9* 3.1*  2.9*   CHLORIDE 108  --  112*  --   --  113*   < > 111* 106   CO2 33*  --  26  --   --  22   < > 20 24   BUN 9  --  10  --   --  10   < > 30 36*   CR 0.78  --  0.76  --   --  0.84   < > 1.55* 1.93*   ANIONGAP 3  --  5  --    --  6   < > 7 6   CLAY 8.8  --  8.5  --   --  8.2*   < > 8.1* 8.3*   *  --  138* 124*   < > 163*   < > 100* 116*   ALBUMIN  --   --   --   --   --   --   --   --  2.6*   PROTTOTAL  --   --   --   --   --   --   --   --  5.7*   BILITOTAL  --   --   --   --   --   --   --   --  0.7   ALKPHOS  --   --   --   --   --   --   --   --  62   ALT  --   --   --   --   --   --   --   --  21   AST  --   --   --   --   --   --   --   --  29    < > = values in this interval not displayed.       Imaging  Recent Results (from the past 24 hour(s))   XR Chest Port 1 View    Narrative    XR PORTABLE CHEST ONE VIEW  6/27/2022 12:49 PM       INDICATION: New wheezing and rales. Pulmonary edema, pleural  effusions, pneumonia?  COMPARISON: 10/22/2020       Impression    IMPRESSION: Cardiomegaly. There are new interstitial infiltrates in  both lung bases, indeterminate for edema versus pneumonia. No pleural  effusion or pneumothorax.    GREGG JONES MD         SYSTEM ID:  [id number]

## 2022-06-29 ENCOUNTER — APPOINTMENT (OUTPATIENT)
Dept: OCCUPATIONAL THERAPY | Facility: CLINIC | Age: 82
DRG: 391 | End: 2022-06-29
Attending: HOSPITALIST
Payer: COMMERCIAL

## 2022-06-29 ENCOUNTER — APPOINTMENT (OUTPATIENT)
Dept: PHYSICAL THERAPY | Facility: CLINIC | Age: 82
DRG: 391 | End: 2022-06-29
Attending: HOSPITALIST
Payer: COMMERCIAL

## 2022-06-29 LAB
ANION GAP SERPL CALCULATED.3IONS-SCNC: 5 MMOL/L (ref 3–14)
ATRIAL RATE - MUSE: 340 BPM
BUN SERPL-MCNC: 10 MG/DL (ref 7–30)
CALCIUM SERPL-MCNC: 8.7 MG/DL (ref 8.5–10.1)
CHLORIDE BLD-SCNC: 102 MMOL/L (ref 94–109)
CO2 SERPL-SCNC: 35 MMOL/L (ref 20–32)
CREAT SERPL-MCNC: 0.72 MG/DL (ref 0.66–1.25)
DIASTOLIC BLOOD PRESSURE - MUSE: NORMAL MMHG
GFR SERPL CREATININE-BSD FRML MDRD: 90 ML/MIN/1.73M2
GLUCOSE BLD-MCNC: 127 MG/DL (ref 70–99)
INTERPRETATION ECG - MUSE: NORMAL
MAGNESIUM SERPL-MCNC: 1.5 MG/DL (ref 1.6–2.3)
MAGNESIUM SERPL-MCNC: 2.6 MG/DL (ref 1.6–2.3)
P AXIS - MUSE: NORMAL DEGREES
PHOSPHATE SERPL-MCNC: 3.1 MG/DL (ref 2.5–4.5)
POTASSIUM BLD-SCNC: 3.2 MMOL/L (ref 3.4–5.3)
POTASSIUM BLD-SCNC: 3.7 MMOL/L (ref 3.4–5.3)
PR INTERVAL - MUSE: NORMAL MS
QRS DURATION - MUSE: 96 MS
QT - MUSE: 412 MS
QTC - MUSE: 484 MS
R AXIS - MUSE: -12 DEGREES
SODIUM SERPL-SCNC: 142 MMOL/L (ref 133–144)
SYSTOLIC BLOOD PRESSURE - MUSE: NORMAL MMHG
T AXIS - MUSE: -24 DEGREES
VENTRICULAR RATE- MUSE: 83 BPM

## 2022-06-29 PROCEDURE — 250N000013 HC RX MED GY IP 250 OP 250 PS 637: Performed by: PHYSICIAN ASSISTANT

## 2022-06-29 PROCEDURE — 36415 COLL VENOUS BLD VENIPUNCTURE: CPT | Performed by: HOSPITALIST

## 2022-06-29 PROCEDURE — 97535 SELF CARE MNGMENT TRAINING: CPT | Mod: GO | Performed by: OCCUPATIONAL THERAPIST

## 2022-06-29 PROCEDURE — 250N000013 HC RX MED GY IP 250 OP 250 PS 637: Performed by: INTERNAL MEDICINE

## 2022-06-29 PROCEDURE — 97161 PT EVAL LOW COMPLEX 20 MIN: CPT | Mod: GP

## 2022-06-29 PROCEDURE — 84132 ASSAY OF SERUM POTASSIUM: CPT | Performed by: HOSPITALIST

## 2022-06-29 PROCEDURE — 97530 THERAPEUTIC ACTIVITIES: CPT | Mod: GP

## 2022-06-29 PROCEDURE — 80048 BASIC METABOLIC PNL TOTAL CA: CPT | Performed by: HOSPITALIST

## 2022-06-29 PROCEDURE — 97116 GAIT TRAINING THERAPY: CPT | Mod: GP

## 2022-06-29 PROCEDURE — 83735 ASSAY OF MAGNESIUM: CPT | Performed by: HOSPITALIST

## 2022-06-29 PROCEDURE — 250N000013 HC RX MED GY IP 250 OP 250 PS 637: Performed by: HOSPITALIST

## 2022-06-29 PROCEDURE — 84100 ASSAY OF PHOSPHORUS: CPT | Performed by: HOSPITALIST

## 2022-06-29 PROCEDURE — 97165 OT EVAL LOW COMPLEX 30 MIN: CPT | Mod: GO | Performed by: OCCUPATIONAL THERAPIST

## 2022-06-29 PROCEDURE — 120N000001 HC R&B MED SURG/OB

## 2022-06-29 PROCEDURE — 99233 SBSQ HOSP IP/OBS HIGH 50: CPT | Performed by: HOSPITALIST

## 2022-06-29 PROCEDURE — 250N000011 HC RX IP 250 OP 636: Performed by: HOSPITALIST

## 2022-06-29 RX ORDER — MAGNESIUM SULFATE HEPTAHYDRATE 40 MG/ML
4 INJECTION, SOLUTION INTRAVENOUS ONCE
Status: COMPLETED | OUTPATIENT
Start: 2022-06-29 | End: 2022-06-29

## 2022-06-29 RX ORDER — FUROSEMIDE 10 MG/ML
40 INJECTION INTRAMUSCULAR; INTRAVENOUS ONCE
Status: COMPLETED | OUTPATIENT
Start: 2022-06-29 | End: 2022-06-29

## 2022-06-29 RX ORDER — POTASSIUM CHLORIDE 1.5 G/1.58G
40 POWDER, FOR SOLUTION ORAL ONCE
Status: COMPLETED | OUTPATIENT
Start: 2022-06-29 | End: 2022-06-29

## 2022-06-29 RX ADMIN — POTASSIUM CHLORIDE 40 MEQ: 1.5 POWDER, FOR SOLUTION ORAL at 11:36

## 2022-06-29 RX ADMIN — BUDESONIDE 9 MG: 9 TABLET, EXTENDED RELEASE ORAL at 10:41

## 2022-06-29 RX ADMIN — FUROSEMIDE 40 MG: 10 INJECTION, SOLUTION INTRAVENOUS at 10:50

## 2022-06-29 RX ADMIN — MAGNESIUM SULFATE HEPTAHYDRATE 4 G: 40 INJECTION, SOLUTION INTRAVENOUS at 10:42

## 2022-06-29 RX ADMIN — METRONIDAZOLE 500 MG: 500 TABLET, FILM COATED ORAL at 19:53

## 2022-06-29 RX ADMIN — METRONIDAZOLE 500 MG: 500 TABLET, FILM COATED ORAL at 10:41

## 2022-06-29 RX ADMIN — SIMVASTATIN 40 MG: 40 TABLET, FILM COATED ORAL at 19:53

## 2022-06-29 RX ADMIN — METRONIDAZOLE 500 MG: 500 TABLET, FILM COATED ORAL at 17:52

## 2022-06-29 RX ADMIN — TAMSULOSIN HYDROCHLORIDE 0.4 MG: 0.4 CAPSULE ORAL at 19:53

## 2022-06-29 RX ADMIN — RIVAROXABAN 20 MG: 10 TABLET, FILM COATED ORAL at 17:52

## 2022-06-29 RX ADMIN — ASPIRIN 81 MG: 81 TABLET, COATED ORAL at 10:41

## 2022-06-29 RX ADMIN — CHOLESTYRAMINE 8 G: 4 POWDER, FOR SUSPENSION ORAL at 13:08

## 2022-06-29 ASSESSMENT — ACTIVITIES OF DAILY LIVING (ADL)
ADLS_ACUITY_SCORE: 39
ADLS_ACUITY_SCORE: 34
ADLS_ACUITY_SCORE: 39
ADLS_ACUITY_SCORE: 41
ADLS_ACUITY_SCORE: 34
ADLS_ACUITY_SCORE: 39
ADLS_ACUITY_SCORE: 34
ADLS_ACUITY_SCORE: 39
ADLS_ACUITY_SCORE: 34

## 2022-06-29 NOTE — PROGRESS NOTES
[facility]    Hospitalist Progress Note    Date of Admission:  6/23/2022    Assessment & Plan     Kamari Valentin is a 82 year old male with a history of severe aortic stenosis, stroke, HTN, HLD, paroxysmal atrial fibrillation on Xarelto who presents with 3 weeks of diarrhea and 1 day of nausea and vomiting.       Lymphocytic colitis  Nausea and vomiting, resolved  Diarrhea x3 weeks, not improving.  CT abdomen pelvis on 6/22 noted enterocolitis.  C. difficile reportedly negative a week ago.  No improvement on Imodium.  Started on ciprofloxacin on 6/22 with concern for cystitis but UA with no evidence of infection.  Developed nausea and vomiting after starting on antibiotic.    * admission C.diff and enteric panel negative  * underwent colonoscopy on 6/26 that was mostly unremarkable without any significant colitis.  Diverticulosis noted, 1 polyp removed.  Biopsy path returned showing lymphocytic colitis  * outpatient stool studies revealed Blastocystis hominis in 1 of 3 samples; unclear pathogenicity with treatment usually not indicated per ID    - initiated on budesonide 6/28, cotninue  - defer continued need for Questran to GI (increased to bid 6/28)  - initiated on flagyl 6/27, defer continued need to ID  - stools significantly improved over past 48 hours    Acute hypoxic respiratory failure due to suspected acute on chronic CHF due to severe aortic stenosis and iatrogenic IVF  Coronary artery disease  Hypertension  HLD  Chronic atrial fibrillation  Known hx of severe aortic stenosis as well as CAD, had been recommended for CABG and valve replacement, but declined this after experiencing recurrent stroke following angiogram in Oct 2020.  Re-iterated his desire for no treatment in Cards follow up Jan 2022.  * reported lightheadedness 6/27 with EKG showing new T-wave inversion of inferior leads; trop x2 flat about 150; TTE not obtained as he is not interested in any cardiac intervention  *  CXR 6/27 with bibasilar edema vs infiltrate - reporting mildly productive cough and mild dyspnea started evening of 6/25, but afebrile without leukocytosis, BNP elevated at 18K  * IVF stopped and initiated lasix 6/27 with improvement in symptoms    -Continue PTA aspirin, hydralazine, simvastatin, losartan (resumed 6/27), Xarelto (resumed 6/27)  -again net negative 2L in past 24 hours, ongoing pulmonary crackles and reporting congestion, repeat lasix 40 mg IV today  -weaned to room air overnight     Hypokalemia  Hypomagnesemia   CELESTINO, resolved  Creatinine 1.93, K 2.9 at admission.  Cr normalized with IVF.  -monitor BMP daily with diuresis, needs K and mag 6/29     BPH  -Continue PTA Flomax     Prior history of stroke  - Continue PTA statin, aspirin, Xarelto      Anemia, possibly due to ivf dilution  Hb stable around 11. No evidence of bleeding.    Goals of care  See goals of care discussion in progress note 6/27.  Not interested in aggressive interventions but ok with current medical management.   - family today reports they would be interested in TCU if recommended, PT/OT eval pending - SW aware    DVT Prophylaxis: DOAC  Code Status: DNR / DNI  Dispo:  TCU tomorrow if remains stable off oxygen and adequately diuresed       Conrad Olmedo MD  [facility]  Securely message with the Vocera Web Console (learn more here)  Text page via [employer] Paging/Directory      Interval History   Reports no diarrhea this morning.  Feeling better but still complains of feeling congested.  No lightheadedness, dyspnea or chest pain/pressure.  Minimal cough productive of thin sputum.  Denies fever/chills.     Wife updated at bedside.     -Data reviewed today: I reviewed all new labs and imaging results over the last 24 hours. I personally reviewed no imaging results    Physical Exam   Temp: 97.8  F (36.6  C) Temp src: Oral BP: 136/70 Pulse: 90   Resp: 18 SpO2: 91 % O2 Device: None (Room air) Oxygen Delivery: 2  LPM  Vitals:    06/23/22 1743   Weight: 77.1 kg (170 lb)     Vital Signs with Ranges  Temp:  [97.8  F (36.6  C)-98.9  F (37.2  C)] 97.8  F (36.6  C)  Pulse:  [72-99] 90  Resp:  [15-18] 18  BP: (114-150)/(70-91) 136/70  SpO2:  [91 %-96 %] 91 %  I/O last 3 completed shifts:  In: 795 [P.O.:795]  Out: 2800 [Urine:2800]    Constitutional: Awake, alert, cooperative, no apparent distress.   Respiratory: no tachypnea, mild bibasilar crackles and mildly diminished, no wheezing  Cardiovascular: regular rhythm, grade 2/6 systolic murmur, no edema  GI: Soft, non-distended, non-tender, normal bowel sounds.  Musculoskeletal: extremities warm and well perfused  Neurologic: Alert, oriented x3 but making confused statements  Psychiatric: Calm and pleasant, no obvious anxiety or depression.        Medications     - MEDICATION INSTRUCTIONS -         aspirin  81 mg Oral Daily     budesonide  9 mg Oral Daily     cholestyramine  2 packet Oral Daily     furosemide  40 mg Intravenous Once     hydrALAZINE  10 mg Oral BID     losartan  100 mg Oral Daily     metroNIDAZOLE  500 mg Oral TID     rivaroxaban ANTICOAGULANT  20 mg Oral Daily with supper     simvastatin  40 mg Oral QPM     sodium chloride (PF)  3 mL Intracatheter Q8H     tamsulosin  0.4 mg Oral QPM       Data   Recent Labs   Lab 06/29/22  0714 06/28/22  0726 06/27/22  1326 06/27/22  0817 06/24/22  1821 06/24/22  0657 06/23/22  1750   WBC  --  9.4  --  9.6  --  8.0 11.2*   HGB  --  11.2*  --  11.4*  --  11.0* 11.5*   MCV  --  94  --  97  --  96 95   PLT  --  233  --  198  --  188 193    144  --  143   < > 138 136   POTASSIUM 3.2* 3.5 3.6 3.3*   < > 2.9* 3.1*  2.9*   CHLORIDE 102 108  --  112*   < > 111* 106   CO2 35* 33*  --  26   < > 20 24   BUN 10 9  --  10   < > 30 36*   CR 0.72 0.78  --  0.76   < > 1.55* 1.93*   ANIONGAP 5 3  --  5   < > 7 6   CLAY 8.7 8.8  --  8.5   < > 8.1* 8.3*   * 128*  --  138*   < > 100* 116*   ALBUMIN  --   --   --   --   --   --  2.6*    PROTTOTAL  --   --   --   --   --   --  5.7*   BILITOTAL  --   --   --   --   --   --  0.7   ALKPHOS  --   --   --   --   --   --  62   ALT  --   --   --   --   --   --  21   AST  --   --   --   --   --   --  29    < > = values in this interval not displayed.       Imaging  No results found for this or any previous visit (from the past 24 hour(s)).

## 2022-06-29 NOTE — PLAN OF CARE
Goal Outcome Evaluation:    Denies CP, SOB. 92% on RA. 1 BM today. All pt needs met this time. Plan to discharge to TCU. BradfordO x4. Sitter free since 1500.

## 2022-06-29 NOTE — PROGRESS NOTES
Care Management Follow Up    Length of Stay (days): 6    Expected Discharge Date: 06/30/2022     Concerns to be Addressed:       Patient plan of care discussed at interdisciplinary rounds: Yes    Anticipated Discharge Disposition: Skilled Nursing Facility     Anticipated Discharge Services: Transportation Services  Anticipated Discharge DME: None    Patient/family educated on Medicare website which has current facility and service quality ratings:  (Wife had list prepared)  Education Provided on the Discharge Plan:    Patient/Family in Agreement with the Plan: yes    Referrals Placed by CM/SW: Post Acute Facilities  Private pay costs discussed: Not applicable    Additional Information:    [facility] is reviewing.  [facility] is reviewing and has a bed available Friday, 7/1.  [facility] has additional COVID restrictions currently (dressing/gown needed, eating in room but can have visitors).  Luis Alfredo to continue to follow.      JUANCARLOS PickardSW

## 2022-06-29 NOTE — PLAN OF CARE
Admitting Diagnosis: Enterocolitis   Hypokalemia   Acute kidney injury   Nausea vomiting and diarrhea     Fall on Sunday 6/26    Vitals WNL  Pain- Denied  A&Ox4  Voiding External cath during HS  Mobility- Ax1 GBW  Tele N/A  CMS Intact  Lung Sounds WNL on RA  GI X loose stools   Dressing N/A  Diet: Regular      Plan: Placement TCU

## 2022-06-29 NOTE — PROGRESS NOTES
Care Management Follow Up    Length of Stay (days): 6    Expected Discharge Date: 06/30/2022     Concerns to be Addressed:       Patient plan of care discussed at interdisciplinary rounds: Yes    Anticipated Discharge Disposition: Skilled Nursing Facility     Anticipated Discharge Services: Transportation Services  Anticipated Discharge DME: None    Patient/family educated on Medicare website which has current facility and service quality ratings:  (Wife had list prepared)  Education Provided on the Discharge Plan:    Patient/Family in Agreement with the Plan: yes    Referrals Placed by CM/SW: Post Acute Facilities  Private pay costs discussed: Not applicable    Additional Information:  [facility] They will place pt in a private room at no extra fee due to iso needs. They will need pt to know they have a Covid pt in the building. [facility] is still assessing.        INESSA Ingram, LGSW  [phone] Desk phone  [phone] Cell/text (Preferred)  [facility]

## 2022-06-29 NOTE — PROGRESS NOTES
06/29/22 1349   Quick Adds   Type of Visit Initial PT Evaluation   Living Environment   People in Home spouse   Current Living Arrangements house   Home Accessibility stairs to enter home;stairs within home   Number of Stairs, Main Entrance 4   Stair Railings, Main Entrance railing on left side (ascending)   Transportation Anticipated family or friend will provide   Living Environment Comments Wife present, confirmed that pt does not drive.   Self-Care   Usual Activity Tolerance fair   Current Activity Tolerance poor   Equipment Currently Used at Home walker, rolling;cane, straight   Fall history within last six months no   Activity/Exercise/Self-Care Comment Typically walks short indoor distances. Used to use his 4WW for longer distances, but hasn't recently w/ weakness.   General Information   Onset of Illness/Injury or Date of Surgery 06/23/22   Referring Physician Conrad Olmedo MD   Patient/Family Therapy Goals Statement (PT) to get stronger   Pertinent History of Current Problem (include personal factors and/or comorbidities that impact the POC) Per chart:    Persistent diarrhea, improving  Enterocolitis, improving  Nausea and vomiting, resolved. H/o L CVA   Existing Precautions/Restrictions fall   Weight-Bearing Status - LLE full weight-bearing   Weight-Bearing Status - RLE full weight-bearing   Cognition   Affect/Mental Status (Cognition) WFL   Posture    Posture Kyphosis   Strength (Manual Muscle Testing)   Strength Comments generalized weakness   Bed Mobility   Comment, (Bed Mobility) to/from chair   Transfers   Comment, (Transfers) min assist to FWW   Gait/Stairs (Locomotion)   [place] Level (Gait) minimum assist (75% patient effort)   Assistive Device (Gait) walker, front-wheeled   Distance in Feet (Required for LE Total Joints) 10ft   Comment, (Gait/Stairs) slow w/ FWW, L LE externally rotated and lags behind R.   Balance   Balance Comments needs FWW and min assist for static standing   Clinical  Impression   Criteria for Skilled Therapeutic Intervention Yes, treatment indicated   PT Diagnosis (PT) impaired functional mobility   Influenced by the following impairments impaired strength, balance, activity tolerance   Functional limitations due to impairments bed mob, transfers, ambulation, stairs   Clinical Presentation (PT Evaluation Complexity) Stable/Uncomplicated   Clinical Presentation Rationale clinical judgement   Clinical Decision Making (Complexity) low complexity   Planned Therapy Interventions (PT) balance training;bed mobility training;gait training;neuromuscular re-education;patient/family education;strengthening;stair training;transfer training   Anticipated Equipment Needs at Discharge (PT) walker, rolling   Risk & Benefits of therapy have been explained evaluation/treatment results reviewed;care plan/treatment goals reviewed;risks/benefits reviewed;current/potential barriers reviewed;participants voiced agreement with care plan;participants included;patient   PT Discharge Planning   PT Discharge Recommendation (DC Rec) Transitional Care Facility   PT Rationale for DC Rec Patient presents to PT with decreased functional mobility compared to baseline, needing min assist of 1 with FWW. Will benefit from TCU to address functional mobility prior to returning home.   PT Brief overview of current status min assist   Total Evaluation Time   Total Evaluation Time (Minutes) 10   Physical Therapy Goals   PT Frequency 5x/week   PT Predicted Duration/Target Date for Goal Attainment 07/06/22   PT Goals Bed Mobility;Transfers;Gait;Stairs   PT: Bed Mobility Modified independent;Supine to/from sit;Rolling   PT: Transfers Modified independent;Sit to/from stand;Bed to/from chair;Assistive device   PT: Gait Supervision/stand-by assist;Rolling walker;50 feet   PT: Stairs Minimal assist;4 stairs;Rail on left

## 2022-06-29 NOTE — PROGRESS NOTES
06/29/22 0900   Quick Adds   Type of Visit Initial Occupational Therapy Evaluation   Living Environment   People in Home spouse   Current Living Arrangements house   Home Accessibility stairs to enter home;stairs within home   Number of Stairs, Main Entrance   (4 L rail ascending)   Number of Stairs, Within Home, Primary   (flight to --does not need to access)   Transportation Anticipated car, drives self;family or friend will provide   Living Environment Comments Once in the home, able to stay on main floor;laundry basement;pt. has higher toilet w/ grab bar;tub/shower dioni. w/ grab bars;does have a tub bench--does no currently use, sponge-bathes   Self-Care   Usual Activity Tolerance fair   Current Activity Tolerance fair   Equipment Currently Used at Home walker, standard   Fall history within last six months no   Activity/Exercise/Self-Care Comment Pt. reports using a WW in the home, max. distance around 20 feet, does ambulate to bathroom usin WW, however, not able to get WW into bathroom due to limited spcace;also gets around on rolling captain chairs during the day;pt. has had 2 strokes, most recent approx. 2 years ago w/ residual L sided weakness. Pt. reports he is able to manage ADL's, spouse related he has been spong-bathing more due to fefar of falling;pt. reports he and spouse set up medication box together.   General Information   Onset of Illness/Injury or Date of Surgery 06/29/22   Referring Physician Dr. Olmedo   Additional Occupational Profile Info/Pertinent History of Current Problem OT:Kamari Valentin is a 82 year old male with a history of severe aortic stenosis, stroke, HTN, HLD, paroxysmal atrial fibrillation on Xarelto who presents with 3 weeks of diarrhea and 1 day of nausea and vomiting. Pt. Had an unwittnesed, unassisted fall 6/26.   Performance Patterns (Routines, Roles, Habits) pt. ambulates short household distances at baseline;spouse completes IADL's   Existing  Precautions/Restrictions fall   General Observations and Info Pt. lying in bed, spouse presetn, agreeable to OT.   Cognitive Status Examination   Orientation Status orientation to person, place and time   Affect/Mental Status (Cognitive) confused   Follows Commands follows one-step commands;75-90% accuracy   Safety Deficit insight into deficits/self-awareness;judgment;safety precautions awareness;problem-solving;awareness of need for assistance   Memory Deficit   (STM recall 2/3 words after delay)   Cognitive Status Comments cognition appears to be below baselinelwill monitor/will complete furhter coginitve screen   Visual Perception   Impact of Vision Impairment on Function (Vision) wears glasses;no vison problems noted/reported   Sensory   Sensory Comments no numbness/tingling reported   Pain Assessment   Patient Currently in Pain No   Posture   Posture protracted shoulders;forward head position   Range of Motion Comprehensive   Comment, General Range of Motion BUE WNL/WFL   Strength Comprehensive (MMT)   Comment, General Manual Muscle Testing (MMT) Assessment grossly 4/5 BUE strength;gen.weakness   Coordination   Upper Extremity Coordination Left UE impaired   Coordination Comments incoord. LUE--baseline per pt./spouse(past stroke)   Bed Mobility   Comment (Bed Mobility) overall CGA/vc's/handrail for supine-sit   Transfers   Transfer Comments sit-stand min. A   Balance   Balance Comments impaired   Lower Body Dressing Assessment/Training   [place] Level (Lower Body Dressing) minimum assist (75% patient effort)   Grooming Assessment/Training   [place] Level (Grooming) supervision  (SBA)   Clinical Impression   Criteria for Skilled Therapeutic Interventions Met (OT) Yes, treatment indicated   OT Diagnosis Decline in ADL performance   OT Problem List-Impairments impacting ADL problems related to;balance;cognition;mobility;strength;activity tolerance impaired;coordination   Assessment of Occupational  Performance 5 or more Performance Deficits   Identified Performance Deficits dressing, bathing, toileting, grooming/standing ADL's, mobility, transfers, IADL's   Planned Therapy Interventions (OT) ADL retraining;balance training;cognition;strengthening;transfer training;progressive activity/exercise   Clinical Decision Making Complexity (OT) low complexity   Anticipated Equipment Needs Upon Discharge (OT) dressing equipment   Risk & Benefits of therapy have been explained evaluation/treatment results reviewed;care plan/treatment goals reviewed;risks/benefits reviewed;current/potential barriers reviewed;participants voiced agreement with care plan;participants included;patient   OT Discharge Planning   OT Discharge Recommendation (DC Rec) Transitional Care Facility   OT Rationale for DC Rec OT:Pt. currently below baseline for I/ADL's/mobility, transfers w/ assist X 1-2 for safety, falls risk;pt. limited by weakness, decreased activity tolerance, imapired balance, impaired cognition;pt. would benefit from contiued skilled OT intervention to maximize safety.indep. prior to discharge home.   Total Evaluation Time (Minutes)   Total Evaluation Time (Minutes) 12   OT Goals   Therapy Frequency (OT) Daily   OT Predicted Duration/Target Date for Goal Attainment 07/06/22   OT Goals Hygiene/Grooming;Lower Body Dressing;Transfers;Toilet Transfer/Toileting   OT: Hygiene/Grooming supervision/stand-by assist;while standing  (incorp. standing)   OT: Lower Body Dressing Supervision/stand-by assist;using adaptive equipment;including set-up/clothing retrieval   OT: Transfer Supervision/stand-by assist;with assistive device   OT: Toilet Transfer/Toileting Supervision/stand-by assist;toilet transfer;cleaning and garment management;using adaptive equipment

## 2022-06-29 NOTE — PLAN OF CARE
Goal Outcome Evaluation:      Patient is alert and oriented X4, baseline left sided weakness and facial droop.VSS expt BP slightly elevated, on 2 LPM via NC with sat of 95%. Assist of 1 with gait belt and walker. On regular diet with good appetites.  Takes pills whole with thin liquids. PIV saline locked .patient denies pain, nausea and abdominal pain. Confusion is much improved.   Incontinent of bowel and bladder, external  catheter in place with minimum output, patient had loose  BM X1.  Diarrhea has improved.  Awaiting TCU placement.

## 2022-06-30 VITALS
SYSTOLIC BLOOD PRESSURE: 110 MMHG | WEIGHT: 170 LBS | TEMPERATURE: 97.4 F | DIASTOLIC BLOOD PRESSURE: 82 MMHG | RESPIRATION RATE: 20 BRPM | BODY MASS INDEX: 26.63 KG/M2 | OXYGEN SATURATION: 93 % | HEART RATE: 89 BPM

## 2022-06-30 LAB
ANION GAP SERPL CALCULATED.3IONS-SCNC: 3 MMOL/L (ref 3–14)
BUN SERPL-MCNC: 20 MG/DL (ref 7–30)
CALCIUM SERPL-MCNC: 8.5 MG/DL (ref 8.5–10.1)
CHLORIDE BLD-SCNC: 102 MMOL/L (ref 94–109)
CO2 SERPL-SCNC: 36 MMOL/L (ref 20–32)
CREAT SERPL-MCNC: 0.7 MG/DL (ref 0.66–1.25)
GFR SERPL CREATININE-BSD FRML MDRD: >90 ML/MIN/1.73M2
GLUCOSE BLD-MCNC: 117 MG/DL (ref 70–99)
MAGNESIUM SERPL-MCNC: 2.2 MG/DL (ref 1.6–2.3)
PHOSPHATE SERPL-MCNC: 3.4 MG/DL (ref 2.5–4.5)
POTASSIUM BLD-SCNC: 3.3 MMOL/L (ref 3.4–5.3)
POTASSIUM BLD-SCNC: 3.6 MMOL/L (ref 3.4–5.3)
SODIUM SERPL-SCNC: 141 MMOL/L (ref 133–144)

## 2022-06-30 PROCEDURE — 84100 ASSAY OF PHOSPHORUS: CPT | Performed by: HOSPITALIST

## 2022-06-30 PROCEDURE — 250N000013 HC RX MED GY IP 250 OP 250 PS 637: Performed by: HOSPITALIST

## 2022-06-30 PROCEDURE — 250N000013 HC RX MED GY IP 250 OP 250 PS 637: Performed by: PHYSICIAN ASSISTANT

## 2022-06-30 PROCEDURE — 84132 ASSAY OF SERUM POTASSIUM: CPT | Performed by: HOSPITALIST

## 2022-06-30 PROCEDURE — 83735 ASSAY OF MAGNESIUM: CPT | Performed by: HOSPITALIST

## 2022-06-30 PROCEDURE — 99239 HOSP IP/OBS DSCHRG MGMT >30: CPT | Performed by: HOSPITALIST

## 2022-06-30 PROCEDURE — 80048 BASIC METABOLIC PNL TOTAL CA: CPT | Performed by: HOSPITALIST

## 2022-06-30 PROCEDURE — 36415 COLL VENOUS BLD VENIPUNCTURE: CPT | Performed by: HOSPITALIST

## 2022-06-30 PROCEDURE — 250N000013 HC RX MED GY IP 250 OP 250 PS 637: Performed by: INTERNAL MEDICINE

## 2022-06-30 RX ORDER — BUDESONIDE 3 MG/1
CAPSULE, COATED PELLETS ORAL
DISCHARGE
Start: 2022-06-30 | End: 2024-01-01

## 2022-06-30 RX ORDER — POTASSIUM CHLORIDE 1500 MG/1
40 TABLET, EXTENDED RELEASE ORAL ONCE
Status: COMPLETED | OUTPATIENT
Start: 2022-06-30 | End: 2022-06-30

## 2022-06-30 RX ORDER — CHOLESTYRAMINE 4 G/9G
2 POWDER, FOR SUSPENSION ORAL DAILY
DISCHARGE
Start: 2022-07-01

## 2022-06-30 RX ADMIN — POTASSIUM CHLORIDE 40 MEQ: 1500 TABLET, EXTENDED RELEASE ORAL at 08:42

## 2022-06-30 RX ADMIN — ASPIRIN 81 MG: 81 TABLET, COATED ORAL at 08:42

## 2022-06-30 RX ADMIN — CHOLESTYRAMINE 8 G: 4 POWDER, FOR SUSPENSION ORAL at 08:42

## 2022-06-30 RX ADMIN — BUDESONIDE 9 MG: 9 TABLET, EXTENDED RELEASE ORAL at 08:41

## 2022-06-30 ASSESSMENT — ACTIVITIES OF DAILY LIVING (ADL)
ADLS_ACUITY_SCORE: 34
ADLS_ACUITY_SCORE: 35
ADLS_ACUITY_SCORE: 34
ADLS_ACUITY_SCORE: 34
ADLS_ACUITY_SCORE: 35
ADLS_ACUITY_SCORE: 34
ADLS_ACUITY_SCORE: 35
ADLS_ACUITY_SCORE: 34

## 2022-06-30 NOTE — PLAN OF CARE
Physical Therapy Discharge Summary    Reason for therapy discharge:    Discharged to transitional care facility.    Progress towards therapy goal(s). See goals on Care Plan in [facility] electronic health record for goal details.  Goals not met.  Barriers to achieving goals:   discharge from facility.    Therapy recommendation(s):    Continued therapy is recommended.  Rationale/Recommendations:  PT at TCU to progress independence with mobility.

## 2022-06-30 NOTE — PROGRESS NOTES
[facility]  Gastroenterology Progress Note     Kamari Valentin MRN# [id number]   YOB: 1940 Age: 82 year old          Assessment and Plan:   Kamari Valentin is a 82 year odl male with diarrhea and enterocolitis     Active Problems:  Enterocolitis  Nausea vomiting and diarrhea  Lymphocytic colitis  Ct noted enterocolitis  Outpatient stools revealed blastocystis hominis in stools in 1 of 3 samples- ID following- recommended flagyle  Stool frequency much improved  Enteric pathogens and C difficile negative  6/26 Colonoscopy revealed 1 10 mm polyp, diverticulosis, normal appearing colonic mucosa. Random biopsies taken- revealed lymphocytic colitis and tubular adenoma  Had RRT called- 6/27Troponin, procalcitonin and BNP elevation with abnormal EKG. Patient may not want any intervention     -- start budesonide 9 mg daily for 2 weeks and decrease to 6 mg for 2 weeks then 3 mg for 2-3 months after  -- high fiber diet diet  -- Questran daily and hold for constipation  -- Ok with GI to discharge patient with plans for outpatient f/u in 1-2 weeks                  Interval History:   no new complaints, doing well and doing well; no cp, sob, n/v or abdominal pain. Diarrhea improved with questran- still mildly lose              Review of Systems:   C: NEGATIVE for fever, chills, change in weight  E/M: NEGATIVE for ear, mouth and throat problems  R: NEGATIVE for significant cough or SOB  CV: NEGATIVE for chest pain, palpitations or peripheral edema             Medications:   I have reviewed this patient's current medications    aspirin  81 mg Oral Daily     budesonide  9 mg Oral Daily     cholestyramine  2 packet Oral Daily     [Held by provider] hydrALAZINE  10 mg Oral BID     [Held by provider] losartan  100 mg Oral Daily     rivaroxaban ANTICOAGULANT  20 mg Oral Daily with supper     simvastatin  40 mg Oral QPM     sodium chloride (PF)  3 mL Intracatheter Q8H     tamsulosin  0.4 mg Oral QPM                   Physical Exam:   Vitals were reviewed  Vital Signs with Ranges  Temp:  [97.4  F (36.3  C)-98.5  F (36.9  C)] 97.4  F (36.3  C)  Pulse:  [83-98] 89  Resp:  [16-20] 20  BP: (110-138)/(63-93) 110/82  SpO2:  [92 %-94 %] 93 %  I/O last 3 completed shifts:  In: 1080 [P.O.:1080]  Out: 1270 [Urine:1270]  Constitutional: healthy, alert and no distress   Cardiovascular: negative, PMI normal. No lifts, heaves, or thrills. RRR. No murmurs, clicks gallops or rub  Respiratory: negative, Percussion normal. Good diaphragmatic excursion. Lungs clear  Abdomen: Abdomen soft, non-tender. BS normal. No masses, organomegaly           Data:   I reviewed the patient's new clinical lab test results.   Recent Labs   Lab Test 06/28/22  0726 06/27/22  0817 06/24/22  0657 06/20/22  1144 01/10/22  1811 10/26/20  0907 10/22/20  0810   WBC 9.4 9.6 8.0   < > 7.4   < > 6.0   HGB 11.2* 11.4* 11.0*   < > 13.7   < > 13.9   MCV 94 97 96   < > 98   < > 97    198 188   < > 199   < > 204   INR  --   --   --   --  1.27*  --  1.05    < > = values in this interval not displayed.     Recent Labs   Lab Test 06/30/22  0629 06/29/22  1616 06/29/22  0714 06/28/22  0726   POTASSIUM 3.3* 3.7 3.2* 3.5   CHLORIDE 102  --  102 108   CO2 36*  --  35* 33*   BUN 20  --  10 9   ANIONGAP 3  --  5 3     Recent Labs   Lab Test 06/27/22  0914 06/23/22  2024 06/23/22  1750 06/20/22  1249 10/27/20  0844 10/22/20  1300   ALBUMIN  --   --  2.6* 3.4 3.9  --    BILITOTAL  --   --  0.7 0.8 0.8  --    ALT  --   --  21 18 23  --    AST  --   --  29 29 19  --    PROTEIN 30 * 30 *  --   --   --  Negative       I reviewed the patient's new imaging results.    All laboratory data reviewed  All imaging studies reviewed by me.    Susan Thomas PA-C,  6/27/2022  [facility]  Office : [phone]  Cell: [phone] (Dr. Mccormick)  Cell: [phone] (Susan Thomas PA-C)

## 2022-06-30 NOTE — DISCHARGE SUMMARY
[facility]  Hospitalist Discharge Summary      Date of Admission:  6/23/2022  Date of Discharge:  6/30/2022  Discharging Provider: Conrad Olmedo MD  Discharge Service: Hospitalist Service    Discharge Diagnoses   Lymphocytic colitis  Acute hypoxic respiratory failure  Acute on chronic CHF due to severe aortic stenosis  CAD  HTN  HLD  Chronic A-fib  Hypokalemia  Hypomagnesemia   CELESTINO  BPH  Hx CVA  Anemia     Follow-ups Needed After Discharge   Follow-up Appointments     Follow Up and recommended labs and tests      Follow up with Nursing home physician.  No follow up labs or test are   needed.  Follow up with [facility] clinic.  You will be contacted to schedule follow   up appointment.           Discharge Disposition   Discharged to nursing home  Condition at discharge: Stable    Hospital Course   Kamari Valentin is a 82 year old male with a history of severe aortic stenosis, stroke, HTN, HLD, paroxysmal atrial fibrillation on Xarelto who presents with 3 weeks of diarrhea and 1 day of nausea and vomiting.        Lymphocytic colitis, improving   Nausea and vomiting, resolved  * Diarrhea x3 weeks, not improving.  CT abdomen pelvis on 6/22 noted enterocolitis.  C. difficile reportedly negative a week ago.  No improvement on Imodium.  Started on ciprofloxacin on 6/22 with concern for cystitis but UA with no evidence of infection.  Developed nausea and vomiting after starting on antibiotic which resolved with discontinuation.    * admission C.diff and enteric panel negative  * underwent colonoscopy on 6/26 that was mostly unremarkable without any significant colitis.  Diverticulosis noted, 1 polyp removed.  Biopsy path returned showing lymphocytic colitis - he was initiated on budesonide with improvement  * outpatient stool studies revealed Blastocystis hominis in 1 of 3 samples; unclear pathogenicity with treatment usually not indicated per ID     - budesonide 9 mg daily for 2 weeks and decrease  to 6 mg for 2 weeks then 3 mg for 2-3 months after per GI  - Questran daily (hold for constipation)   - follow up with [facility] outpatient      Acute hypoxic respiratory failure due to suspected acute on chronic CHF due to severe aortic stenosis and iatrogenic IVF, resolved  Coronary artery disease  Hypertension  HLD  Chronic atrial fibrillation  Known hx of severe aortic stenosis as well as CAD, had been recommended for CABG and valve replacement, but declined this after experiencing recurrent stroke following angiogram in Oct 2020.  Re-iterated his desire for no treatment in Cards follow up Jan 2022.  * reported lightheadedness 6/27 with EKG showing new T-wave inversion of inferior leads; trop x2 flat about 150; TTE not obtained as he is not interested in any cardiac intervention  * New onset hypoxia during admission with CXR 6/27 with bibasilar edema vs infiltrate - BNP elevated at 18K, was afebrile without leukocytosis or productive cough to suggest PNA  * IVF stopped and initiated lasix 6/276/29 with improvement in symptoms and weaned to room air 6/28.      -Continue PTA aspirin, simvastatin, Xarelto   -holding losartan 100 mg daily and hydralazine 10 mg bid as pressures somewhat soft (110's) following diuresis; resume anti-hypertensives at TCU as indicated     Hypokalemia, resolved  Hypomagnesemia, resolved  CELESTINO, resolved  Creatinine 1.93, K 2.9 at admission; due to poor intake and GI losses.  Cr normalized with IVF.  Lytes normalized with supplementation.      BPH  Continue PTA Flomax     Prior history of stroke  Continue PTA statin, aspirin, Xarelto      Anemia, possibly due to dilution  Hb stable around 11. No evidence of bleeding. Additional work-up was not pursued this admission given patient/family goal of limited interventions.       Consultations This Hospital Stay   GASTROENTEROLOGY IP CONSULT  INFECTIOUS DISEASES IP CONSULT  PHYSICAL THERAPY ADULT IP CONSULT  OCCUPATIONAL THERAPY ADULT IP  CONSULT  CARE MANAGEMENT / SOCIAL WORK IP CONSULT  PHYSICAL THERAPY ADULT IP CONSULT  OCCUPATIONAL THERAPY ADULT IP CONSULT    Code Status   No CPR- Do NOT Intubate    Time Spent on this Encounter   I, Conrad Olmedo MD, personally saw the patient today and spent greater than 30 minutes discharging this patient.       Conrad Olmedo MD  [facility]  [address] [facility] [address]  Phone: [phone]  Fax: [phone]  ______________________________________________________________________    Physical Exam   Vital Signs: Temp: 97.4  F (36.3  C) Temp src: Oral BP: 110/82 Pulse: 89   Resp: 20 SpO2: 93 % O2 Device: None (Room air)    Weight: 170 lbs 0 oz  General Appearance: Well nourished elderly male in NAD  Respiratory: few bibasilar crackles, no wheezing or tachypnea   Cardiovascular: irregular rhythm, normal s1/s2 with grade 2/6 systolic murmur  GI: abdomen soft, nontender, normal bowel sounds, nondistended  Skin: no peripheral edema   Other: Alert and appropriate, cranial nerves grossly intact         Primary Care Physician   Annita Proctor    Discharge Orders      General info for SNF    Length of Stay Estimate: Short Term Care: Estimated # of Days <30  Condition at Discharge: Improving  Level of care:skilled   Rehabilitation Potential: Excellent  Admission H&P remains valid and up-to-date: Yes  Recent Chemotherapy: N/A  Use Nursing Home Standing Orders: Yes     Mantoux instructions    Give two-step Mantoux (PPD) Per Facility Policy Yes     Follow Up and recommended labs and tests    Follow up with Nursing home physician.  No follow up labs or test are needed.  Follow up with [facility] GI clinic.  You will be contacted to schedule follow up appointment.     Reason for your hospital stay    You were admitted for diarrhea which is due to lymphocytic colitis (a form of inflammation of the colon) which is improving with steroid treatment.  You also developed some pulmonary  edema (fluid on the lungs) due to receiving IV fluids for hydration which resolved with diuresis.     Daily weights    Call Provider for weight gain of more than 2 pounds per day or 5 pounds per week.     Activity - Up with nursing assistance     Physical Therapy Adult Consult    Evaluate and treat as clinically indicated.    Reason:  physical deconditioning     Occupational Therapy Adult Consult    Evaluate and treat as clinically indicated.    Reason:  physical deconditioning     Diet    Follow this diet upon discharge: Cardiac       Significant Results and Procedures   Most Recent 3 CBC's:Recent Labs   Lab Test 06/28/22  0726 06/27/22  0817 06/24/22  0657   WBC 9.4 9.6 8.0   HGB 11.2* 11.4* 11.0*   MCV 94 97 96    198 188     Most Recent 3 BMP's:Recent Labs   Lab Test 06/30/22  0629 06/29/22  1616 06/29/22  0714 06/28/22  0726     --  142 144   POTASSIUM 3.3* 3.7 3.2* 3.5   CHLORIDE 102  --  102 108   CO2 36*  --  35* 33*   BUN 20  --  10 9   CR 0.70  --  0.72 0.78   ANIONGAP 3  --  5 3   CLAY 8.5  --  8.7 8.8   *  --  127* 128*     Most Recent 3 BNP's:Recent Labs   Lab Test 06/27/22  1224   NTBNPI 18,288*   ,   Results for orders placed or performed during the hospital encounter of 06/23/22   XR Chest Port 1 View    Narrative    XR PORTABLE CHEST ONE VIEW  6/27/2022 12:49 PM       INDICATION: New wheezing and rales. Pulmonary edema, pleural  effusions, pneumonia?  COMPARISON: 10/22/2020       Impression    IMPRESSION: Cardiomegaly. There are new interstitial infiltrates in  both lung bases, indeterminate for edema versus pneumonia. No pleural  effusion or pneumothorax.    GREGG JONES MD         SYSTEM ID:  [id number]       Discharge Medications   Current Discharge Medication List      START taking these medications    Details   budesonide (ENTOCORT EC) 3 MG EC capsule Take 3 capsules (9 mg) by mouth every morning for 14 days, THEN 2 capsules (6 mg) every morning for 14 days, THEN 1 capsule  (3 mg) every morning for 30 days.    Associated Diagnoses: Lymphocytic colitis      cholestyramine (QUESTRAN) 4 g packet Take 2 packets (8 g) by mouth daily Hold for constipation    Associated Diagnoses: Diarrhea, unspecified type         CONTINUE these medications which have NOT CHANGED    Details   acetaminophen (TYLENOL) 325 MG tablet Take 2 tablets (650 mg) by mouth every 6 hours as needed for mild pain or fever (> 101 F)  Qty:      Associated Diagnoses: Cerebrovascular accident (CVA), unspecified mechanism (H)      aspirin (ASA) 81 MG EC tablet Take 1 tablet (81 mg) by mouth daily  Qty: 30 tablet, Refills: 0    Associated Diagnoses: Cerebrovascular accident (CVA), unspecified mechanism (H)      fluticasone (FLONASE) 50 MCG/ACT nasal spray Spray 1 spray into both nostrils 2 times daily as needed for rhinitis or allergies      rivaroxaban ANTICOAGULANT (XARELTO) 20 MG TABS tablet Take 1 tablet (20 mg) by mouth daily (with dinner)  Qty: 30 tablet, Refills: 0    Associated Diagnoses: Cerebrovascular accident (CVA), unspecified mechanism (H)      simvastatin (ZOCOR) 40 MG tablet Take 1 tablet (40 mg) by mouth daily  Qty: 30 tablet, Refills: 0    Associated Diagnoses: Cerebrovascular accident (CVA), unspecified mechanism (H)      tamsulosin (FLOMAX) 0.4 MG capsule Take 0.4 mg by mouth every evening      vitamin B-12 (CYANOCOBALAMIN) 1000 MCG tablet Take 3,000 mcg by mouth daily      vitamin D3 (CHOLECALCIFEROL) 50 mcg (2000 units) tablet Take 1 tablet by mouth daily         STOP taking these medications       ciprofloxacin (CIPRO) 500 MG tablet Comments:   Reason for Stopping:         hydrALAZINE (APRESOLINE) 10 MG tablet Comments:   Reason for Stopping:         loperamide (IMODIUM A-D) 2 MG tablet Comments:   Reason for Stopping:         losartan (COZAAR) 100 MG tablet Comments:   Reason for Stopping:         potassium chloride ER (K-TAB) 20 MEQ CR tablet Comments:   Reason for Stopping:             Allergies    Allergies   Allergen Reactions     Lisinopril

## 2022-06-30 NOTE — PLAN OF CARE
Admitting Diagnosis: Enterocolitis, Nausea vomiting and diarrhea      Minor Fall on Sunday 6/26     Vitals WNL  Pain- Denied  A&Ox4  Voiding External cath during HS. 520ml urine output   Mobility- Ax1 GBW  Tele N/A  CMS Intact  Lung Sounds WNL on RA, some fine crackles but diminished  GI- Small loose stool this morning.   Dressing N/A  Diet: Regular            Plan: Placement TCU likely to [facility] TCU on 7/1 TBD

## 2022-06-30 NOTE — PLAN OF CARE
Goal Outcome Evaluation:  A&Ox4. VSS on RA. Denies pain. Up A1 gb/walker, pt has some baseline left sided weakness d/t previous stroke. Voiding adequately, 1 BM this shift. PT/OT following. Tolerating regular diet. Discharge to [facility] TCU today, wheelchair ride scheduled for 1500.

## 2022-06-30 NOTE — PROGRESS NOTES
Care Management Discharge Note    Discharge Date: 07/01/2022       Discharge Disposition: Skilled Nursing Facility    Discharge Services: Transportation Services    Discharge DME: None    Discharge Transportation: [facility] w/c ride    Private pay costs discussed: Not applicable    PAS Confirmation Code:  [id number]  Patient/family educated on Medicare website which has current facility and service quality ratings:  (Wife had list prepared)    Education Provided on the Discharge Plan:  yes  Persons Notified of Discharge Plans: patient  Patient/Family in Agreement with the Plan: yes    Handoff Referral Completed: Yes    Additional Information:  [facility] accepted patient in DOD. [facility] called said that they have a private room for this afternoon. There's no charge for private room because of patient's diarrhea precautions. Writer scheduled a w/c [facility] ride for today at 1500. Writer called [facility] and let him know of patient's ride time. Writer called Agatha (spouse) and let her know of patient's ride time. Writer completed PAS, put in chart and sent to facility. Writer received the discharge orders and faxed to [facility].     PAS-RR    D: Per DHS regulation, SW completed and submitted PAS-RR to [place] [employer] Direct Connect via the Senior LinkAge Line.  PAS-RR confirmation # is : [id number]    I: SW spoke with patient and they are aware a PAS-RR has been submitted.  SW reviewed with patient that they may be contacted for a follow up appointment within 10 days of hospital discharge if their SNF stay is < 30 days.  Contact information for Senior LinkAge Line was also provided.    A: Patient verbalized understanding.    P: Further questions may be directed to [facility] LinkAge Line at #[phone], option #4 for PAS-RR staff.    NIKOS Arriaza

## 2022-07-01 ENCOUNTER — PATIENT OUTREACH (OUTPATIENT)
Dept: CARE COORDINATION | Facility: CLINIC | Age: 82
End: 2022-07-01

## 2022-07-01 NOTE — PROGRESS NOTES
Clinic Care Coordination Contact  Care Coordination Transition Communication         Clinical Data: Patient was hospitalized at [facility] from 6/23/22 to 6/30/22 with diagnosis of lymphocytic colitis. Pt has a past medical history significant for severe aortic stenosis, stroke, HTN, HLD, paroxysmal atrial fibrillation currently on Xarelto.     Transition to Facility:              Facility Name: [facility]               Plan: RN/SW Care Coordinator will await notification from facility staff informing RN/SW Care Coordinator of patient's discharge plans/needs. RN/SW Care Coordinator will review chart and outreach to facility staff every 4 weeks and as needed.     NIKOS Feliz   Social Work Care Coordinator  [phone]

## 2022-07-01 NOTE — LETTER
Hand-off  for Care Coordination  What is Care Coordination?  [facility] Care Coordination Services are available to people in complex situations,   for example medical, social or financial. The Care Coordinator, a SW or an RN, works with the   patient and their doctor to determine health goals, obtain resources, achieve outcomes,   and develop plans to coordinate care across settings.      o Patient Name:   Kamari Valentin  o Patient :     1940  o Patient PCP:     Annita Proctor MD    o Patient Primary Clinic:   [facility] [address] [facility] [address]  o [place] [address]  o D/C Facility: [facility]     Care Coordinator to Contact at [place]  Beulah Briceno   Phone:[phone]

## 2022-07-01 NOTE — PLAN OF CARE
"Occupational Therapy Discharge Summary    Reason for therapy discharge:    Discharged to transitional care facility.    Progress towards therapy goal(s). See goals on Care Plan in [facility] electronic health record for goal details.  Goals partially met.  Barriers to achieving goals:   limited tolerance for therapy and discharge from facility.    Therapy recommendation(s):    Continued therapy is recommended.  Rationale/Recommendations:  continued IP OT in TCU setting.   \"OT:Pt. currently below baseline for I/ADL's/mobility, transfers w/ assist X 1-2 for safety, falls risk;pt. limited by weakness, decreased activity tolerance, imapired balance, impaired cognition;pt. would benefit from contiued skilled OT intervention to maximize safety.indep. prior to discharge home.\"      Writing therapist did not treat pt, note written based on previous treating therapist notes and recommendations. Please see chart and flow sheet for additional details.          "

## 2022-07-06 ENCOUNTER — TRANSITIONAL CARE UNIT VISIT (OUTPATIENT)
Dept: GERIATRICS | Facility: CLINIC | Age: 82
End: 2022-07-06
Payer: COMMERCIAL

## 2022-07-06 VITALS
BODY MASS INDEX: 24.27 KG/M2 | WEIGHT: 154.6 LBS | RESPIRATION RATE: 18 BRPM | DIASTOLIC BLOOD PRESSURE: 81 MMHG | HEART RATE: 80 BPM | SYSTOLIC BLOOD PRESSURE: 145 MMHG | TEMPERATURE: 98.2 F | HEIGHT: 67 IN | OXYGEN SATURATION: 93 %

## 2022-07-06 DIAGNOSIS — R53.81 PHYSICAL DECONDITIONING: ICD-10-CM

## 2022-07-06 DIAGNOSIS — I35.0 AORTIC VALVE STENOSIS, ETIOLOGY OF CARDIAC VALVE DISEASE UNSPECIFIED: ICD-10-CM

## 2022-07-06 DIAGNOSIS — K52.832 LYMPHOCYTIC COLITIS: Primary | ICD-10-CM

## 2022-07-06 DIAGNOSIS — I50.30 HEART FAILURE WITH PRESERVED EJECTION FRACTION, NYHA CLASS I (H): ICD-10-CM

## 2022-07-06 DIAGNOSIS — G81.90 HEMIPARESIS, UNSPECIFIED HEMIPARESIS ETIOLOGY, UNSPECIFIED LATERALITY (H): ICD-10-CM

## 2022-07-06 DIAGNOSIS — I10 ESSENTIAL HYPERTENSION, BENIGN: ICD-10-CM

## 2022-07-06 DIAGNOSIS — Z86.73 HISTORY OF STROKE: ICD-10-CM

## 2022-07-06 DIAGNOSIS — I48.0 PAROXYSMAL ATRIAL FIBRILLATION (H): ICD-10-CM

## 2022-07-06 PROCEDURE — 99310 SBSQ NF CARE HIGH MDM 45: CPT | Performed by: NURSE PRACTITIONER

## 2022-07-06 RX ORDER — LOSARTAN POTASSIUM 25 MG/1
50 TABLET ORAL DAILY
Start: 2022-07-06 | End: 2024-01-01

## 2022-07-06 NOTE — LETTER
7/6/2022        RE: Kamari Valentin  [address]  [facility] [address]        [facility] GERIATRICS    PRIMARY CARE PROVIDER AND CLINIC:  Annita Proctor MD, [facility] [address] / [place]  Chief Complaint   Patient presents with     Hospital F/U      [place] Medical Record Number:  [id number]  Place of Service where encounter took place:  [facility] ([facility]) [25]    Kamari Valentin  is a 82 year old  (1940), admitted to the above facility from  [facility]. Hospital stay 6/23/22 through 6/30/22..   HPI:    PMH of HTN, HLD, severe aortic stenosis, PAF on xarelto, stroke who presents with 3 weeks hx of diarrhea  Lymphocytic colitis: initial workup essentially negative, C-diff negative, colonoscopy mostly unremarkable, diverticulosis noted with 1  Polyp removed, biopsy path showing lymphocytic colitis patient started on budesonide with improvement. OP stool studies revealed blastocystitis hominis in 1 of 3 samples, unclear pathogenicity ID evaluated and states treatment usually not indicated.   Acute hypoxic respiratory failure: likely acute on chronic CHF due to severe AS with fluid overload from IVF: BNP elevated 18K, IVF stopped, IV lasix 6/27  PTA lsoartan 100mg qD and hydralazine 10mg BID held due to soft BP during hospitalization.  Hypokalemia/hypomagnesemia/CELESTINO: replaced and resolved with IVF, creat 1.93 on admission, improved by discharge  Anemia; possible dilutional, no workup initiated patient and family wishes for limited intervention.   On exam today: patient sitting up in w/c, alert, pleasant, states he is fatigued today, states he did sleep well last night, denies pain or discomfort, states bowels are formed, denies N/V/D, denies CP, SOB, palpitations, states he has BAE which has improved, patient states appetite is good, no other concerns   CODE STATUS/ADVANCE DIRECTIVES DISCUSSION:  Prior  DNR / DNI  ALLERGIES:    Allergies   Allergen Reactions     Lisinopril       PAST MEDICAL HISTORY:   Past Medical History:   Diagnosis Date     Aortic stenosis     mild-moderate     Atrial fibrillation (H)     paroxysmal     Carotid stenosis     mild on right, moderate on left     Chronic renal insufficiency      Essential hypertension, benign      Hyperlipidaemia      Hypertrophy of prostate without urinary obstruction and other lower urinary tract symptoms (LUTS)      Rheumatic fever      Unspecified cerebral artery occlusion with cerebral infarction       PAST SURGICAL HISTORY:   has a past surgical history that includes hernia repair; ENT surgery; ENT surgery; Herniorrhaphy inguinal (2/15/2012); orthopedic surgery (left ankle 2/2013); Right Heart Catheterization (N/A, 10/22/2020); Coronary Angiogram (N/A, 10/22/2020); Colonoscopy (N/A, 6/26/2022); and Colonoscopy (N/A, 6/26/2022).  FAMILY HISTORY: family history includes Abdominal Aortic Aneurysm in his father; Coronary Artery Disease Early Onset in his mother; Respiratory in his brother.  SOCIAL HISTORY:   reports that he quit smoking about 11 years ago. His smoking use included cigars and pipe. He quit after 20.00 years of use. He has never used smokeless tobacco. He reports that he does not drink alcohol and does not use drugs.  Patient's living condition: lives with spouse    Post Discharge Medication Reconciliation Status: discharge medications reconciled, continue medications without change  Current Outpatient Medications   Medication Sig     acetaminophen (TYLENOL) 325 MG tablet Take 2 tablets (650 mg) by mouth every 6 hours as needed for mild pain or fever (> 101 F)     aspirin (ASA) 81 MG EC tablet Take 1 tablet (81 mg) by mouth daily     budesonide (ENTOCORT EC) 3 MG EC capsule Take 3 capsules (9 mg) by mouth every morning for 14 days, THEN 2 capsules (6 mg) every morning for 14 days, THEN 1 capsule (3 mg) every morning for 30 days.     cholestyramine (QUESTRAN) 4 g packet  "Take 2 packets (8 g) by mouth daily Hold for constipation     fluticasone (FLONASE) 50 MCG/ACT nasal spray Spray 1 spray into both nostrils 2 times daily as needed for rhinitis or allergies     rivaroxaban ANTICOAGULANT (XARELTO) 20 MG TABS tablet Take 1 tablet (20 mg) by mouth daily (with dinner)     simvastatin (ZOCOR) 40 MG tablet Take 1 tablet (40 mg) by mouth daily (Patient taking differently: Take 40 mg by mouth every evening)     tamsulosin (FLOMAX) 0.4 MG capsule Take 0.4 mg by mouth every evening     vitamin B-12 (CYANOCOBALAMIN) 1000 MCG tablet Take 3,000 mcg by mouth daily     vitamin D3 (CHOLECALCIFEROL) 50 mcg (2000 units) tablet Take 1 tablet by mouth daily     No current facility-administered medications for this visit.       ROS:  10 point ROS of systems including Constitutional, Eyes, Respiratory, Cardiovascular, Gastroenterology, Genitourinary, Integumentary, Musculoskeletal, Psychiatric were all negative except for pertinent positives noted in my HPI.    Vitals:  BP (!) 145/81   Pulse 80   Temp 98.2  F (36.8  C)   Resp 18   Ht 1.702 m (5' 7\")   Wt 70.1 kg (154 lb 9.6 oz)   SpO2 93%   BMI 24.21 kg/m    Exam:  GENERAL APPEARANCE:  Alert, in no distress  ENT:  Mouth and posterior oropharynx normal, moist mucous membranes, [place]  EYES:  EOM, conjunctivae, lids, pupils and irises normal, PERRL  RESP:  respiratory effort and palpation of chest normal, lungs clear to auscultation , no respiratory distress  CV:  Palpation and auscultation of heart done , regular rate and rhythm, no murmur, rub, or gallop, peripheral edema trace+ in LE bilaterally  ABDOMEN:  normal bowel sounds, soft, nontender, no hepatosplenomegaly or other masses  M/S:   patient sitting up in w/c  SKIN:  Inspection of skin and subcutaneous tissue baseline  NEURO:   speech wnl  PSYCH:  affect and mood normal    Lab/Diagnostic data:  Recent labs in [facility] reviewed by me today.  and   Most Recent 3 CBC's:Recent Labs   Lab Test " 06/28/22  0726 06/27/22  0817 06/24/22  0657   WBC 9.4 9.6 8.0   HGB 11.2* 11.4* 11.0*   MCV 94 97 96    198 188     Most Recent 3 BMP's:Recent Labs   Lab Test 06/30/22  1315 06/30/22  0629 06/29/22  1616 06/29/22  0714 06/28/22  0726   NA  --  141  --  142 144   POTASSIUM 3.6 3.3* 3.7 3.2* 3.5   CHLORIDE  --  102  --  102 108   CO2  --  36*  --  35* 33*   BUN  --  20  --  10 9   CR  --  0.70  --  0.72 0.78   ANIONGAP  --  3  --  5 3   CLAY  --  8.5  --  8.7 8.8   GLC  --  117*  --  127* 128*       ASSESSMENT/PLAN:    (K52.832) Lymphocytic colitis  (R53.81) Physical deconditioning  (primary encounter diagnosis)  Comment: acute/ongoing  Plan: f/u with GI Dr. Mccormick as OP  Continue budesonide 9mg QAM for 14 days then 6mg QAM for 14 days then 3mg QAM for 30 days    (I10) Essential hypertension, benign  (I48.0) Paroxysmal atrial fibrillation (H)  (I35.0) Aortic valve stenosis, etiology of cardiac valve disease unspecified  (I50.30) Heart failure with preserved ejection fraction, NYHA class I (H)  Comment: acute/ongoing  Plan: severe AS and cAD cardiology recommended AVR and CABG patient declined after experiencing recurrent stroke following angiogram 10/2020, reiterated desire for no treatment Cards f/u 1/2022  Holding PTA losartan 100mg QD and hydralazine 10mg BID due to soft BP during hospitalization  Restart losartan 50mg QD, continue xarelto 20mg QD, ASA 81mg QD, zocor 40mg QD    (Z86.73) History of stroke  (G81.90) Hemiparesis, unspecified hemiparesis etiology, unspecified laterality (H)  Comment: ongoing  Plan: PT and OT as above, continue xarelto 20mg QD, ASA 81mg QD    Orders:  Start losartan 50mg QD  BMP and CBC in AM      Total time spent with patient visit at the [facility] nursing facility was 35 min including patient visit and review of past records. Greater than 50% of total time spent with counseling and coordinating care due to discussed patient hospitalization, discussed progress in therapy in TCU,  will restart losartan and f/u with labs in AM.     Electronically signed by:  Tonya Lynn Haase, APRN CNP                     Sincerely,        Tonya Lynn Haase, APRN CNP

## 2022-07-06 NOTE — PROGRESS NOTES
[facility] GERIATRICS    PRIMARY CARE PROVIDER AND CLINIC:  Annita Proctor MD, [facility] [address] / [place]  Chief Complaint   Patient presents with     Hospital F/U      [place] Medical Record Number:  [id number]  Place of Service where encounter took place:  [facility]) [25]    Kamari Valentin  is a 82 year old  (1940), admitted to the above facility from  [facility]. Hospital stay 6/23/22 through 6/30/22..   HPI:    PMH of HTN, HLD, severe aortic stenosis, PAF on xarelto, stroke who presents with 3 weeks hx of diarrhea  Lymphocytic colitis: initial workup essentially negative, C-diff negative, colonoscopy mostly unremarkable, diverticulosis noted with 1  Polyp removed, biopsy path showing lymphocytic colitis patient started on budesonide with improvement. OP stool studies revealed blastocystitis hominis in 1 of 3 samples, unclear pathogenicity ID evaluated and states treatment usually not indicated.   Acute hypoxic respiratory failure: likely acute on chronic CHF due to severe AS with fluid overload from IVF: BNP elevated 18K, IVF stopped, IV lasix 6/27  PTA lsoartan 100mg qD and hydralazine 10mg BID held due to soft BP during hospitalization.  Hypokalemia/hypomagnesemia/CELESTINO: replaced and resolved with IVF, creat 1.93 on admission, improved by discharge  Anemia; possible dilutional, no workup initiated patient and family wishes for limited intervention.   On exam today: patient sitting up in w/c, alert, pleasant, states he is fatigued today, states he did sleep well last night, denies pain or discomfort, states bowels are formed, denies N/V/D, denies CP, SOB, palpitations, states he has BAE which has improved, patient states appetite is good, no other concerns   CODE STATUS/ADVANCE DIRECTIVES DISCUSSION:  Prior  DNR / DNI  ALLERGIES:   Allergies   Allergen Reactions     Lisinopril       PAST MEDICAL HISTORY:   Past Medical History:    Diagnosis Date     Aortic stenosis     mild-moderate     Atrial fibrillation (H)     paroxysmal     Carotid stenosis     mild on right, moderate on left     Chronic renal insufficiency      Essential hypertension, benign      Hyperlipidaemia      Hypertrophy of prostate without urinary obstruction and other lower urinary tract symptoms (LUTS)      Rheumatic fever      Unspecified cerebral artery occlusion with cerebral infarction       PAST SURGICAL HISTORY:   has a past surgical history that includes hernia repair; ENT surgery; ENT surgery; Herniorrhaphy inguinal (2/15/2012); orthopedic surgery (left ankle 2/2013); Right Heart Catheterization (N/A, 10/22/2020); Coronary Angiogram (N/A, 10/22/2020); Colonoscopy (N/A, 6/26/2022); and Colonoscopy (N/A, 6/26/2022).  FAMILY HISTORY: family history includes Abdominal Aortic Aneurysm in his father; Coronary Artery Disease Early Onset in his mother; Respiratory in his brother.  SOCIAL HISTORY:   reports that he quit smoking about 11 years ago. His smoking use included cigars and pipe. He quit after 20.00 years of use. He has never used smokeless tobacco. He reports that he does not drink alcohol and does not use drugs.  Patient's living condition: lives with spouse    Post Discharge Medication Reconciliation Status: discharge medications reconciled, continue medications without change  Current Outpatient Medications   Medication Sig     acetaminophen (TYLENOL) 325 MG tablet Take 2 tablets (650 mg) by mouth every 6 hours as needed for mild pain or fever (> 101 F)     aspirin (ASA) 81 MG EC tablet Take 1 tablet (81 mg) by mouth daily     budesonide (ENTOCORT EC) 3 MG EC capsule Take 3 capsules (9 mg) by mouth every morning for 14 days, THEN 2 capsules (6 mg) every morning for 14 days, THEN 1 capsule (3 mg) every morning for 30 days.     cholestyramine (QUESTRAN) 4 g packet Take 2 packets (8 g) by mouth daily Hold for constipation     fluticasone (FLONASE) 50 MCG/ACT nasal  "spray Spray 1 spray into both nostrils 2 times daily as needed for rhinitis or allergies     rivaroxaban ANTICOAGULANT (XARELTO) 20 MG TABS tablet Take 1 tablet (20 mg) by mouth daily (with dinner)     simvastatin (ZOCOR) 40 MG tablet Take 1 tablet (40 mg) by mouth daily (Patient taking differently: Take 40 mg by mouth every evening)     tamsulosin (FLOMAX) 0.4 MG capsule Take 0.4 mg by mouth every evening     vitamin B-12 (CYANOCOBALAMIN) 1000 MCG tablet Take 3,000 mcg by mouth daily     vitamin D3 (CHOLECALCIFEROL) 50 mcg (2000 units) tablet Take 1 tablet by mouth daily     No current facility-administered medications for this visit.       ROS:  10 point ROS of systems including Constitutional, Eyes, Respiratory, Cardiovascular, Gastroenterology, Genitourinary, Integumentary, Musculoskeletal, Psychiatric were all negative except for pertinent positives noted in my HPI.    Vitals:  BP (!) 145/81   Pulse 80   Temp 98.2  F (36.8  C)   Resp 18   Ht 1.702 m (5' 7\")   Wt 70.1 kg (154 lb 9.6 oz)   SpO2 93%   BMI 24.21 kg/m    Exam:  GENERAL APPEARANCE:  Alert, in no distress  ENT:  Mouth and posterior oropharynx normal, moist mucous membranes, [place]  EYES:  EOM, conjunctivae, lids, pupils and irises normal, PERRL  RESP:  respiratory effort and palpation of chest normal, lungs clear to auscultation , no respiratory distress  CV:  Palpation and auscultation of heart done , regular rate and rhythm, no murmur, rub, or gallop, peripheral edema trace+ in LE bilaterally  ABDOMEN:  normal bowel sounds, soft, nontender, no hepatosplenomegaly or other masses  M/S:   patient sitting up in w/c  SKIN:  Inspection of skin and subcutaneous tissue baseline  NEURO:   speech wnl  PSYCH:  affect and mood normal    Lab/Diagnostic data:  Recent labs in [facility] reviewed by me today.  and   Most Recent 3 CBC's:Recent Labs   Lab Test 06/28/22  0726 06/27/22  0817 06/24/22  0657   WBC 9.4 9.6 8.0   HGB 11.2* 11.4* 11.0*   MCV 94 97 96   PLT " 233 198 188     Most Recent 3 BMP's:Recent Labs   Lab Test 06/30/22  1315 06/30/22  0629 06/29/22  1616 06/29/22  0714 06/28/22  0726   NA  --  141  --  142 144   POTASSIUM 3.6 3.3* 3.7 3.2* 3.5   CHLORIDE  --  102  --  102 108   CO2  --  36*  --  35* 33*   BUN  --  20  --  10 9   CR  --  0.70  --  0.72 0.78   ANIONGAP  --  3  --  5 3   CLAY  --  8.5  --  8.7 8.8   GLC  --  117*  --  127* 128*       ASSESSMENT/PLAN:    (K52.832) Lymphocytic colitis  (R53.81) Physical deconditioning  (primary encounter diagnosis)  Comment: acute/ongoing  Plan: f/u with GI Dr. Mccormick as OP  Continue budesonide 9mg QAM for 14 days then 6mg QAM for 14 days then 3mg QAM for 30 days    (I10) Essential hypertension, benign  (I48.0) Paroxysmal atrial fibrillation (H)  (I35.0) Aortic valve stenosis, etiology of cardiac valve disease unspecified  (I50.30) Heart failure with preserved ejection fraction, NYHA class I (H)  Comment: acute/ongoing  Plan: severe AS and cAD cardiology recommended AVR and CABG patient declined after experiencing recurrent stroke following angiogram 10/2020, reiterated desire for no treatment Cards f/u 1/2022  Holding PTA losartan 100mg QD and hydralazine 10mg BID due to soft BP during hospitalization  Restart losartan 50mg QD, continue xarelto 20mg QD, ASA 81mg QD, zocor 40mg QD    (Z86.73) History of stroke  (G81.90) Hemiparesis, unspecified hemiparesis etiology, unspecified laterality (H)  Comment: ongoing  Plan: PT and OT as above, continue xarelto 20mg QD, ASA 81mg QD    Orders:  Start losartan 50mg QD  BMP and CBC in AM      Total time spent with patient visit at the skilled nursing facility was 35 min including patient visit and review of past records. Greater than 50% of total time spent with counseling and coordinating care due to discussed patient hospitalization, discussed progress in therapy in TCU, will restart losartan and f/u with labs in AM.     Electronically signed by:  Tonya Lynn Haase, APRN CNP

## 2022-07-07 VITALS
TEMPERATURE: 97.5 F | HEIGHT: 67 IN | SYSTOLIC BLOOD PRESSURE: 121 MMHG | RESPIRATION RATE: 16 BRPM | HEART RATE: 78 BPM | OXYGEN SATURATION: 96 % | WEIGHT: 155.6 LBS | BODY MASS INDEX: 24.42 KG/M2 | DIASTOLIC BLOOD PRESSURE: 69 MMHG

## 2022-07-07 NOTE — PROGRESS NOTES
"[facility] GERIATRICS    Chief Complaint   Patient presents with     RECHECK     HPI:  Kamari Valentin is a 82 year old  (1940), who is being seen today for an episodic care visit at: [facility]) [25]. Today's concern is:   Lymphocytic colitis: patient states bowels are working, stool formed, no c/o abdominal pain or discomfort,   In therapy patient is walking up to 300 feet using a 4ww with SBA, ongoing left sided weakness from previous stroke.   HTN/PAF/AS: /61, 121/69, 95/60 with HR 70-80 range, denies CP, palpitations  Anemia: Hgb 11.9 on 7/7/22    Allergies, and PMH/PSH reviewed in Psychiatric today.  REVIEW OF SYSTEMS:  10 point ROS of systems including Constitutional, Eyes, Respiratory, Cardiovascular, Gastroenterology, Genitourinary, Integumentary, Musculoskeletal, Psychiatric were all negative except for pertinent positives noted in my HPI.    Objective:   /69   Pulse 78   Temp 97.5  F (36.4  C)   Resp 16   Ht 1.702 m (5' 7\")   Wt 70.6 kg (155 lb 9.6 oz)   SpO2 96%   BMI 24.37 kg/m    GENERAL APPEARANCE:  Alert, in no distress  ENT:  Mouth and posterior oropharynx normal, moist mucous membranes, normal hearing acuity  EYES:  EOM, conjunctivae, lids, pupils and irises normal, PERRL  RESP:  respiratory effort and palpation of chest normal, lungs clear to auscultation , no respiratory distress  CV:  Palpation and auscultation of heart done , regular rate and rhythm, no murmur, rub, or gallop, peripheral edema trace+ in ankles bilaterally  ABDOMEN:  normal bowel sounds, soft, nontender, no hepatosplenomegaly or other masses  M/S:   sitting up in w/c  SKIN:  Inspection of skin and subcutaneous tissue baseline  NEURO:   speech wnl  PSYCH:  affect and mood normal    Recent labs in Psychiatric reviewed by me today.  and   Most Recent 3 CBC's:Recent Labs   Lab Test 06/28/22  0726 06/27/22  0817 06/24/22  0657   WBC 9.4 9.6 8.0   HGB 11.2* 11.4* 11.0*   MCV 94 97 96    198 188 "     Most Recent 3 BMP's:Recent Labs   Lab Test 06/30/22  1315 06/30/22  0629 06/29/22  1616 06/29/22  0714 06/28/22  0726   NA  --  141  --  142 144   POTASSIUM 3.6 3.3* 3.7 3.2* 3.5   CHLORIDE  --  102  --  102 108   CO2  --  36*  --  35* 33*   BUN  --  20  --  10 9   CR  --  0.70  --  0.72 0.78   ANIONGAP  --  3  --  5 3   CLAY  --  8.5  --  8.7 8.8   GLC  --  117*  --  127* 128*       Assessment/Plan:  (K52.832) Lymphocytic colitis  (R53.81) Physical deconditioning  (primary encounter diagnosis)  Comment: acute/ongoing  Plan: f/u with GI Dr. Mccormick as OP  Continue budesonide 9mg QAM for 14 days then 6mg QAM for 14 days then 3mg QAM for 30 days     (I10) Essential hypertension, benign  (I48.0) Paroxysmal atrial fibrillation (H)  (I35.0) Aortic valve stenosis, etiology of cardiac valve disease unspecified  (I50.30) Heart failure with preserved ejection fraction, NYHA class I (H)  Comment: acute/ongoing  Plan: severe AS and cAD cardiology recommended AVR and CABG patient declined after experiencing recurrent stroke following angiogram 10/2020, reiterated desire for no treatment Cards f/u 1/2022  Holding PTA losartan 100mg QD and hydralazine 10mg BID due to soft BP during hospitalization  Restart losartan 50mg QD, continue xarelto 20mg QD, ASA 81mg QD, zocor 40mg QD     (Z86.73) History of stroke  (G81.90) Hemiparesis, unspecified hemiparesis etiology, unspecified laterality (H)  Comment: ongoing  Plan: PT and OT as above, continue xarelto 20mg QD, ASA 81mg QD    Orders:  No new orders today      Electronically signed by: Tonya Lynn Haase, APRN CNP

## 2022-07-08 ENCOUNTER — TRANSITIONAL CARE UNIT VISIT (OUTPATIENT)
Dept: GERIATRICS | Facility: CLINIC | Age: 82
End: 2022-07-08
Payer: COMMERCIAL

## 2022-07-08 DIAGNOSIS — I48.0 PAROXYSMAL ATRIAL FIBRILLATION (H): ICD-10-CM

## 2022-07-08 DIAGNOSIS — Z86.73 HISTORY OF STROKE: ICD-10-CM

## 2022-07-08 DIAGNOSIS — I10 ESSENTIAL HYPERTENSION, BENIGN: ICD-10-CM

## 2022-07-08 DIAGNOSIS — I50.30 HEART FAILURE WITH PRESERVED EJECTION FRACTION, NYHA CLASS I (H): ICD-10-CM

## 2022-07-08 DIAGNOSIS — I35.0 AORTIC VALVE STENOSIS, ETIOLOGY OF CARDIAC VALVE DISEASE UNSPECIFIED: ICD-10-CM

## 2022-07-08 DIAGNOSIS — G81.90 HEMIPARESIS, UNSPECIFIED HEMIPARESIS ETIOLOGY, UNSPECIFIED LATERALITY (H): ICD-10-CM

## 2022-07-08 DIAGNOSIS — R53.81 PHYSICAL DECONDITIONING: ICD-10-CM

## 2022-07-08 DIAGNOSIS — K52.832 LYMPHOCYTIC COLITIS: Primary | ICD-10-CM

## 2022-07-08 PROCEDURE — 99309 SBSQ NF CARE MODERATE MDM 30: CPT | Performed by: NURSE PRACTITIONER

## 2022-07-08 NOTE — LETTER
"    7/8/2022        RE: Kamari Valentin  [address]  [facility] [address]        [facility]    Chief Complaint   Patient presents with     RECHECK     HPI:  Kamari Valentin is a 82 year old  (1940), who is being seen today for an episodic care visit at: [facility] ([facility]) [25]. Today's concern is:   Lymphocytic colitis: patient states bowels are working, stool formed, no c/o abdominal pain or discomfort,   In therapy patient is walking up to 300 feet using a 4ww with SBA, ongoing left sided weakness from previous stroke.   HTN/PAF/AS: /61, 121/69, 95/60 with HR 70-80 range, denies CP, palpitations  Anemia: Hgb 11.9 on 7/7/22    Allergies, and PMH/PSH reviewed in [facility] today.  REVIEW OF SYSTEMS:  10 point ROS of systems including Constitutional, Eyes, Respiratory, Cardiovascular, Gastroenterology, Genitourinary, Integumentary, Musculoskeletal, Psychiatric were all negative except for pertinent positives noted in my HPI.    Objective:   /69   Pulse 78   Temp 97.5  F (36.4  C)   Resp 16   Ht 1.702 m (5' 7\")   Wt 70.6 kg (155 lb 9.6 oz)   SpO2 96%   BMI 24.37 kg/m    GENERAL APPEARANCE:  Alert, in no distress  ENT:  Mouth and posterior oropharynx normal, moist mucous membranes, normal hearing acuity  EYES:  EOM, conjunctivae, lids, pupils and irises normal, PERRL  RESP:  respiratory effort and palpation of chest normal, lungs clear to auscultation , no respiratory distress  CV:  Palpation and auscultation of heart done , regular rate and rhythm, no murmur, rub, or gallop, peripheral edema trace+ in ankles bilaterally  ABDOMEN:  normal bowel sounds, soft, nontender, no hepatosplenomegaly or other masses  M/S:   sitting up in w/c  SKIN:  Inspection of skin and subcutaneous tissue baseline  NEURO:   speech wnl  PSYCH:  affect and mood normal    Recent labs in [facility] reviewed by me today.  and   Most Recent 3 CBC's:Recent Labs   Lab Test 06/28/22  0726 06/27/22  0817 " 06/24/22  0657   WBC 9.4 9.6 8.0   HGB 11.2* 11.4* 11.0*   MCV 94 97 96    198 188     Most Recent 3 BMP's:Recent Labs   Lab Test 06/30/22  1315 06/30/22  0629 06/29/22  1616 06/29/22  0714 06/28/22  0726   NA  --  141  --  142 144   POTASSIUM 3.6 3.3* 3.7 3.2* 3.5   CHLORIDE  --  102  --  102 108   CO2  --  36*  --  35* 33*   BUN  --  20  --  10 9   CR  --  0.70  --  0.72 0.78   ANIONGAP  --  3  --  5 3   CLAY  --  8.5  --  8.7 8.8   GLC  --  117*  --  127* 128*       Assessment/Plan:  (K52.832) Lymphocytic colitis  (R53.81) Physical deconditioning  (primary encounter diagnosis)  Comment: acute/ongoing  Plan: f/u with GI Dr. Mccormick as OP  Continue budesonide 9mg QAM for 14 days then 6mg QAM for 14 days then 3mg QAM for 30 days     (I10) Essential hypertension, benign  (I48.0) Paroxysmal atrial fibrillation (H)  (I35.0) Aortic valve stenosis, etiology of cardiac valve disease unspecified  (I50.30) Heart failure with preserved ejection fraction, NYHA class I (H)  Comment: acute/ongoing  Plan: severe AS and cAD cardiology recommended AVR and CABG patient declined after experiencing recurrent stroke following angiogram 10/2020, reiterated desire for no treatment Cards f/u 1/2022  Holding PTA losartan 100mg QD and hydralazine 10mg BID due to soft BP during hospitalization  Restart losartan 50mg QD, continue xarelto 20mg QD, ASA 81mg QD, zocor 40mg QD     (Z86.73) History of stroke  (G81.90) Hemiparesis, unspecified hemiparesis etiology, unspecified laterality (H)  Comment: ongoing  Plan: PT and OT as above, continue xarelto 20mg QD, ASA 81mg QD    Orders:  No new orders today      Electronically signed by: Tonya Lynn Haase, APRN CNP             Sincerely,        Tonya Lynn Haase, APRN CNP

## 2022-07-11 ENCOUNTER — TRANSITIONAL CARE UNIT VISIT (OUTPATIENT)
Dept: GERIATRICS | Facility: CLINIC | Age: 82
End: 2022-07-11
Payer: COMMERCIAL

## 2022-07-11 VITALS
SYSTOLIC BLOOD PRESSURE: 110 MMHG | TEMPERATURE: 98.1 F | BODY MASS INDEX: 23.78 KG/M2 | DIASTOLIC BLOOD PRESSURE: 66 MMHG | WEIGHT: 151.5 LBS | RESPIRATION RATE: 18 BRPM | HEART RATE: 82 BPM | HEIGHT: 67 IN | OXYGEN SATURATION: 93 %

## 2022-07-11 DIAGNOSIS — K52.832 LYMPHOCYTIC COLITIS: Primary | ICD-10-CM

## 2022-07-11 DIAGNOSIS — I35.0 AORTIC VALVE STENOSIS, ETIOLOGY OF CARDIAC VALVE DISEASE UNSPECIFIED: ICD-10-CM

## 2022-07-11 DIAGNOSIS — I10 ESSENTIAL HYPERTENSION, BENIGN: ICD-10-CM

## 2022-07-11 DIAGNOSIS — I50.30 HEART FAILURE WITH PRESERVED EJECTION FRACTION, NYHA CLASS I (H): ICD-10-CM

## 2022-07-11 DIAGNOSIS — R35.0 URINARY FREQUENCY: ICD-10-CM

## 2022-07-11 DIAGNOSIS — Z86.73 HISTORY OF STROKE: ICD-10-CM

## 2022-07-11 DIAGNOSIS — I48.0 PAROXYSMAL ATRIAL FIBRILLATION (H): ICD-10-CM

## 2022-07-11 DIAGNOSIS — R53.81 PHYSICAL DECONDITIONING: ICD-10-CM

## 2022-07-11 DIAGNOSIS — G81.90 HEMIPARESIS, UNSPECIFIED HEMIPARESIS ETIOLOGY, UNSPECIFIED LATERALITY (H): ICD-10-CM

## 2022-07-11 PROCEDURE — 99309 SBSQ NF CARE MODERATE MDM 30: CPT | Performed by: NURSE PRACTITIONER

## 2022-07-11 NOTE — LETTER
"    7/11/2022        RE: Kamari Valentin  [address]  [facility] [address]        [facility]    Chief Complaint   Patient presents with     Nursing Home Acute     HPI:  Kamari Valentin is a 82 year old  (1940), who is being seen today for an episodic care visit at: [facility] ([facility]) [25]. Today's concern is:   Lymphocytic colitis: patient  no c/o abdominal pain or discomfort, stool formed  In therapy patient is walking up to 300 feet using a 4ww with SBA, ongoing left sided weakness from previous stroke.   HTN/PAF/AS: /66, 132/77, 143/79  with HR 70-80 range, denies CP, palpitations  Anemia: Hgb 11.9 on 7/7/22  Urinary frequency: patient states he has ongoing urinary frequency, up a lot at night and throughout the day, he said his PCP prescribed tamsulosin up to TID PTA, discussed with patient     Allergies, and PMH/PSH reviewed in [facility] today.  REVIEW OF SYSTEMS:  10 point ROS of systems including Constitutional, Eyes, Respiratory, Cardiovascular, Gastroenterology, Genitourinary, Integumentary, Musculoskeletal, Psychiatric were all negative except for pertinent positives noted in my HPI.    Objective:   /66   Pulse 82   Temp 98.1  F (36.7  C)   Resp 18   Ht 1.702 m (5' 7\")   Wt 68.7 kg (151 lb 8 oz)   SpO2 93%   BMI 23.73 kg/m    GENERAL APPEARANCE:  Alert, in no distress  ENT:  Mouth and posterior oropharynx normal, moist mucous membranes, [place]  EYES:  EOM, conjunctivae, lids, pupils and irises normal, PERRL  RESP:  respiratory effort and palpation of chest normal, lungs clear to auscultation , no respiratory distress  CV:  Palpation and auscultation of heart done , regular rate and rhythm, no murmur, rub, or gallop, peripheral edema trace+ in LE bilaterally  ABDOMEN:  normal bowel sounds, soft, nontender, no hepatosplenomegaly or other masses  M/S:   patient sitting up in w/c  SKIN:  Inspection of skin and subcutaneous tissue baseline  NEURO:   speech " wnl  PSYCH:  affect and mood normal    Recent labs in [facility] reviewed by me today.  and   Most Recent 3 CBC's:Recent Labs   Lab Test 06/28/22  0726 06/27/22  0817 06/24/22  0657   WBC 9.4 9.6 8.0   HGB 11.2* 11.4* 11.0*   MCV 94 97 96    198 188     Most Recent 3 BMP's:Recent Labs   Lab Test 06/30/22  1315 06/30/22  0629 06/29/22  1616 06/29/22  0714 06/28/22  0726   NA  --  141  --  142 144   POTASSIUM 3.6 3.3* 3.7 3.2* 3.5   CHLORIDE  --  102  --  102 108   CO2  --  36*  --  35* 33*   BUN  --  20  --  10 9   CR  --  0.70  --  0.72 0.78   ANIONGAP  --  3  --  5 3   CLAY  --  8.5  --  8.7 8.8   GLC  --  117*  --  127* 128*       Assessment/Plan:  (K52.832) Lymphocytic colitis  (R53.81) Physical deconditioning  (primary encounter diagnosis)  Comment: acute/ongoing  Plan: f/u with GI Dr. Mccormick as OP  Continue budesonide 9mg QAM for 14 days then 6mg QAM for 14 days then 3mg QAM for 30 days     (I10) Essential hypertension, benign  (I48.0) Paroxysmal atrial fibrillation (H)  (I35.0) Aortic valve stenosis, etiology of cardiac valve disease unspecified  (I50.30) Heart failure with preserved ejection fraction, NYHA class I (H)  Comment: acute/ongoing  Plan: severe AS and cAD cardiology recommended AVR and CABG patient declined after experiencing recurrent stroke following angiogram 10/2020, reiterated desire for no treatment Cards f/u 1/2022  Holding PTA losartan 100mg QD and hydralazine 10mg BID due to soft BP during hospitalization  Restart losartan 50mg QD, continue xarelto 20mg QD, ASA 81mg QD, zocor 40mg QD     (Z86.73) History of stroke  (G81.90) Hemiparesis, unspecified hemiparesis etiology, unspecified laterality (H)  Comment: ongoing  Plan: PT and OT as above, continue xarelto 20mg QD, ASA 81mg QD    (R35.0) urinary frequency   Acute/ongoing  Plan: increase tamsulosin to 0.4mg BID       Orders:  OK to increase tamsulosin to 0.4mg BID    Electronically signed by: Tonya Lynn Haase, APRN CNP            Sincerely,        Tonya Lynn Haase, APRN CNP

## 2022-07-11 NOTE — PROGRESS NOTES
"[facility] GERIATRICS    Chief Complaint   Patient presents with     Nursing Home Acute     HPI:  Kamari Valentin is a 82 year old  (1940), who is being seen today for an episodic care visit at: [facility] ([facility]) [25]. Today's concern is:   Lymphocytic colitis: patient  no c/o abdominal pain or discomfort, stool formed  In therapy patient is walking up to 300 feet using a 4ww with SBA, ongoing left sided weakness from previous stroke.   HTN/PAF/AS: /66, 132/77, 143/79  with HR 70-80 range, denies CP, palpitations  Anemia: Hgb 11.9 on 7/7/22  Urinary frequency: patient states he has ongoing urinary frequency, up a lot at night and throughout the day, he said his PCP prescribed tamsulosin up to TID PTA, discussed with patient     Allergies, and PMH/PSH reviewed in [facility] today.  REVIEW OF SYSTEMS:  10 point ROS of systems including Constitutional, Eyes, Respiratory, Cardiovascular, Gastroenterology, Genitourinary, Integumentary, Musculoskeletal, Psychiatric were all negative except for pertinent positives noted in my HPI.    Objective:   /66   Pulse 82   Temp 98.1  F (36.7  C)   Resp 18   Ht 1.702 m (5' 7\")   Wt 68.7 kg (151 lb 8 oz)   SpO2 93%   BMI 23.73 kg/m    GENERAL APPEARANCE:  Alert, in no distress  ENT:  Mouth and posterior oropharynx normal, moist mucous membranes, [place]  EYES:  EOM, conjunctivae, lids, pupils and irises normal, PERRL  RESP:  respiratory effort and palpation of chest normal, lungs clear to auscultation , no respiratory distress  CV:  Palpation and auscultation of heart done , regular rate and rhythm, no murmur, rub, or gallop, peripheral edema trace+ in LE bilaterally  ABDOMEN:  normal bowel sounds, soft, nontender, no hepatosplenomegaly or other masses  M/S:   patient sitting up in w/c  SKIN:  Inspection of skin and subcutaneous tissue baseline  NEURO:   speech wnl  PSYCH:  affect and mood normal    Recent labs in [facility] reviewed by me today.  and   Most Recent " 3 CBC's:Recent Labs   Lab Test 06/28/22  0726 06/27/22  0817 06/24/22  0657   WBC 9.4 9.6 8.0   HGB 11.2* 11.4* 11.0*   MCV 94 97 96    198 188     Most Recent 3 BMP's:Recent Labs   Lab Test 06/30/22  1315 06/30/22  0629 06/29/22  1616 06/29/22  0714 06/28/22  0726   NA  --  141  --  142 144   POTASSIUM 3.6 3.3* 3.7 3.2* 3.5   CHLORIDE  --  102  --  102 108   CO2  --  36*  --  35* 33*   BUN  --  20  --  10 9   CR  --  0.70  --  0.72 0.78   ANIONGAP  --  3  --  5 3   CLAY  --  8.5  --  8.7 8.8   GLC  --  117*  --  127* 128*       Assessment/Plan:  (K52.832) Lymphocytic colitis  (R53.81) Physical deconditioning  (primary encounter diagnosis)  Comment: acute/ongoing  Plan: f/u with GI Dr. Mccormick as OP  Continue budesonide 9mg QAM for 14 days then 6mg QAM for 14 days then 3mg QAM for 30 days     (I10) Essential hypertension, benign  (I48.0) Paroxysmal atrial fibrillation (H)  (I35.0) Aortic valve stenosis, etiology of cardiac valve disease unspecified  (I50.30) Heart failure with preserved ejection fraction, NYHA class I (H)  Comment: acute/ongoing  Plan: severe AS and cAD cardiology recommended AVR and CABG patient declined after experiencing recurrent stroke following angiogram 10/2020, reiterated desire for no treatment Cards f/u 1/2022  Holding PTA losartan 100mg QD and hydralazine 10mg BID due to soft BP during hospitalization  Restart losartan 50mg QD, continue xarelto 20mg QD, ASA 81mg QD, zocor 40mg QD     (Z86.73) History of stroke  (G81.90) Hemiparesis, unspecified hemiparesis etiology, unspecified laterality (H)  Comment: ongoing  Plan: PT and OT as above, continue xarelto 20mg QD, ASA 81mg QD    (R35.0) urinary frequency   Acute/ongoing  Plan: increase tamsulosin to 0.4mg BID       Orders:  OK to increase tamsulosin to 0.4mg BID    Electronically signed by: Tonya Lynn Haase, APRN CNP

## 2022-07-14 ENCOUNTER — TRANSITIONAL CARE UNIT VISIT (OUTPATIENT)
Dept: GERIATRICS | Facility: CLINIC | Age: 82
End: 2022-07-14
Payer: COMMERCIAL

## 2022-07-14 VITALS
TEMPERATURE: 97.7 F | DIASTOLIC BLOOD PRESSURE: 79 MMHG | HEART RATE: 96 BPM | BODY MASS INDEX: 23.86 KG/M2 | OXYGEN SATURATION: 96 % | HEIGHT: 67 IN | RESPIRATION RATE: 18 BRPM | WEIGHT: 152 LBS | SYSTOLIC BLOOD PRESSURE: 126 MMHG

## 2022-07-14 DIAGNOSIS — I48.0 PAROXYSMAL ATRIAL FIBRILLATION (H): ICD-10-CM

## 2022-07-14 DIAGNOSIS — K52.832 LYMPHOCYTIC COLITIS: Primary | ICD-10-CM

## 2022-07-14 DIAGNOSIS — G81.90 HEMIPARESIS, UNSPECIFIED HEMIPARESIS ETIOLOGY, UNSPECIFIED LATERALITY (H): ICD-10-CM

## 2022-07-14 DIAGNOSIS — I10 ESSENTIAL HYPERTENSION, BENIGN: ICD-10-CM

## 2022-07-14 DIAGNOSIS — R53.81 PHYSICAL DECONDITIONING: ICD-10-CM

## 2022-07-14 DIAGNOSIS — R41.89 COGNITIVE IMPAIRMENT: ICD-10-CM

## 2022-07-14 DIAGNOSIS — I35.0 AORTIC VALVE STENOSIS, ETIOLOGY OF CARDIAC VALVE DISEASE UNSPECIFIED: ICD-10-CM

## 2022-07-14 DIAGNOSIS — I50.30 HEART FAILURE WITH PRESERVED EJECTION FRACTION, NYHA CLASS I (H): ICD-10-CM

## 2022-07-14 DIAGNOSIS — Z86.73 HISTORY OF STROKE: ICD-10-CM

## 2022-07-14 PROCEDURE — 99310 SBSQ NF CARE HIGH MDM 45: CPT | Performed by: NURSE PRACTITIONER

## 2022-07-14 NOTE — LETTER
"    7/14/2022        RE: Kamari Valentin  [address]  [facility] [address]        [facility] GERIATRICS    Chief Complaint   Patient presents with     RECHECK     HPI:  Kamari Valentin is a 82 year old  (1940), who is being seen today for an episodic care visit at: [facility] ([facility]) [25]. Today's concern is:   Lymphocytic colitis: patient  no c/o abdominal pain or discomfort, stool formed  In therapy patient is walking up to 300 feet using a 4ww with SBA, ongoing left sided weakness from previous stroke. Requires cues for safety  HTN/PAF/AS: /73, 126/79, 96/64 with HR 80-90 range, denies CP, palpitations  Anemia: Hgb 11.9 on 7/7/22  Cognitive impairment: CPT 4.8/5.6, Bims 13/15, SBT 10/28 therapy state patient will need 24 hour supervision at home, [facility] family have agreed to provide 24 hour care at home for patient    Allergies, and PMH/PSH reviewed in [facility] today.  REVIEW OF SYSTEMS:  10 point ROS of systems including Constitutional, Eyes, Respiratory, Cardiovascular, Gastroenterology, Genitourinary, Integumentary, Musculoskeletal, Psychiatric were all negative except for pertinent positives noted in my HPI.    Objective:   /79   Pulse 96   Temp 97.7  F (36.5  C)   Resp 18   Ht 1.702 m (5' 7\")   Wt 68.9 kg (152 lb)   SpO2 96%   BMI 23.81 kg/m    GENERAL APPEARANCE:  Alert, in no distress  ENT:  Mouth and posterior oropharynx normal, moist mucous membranes, [place]  EYES:  EOM, conjunctivae, lids, pupils and irises normal, PERRL  RESP:  respiratory effort and palpation of chest normal, lungs clear to auscultation , no respiratory distress  CV:  Palpation and auscultation of heart done , regular rate and rhythm, no murmur, rub, or gallop, peripheral edema trace+ in LE bilaterally  ABDOMEN:  normal bowel sounds, soft, nontender, no hepatosplenomegaly or other masses  M/S:   patient sitting up in w/c  SKIN:  Inspection of skin and subcutaneous tissue baseline  NEURO:   " speech wnl  PSYCH:  affect and mood normal    Recent labs in [facility] reviewed by me today.  and   Most Recent 3 CBC's:Recent Labs   Lab Test 06/28/22  0726 06/27/22  0817 06/24/22  0657   WBC 9.4 9.6 8.0   HGB 11.2* 11.4* 11.0*   MCV 94 97 96    198 188     Most Recent 3 BMP's:Recent Labs   Lab Test 06/30/22  1315 06/30/22  0629 06/29/22  1616 06/29/22  0714 06/28/22  0726   NA  --  141  --  142 144   POTASSIUM 3.6 3.3* 3.7 3.2* 3.5   CHLORIDE  --  102  --  102 108   CO2  --  36*  --  35* 33*   BUN  --  20  --  10 9   CR  --  0.70  --  0.72 0.78   ANIONGAP  --  3  --  5 3   CLAY  --  8.5  --  8.7 8.8   GLC  --  117*  --  127* 128*       Assessment/Plan:  (K52.832) Lymphocytic colitis  (R53.81) Physical deconditioning  (primary encounter diagnosis)  Comment: acute/ongoing no changes today  Plan: f/u with GI Dr. Mccormick as OP  Continue budesonide 9mg QAM for 14 days then 6mg QAM for 14 days then 3mg QAM for 30 days     (I10) Essential hypertension, benign  (I48.0) Paroxysmal atrial fibrillation (H)  (I35.0) Aortic valve stenosis, etiology of cardiac valve disease unspecified  (I50.30) Heart failure with preserved ejection fraction, NYHA class I (H)  Comment: acute/ongoing no changes today  Plan: severe AS and cAD cardiology recommended AVR and CABG patient declined after experiencing recurrent stroke following angiogram 10/2020, reiterated desire for no treatment Cards f/u 1/2022  Holding PTA losartan 100mg QD and hydralazine 10mg BID due to soft BP during hospitalization  Restart losartan 50mg QD, continue xarelto 20mg QD, ASA 81mg QD, zocor 40mg QD     (Z86.73) History of stroke  (G81.90) Hemiparesis, unspecified hemiparesis etiology, unspecified laterality (H)  Comment: ongoing no changes  Plan: PT and OT as above, continue xarelto 20mg QD, ASA 81mg QD    (R41.89) cognitive impairment  Acute/ongoing CPT 4.8/5.6, BIMS 13/15, SBT 10/28 indicating the nee for 24 hour supervision/care  Family have agreed to provide  24 hour care at home  Plan:     Orders:  No new orders      Electronically signed by: Tonya Lynn Haase, APRN CNP             Sincerely,        Tonya Lynn Haase, APRN CNP

## 2022-07-14 NOTE — PROGRESS NOTES
"[facility] GERIATRICS    Chief Complaint   Patient presents with     RECHECK     HPI:  Kamari Valentin is a 82 year old  (1940), who is being seen today for an episodic care visit at: [facility]) [25]. Today's concern is:   Lymphocytic colitis: patient  no c/o abdominal pain or discomfort, stool formed  In therapy patient is walking up to 300 feet using a 4ww with SBA, ongoing left sided weakness from previous stroke. Requires cues for safety  HTN/PAF/AS: /73, 126/79, 96/64 with HR 80-90 range, denies CP, palpitations  Anemia: Hgb 11.9 on 7/7/22  Cognitive impairment: CPT 4.8/5.6, Bims 13/15, SBT 10/28 therapy state patient will need 24 hour supervision at home, [facility] family have agreed to provide 24 hour care at home for patient    Allergies, and PMH/PSH reviewed in [facility] today.  REVIEW OF SYSTEMS:  10 point ROS of systems including Constitutional, Eyes, Respiratory, Cardiovascular, Gastroenterology, Genitourinary, Integumentary, Musculoskeletal, Psychiatric were all negative except for pertinent positives noted in my HPI.    Objective:   /79   Pulse 96   Temp 97.7  F (36.5  C)   Resp 18   Ht 1.702 m (5' 7\")   Wt 68.9 kg (152 lb)   SpO2 96%   BMI 23.81 kg/m    GENERAL APPEARANCE:  Alert, in no distress  ENT:  Mouth and posterior oropharynx normal, moist mucous membranes, [place]  EYES:  EOM, conjunctivae, lids, pupils and irises normal, PERRL  RESP:  respiratory effort and palpation of chest normal, lungs clear to auscultation , no respiratory distress  CV:  Palpation and auscultation of heart done , regular rate and rhythm, no murmur, rub, or gallop, peripheral edema trace+ in LE bilaterally  ABDOMEN:  normal bowel sounds, soft, nontender, no hepatosplenomegaly or other masses  M/S:   patient sitting up in w/c  SKIN:  Inspection of skin and subcutaneous tissue baseline  NEURO:   speech wnl  PSYCH:  affect and mood normal    Recent labs in [facility] reviewed by me today.  and "   Most Recent 3 CBC's:Recent Labs   Lab Test 06/28/22  0726 06/27/22  0817 06/24/22  0657   WBC 9.4 9.6 8.0   HGB 11.2* 11.4* 11.0*   MCV 94 97 96    198 188     Most Recent 3 BMP's:Recent Labs   Lab Test 06/30/22  1315 06/30/22  0629 06/29/22  1616 06/29/22  0714 06/28/22  0726   NA  --  141  --  142 144   POTASSIUM 3.6 3.3* 3.7 3.2* 3.5   CHLORIDE  --  102  --  102 108   CO2  --  36*  --  35* 33*   BUN  --  20  --  10 9   CR  --  0.70  --  0.72 0.78   ANIONGAP  --  3  --  5 3   CLAY  --  8.5  --  8.7 8.8   GLC  --  117*  --  127* 128*       Assessment/Plan:  (K52.832) Lymphocytic colitis  (R53.81) Physical deconditioning  (primary encounter diagnosis)  Comment: acute/ongoing no changes today  Plan: f/u with GI Dr. Mccormick as OP  Continue budesonide 9mg QAM for 14 days then 6mg QAM for 14 days then 3mg QAM for 30 days     (I10) Essential hypertension, benign  (I48.0) Paroxysmal atrial fibrillation (H)  (I35.0) Aortic valve stenosis, etiology of cardiac valve disease unspecified  (I50.30) Heart failure with preserved ejection fraction, NYHA class I (H)  Comment: acute/ongoing no changes today  Plan: severe AS and cAD cardiology recommended AVR and CABG patient declined after experiencing recurrent stroke following angiogram 10/2020, reiterated desire for no treatment Cards f/u 1/2022  Holding PTA losartan 100mg QD and hydralazine 10mg BID due to soft BP during hospitalization  Restart losartan 50mg QD, continue xarelto 20mg QD, ASA 81mg QD, zocor 40mg QD     (Z86.73) History of stroke  (G81.90) Hemiparesis, unspecified hemiparesis etiology, unspecified laterality (H)  Comment: ongoing no changes  Plan: PT and OT as above, continue xarelto 20mg QD, ASA 81mg QD    (R41.89) cognitive impairment  Acute/ongoing CPT 4.8/5.6, BIMS 13/15, SBT 10/28 indicating the nee for 24 hour supervision/care  Family have agreed to provide 24 hour care at home  Plan:     Orders:  No new orders      Electronically signed by: Berenice  Lynn Haase, APRN CNP

## 2022-07-19 ENCOUNTER — DISCHARGE SUMMARY NURSING HOME (OUTPATIENT)
Dept: GERIATRICS | Facility: CLINIC | Age: 82
End: 2022-07-19
Payer: COMMERCIAL

## 2022-07-19 VITALS
HEIGHT: 67 IN | WEIGHT: 152.8 LBS | SYSTOLIC BLOOD PRESSURE: 128 MMHG | RESPIRATION RATE: 18 BRPM | OXYGEN SATURATION: 95 % | BODY MASS INDEX: 23.98 KG/M2 | TEMPERATURE: 97.9 F | HEART RATE: 70 BPM | DIASTOLIC BLOOD PRESSURE: 64 MMHG

## 2022-07-19 DIAGNOSIS — R41.89 COGNITIVE IMPAIRMENT: ICD-10-CM

## 2022-07-19 DIAGNOSIS — I35.0 AORTIC VALVE STENOSIS, ETIOLOGY OF CARDIAC VALVE DISEASE UNSPECIFIED: ICD-10-CM

## 2022-07-19 DIAGNOSIS — G81.90 HEMIPARESIS, UNSPECIFIED HEMIPARESIS ETIOLOGY, UNSPECIFIED LATERALITY (H): ICD-10-CM

## 2022-07-19 DIAGNOSIS — K52.832 LYMPHOCYTIC COLITIS: Primary | ICD-10-CM

## 2022-07-19 DIAGNOSIS — Z86.73 HISTORY OF STROKE: ICD-10-CM

## 2022-07-19 DIAGNOSIS — I48.0 PAROXYSMAL ATRIAL FIBRILLATION (H): ICD-10-CM

## 2022-07-19 DIAGNOSIS — R53.81 PHYSICAL DECONDITIONING: ICD-10-CM

## 2022-07-19 DIAGNOSIS — I10 ESSENTIAL HYPERTENSION, BENIGN: ICD-10-CM

## 2022-07-19 DIAGNOSIS — I50.30 HEART FAILURE WITH PRESERVED EJECTION FRACTION, NYHA CLASS I (H): ICD-10-CM

## 2022-07-19 PROCEDURE — 99316 NF DSCHRG MGMT 30 MIN+: CPT | Performed by: NURSE PRACTITIONER

## 2022-07-19 NOTE — PROGRESS NOTES
[facility] GERIATRICS DISCHARGE SUMMARY  PATIENT'S NAME: Kamari Valentin  YOB: 1940  MEDICAL RECORD NUMBER:  [id number]  Place of Service where encounter took place:  [facility]) [25]    PRIMARY CARE PROVIDER AND CLINIC RESPONSIBLE AFTER TRANSFER:   Annita Proctor MD, [facility] [address] / [place]*    FMG Provider     Transferring providers: Tonya Lynn Haase, PAULINA CLAIRE, Dr. Emory Matute MD  Recent Hospitalization/ED:  [facility] Hospital stay 6/23/22 to 6/30/22.  Date of SNF Admission: June 30, 2022  Date of SNF (anticipated) Discharge: July 21, 2022  Discharged to: previous independent home  Cognitive Scores: BIMS: 13/15, CPT: 4.8/5.6 and Short blessed: 10/28  Physical Function: Ambulating 300 ft with RW  DME: Walker    CODE STATUS/ADVANCE DIRECTIVES DISCUSSION:  Prior   ALLERGIES: Lisinopril    NURSING FACILITY COURSE   Medication Changes/Rationale:     See assessment and plan    Summary of nursing facility stay:   Patient progressed in therapy to walking up to 300 feet using a RW with SBA, SBA with ADL's will DC to home with home PT, OT, RN, HHA through [facility].     Discharge Medications:    Current Outpatient Medications   Medication Sig Dispense Refill     acetaminophen (TYLENOL) 325 MG tablet Take 2 tablets (650 mg) by mouth every 6 hours as needed for mild pain or fever (> 101 F)       aspirin (ASA) 81 MG EC tablet Take 1 tablet (81 mg) by mouth daily 30 tablet 0     budesonide (ENTOCORT EC) 3 MG EC capsule Take 3 capsules (9 mg) by mouth every morning for 14 days, THEN 2 capsules (6 mg) every morning for 14 days, THEN 1 capsule (3 mg) every morning for 30 days.       cholestyramine (QUESTRAN) 4 g packet Take 2 packets (8 g) by mouth daily Hold for constipation       fluticasone (FLONASE) 50 MCG/ACT nasal spray Spray 1 spray into both nostrils 2 times daily as needed for rhinitis or allergies       losartan (COZAAR) 25 MG  "tablet Take 2 tablets (50 mg) by mouth daily       rivaroxaban ANTICOAGULANT (XARELTO) 20 MG TABS tablet Take 1 tablet (20 mg) by mouth daily (with dinner) 30 tablet 0     simvastatin (ZOCOR) 40 MG tablet Take 1 tablet (40 mg) by mouth daily (Patient taking differently: Take 40 mg by mouth every evening) 30 tablet 0     tamsulosin (FLOMAX) 0.4 MG capsule Take 0.4 mg by mouth 2 times daily       vitamin B-12 (CYANOCOBALAMIN) 1000 MCG tablet Take 3,000 mcg by mouth daily       vitamin D3 (CHOLECALCIFEROL) 50 mcg (2000 units) tablet Take 1 tablet by mouth daily          Post Discharge Medication Reconciliation Status: discharge medications reconciled, continue medications without change.    Controlled medications:   not applicable/none     Past Medical History:   Past Medical History:   Diagnosis Date     Aortic stenosis     mild-moderate     Atrial fibrillation (H)     paroxysmal     Carotid stenosis     mild on right, moderate on left     Chronic renal insufficiency      Essential hypertension, benign      Hyperlipidaemia      Hypertrophy of prostate without urinary obstruction and other lower urinary tract symptoms (LUTS)      Rheumatic fever      Unspecified cerebral artery occlusion with cerebral infarction      Physical Exam:   Vitals: /64   Pulse 70   Temp 97.9  F (36.6  C)   Resp 18   Ht 1.702 m (5' 7\")   Wt 69.3 kg (152 lb 12.8 oz)   SpO2 95%   BMI 23.93 kg/m    BMI: Body mass index is 23.93 kg/m .  GENERAL APPEARANCE:  Alert, in no distress  ENT:  Mouth and posterior oropharynx normal, moist mucous membranes, [place]  EYES:  EOM, conjunctivae, lids, pupils and irises normal, PERRL  RESP:  respiratory effort and palpation of chest normal, lungs clear to auscultation , no respiratory distress  CV:  Palpation and auscultation of heart done , regular rate and rhythm, no murmur, rub, or gallop, peripheral edema trace+ in LE bilaterally  ABDOMEN:  normal bowel sounds, soft, nontender, no hepatosplenomegaly " or other masses  M/S:   patient sitting up in w/c  SKIN:  Inspection of skin and subcutaneous tissue baseline  NEURO:   speeh wnl  PSYCH:  affect and mood normal     SNF labs: Recent labs in [facility] reviewed by me today.  and   Most Recent 3 CBC's:Recent Labs   Lab Test 06/28/22  0726 06/27/22  0817 06/24/22  0657   WBC 9.4 9.6 8.0   HGB 11.2* 11.4* 11.0*   MCV 94 97 96    198 188     Most Recent 3 BMP's:Recent Labs   Lab Test 06/30/22  1315 06/30/22  0629 06/29/22  1616 06/29/22  0714 06/28/22  0726   NA  --  141  --  142 144   POTASSIUM 3.6 3.3* 3.7 3.2* 3.5   CHLORIDE  --  102  --  102 108   CO2  --  36*  --  35* 33*   BUN  --  20  --  10 9   CR  --  0.70  --  0.72 0.78   ANIONGAP  --  3  --  5 3   CLAY  --  8.5  --  8.7 8.8   GLC  --  117*  --  127* 128*     Assessment/Plan:  (K52.832) Lymphocytic colitis  (R53.81) Physical deconditioning  (primary encounter diagnosis)  Comment: acute/ongoing no changes today  Plan: f/u with GI Dr. Mccormick as OP  Continue budesonide 9mg QAM for 14 days then 6mg QAM for 14 days then 3mg QAM for 30 days     (I10) Essential hypertension, benign  (I48.0) Paroxysmal atrial fibrillation (H)  (I35.0) Aortic valve stenosis, etiology of cardiac valve disease unspecified  (I50.30) Heart failure with preserved ejection fraction, NYHA class I (H)  Comment: acute/ongoing no changes today  Plan: severe AS and cAD cardiology recommended AVR and CABG patient declined after experiencing recurrent stroke following angiogram 10/2020, reiterated desire for no treatment Cards f/u 1/2022  Restart losartan 50mg QD (PTA 100mg QD), continue xarelto 20mg QD, ASA 81mg QD, zocor 40mg QD  Did not restart hydralazine 10mg TID which was held during hospitalization     (Z86.73) History of stroke  (G81.90) Hemiparesis, unspecified hemiparesis etiology, unspecified laterality (H)  Comment: ongoing no changes  Plan: DC to home with home PT, OT, RN and HHA through [facility], patient family have agreed to provide 24  hour care and supervision   continue xarelto 20mg QD, ASA 81mg QD     (R41.89) cognitive impairment  Acute/ongoing CPT 4.8/5.6, BIMS 13/15, SBT 10/28 indicating the need for 24 hour supervision/care  Family have agreed to provide 24 hour care at home     DISCHARGE PLAN:    Follow up labs: No labs orders/due    Medical Follow Up:      Follow up with primary care provider in 1 weeks    Current [place] scheduled appointments:     Future Appointments   Date Time Provider Department Center   No future appts.      Discharge Services: Home Care:  Occupational Therapy, Physical Therapy, Registered Nurse and Home Health Aide    Discharge Instructions Verbalized to Patient at Discharge:     None    TOTAL DISCHARGE TIME:   Greater than 30 minutes  Electronically signed by:  Tonya Lynn Haase, APRN CNP

## 2022-07-19 NOTE — LETTER
7/19/2022        RE: Kamari Valentin  [address]  [place] [address]        [facility] GERIATRICS DISCHARGE SUMMARY  PATIENT'S NAME: Kamari Valentin  YOB: 1940  MEDICAL RECORD NUMBER:  [id number]  Place of Service where encounter took place:  [facility]) [25]    PRIMARY CARE PROVIDER AND CLINIC RESPONSIBLE AFTER TRANSFER:   Annita Proctor MD, [facility] [address] / [place]*    G Provider     Transferring providers: Tonya Lynn Haase, PAULINA CLAIRE, Dr. Emory Matute MD  Recent Hospitalization/ED:  [facility] Hospital stay 6/23/22 to 6/30/22.  Date of SNF Admission: June 30, 2022  Date of SNF (anticipated) Discharge: July 21, 2022  Discharged to: previous independent home  Cognitive Scores: BIMS: 13/15, CPT: 4.8/5.6 and Short blessed: 10/28  Physical Function: Ambulating 300 ft with RW  DME: Walker    CODE STATUS/ADVANCE DIRECTIVES DISCUSSION:  Prior   ALLERGIES: Lisinopril    NURSING FACILITY COURSE   Medication Changes/Rationale:     See assessment and plan    Summary of nursing facility stay:   Patient progressed in therapy to walking up to 300 feet using a RW with SBA, SBA with ADL's will DC to home with home PT, OT, RN, HHA through [facility].     Discharge Medications:    Current Outpatient Medications   Medication Sig Dispense Refill     acetaminophen (TYLENOL) 325 MG tablet Take 2 tablets (650 mg) by mouth every 6 hours as needed for mild pain or fever (> 101 F)       aspirin (ASA) 81 MG EC tablet Take 1 tablet (81 mg) by mouth daily 30 tablet 0     budesonide (ENTOCORT EC) 3 MG EC capsule Take 3 capsules (9 mg) by mouth every morning for 14 days, THEN 2 capsules (6 mg) every morning for 14 days, THEN 1 capsule (3 mg) every morning for 30 days.       cholestyramine (QUESTRAN) 4 g packet Take 2 packets (8 g) by mouth daily Hold for constipation       fluticasone (FLONASE) 50 MCG/ACT nasal spray Spray 1 spray into  "both nostrils 2 times daily as needed for rhinitis or allergies       losartan (COZAAR) 25 MG tablet Take 2 tablets (50 mg) by mouth daily       rivaroxaban ANTICOAGULANT (XARELTO) 20 MG TABS tablet Take 1 tablet (20 mg) by mouth daily (with dinner) 30 tablet 0     simvastatin (ZOCOR) 40 MG tablet Take 1 tablet (40 mg) by mouth daily (Patient taking differently: Take 40 mg by mouth every evening) 30 tablet 0     tamsulosin (FLOMAX) 0.4 MG capsule Take 0.4 mg by mouth 2 times daily       vitamin B-12 (CYANOCOBALAMIN) 1000 MCG tablet Take 3,000 mcg by mouth daily       vitamin D3 (CHOLECALCIFEROL) 50 mcg (2000 units) tablet Take 1 tablet by mouth daily          Post Discharge Medication Reconciliation Status: discharge medications reconciled, continue medications without change.    Controlled medications:   not applicable/none     Past Medical History:   Past Medical History:   Diagnosis Date     Aortic stenosis     mild-moderate     Atrial fibrillation (H)     paroxysmal     Carotid stenosis     mild on right, moderate on left     Chronic renal insufficiency      Essential hypertension, benign      Hyperlipidaemia      Hypertrophy of prostate without urinary obstruction and other lower urinary tract symptoms (LUTS)      Rheumatic fever      Unspecified cerebral artery occlusion with cerebral infarction      Physical Exam:   Vitals: /64   Pulse 70   Temp 97.9  F (36.6  C)   Resp 18   Ht 1.702 m (5' 7\")   Wt 69.3 kg (152 lb 12.8 oz)   SpO2 95%   BMI 23.93 kg/m    BMI: Body mass index is 23.93 kg/m .  GENERAL APPEARANCE:  Alert, in no distress  ENT:  Mouth and posterior oropharynx normal, moist mucous membranes, [place]  EYES:  EOM, conjunctivae, lids, pupils and irises normal, PERRL  RESP:  respiratory effort and palpation of chest normal, lungs clear to auscultation , no respiratory distress  CV:  Palpation and auscultation of heart done , regular rate and rhythm, no murmur, rub, or gallop, peripheral edema " trace+ in LE bilaterally  ABDOMEN:  normal bowel sounds, soft, nontender, no hepatosplenomegaly or other masses  M/S:   patient sitting up in w/c  SKIN:  Inspection of skin and subcutaneous tissue baseline  NEURO:   speeh wnl  PSYCH:  affect and mood normal     SNF labs: Recent labs in [facility] reviewed by me today.  and   Most Recent 3 CBC's:Recent Labs   Lab Test 06/28/22  0726 06/27/22  0817 06/24/22  0657   WBC 9.4 9.6 8.0   HGB 11.2* 11.4* 11.0*   MCV 94 97 96    198 188     Most Recent 3 BMP's:Recent Labs   Lab Test 06/30/22  1315 06/30/22  0629 06/29/22  1616 06/29/22  0714 06/28/22  0726   NA  --  141  --  142 144   POTASSIUM 3.6 3.3* 3.7 3.2* 3.5   CHLORIDE  --  102  --  102 108   CO2  --  36*  --  35* 33*   BUN  --  20  --  10 9   CR  --  0.70  --  0.72 0.78   ANIONGAP  --  3  --  5 3   CLAY  --  8.5  --  8.7 8.8   GLC  --  117*  --  127* 128*     Assessment/Plan:  (K52.832) Lymphocytic colitis  (R53.81) Physical deconditioning  (primary encounter diagnosis)  Comment: acute/ongoing no changes today  Plan: f/u with GI Dr. Mccormick as OP  Continue budesonide 9mg QAM for 14 days then 6mg QAM for 14 days then 3mg QAM for 30 days     (I10) Essential hypertension, benign  (I48.0) Paroxysmal atrial fibrillation (H)  (I35.0) Aortic valve stenosis, etiology of cardiac valve disease unspecified  (I50.30) Heart failure with preserved ejection fraction, NYHA class I (H)  Comment: acute/ongoing no changes today  Plan: severe AS and cAD cardiology recommended AVR and CABG patient declined after experiencing recurrent stroke following angiogram 10/2020, reiterated desire for no treatment Cards f/u 1/2022  Restart losartan 50mg QD (PTA 100mg QD), continue xarelto 20mg QD, ASA 81mg QD, zocor 40mg QD  Did not restart hydralazine 10mg TID which was held during hospitalization     (Z86.73) History of stroke  (G81.90) Hemiparesis, unspecified hemiparesis etiology, unspecified laterality (H)  Comment: ongoing no changes  Plan:  DC to home with home PT, OT, RN and HHA through [facility], patient family have agreed to provide 24 hour care and supervision   continue xarelto 20mg QD, ASA 81mg QD     (R41.89) cognitive impairment  Acute/ongoing CPT 4.8/5.6, BIMS 13/15, SBT 10/28 indicating the need for 24 hour supervision/care  Family have agreed to provide 24 hour care at home     DISCHARGE PLAN:    Follow up labs: No labs orders/due    Medical Follow Up:      Follow up with primary care provider in 1 weeks    Current [place] scheduled appointments:     Future Appointments   Date Time Provider Department Center   No future appts.      Discharge Services: Home Care:  Occupational Therapy, Physical Therapy, Registered Nurse and Home Health Aide    Discharge Instructions Verbalized to Patient at Discharge:     None    TOTAL DISCHARGE TIME:   Greater than 30 minutes  Electronically signed by:  Tonya Lynn Haase, APRN CNP                Sincerely,        Tonya Lynn Haase, APRN CNP

## 2022-08-03 ENCOUNTER — PATIENT OUTREACH (OUTPATIENT)
Dept: CARE COORDINATION | Facility: CLINIC | Age: 82
End: 2022-08-03

## 2022-08-03 NOTE — LETTER
Patient Centered Plan of Care  About Me:        Patient Name:  Kamari García    YOB: 1940  Age:         82 year old   Pedro MRN:    [id number] Telephone Information:  Home Phone [phone]   Mobile [phone]       Address:  [address]  [place] [address] Email address:  [email or web address]      Emergency Contact(s)    Name Relationship Lgl Grd Work Phone Home Phone Mobile Phone   1. MAVERICK GARCÍA Spouse   [phone]   2. ABDULKADIR REID Daughter  [phone]           Primary language:  English     needed? No   [place] [employer] [facility]:  [phone] op. 1  Other communication barriers:None    Preferred Method of Communication:  Mail  Current living arrangement: No data recorded  Mobility Status/ Medical Equipment: No data recorded      Health Maintenance  Health Maintenance Reviewed: Up to date      My Access Plan  Medical Emergency 911   Primary Clinic Line [phone]   24 Hour Appointment Line [phone] or  [phone]) (toll-free)   24 Hour Nurse Line [phone] (toll-free)   Preferred Urgent Care No data recorded   Preferred Hospital No data recorded   Preferred Pharmacy [facility] [address] [place] [address]     Behavioral Health Crisis Line The National Suicide Prevention Lifeline at 1-321.629.6874 or 418             My Care Team Members  Patient Care Team       Relationship Specialty Notifications Start End    Annita Proctor MD PCP - General Family Medicine  11/10/20     Phone: [phone] Fax: [phone]         [facility] [address] [place] [address]    Linda Zimmerman, APRN CNP Assigned Heart and Vascular Provider   1/30/22     Phone: [phone] Fax: [phone] [address] [place] [address]    Beulah Briceno LSW Lead Care Coordinator   7/1/22             My Care Plans  Self Management and Treatment  Plan  Goals and (Comments)   Goals        General     1Medical (pt-stated)      Notes - Note created  8/3/2022 11:54 AM by Beulah Briceno, AIDAW     Goal Statement: I will improve my overall strength and well being with the support of home care services by 10/22  Date Goal set: 8/3/22  Barriers: Wait list for staffing  Strengths: Supportive family  Date to Achieve By: 10/22  Patient expressed understanding of goal: Yes  Action steps to achieve this goal:  1. I will schedule weekly visits with home care agency  2. I will work cooperatively at my pt sessions  3. I will advocate for any needs I may have               Action Plans on File:                       Advance Care Plans/Directives Type:   No data recorded    My Medical and Care Information  Problem List   Patient Active Problem List   Diagnosis     Stroke (H)     Hyperlipidemia     Atrial fibrillation (H)     Essential hypertension, benign     Aortic stenosis-moderate, mean gradient 25mmhg echo 2017     Aortic valve stenosis, etiology of cardiac valve disease unspecified     Status post coronary angiogram     Left leg weakness     Ischemic stroke (H)     Stroke (cerebrum) (H)     Dizziness     History of stroke     Hemiparesis, unspecified hemiparesis etiology, unspecified laterality (H)     Enterocolitis     Hypokalemia     Acute kidney injury (H)     Nausea vomiting and diarrhea     Lymphocytic colitis      Current Medications and Allergies:  See printed Medication Report.    Care Coordination Start Date: 8/3/2022   Frequency of Care Coordination: monthly     Form Last Updated: 08/03/2022

## 2022-08-03 NOTE — LETTER
FAFP    August 3, 2022    Kamari Valentin  [address]  [place] [address]      Dear Kamari,        I am a clinic care coordinator who works with Annita Proctor MD with the [facility]. I wanted to thank you for spending the time to talk with me.  Below is a description of clinic care coordination and how I can further assist you.       The clinic care coordination team is made up of a registered nurse, , financial resource worker and community health worker who understand the health care system. The goal of clinic care coordination is to help you manage your health and improve access to the health care system. Our team works alongside your provider to assist you in determining your health and social needs. We can help you obtain health care and community resources, providing you with necessary information and education. We can work with you through any barriers and develop a care plan that helps coordinate and strengthen the communication between you and your care team.    Please feel free to contact me with any questions or concerns regarding care coordination and what we can offer.      We are focused on providing you with the highest-quality healthcare experience possible.    Sincerely,     NIKOS Sheridan  , Care Coordination  [facility]  [phone]  [email or web address]

## 2022-08-03 NOTE — PROGRESS NOTES
Clinic Care Coordination Contact    Clinic Care Coordination Contact  OUTREACH    Referral Information: recently discharged from TCU in July            Chief Complaint   Patient presents with     Clinic Care Coordination - Initial        Universal Utilization:      Utilization    Hospital Admissions  1             ED Visits  3             No Show Count (past year)  0                Current as of: 7/29/2022  8:34 PM              Clinical Concerns:  Current Medical Concerns:  Lymphocytic colitis  Acute hypoxic respiratory failure  Acute on chronic CHF due to severe aortic stenosis  CAD  HTN  HLD  Chronic A-fib  Hypokalemia  Hypomagnesemia   CELESTINO  BPH  Hx CVA  Anemia   Current Behavioral Concerns: None noted  Education Provided to patient: Introduced CC     Health Maintenance Reviewed:    Clinical Pathway: None    Medication Management:  Medication review status: Medications reviewed and no changes reported per patient.             Functional Status:       Living Situation:       Lifestyle & Psychosocial Needs:PC to Gene. He was discharged from TCU on 7/19 with home cares scheduled . Pt. reports that he had the assessment on sunday but was told that it would be a wait time for services due to staffing. [facility] called and left  for [facility] to call this writer back. Pt states his granddaughter has come around to help him with PT exercises which has been helpful to him. He reports that he uses a walker to ambulate around and has built a ramp in the back of his house to get in and out of the home and into his car. Pt reports no additional needs at this time outside of home care supports.    Plan: [facility] will outreach to [facility] and will follow up with pt. within 7-10 days.    Social Determinants of Health     Tobacco Use: Medium Risk     Smoking Tobacco Use: Former Smoker     Smokeless Tobacco Use: Never Used   Alcohol Use: Not on file   Financial Resource Strain: Not on file   Food Insecurity: Not on file    Transportation Needs: Not on file   Physical Activity: Not on file   Stress: Not on file   Social Connections: Not on file   Intimate Partner Violence: Not on file   Depression: Not on file   Housing Stability: Not on file                            Resources and Interventions:  Current Resources:    Home care through [facility]                                    Goals:       Patient/Caregiver understanding: yes           Plan: [facility] follow up with home care supports and connect with pt as appropriate.

## 2022-08-08 ENCOUNTER — PATIENT OUTREACH (OUTPATIENT)
Dept: CARE COORDINATION | Facility: CLINIC | Age: 82
End: 2022-08-08

## 2022-08-08 NOTE — PROGRESS NOTES
Clinic Care Coordination Contact    Follow Up Progress Note      Assessment: PC to Kamari, he was with the homecare nurse. Wife reports that he has had PT come out and SN as well. No OT as of yet. [facility] will outreach in a week to ensure there are no additional needs.      Care Gaps:    Health Maintenance Due   Topic Date Due     ADVANCE CARE PLANNING  Never done     ZOSTER IMMUNIZATION (2 of 3) 09/25/2012     FALL RISK ASSESSMENT  01/06/2021     DTAP/TDAP/TD IMMUNIZATION (2 - Td or Tdap) 10/25/2021     PHQ-2 (once per calendar year)  Never done           Goals addressed this encounter:    Goals Addressed    None         Intervention/Education provided during outreach:      Outreach Frequency: monthly        Plan:   Pt. To continue with PT/ home care nurse  Care Coordinator will follow up in 5-7 days.

## 2022-09-01 ENCOUNTER — PATIENT OUTREACH (OUTPATIENT)
Dept: CARE COORDINATION | Facility: CLINIC | Age: 82
End: 2022-09-01

## 2022-09-01 NOTE — PROGRESS NOTES
Clinic Care Coordination Contact  [facility]/Voicemail       Clinical Data: Care Coordinator Outreach  Outreach attempted x 1.  Left message on patient's voicemail with call back information and requested return call.   Care Coordinator will try to reach patient again in 10 business days.

## 2022-09-02 ENCOUNTER — PATIENT OUTREACH (OUTPATIENT)
Dept: CARE COORDINATION | Facility: CLINIC | Age: 82
End: 2022-09-02

## 2022-09-02 ASSESSMENT — ACTIVITIES OF DAILY LIVING (ADL): DEPENDENT_IADLS:: CLEANING;COOKING;LAUNDRY;SHOPPING;MEAL PREPARATION;MEDICATION MANAGEMENT

## 2022-09-02 NOTE — PROGRESS NOTES
Clinic Care Coordination Contact    Follow Up Progress Note      Assessment: PC to pt. Spoke to his wife. Wife reports that Kamari continues to get home care PT and SN through [facility] care. Wife reports that a  came yesterday and shared a bunch of supportive resources with  Kamari and his wife, which she reports to have been very helpful.   Wife reports that Kamari gets around with a walker and has recently gotten a transfer chair to help with moving from car to wheelchair when necessary. Wife reports that the healing is slow and stressful but states she feels supported with family and home care supports. [facility] offered to reach out in a month after home cares stop ( estimated in 2 weeks) to assess for needs. Pt's wife expressed interest and gratitude.     Care Gaps:    Health Maintenance Due   Topic Date Due     ADVANCE CARE PLANNING  Never done     ZOSTER IMMUNIZATION (2 of 3) 09/25/2012     DTAP/TDAP/TD IMMUNIZATION (2 - Td or Tdap) 10/25/2021     PHQ-2 (once per calendar year)  Never done     INFLUENZA VACCINE (1) 09/01/2022     MEDICARE ANNUAL WELLNESS VISIT  09/14/2022           Care Plans  Care Plan: General     Problem: Insufficient In-home support     Priority: High Onset Date: 8/3/2022    Note:     Goal Statement: I will improve my overall strength and well being with the support of home care services by 10/22  Date Goal set: 8/3/22  Barriers: Wait list for staffing  Strengths: Supportive family  Date to Achieve By: 10/22  Patient expressed understanding of goal: Yes  Action steps to achieve this goal:  1. I will schedule weekly visits with home care agency  2. I will work cooperatively at my pt sessions  3. I will advocate for any needs I may have      Goal: Establish adequate home support                   Care Plan: secure home care suports           Intervention/Education provided during outreach:      Outreach Frequency: monthly        Plan:   Continue with home cares as set up  Care Coordinator  will follow up in 1 month

## 2022-09-09 ENCOUNTER — HOSPITAL ENCOUNTER (EMERGENCY)
Facility: CLINIC | Age: 82
End: 2022-09-09
Payer: COMMERCIAL

## 2022-09-14 ENCOUNTER — APPOINTMENT (OUTPATIENT)
Dept: GENERAL RADIOLOGY | Facility: CLINIC | Age: 82
End: 2022-09-14
Attending: EMERGENCY MEDICINE
Payer: COMMERCIAL

## 2022-09-14 ENCOUNTER — HOSPITAL ENCOUNTER (EMERGENCY)
Facility: CLINIC | Age: 82
Discharge: HOME | End: 2022-09-15
Attending: EMERGENCY MEDICINE | Admitting: EMERGENCY MEDICINE
Payer: COMMERCIAL

## 2022-09-14 ENCOUNTER — APPOINTMENT (OUTPATIENT)
Dept: ULTRASOUND IMAGING | Facility: CLINIC | Age: 82
End: 2022-09-14
Attending: EMERGENCY MEDICINE
Payer: COMMERCIAL

## 2022-09-14 DIAGNOSIS — M79.89 LEFT LEG SWELLING: ICD-10-CM

## 2022-09-14 DIAGNOSIS — M79.672 LEFT FOOT PAIN: ICD-10-CM

## 2022-09-14 LAB
ALBUMIN SERPL-MCNC: 3.6 G/DL (ref 3.4–5)
ALP SERPL-CCNC: 57 U/L (ref 40–150)
ALT SERPL W P-5'-P-CCNC: 20 U/L (ref 0–70)
ANION GAP SERPL CALCULATED.3IONS-SCNC: 5 MMOL/L (ref 3–14)
AST SERPL W P-5'-P-CCNC: 13 U/L (ref 0–45)
ATRIAL RATE - MUSE: 416 BPM
BILIRUB DIRECT SERPL-MCNC: 0.2 MG/DL (ref 0–0.2)
BILIRUB SERPL-MCNC: 0.4 MG/DL (ref 0.2–1.3)
BUN SERPL-MCNC: 23 MG/DL (ref 7–30)
CALCIUM SERPL-MCNC: 9 MG/DL (ref 8.5–10.1)
CHLORIDE BLD-SCNC: 110 MMOL/L (ref 94–109)
CO2 SERPL-SCNC: 28 MMOL/L (ref 20–32)
CREAT SERPL-MCNC: 0.71 MG/DL (ref 0.66–1.25)
CRP SERPL-MCNC: 7.2 MG/L (ref 0–8)
DIASTOLIC BLOOD PRESSURE - MUSE: NORMAL MMHG
ERYTHROCYTE [DISTWIDTH] IN BLOOD BY AUTOMATED COUNT: 13.2 % (ref 10–15)
GFR SERPL CREATININE-BSD FRML MDRD: 90 ML/MIN/1.73M2
GLUCOSE BLD-MCNC: 105 MG/DL (ref 70–99)
HCT VFR BLD AUTO: 37.3 % (ref 40–53)
HGB BLD-MCNC: 12.4 G/DL (ref 13.3–17.7)
INTERPRETATION ECG - MUSE: NORMAL
MCH RBC QN AUTO: 32.5 PG (ref 26.5–33)
MCHC RBC AUTO-ENTMCNC: 33.2 G/DL (ref 31.5–36.5)
MCV RBC AUTO: 98 FL (ref 78–100)
P AXIS - MUSE: NORMAL DEGREES
PLATELET # BLD AUTO: 213 10E3/UL (ref 150–450)
POTASSIUM BLD-SCNC: 3.7 MMOL/L (ref 3.4–5.3)
PR INTERVAL - MUSE: NORMAL MS
PROT SERPL-MCNC: 6.8 G/DL (ref 6.8–8.8)
QRS DURATION - MUSE: 86 MS
QT - MUSE: 418 MS
QTC - MUSE: 406 MS
R AXIS - MUSE: -20 DEGREES
RBC # BLD AUTO: 3.82 10E6/UL (ref 4.4–5.9)
SODIUM SERPL-SCNC: 143 MMOL/L (ref 133–144)
SYSTOLIC BLOOD PRESSURE - MUSE: NORMAL MMHG
T AXIS - MUSE: 39 DEGREES
VENTRICULAR RATE- MUSE: 57 BPM
WBC # BLD AUTO: 7.7 10E3/UL (ref 4–11)

## 2022-09-14 PROCEDURE — 86140 C-REACTIVE PROTEIN: CPT | Performed by: EMERGENCY MEDICINE

## 2022-09-14 PROCEDURE — 85014 HEMATOCRIT: CPT

## 2022-09-14 PROCEDURE — 82248 BILIRUBIN DIRECT: CPT | Performed by: EMERGENCY MEDICINE

## 2022-09-14 PROCEDURE — 84550 ASSAY OF BLOOD/URIC ACID: CPT | Performed by: EMERGENCY MEDICINE

## 2022-09-14 PROCEDURE — 84484 ASSAY OF TROPONIN QUANT: CPT | Performed by: EMERGENCY MEDICINE

## 2022-09-14 PROCEDURE — 93970 EXTREMITY STUDY: CPT

## 2022-09-14 PROCEDURE — 99285 EMERGENCY DEPT VISIT HI MDM: CPT | Mod: 25

## 2022-09-14 PROCEDURE — 83880 ASSAY OF NATRIURETIC PEPTIDE: CPT | Performed by: EMERGENCY MEDICINE

## 2022-09-14 PROCEDURE — 36415 COLL VENOUS BLD VENIPUNCTURE: CPT

## 2022-09-14 PROCEDURE — 80053 COMPREHEN METABOLIC PANEL: CPT | Performed by: EMERGENCY MEDICINE

## 2022-09-14 PROCEDURE — 85027 COMPLETE CBC AUTOMATED: CPT | Performed by: EMERGENCY MEDICINE

## 2022-09-14 PROCEDURE — 73630 X-RAY EXAM OF FOOT: CPT | Mod: LT

## 2022-09-14 PROCEDURE — 93005 ELECTROCARDIOGRAM TRACING: CPT

## 2022-09-14 PROCEDURE — 36415 COLL VENOUS BLD VENIPUNCTURE: CPT | Performed by: EMERGENCY MEDICINE

## 2022-09-14 PROCEDURE — 71046 X-RAY EXAM CHEST 2 VIEWS: CPT

## 2022-09-14 ASSESSMENT — ACTIVITIES OF DAILY LIVING (ADL): ADLS_ACUITY_SCORE: 39

## 2022-09-14 ASSESSMENT — ENCOUNTER SYMPTOMS: SHORTNESS OF BREATH: 0

## 2022-09-15 ENCOUNTER — PATIENT OUTREACH (OUTPATIENT)
Dept: CARE COORDINATION | Facility: CLINIC | Age: 82
End: 2022-09-15

## 2022-09-15 VITALS
BODY MASS INDEX: 24.9 KG/M2 | WEIGHT: 159 LBS | SYSTOLIC BLOOD PRESSURE: 165 MMHG | RESPIRATION RATE: 18 BRPM | TEMPERATURE: 98.6 F | HEART RATE: 61 BPM | DIASTOLIC BLOOD PRESSURE: 66 MMHG | OXYGEN SATURATION: 96 %

## 2022-09-15 LAB
NT-PROBNP SERPL-MCNC: 3089 PG/ML (ref 0–1800)
TROPONIN I SERPL HS-MCNC: 15 NG/L
URATE SERPL-MCNC: 5.6 MG/DL (ref 3.5–7.2)

## 2022-09-15 RX ORDER — CEPHALEXIN 500 MG/1
500 CAPSULE ORAL 3 TIMES DAILY
Qty: 21 CAPSULE | Refills: 0 | Status: SHIPPED | OUTPATIENT
Start: 2022-09-15 | End: 2022-09-22

## 2022-09-15 RX ORDER — PREDNISONE 20 MG/1
TABLET ORAL
Qty: 10 TABLET | Refills: 0 | Status: SHIPPED | OUTPATIENT
Start: 2022-09-15 | End: 2024-01-01

## 2022-09-15 ASSESSMENT — ACTIVITIES OF DAILY LIVING (ADL): ADLS_ACUITY_SCORE: 39

## 2022-09-15 NOTE — ED PROVIDER NOTES
History   Chief Complaint:  Leg Swelling       The history is provided by the patient.      Kamari Valentin is a 82 year old male on Xarelto with history of stroke, hypertension, hyperlipidemia, atrial fibrillation, aortic stenosis, carotid stenosis, and cerebral artery occlusion with cerebral infarction who presents via EMS with swelling to the bilateral legs and pain to the lateral aspect of the left foot. He states the swelling began 1-2 weeks ago. Approximately 6 days ago, he had new onset of pain, best characterized as soreness, to the left foot. He iced and rested the foot, which alleviated the pain until it returned yesterday and localized to the later aspect of the foot. He denies any injury to the area. He denies pain to the right foot. [facility] and [facility] are concerned of blood clot. He does have history of gout (x1). He denies shortness of breath and chest pain.    Review of Systems   Respiratory: Negative for shortness of breath.    Cardiovascular: Positive for leg swelling. Negative for chest pain.   Musculoskeletal:        (+) pain to left foot   All other systems reviewed and are negative.        Allergies:  Lisinopril    Medications:  Aspirin  Budesonide  Losartan  Xarelto  Simvastatin  Tamsulosin    Past Medical History:     Stroke  Hyperlipidemia  Hypertension  Atrial fibrillation  Aortic stenosis  Aortic valve stenosis  Enterocolitis  Lymphocytic colitis   Carotid stenosis  Chronic renal insufficiency  Hypertrophy of prostate  Cerebral artery occulusion  Cerebral infarction    Past Surgical History:    Colonoscopy x2  Coronary angiogram  Right heart cath  Septoplasty   Tonsillectomy  Hernia repair  Herniorrhaphy inguinal  Left wrist fracture surgery      Family History:     Respiratory  CAD  AAA    Social History:  The patient presents with other.  The patient presents via EMS.  PCP: Annita Proctor MD    Physical Exam     Patient Vitals for the past 24 hrs:   BP  Temp Temp src Pulse Resp SpO2 Weight   09/14/22 2330 (!) 165/66 -- -- -- -- 96 % --   09/14/22 2326 (!) 165/66 -- -- 61 18 97 % --   09/14/22 1919 -- 98.6  F (37  C) Oral 56 20 100 % 72.1 kg (159 lb)       Physical Exam  General: Patient is alert and normal appearing.  HEENT: Head atraumatic    Eyes: pupils equal and reactive. Conjunctiva clear   Nares: patent   Oropharynx: no lesions, uvula midline, no palatal draping, normal voice, no trismus  Neck: Supple without lymphadenopathy, no meningismus  Chest: Heart regular rate and rhythm.   Lungs: Equal clear to auscultation with no wheeze or rales  Abdomen: Soft, non tender, nondistended, normal bowel sounds  Back: No costovertebral angle tenderness, no midline C, T or L spine tenderness  Neuro: Grossly nonfocal, normal speech, strength equal bilaterally, CN 2-12 intact  Extremities: No deformities, equal radial and DP pulses. No clubbing, cyanosis.  Bilateral  lower extremity edema left greater than right.  Left foot with fifth toe MTP tenderness, erythema, warmth extending over the dorsal aspect of the foot.  Toes are warm and well-perfused.  Normal DP pulse.  Edema up to the ankle.  Skin: Warm and dry with no rash.       Emergency Department Course   ECG  ECG results from 09/14/22   EKG 12 lead     Value    Systolic Blood Pressure     Diastolic Blood Pressure     Ventricular Rate 57    Atrial Rate 416    AR Interval *    QRS Duration 86        QTc 406    P Axis *    R AXIS -20    T Axis 39    Interpretation ECG      Atrial fibrillation with slow ventricular response with a competing junctional pacemaker  Minimal voltage criteria for LVH, may be normal variant  Possible Anterior infarct , age undetermined  Abnormal ECG  When compared with ECG of 27-JUN-2022 10:44,  Confirmed by GENERATED REPORT, COMPUTER ([id number]),  Aasen, Bradley ([id number]) on 9/14/2022 7:46:51 PM    Read 8150         Imaging:  Foot XR, G/E 3 views, left   Final Result   IMPRESSION:    1. No  visualized acute fracture, malalignment or other acute osseous abnormality of the left foot.    2. Mild degenerative changes in the first metatarsophalangeal joint.      US Lower Extremity Venous Duplex Bilateral   Final Result   IMPRESSION:   1.  No deep venous thrombosis in the bilateral lower extremities.      XR Chest 2 Views   Final Result   IMPRESSION: No pleural fluid or pneumothorax. There is a new 19 mm opacity over the right midlung which could represent a tiny focus of airspace disease. No interstitial edema. The cardiomediastinal silhouette at the upper limits of normal in size. There    is aortic tortuosity and calcification.         Report per radiology    Laboratory:  Labs Ordered and Resulted from Time of ED Arrival to Time of ED Departure   CBC WITH PLATELETS - Abnormal       Result Value    WBC Count 7.7      RBC Count 3.82 (*)     Hemoglobin 12.4 (*)     Hematocrit 37.3 (*)     MCV 98      MCH 32.5      MCHC 33.2      RDW 13.2      Platelet Count 213     NT PROBNP INPATIENT - Abnormal    N terminal Pro BNP Inpatient 3,089 (*)    BASIC METABOLIC PANEL - Abnormal    Sodium 143      Potassium 3.7      Chloride 110 (*)     Carbon Dioxide (CO2) 28      Anion Gap 5      Urea Nitrogen 23      Creatinine 0.71      Calcium 9.0      Glucose 105 (*)     GFR Estimate 90     HEPATIC FUNCTION PANEL - Normal    Bilirubin Total 0.4      Bilirubin Direct 0.2      Protein Total 6.8      Albumin 3.6      Alkaline Phosphatase 57      AST 13      ALT 20     CRP INFLAMMATION - Normal    CRP Inflammation 7.2     TROPONIN I - Normal    Troponin I High Sensitivity 15     URIC ACID - Normal    Uric Acid 5.6        Emergency Department Course:    Reviewed:  I reviewed nursing notes, vitals, past medical history and Care Everywhere    Assessments:  2135 I obtained history and examined the patient as noted above.   0013 I rechecked the patient and explained findings.     Disposition:  The patient was discharged to home.      Impression & Plan     Medical Decision Making:  Kamari Valentin is a 82 year old male who presents for evaluation of skin redness.  The history, physical exam and supporting data are consistent with cellulitis.  There is a possibility that there is a component of gout given the location at the MTP joint and it started with pain initially prior to the erythema and warmth and a history of gout in this patient.  His uric acid is normal however it still may be gout in this joint.  Do not feel there is enough of an effusion to tap the MTP joint.  Doubt septic joint.  Patient with normal white blood cell count, afebrile, no elevation of his inflammatory markers.  There do not appear at this time to be any complication of cellulitis including necrotizing fascitis, lymphangitis, lymphadenitis, abscess, osteomyelitis, sepsis, or shock.  The patient is not immunosuppressed.  Supportive outpatient management is indicated with antibiotics.  Close follow-up of primary care physician to ensure no progression and rapid resolution.  Area of cellulitis was outlined.  Cellulitis precautions for home.  We will start the patient on Keflex.  We will also place him on low-dose prednisone in case there is a component of gout given his history of gout in the location at the joint.  He is encouraged to elevate his legs.  Return cautions emergency department were reviewed.  Patient's wife expressed concern about discharging in the middle of the night.  He was offered observation stay but she would not be able to stay with him and therefore they feel comfortable if we get him to the car going home.  He was road tested with a walker here which went well.  Feel the patient is safe for discharge at this time with oral antibiotics as well as low-dose prednisone and close follow-up with primary care.  Return precautions the emergency department.          Diagnosis:    ICD-10-CM    1. Left leg swelling  M79.89    2. Left foot pain  M79.672         Discharge Medications:  New Prescriptions    CEPHALEXIN (KEFLEX) 500 MG CAPSULE    Take 1 capsule (500 mg) by mouth 3 times daily for 7 days    PREDNISONE (DELTASONE) 20 MG TABLET    Take one tablet (20 mg) each day for 5 (five) days       Scribe Disclosure:  Yesy WILSON, am serving as a scribe at 9:27 PM on 9/14/2022 to document services personally performed by Flori Portillo MD based on my observations and the provider's statements to me.          Flori Portillo MD  09/15/22 0133

## 2022-09-15 NOTE — ED TRIAGE NOTES
S: pedal edema   B: home nurse was out this am and noted pt to have increased pedal edema,   No sob  No weight gain ,   A:  Awake and alert pedal edema noted,  hypertensive in triage on coumadin for chronic a-fib     Triage Assessment     Row Name 09/14/22 1922       Triage Assessment (Adult)    Airway WDL WDL       Respiratory WDL    Respiratory WDL WDL       Skin Circulation/Temperature WDL    Skin Circulation/Temperature WDL WDL       Cardiac WDL    Cardiac WDL X       Peripheral/Neurovascular WDL    Peripheral Neurovascular WDL WDL       Cognitive/Neuro/Behavioral WDL    Cognitive/Neuro/Behavioral WDL WDL

## 2022-09-15 NOTE — PROGRESS NOTES
Clinic Care Coordination Contact    Follow Up Progress Note      Assessment: PC to Kamari and his wife after his visit to the ED last night for foot pain. Per pt and wife, they were recommended to go to the ED in the event he was experiencing a blood clot.     Epic chart reports:  Kamari Valentin is a 82 year old male who presents for evaluation of skin redness.  The history, physical exam and supporting data are consistent with cellulitis.  There is a possibility that there is a component of gout given the location at the MTP joint and it started with pain initially prior to the erythema and warmth and a history of gout in this patient.  His uric acid is normal however it still may be gout in this joint.  Do not feel there is enough of an effusion to tap the MTP joint.  Doubt septic joint.  Patient with normal white blood cell count, afebrile, no elevation of his inflammatory markers.  There do not appear at this time to be any complication of cellulitis including necrotizing fascitis, lymphangitis, lymphadenitis, abscess, osteomyelitis, sepsis, or shock.  The patient is not immunosuppressed.  Supportive outpatient management is indicated with antibiotics.  Close follow-up of primary care physician to ensure no progression and rapid resolution.  Area of cellulitis was outlined.  Cellulitis precautions for home.  We will start the patient on Keflex.  We will also place him on low-dose prednisone in case there is a component of gout given his history of gout in the location at the joint.  He is encouraged to elevate his legs.  Return cautions emergency department were reviewed.  Patient's wife expressed concern about discharging in the middle of the night.  He was offered observation stay but she would not be able to stay with him and therefore they feel comfortable if we get him to the car going home.  He was road tested with a walker here which went well.  Feel the patient is safe for discharge at this time with oral  "antibiotics as well as low-dose prednisone and close follow-up with primary care.  Return precautions the emergency department.    Pt was given Keflex and prednisone upon discharge. Pt reports his foot still hurts but is somewhat better. Wife reports that he continues to use a walker and they have purchased a transport chair to help with transitions between home and car. they have put in a ramp to assist with coming and going of the house.     Pt continues to struggle with frequent incontinence and wears \"heavy duty pull ups\" at night. He sees Urology tomorrow . Pt finished up with GI provider. [facility] comes 3-4 times a week for SN and PT . Today , OT showed up a swell. Wife reports pt has not been referred to hospice yet as he continues to do okay. Pt has a follow up with pcp for 9/21. [facility] will outreach in  a month to assess for needs.    Care Gaps:    Health Maintenance Due   Topic Date Due     ADVANCE CARE PLANNING  Never done     ZOSTER IMMUNIZATION (2 of 3) 09/25/2012     DTAP/TDAP/TD IMMUNIZATION (2 - Td or Tdap) 10/25/2021     PHQ-2 (once per calendar year)  Never done     MEDICARE ANNUAL WELLNESS VISIT  09/14/2022           Care Plans  Care Plan: General     Problem: Insufficient In-home support     Priority: High Onset Date: 8/3/2022    Note:     Goal Statement: I will improve my overall strength and well being with the support of home care services by 10/22  Date Goal set: 8/3/22  Barriers: Wait list for staffing  Strengths: Supportive family  Date to Achieve By: 10/22  Patient expressed understanding of goal: Yes  Action steps to achieve this goal:  1. I will schedule weekly visits with home care agency  2. I will work cooperatively at my pt sessions  3. I will advocate for any needs I may have      Goal: Establish adequate home support     This Visit's Progress: 50%                  Care Plan: secure home care suports           Intervention/Education provided during outreach:               Plan: "   Follow up with urology, home cares and appt with pcp on 9/21  Care Coordinator will follow up in 1 month

## 2022-10-04 ENCOUNTER — PATIENT OUTREACH (OUTPATIENT)
Dept: CARE COORDINATION | Facility: CLINIC | Age: 82
End: 2022-10-04

## 2022-10-04 ASSESSMENT — ACTIVITIES OF DAILY LIVING (ADL): DEPENDENT_IADLS:: CLEANING;COOKING;LAUNDRY;SHOPPING;MEAL PREPARATION;MEDICATION MANAGEMENT

## 2022-10-04 NOTE — PROGRESS NOTES
Clinic Care Coordination Contact  [facility]/Voicemail    Referral Source: SNF/TCU  Clinical Data: Care Coordinator Outreach  Outreach attempted x 2.  Left message on patient's voicemail with call back information and requested return call.   Care Coordinator will try to reach patient again in 10 business days.

## 2022-10-10 ENCOUNTER — PATIENT OUTREACH (OUTPATIENT)
Dept: CARE COORDINATION | Facility: CLINIC | Age: 82
End: 2022-10-10

## 2022-10-10 ASSESSMENT — ACTIVITIES OF DAILY LIVING (ADL): DEPENDENT_IADLS:: CLEANING;COOKING;LAUNDRY;SHOPPING;MEAL PREPARATION;MEDICATION MANAGEMENT

## 2022-10-10 NOTE — PROGRESS NOTES
Clinic Care Coordination Contact    Follow Up Progress Note      Assessment: PC from Kamari's wife sattng that Kamari has an appt with Urology tomorrow and will have a cortisone shot in his shoulder next week with [facility]. [facility] supports have stopped however  Pt and wife are now receiving meals by [employer] which have been a great support. Wife has no additional concerns or needs at this time but will contact this writer with any concerns/ needs she/they may have.    Care Gaps:    Health Maintenance Due   Topic Date Due     ADVANCE CARE PLANNING  Never done     HEPATITIS B IMMUNIZATION (1 of 3 - 3-dose series) Never done     ZOSTER IMMUNIZATION (2 of 3) 09/25/2012     DTAP/TDAP/TD IMMUNIZATION (2 - Td or Tdap) 10/25/2021     PHQ-2 (once per calendar year)  Never done     COVID-19 Vaccine (5 - Booster for Pfizer series) 06/22/2022     MEDICARE ANNUAL WELLNESS VISIT  09/14/2022           Care Plans      Intervention/Education provided during outreach:               Plan:   Pt to continue care plan as scheduled  Care Coordinator will close out CC.

## 2022-10-22 ENCOUNTER — HEALTH MAINTENANCE LETTER (OUTPATIENT)
Age: 82
End: 2022-10-22

## 2023-01-01 ENCOUNTER — HEALTH MAINTENANCE LETTER (OUTPATIENT)
Age: 83
End: 2023-01-01

## 2023-01-01 ENCOUNTER — PATIENT OUTREACH (OUTPATIENT)
Dept: CARE COORDINATION | Facility: CLINIC | Age: 83
End: 2023-01-01
Payer: COMMERCIAL

## 2023-01-01 ASSESSMENT — ACTIVITIES OF DAILY LIVING (ADL): DEPENDENT_IADLS:: COOKING;LAUNDRY;SHOPPING;MEAL PREPARATION;TRANSPORTATION

## 2023-01-20 ENCOUNTER — TRANSCRIBE ORDERS (OUTPATIENT)
Dept: OTHER | Age: 83
End: 2023-01-20

## 2023-01-20 DIAGNOSIS — Z86.73 STATUS POST CVA: Primary | ICD-10-CM

## 2023-06-15 NOTE — PROGRESS NOTES
HPI and Plan:   See dictation    Orders Placed This Encounter   Procedures     Follow-Up with Cardiac Advanced Practice Provider     Follow-Up with Electrophysiologist     EKG 12-lead complete w/read - Clinics (performed today)       No orders of the defined types were placed in this encounter.      There are no discontinued medications.      Encounter Diagnoses   Name Primary?     Atrial fibrillation (H)      Paroxysmal atrial fibrillation (H) Yes       CURRENT MEDICATIONS:  Current Outpatient Prescriptions   Medication Sig Dispense Refill     rivaroxaban ANTICOAGULANT (XARELTO) 20 MG TABS tablet Take 1 tablet (20 mg) by mouth daily 90 tablet 0     hydrochlorothiazide (HYDRODIURIL) 25 MG tablet Take 1 tablet (25 mg) by mouth daily 90 tablet 0     amlodipine (NORVASC) 10 MG tablet Take 1 tablet by mouth daily.       cloNIDINE (CATAPRES) 0.1 MG tablet Take 1 tablet by mouth daily.       simvastatin (ZOCOR) 40 MG tablet Take 1 tablet by mouth daily.       hydrALAZINE (APRESOLINE) 25 MG tablet Take 25 mg by mouth 2 times daily.         ALLERGIES     Allergies   Allergen Reactions     Lisinopril        PAST MEDICAL HISTORY:  Past Medical History:   Diagnosis Date     Aortic stenosis     mild-moderate     Atrial fibrillation (H)     paroxysmal     Carotid stenosis     mild on right, moderate on left     Chronic renal insufficiency      Essential hypertension, benign      Hyperlipidaemia      Hypertrophy of prostate without urinary obstruction and other lower urinary tract symptoms (LUTS)      Rheumatic fever      Unspecified cerebral artery occlusion with cerebral infarction        PAST SURGICAL HISTORY:  Past Surgical History:   Procedure Laterality Date     ENT SURGERY      SEPTOPLASTY     ENT SURGERY      TONSILLECTOMY     HERNIA REPAIR       HERNIORRHAPHY INGUINAL  2/15/2012    Procedure:HERNIORRHAPHY INGUINAL; LEFT INGUINAL HERNIA REPAIR WITH MESH; Surgeon:SHILO READ; Location:[facility]     ORTHOPEDIC SURGERY  [facility]  "ankle 2/2013    ORIF LEFT WRIST FRACTURE       FAMILY HISTORY:  Family History   Problem Relation Age of Onset     Respiratory Brother      Coronary Artery Disease Early Onset Mother      Abdominal Aortic Aneurysm Father        SOCIAL HISTORY:  Social History     Social History     Marital status:      Spouse name: N/A     Number of children: N/A     Years of education: N/A     Social History Main Topics     Smoking status: Former Smoker     Years: 20.00     Types: Cigars, Pipe     Quit date: 9/1/2010     Smokeless tobacco: Never Used     Alcohol use 0.0 oz/week     0 Standard drinks or equivalent per week      Comment: MINIMAL AMOUNT     Drug use: No     Sexual activity: Not Asked     Other Topics Concern     None     Social History Narrative       Review of Systems:  Skin:  Negative       Eyes:  Positive for glasses    ENT:  Negative      Respiratory:  Negative       Cardiovascular:  Negative      Gastroenterology: Negative      Genitourinary:  Negative      Musculoskeletal:  Negative      Neurologic:  Negative      Psychiatric:  Negative      Heme/Lymph/Imm:  Negative      Endocrine:  Negative        Physical Exam:  Vitals: /80  Pulse 60  Ht 1.702 m (5' 7\")  Wt 83.9 kg (185 lb)  BMI 28.98 kg/m2    Constitutional:  cooperative, alert and oriented, well developed, well nourished, in no acute distress        Skin:  warm and dry to the touch, no apparent skin lesions or masses noted        Head:  normocephalic, no masses or lesions        Eyes:  pupils equal and round;conjunctivae and lids unremarkable   wear eye glasses    ENT:  no pallor or cyanosis, dentition good        Neck:  JVP normal transmitted murmur      Chest:  normal breath sounds, clear to auscultation, normal A-P diameter, normal symmetry, normal respiratory excursion, no use of accessory muscles          Cardiac: regular rhythm       grade 2;grade 3;systolic ejection murmur     regular rhythm, normal heart sounds, III/VI systolic " 15-Cam-2023 02:33 murmur over the aortic valve area    Abdomen:  abdomen soft        Vascular: pulses full and equal                                        Extremities and Back:  no deformities, clubbing, cyanosis, erythema observed;no edema              Neurological:  affect appropriate, oriented to time, person and place              CC  Reyes Curtis MD   [facility]  [address]  [address]  [place] [id number]

## 2023-11-15 NOTE — PROGRESS NOTES
Clinic Care Coordination Contact  [facility]/Voicemail    Clinical Data: Care Coordinator Outreach. Referral received from PCP indicating that pt's daughter reached out to clinic regarding questions for increasing care in the home for patient/spouse.    Outreach Documentation Number of Outreach Attempt   11/15/2023  11:46 AM 1       Left message on  daughter's (Rachael)  voicemail with call back information and requested return call.    Plan:Care Coordinator will try to reach patient again in 3-5 business days.    Rachael Veliz [facility]  Social Work Care Coordinator  Phone: [phone]

## 2023-11-15 NOTE — LETTER
[facility]  [address]  [place] [address]   [phone]         November 21, 2023    Kamari Valentin  [address] [place] [id number] [phone]          Dear Kamari,    I am a clinic care coordinator who works with Annita Proctor MD at [facility]. I wanted to thank you for spending the time to talk with me.  Below is a description of clinic care coordination and how I can further assist you.     The goal of clinic care coordination is to help you manage your health and improve access to the health care system. Our we work alongside your provider to assist you in determining your health and social needs. We can help you obtain health care and community resources, providing you with necessary information and education. We can work with you through barriers and develop a care plan that helps coordinate and strengthen the communication between you and your care team.  Our services are voluntary and are offered without charge to you personally.    Please feel free to contact me at [phone], with any questions or concerns. We at [facility] are focused on providing you with the highest-quality healthcare experience possible and that all starts with you.     Sincerely,     JUANCARLOS SmithSW

## 2023-11-15 NOTE — LETTER
Patient Centered Plan of Care  About Me:        Patient Name:  Kamari García    YOB: 1940  Age:         83 year old   ePdro MRN:    [id number] Telephone Information:  Home Phone [phone]   Mobile [phone]       Address:  [address]  [place] [address] Email address:  [email or web address]      Emergency Contact(s)    Name Relationship Lgl Grd Work Phone Home Phone Mobile Phone   1. MAVERICK GARCÍA Spouse   [phone]   2. RACHAEL REID Daughter  [phone]           Primary language:  English     needed? No   [facility]:  [phone] op. 1  Other communication barriers:No data recorded  Preferred Method of Communication:  Mail  Current living arrangement: I live in a private home with spouse    Mobility Status/ Medical Equipment: Independent w/Device        Health Maintenance  Health Maintenance Reviewed: No data recorded    My Access Plan  Medical Emergency 911   Primary Clinic Line [facility] - [phone]   24 Hour Appointment Line [phone] or  [phone]) (toll-free)   24 Hour Nurse Line [phone] (toll-free)   Preferred Urgent Care No data recorded   Preferred Hospital No data recorded   Preferred Pharmacy [facility] [address] [place] [address]     Behavioral Health Crisis Line The National Suicide Prevention Lifeline at 1-382.513.7068 or Text/Call 988           My Care Team Members  Patient Care Team         Relationship Specialty Notifications Start End    Annita Proctor MD PCP - General Family Medicine  11/10/20     Phone: [phone] Fax: [phone] [address] [place] [address]    Rachael Veliz, [facility] Lead Care Coordinator Primary Care - CC Admissions 11/15/23     Phone: [phone]                     My Care Plans  Self Management and Treatment Plan    Care Plan  Care Plan: Help At Home        Problem: Insufficient In-home support       Goal: Establish adequate home support       Start Date: 11/21/2023 Expected End Date: 2/21/2024    Priority: High    Note:     Barriers: New to utilizing services   Strengths: Supportive family and financial means  Patient expressed understanding of goal: Yes  Action steps to achieve this goal:  1. I will review the resources that [facility] sends  2. I will start receiving help in the home through agency  3. I will consider assisted living if needed                               Action Plans on File:                       Advance Care Plans/Directives:   Advanced Care Plan/Directives on file: No data recorded  Discussed with patient/caregiver(s): No data recorded           My Medical and Care Information  Problem List   Patient Active Problem List   Diagnosis    Stroke (H)    Hyperlipidemia    Atrial fibrillation (H)    Essential hypertension, benign    Aortic stenosis-moderate, mean gradient 25mmhg echo 2017    Aortic valve stenosis, etiology of cardiac valve disease unspecified    Status post coronary angiogram    Left leg weakness    Ischemic stroke (H)    Stroke (cerebrum) (H)    Dizziness    History of stroke    Hemiparesis, unspecified hemiparesis etiology, unspecified laterality (H)    Enterocolitis    Hypokalemia    Acute kidney injury (H24)    Nausea vomiting and diarrhea    Lymphocytic colitis      Current Medications and Allergies:  See printed Medication Report.    Care Coordination Start Date: 11/15/2023   Frequency of Care Coordination: monthly, more frequently as needed     Form Last Updated: 11/21/2023

## 2024-01-01 ENCOUNTER — PATIENT OUTREACH (OUTPATIENT)
Dept: CARE COORDINATION | Facility: CLINIC | Age: 84
End: 2024-01-01
Payer: COMMERCIAL

## 2024-01-01 ENCOUNTER — APPOINTMENT (OUTPATIENT)
Dept: GENERAL RADIOLOGY | Facility: CLINIC | Age: 84
End: 2024-01-01
Attending: EMERGENCY MEDICINE
Payer: COMMERCIAL

## 2024-01-01 ENCOUNTER — HOSPITAL ENCOUNTER (OUTPATIENT)
Facility: CLINIC | Age: 84
Setting detail: OBSERVATION
Discharge: HOME | End: 2024-01-25
Attending: EMERGENCY MEDICINE | Admitting: INTERNAL MEDICINE
Payer: COMMERCIAL

## 2024-01-01 ENCOUNTER — APPOINTMENT (OUTPATIENT)
Dept: CARDIOLOGY | Facility: CLINIC | Age: 84
End: 2024-01-01
Attending: INTERNAL MEDICINE
Payer: COMMERCIAL

## 2024-01-01 VITALS
WEIGHT: 142 LBS | RESPIRATION RATE: 26 BRPM | BODY MASS INDEX: 22.82 KG/M2 | DIASTOLIC BLOOD PRESSURE: 90 MMHG | HEART RATE: 66 BPM | TEMPERATURE: 98 F | HEIGHT: 66 IN | OXYGEN SATURATION: 97 % | SYSTOLIC BLOOD PRESSURE: 163 MMHG

## 2024-01-01 DIAGNOSIS — I48.20 CHRONIC ATRIAL FIBRILLATION (H): ICD-10-CM

## 2024-01-01 DIAGNOSIS — R55 SYNCOPE AND COLLAPSE: ICD-10-CM

## 2024-01-01 DIAGNOSIS — R06.00 DYSPNEA, UNSPECIFIED TYPE: ICD-10-CM

## 2024-01-01 LAB
ALBUMIN SERPL BCG-MCNC: 3.7 G/DL (ref 3.5–5.2)
ALP SERPL-CCNC: 48 U/L (ref 40–150)
ALT SERPL W P-5'-P-CCNC: 20 U/L (ref 0–70)
ANION GAP SERPL CALCULATED.3IONS-SCNC: 9 MMOL/L (ref 7–15)
AST SERPL W P-5'-P-CCNC: 28 U/L (ref 0–45)
ATRIAL RATE - MUSE: NORMAL BPM
BASOPHILS # BLD AUTO: 0 10E3/UL (ref 0–0.2)
BASOPHILS NFR BLD AUTO: 0 %
BILIRUB SERPL-MCNC: 0.4 MG/DL
BUN SERPL-MCNC: 19.3 MG/DL (ref 8–23)
CALCIUM SERPL-MCNC: 8.9 MG/DL (ref 8.8–10.2)
CHLORIDE SERPL-SCNC: 107 MMOL/L (ref 98–107)
CREAT SERPL-MCNC: 1.09 MG/DL (ref 0.67–1.17)
DEPRECATED HCO3 PLAS-SCNC: 29 MMOL/L (ref 22–29)
DIASTOLIC BLOOD PRESSURE - MUSE: NORMAL MMHG
EGFRCR SERPLBLD CKD-EPI 2021: 67 ML/MIN/1.73M2
EOSINOPHIL # BLD AUTO: 0.1 10E3/UL (ref 0–0.7)
EOSINOPHIL NFR BLD AUTO: 2 %
ERYTHROCYTE [DISTWIDTH] IN BLOOD BY AUTOMATED COUNT: 14.4 % (ref 10–15)
GLUCOSE SERPL-MCNC: 120 MG/DL (ref 70–99)
HCT VFR BLD AUTO: 32.3 % (ref 40–53)
HGB BLD-MCNC: 10.8 G/DL (ref 13.3–17.7)
HOLD SPECIMEN: NORMAL
HOLD SPECIMEN: NORMAL
IMM GRANULOCYTES # BLD: 0 10E3/UL
IMM GRANULOCYTES NFR BLD: 0 %
INTERPRETATION ECG - MUSE: NORMAL
LVEF ECHO: NORMAL
LYMPHOCYTES # BLD AUTO: 1 10E3/UL (ref 0.8–5.3)
LYMPHOCYTES NFR BLD AUTO: 16 %
MAGNESIUM SERPL-MCNC: 1.8 MG/DL (ref 1.7–2.3)
MCH RBC QN AUTO: 33.6 PG (ref 26.5–33)
MCHC RBC AUTO-ENTMCNC: 33.4 G/DL (ref 31.5–36.5)
MCV RBC AUTO: 101 FL (ref 78–100)
MONOCYTES # BLD AUTO: 0.5 10E3/UL (ref 0–1.3)
MONOCYTES NFR BLD AUTO: 9 %
NEUTROPHILS # BLD AUTO: 4.3 10E3/UL (ref 1.6–8.3)
NEUTROPHILS NFR BLD AUTO: 73 %
NRBC # BLD AUTO: 0 10E3/UL
NRBC BLD AUTO-RTO: 0 /100
NT-PROBNP SERPL-MCNC: ABNORMAL PG/ML (ref 0–1800)
P AXIS - MUSE: NORMAL DEGREES
PLATELET # BLD AUTO: 187 10E3/UL (ref 150–450)
POTASSIUM SERPL-SCNC: 3.7 MMOL/L (ref 3.4–5.3)
PR INTERVAL - MUSE: NORMAL MS
PROCALCITONIN SERPL IA-MCNC: 0.05 NG/ML
PROT SERPL-MCNC: 6 G/DL (ref 6.4–8.3)
QRS DURATION - MUSE: 100 MS
QT - MUSE: 446 MS
QTC - MUSE: 441 MS
R AXIS - MUSE: -15 DEGREES
RBC # BLD AUTO: 3.21 10E6/UL (ref 4.4–5.9)
SODIUM SERPL-SCNC: 145 MMOL/L (ref 135–145)
SYSTOLIC BLOOD PRESSURE - MUSE: NORMAL MMHG
T AXIS - MUSE: 143 DEGREES
TROPONIN T SERPL HS-MCNC: 33 NG/L
VENTRICULAR RATE- MUSE: 59 BPM
WBC # BLD AUTO: 5.9 10E3/UL (ref 4–11)

## 2024-01-01 PROCEDURE — G0378 HOSPITAL OBSERVATION PER HR: HCPCS

## 2024-01-01 PROCEDURE — 99285 EMERGENCY DEPT VISIT HI MDM: CPT

## 2024-01-01 PROCEDURE — 84145 PROCALCITONIN (PCT): CPT | Performed by: INTERNAL MEDICINE

## 2024-01-01 PROCEDURE — 99236 HOSP IP/OBS SAME DATE HI 85: CPT | Performed by: INTERNAL MEDICINE

## 2024-01-01 PROCEDURE — 250N000013 HC RX MED GY IP 250 OP 250 PS 637: Performed by: INTERNAL MEDICINE

## 2024-01-01 PROCEDURE — 99223 1ST HOSP IP/OBS HIGH 75: CPT | Mod: 25 | Performed by: INTERNAL MEDICINE

## 2024-01-01 PROCEDURE — 71046 X-RAY EXAM CHEST 2 VIEWS: CPT

## 2024-01-01 PROCEDURE — 93306 TTE W/DOPPLER COMPLETE: CPT | Mod: 26 | Performed by: INTERNAL MEDICINE

## 2024-01-01 PROCEDURE — 36415 COLL VENOUS BLD VENIPUNCTURE: CPT | Performed by: EMERGENCY MEDICINE

## 2024-01-01 PROCEDURE — 93306 TTE W/DOPPLER COMPLETE: CPT

## 2024-01-01 PROCEDURE — 99207 PR APP CREDIT; MD BILLING SHARED VISIT: CPT | Performed by: INTERNAL MEDICINE

## 2024-01-01 PROCEDURE — 93005 ELECTROCARDIOGRAM TRACING: CPT

## 2024-01-01 PROCEDURE — 80053 COMPREHEN METABOLIC PANEL: CPT | Performed by: EMERGENCY MEDICINE

## 2024-01-01 PROCEDURE — 85025 COMPLETE CBC W/AUTO DIFF WBC: CPT | Performed by: EMERGENCY MEDICINE

## 2024-01-01 PROCEDURE — 84484 ASSAY OF TROPONIN QUANT: CPT | Performed by: EMERGENCY MEDICINE

## 2024-01-01 PROCEDURE — 83735 ASSAY OF MAGNESIUM: CPT | Performed by: EMERGENCY MEDICINE

## 2024-01-01 PROCEDURE — 83880 ASSAY OF NATRIURETIC PEPTIDE: CPT | Performed by: EMERGENCY MEDICINE

## 2024-01-01 RX ORDER — HYDRALAZINE HYDROCHLORIDE 10 MG/1
10 TABLET, FILM COATED ORAL 2 TIMES DAILY
Status: DISCONTINUED | OUTPATIENT
Start: 2024-01-01 | End: 2024-01-01

## 2024-01-01 RX ORDER — SIMVASTATIN 40 MG
40 TABLET ORAL EVERY EVENING
Status: DISCONTINUED | OUTPATIENT
Start: 2024-01-01 | End: 2024-01-01 | Stop reason: HOSPADM

## 2024-01-01 RX ORDER — ASPIRIN 81 MG/1
81 TABLET ORAL DAILY
Status: DISCONTINUED | OUTPATIENT
Start: 2024-01-01 | End: 2024-01-01 | Stop reason: HOSPADM

## 2024-01-01 RX ORDER — ONDANSETRON 4 MG/1
4 TABLET, ORALLY DISINTEGRATING ORAL EVERY 6 HOURS PRN
Status: DISCONTINUED | OUTPATIENT
Start: 2024-01-01 | End: 2024-01-01 | Stop reason: HOSPADM

## 2024-01-01 RX ORDER — SERTRALINE HYDROCHLORIDE 25 MG/1
25 TABLET, FILM COATED ORAL DAILY
COMMUNITY

## 2024-01-01 RX ORDER — ACETAMINOPHEN 650 MG/1
650 SUPPOSITORY RECTAL EVERY 4 HOURS PRN
Status: DISCONTINUED | OUTPATIENT
Start: 2024-01-01 | End: 2024-01-01 | Stop reason: HOSPADM

## 2024-01-01 RX ORDER — LANOLIN ALCOHOL/MO/W.PET/CERES
5000 CREAM (GRAM) TOPICAL DAILY
Status: DISCONTINUED | OUTPATIENT
Start: 2024-01-01 | End: 2024-01-01 | Stop reason: HOSPADM

## 2024-01-01 RX ORDER — BUDESONIDE 3 MG/1
3 CAPSULE, COATED PELLETS ORAL EVERY MORNING
Status: DISCONTINUED | OUTPATIENT
Start: 2024-01-01 | End: 2024-01-01 | Stop reason: HOSPADM

## 2024-01-01 RX ORDER — BUDESONIDE 3 MG/1
3 CAPSULE, COATED PELLETS ORAL EVERY MORNING
COMMUNITY

## 2024-01-01 RX ORDER — LOSARTAN POTASSIUM 100 MG/1
100 TABLET ORAL DAILY
COMMUNITY

## 2024-01-01 RX ORDER — PROCHLORPERAZINE 25 MG
12.5 SUPPOSITORY, RECTAL RECTAL EVERY 12 HOURS PRN
Status: DISCONTINUED | OUTPATIENT
Start: 2024-01-01 | End: 2024-01-01 | Stop reason: HOSPADM

## 2024-01-01 RX ORDER — LOSARTAN POTASSIUM 100 MG/1
100 TABLET ORAL DAILY
Status: DISCONTINUED | OUTPATIENT
Start: 2024-01-01 | End: 2024-01-01 | Stop reason: HOSPADM

## 2024-01-01 RX ORDER — CHOLECALCIFEROL (VITAMIN D3) 50 MCG
50 TABLET ORAL DAILY
Status: DISCONTINUED | OUTPATIENT
Start: 2024-01-01 | End: 2024-01-01 | Stop reason: HOSPADM

## 2024-01-01 RX ORDER — ACETAMINOPHEN 325 MG/1
650 TABLET ORAL EVERY 4 HOURS PRN
Status: DISCONTINUED | OUTPATIENT
Start: 2024-01-01 | End: 2024-01-01

## 2024-01-01 RX ORDER — ONDANSETRON 2 MG/ML
4 INJECTION INTRAMUSCULAR; INTRAVENOUS EVERY 6 HOURS PRN
Status: DISCONTINUED | OUTPATIENT
Start: 2024-01-01 | End: 2024-01-01 | Stop reason: HOSPADM

## 2024-01-01 RX ORDER — FLUTICASONE PROPIONATE 50 MCG
1 SPRAY, SUSPENSION (ML) NASAL 2 TIMES DAILY PRN
Status: DISCONTINUED | OUTPATIENT
Start: 2024-01-01 | End: 2024-01-01 | Stop reason: HOSPADM

## 2024-01-01 RX ORDER — CHOLESTYRAMINE 4 G/9G
2 POWDER, FOR SUSPENSION ORAL DAILY
Status: DISCONTINUED | OUTPATIENT
Start: 2024-01-01 | End: 2024-01-01 | Stop reason: HOSPADM

## 2024-01-01 RX ORDER — HYDRALAZINE HYDROCHLORIDE 10 MG/1
10 TABLET, FILM COATED ORAL 2 TIMES DAILY
COMMUNITY
End: 2024-01-01

## 2024-01-01 RX ORDER — ACETAMINOPHEN 325 MG/1
650 TABLET ORAL EVERY 6 HOURS PRN
Status: DISCONTINUED | OUTPATIENT
Start: 2024-01-01 | End: 2024-01-01 | Stop reason: HOSPADM

## 2024-01-01 RX ORDER — SERTRALINE HYDROCHLORIDE 25 MG/1
25 TABLET, FILM COATED ORAL DAILY
Status: DISCONTINUED | OUTPATIENT
Start: 2024-01-01 | End: 2024-01-01 | Stop reason: HOSPADM

## 2024-01-01 RX ORDER — PROCHLORPERAZINE MALEATE 5 MG
5 TABLET ORAL EVERY 6 HOURS PRN
Status: DISCONTINUED | OUTPATIENT
Start: 2024-01-01 | End: 2024-01-01 | Stop reason: HOSPADM

## 2024-01-01 RX ORDER — LIDOCAINE 40 MG/G
CREAM TOPICAL
Status: DISCONTINUED | OUTPATIENT
Start: 2024-01-01 | End: 2024-01-01

## 2024-01-01 RX ADMIN — ASPIRIN 81 MG: 81 TABLET, COATED ORAL at 15:46

## 2024-01-01 RX ADMIN — RIVAROXABAN 15 MG: 15 TABLET, FILM COATED ORAL at 17:57

## 2024-01-01 RX ADMIN — CYANOCOBALAMIN TAB 1000 MCG 5000 MCG: 1000 TAB at 15:46

## 2024-01-01 RX ADMIN — LOSARTAN POTASSIUM 100 MG: 100 TABLET, FILM COATED ORAL at 15:46

## 2024-01-01 RX ADMIN — CHOLESTYRAMINE 8 G: 4 POWDER, FOR SUSPENSION ORAL at 16:53

## 2024-01-01 ASSESSMENT — ACTIVITIES OF DAILY LIVING (ADL)
ADLS_ACUITY_SCORE: 39

## 2024-01-03 NOTE — PROGRESS NOTES
"Clinic Care Coordination Contact  Follow Up Progress Note      Assessment: Call placed to pt's daughter Rachael to check-in on progress/barriers toward goals. Rachael shared that the goal had been to either start receiving in home care/help for her parents or look into assisted living but not Rachael has noticed a decline in pt's health where he has not been interested in eating and his breathing seems shallow like a panting. Pt reports that they are keeping a close eye on things, her brother is here from out of town and staying with pt for the next week. Rachael stated that normally pt \"puts on a show\" when her brother is here and perks up but that hasn't happened yet. Rachael also notes that pt had perked up during his appointment with Dr. Proctor in mid December and things looked good at that time.     Rachael did ask about hospice and this process if pt's health continues to decline. Writer provided general information on hospice and process. Rachael will reach back out to writer with update in a couple of weeks or reach out to the clinic as needed.     Rachael indicated that they will continue to evaluate which path to go down with pt: hospice vs in home care vs assisted living and she anticipates that they will have a better idea of what makes most sense in next few weeks/months but at this time things are still not clear.     Care Gaps:    Health Maintenance Due   Topic Date Due    HF ACTION PLAN  Never done    ADVANCE CARE PLANNING  Never done    RSV VACCINE (Pregnancy & 60+) (1 - 1-dose 60+ series) Never done    ZOSTER IMMUNIZATION (2 of 3) 09/25/2012    DTAP/TDAP/TD IMMUNIZATION (2 - Td or Tdap) 10/25/2021    MEDICARE ANNUAL WELLNESS VISIT  09/14/2022    LIPID  01/10/2023    BMP  03/14/2023    INFLUENZA VACCINE (1) 09/01/2023    COVID-19 Vaccine (5 - 2023-24 season) 09/01/2023    FALL RISK ASSESSMENT  09/02/2023    ALT  09/14/2023    CBC  09/14/2023    PHQ-2 (once per calendar year)  Never done       Care Plans  Care " Plan: Help At Home       Problem: Insufficient In-home support       Goal: Establish adequate home support       Start Date: 11/21/2023 Expected End Date: 2/21/2024    This Visit's Progress: 10%    Priority: High    Note:     Barriers: New to utilizing services   Strengths: Supportive family and financial means  Patient expressed understanding of goal: Yes  Action steps to achieve this goal:  1. I will review the resources that [facility] sends  2. I will start receiving help in the home through agency  3. I will consider assisted living if needed                               Intervention/Education provided during outreach: Supportive check-in, education on hospice.     Outreach Frequency: monthly, more frequently as needed      Plan:   Care Coordinator will follow up in 1 month.    Rachael Veliz [facility]  Social Work Care Coordinator  Phone: [phone]

## 2024-01-24 NOTE — PROGRESS NOTES
Clinic Care Coordination Contact  [facility]/Voicemail    Clinical Data: Care Coordinator Outreach. PCP requested SW CC outreach to daughter after PCP appointment yesterday where pt continues to experience loss of appetite, increased sleepiness and diarrhea that is debilitating. Hospice evaluation/assessment was discussed as next steps.    Outreach Documentation Number of Outreach Attempt       1/24/2024  11:24 AM 1       Left message on  daughter Rachael's  voicemail with call back information and requested return call.    Plan:  Care Coordinator will try to reach patient again in 3-5 business days.    Rachael Veliz [facility]  Social Work Care Coordinator  Phone: [phone]

## 2024-01-25 PROBLEM — R55 SYNCOPE AND COLLAPSE: Status: ACTIVE | Noted: 2024-01-01

## 2024-01-25 PROBLEM — R06.00 DYSPNEA, UNSPECIFIED TYPE: Status: ACTIVE | Noted: 2024-01-01

## 2024-01-25 PROBLEM — I48.20 CHRONIC ATRIAL FIBRILLATION (H): Status: ACTIVE | Noted: 2024-01-01

## 2024-01-25 NOTE — H&P
[facility]    History and Physical - Hospitalist Service       Date of Admission:  1/24/2024    Assessment & Plan   Kamari Valentin is a 83 year old male with past medical history including severe aortic stenosis, coronary artery disease, hypertension, atrial fibrillation, hx of CVA, BPH, cognitive impairment, lymphocytic colitis who presents with syncopal episode. Admitted on 1/24/2024.     Syncope, suspect secondary to severe aortic stenosis   Hypertension  Coronary artery disease, multivessel   Advanced care planning   *Presents with exertional syncope, with ~10 minutes of unconsciousness with slow breathing. Initial pulse 112 and O2 level 57% per family from home oximeter, placed patient on wife's oxygen, eventually regained consciousness.   *Known severe aortic stenosis, last echocardiogram 2/2020. Normal EF at that time. Had been recommended for CABG and AVR, which he declined due to stroke following angiogram. Last seen by cardiology 1/2022 where he re-iterated no invasive management for CAD or aortic stenosis. Admission for CHF 6/2022, echocardiogram deferred as not interested in cardiac procedures.  *Lower extremity edema on exam, which has recently increased, XR with new focal area of atelectasis or infiltrate on R (see below), mild pulmonary vascular congestion.   *Given his poor intake and weight loss, with syncope occurring at level of exertion he has typically tolerated, question if intravascular depletion / decreased preload main factor leading to decreased CO and syncope, hesitate to initiate IV diuresis as on room air without respiratory symptoms currently.   - Discussed goals of care at length with patient and family. He remains adamantly opposed to valve replacement. His primary had placed a referral to hospice for informational purposes due to poor intake/appetite, weight loss; at present moment he does not feel ready to enroll as he otherwise feels good. Discussed  syncope likely due to progressive valve stenosis, poor prognosis once this develops and inevitability of future symptoms.  - Telemetry   - Echocardiogram for medication management and prognostic purposes   - Cardiology consulted for medication management and further education regarding prognosis  - 2 gram sodium restricted diet  - Resume prior to admission losartan when dose confirmed by pharmacy     Atrial fibrillation with slow ventricular response  Hx of CVA  Does not appear to be on rate controlling agents at baseline, awaiting med rec.   - Telemetry   - Resume prior to admission rivaroxaban when dose confirmed by pharmacy     Lymphocytic colitis   Ongoing loose/watery stools approximately 2x per day.  - Resume prior to admission budesonide when dose confirmed by pharmacy     Cognitive impairment  Adult failure to thrive   *Need for 24 hour care recommended on TCU discharge 7/2022  *Family reporting increased care needs at home, as well as general decline with appetite, weight loss  - Re-orient as needed  - Maintain normal day/night, sleep wake cycles  - Minimize sedating/altering medications as able  - Treat separate conditions as detailed above/below   - SW consulted for discharge planning    Right lung opacity  *CXR with focal areas of opacity right infrahilar region extending laterally to the right lung base suspicious for alveolar infiltrate related pneumonia or atelectasis.   *Denies any preceding cough, f/c. Likely atelectasis.   - Encourage IS     Poor intake   Weight loss   - Nutrition consulted     Depression  Reportedly just started on sertraline at PCP visit 1/23/24   - Resume prior to admission sertraline when dose confirmed by pharmacy     Hyperlipidemia  - Hold prior to admission statin while observation status     BPH  - Resume prior to admission tamsulosin when dose confirmed by pharmacy     Chronic anemia  Baseline hemoglobin 11-12 g/dl. Currently around baseline.  - Noted       Diet: 2 Gram  Sodium Diet   DVT Prophylaxis: DOAC  Shen Catheter: Not present  Code Status:  DNR/DNI - Discussed with patient with family at bedside     Disposition Plan   [place] to observation status. Anticipate discharge within 24 hours if clinically improved.     Entered: Panchito Rubio MD 01/25/2024, 2:54 AM     The patient's care was discussed with the ED provider, patient, patient's wife, daughter and son-in-law    Medical Decision Making       100 MINUTES SPENT BY ME on the date of service doing chart review, history, exam, documentation & further activities per the note.      Clinically Significant Risk Factors Present on Admission               # Drug Induced Coagulation Defect: home medication list includes an anticoagulant medication  # Drug Induced Platelet Defect: home medication list includes an antiplatelet medication   # Hypertension: Noted on problem list                      Panchito Rubio MD  [employer]    ______________________________________________________________________    Chief Complaint   Loss of consciousness    History of Present Illness   Kamari Valentin is a 83 year old male who presents with the above chief complaint.    History is obtained from the patient, patient's family, discussion with ED provider and review of medical record. The patient reports he had returned home from dinner, exited his car and was walking up a ramp into his home when he began to feel short of breath.  When he got into the home, he felt lightheaded, sat down and then went limp in the chair losing consciousness.  His family witnessed this, they moved him to the floor and noted that his breathing appeared to be slow and labored, breathing once every 10 seconds.  His pupils appeared constricted at that time.  His family placed a home oximeter they had available and noted an initial heart rate of 112 and oxygen saturation of 57%.  They placed oxygen on him from his wife and his oxygen  "improved.  His son-in-law was unable to palpate a carotid pulse, though he continued to breathe slowly.  He remained unconscious for about 10 minutes and then \"a light switch flipped\" and he regained consciousness.  He was brought into the [facility] emergency department for further evaluation.  He reports the level of exertion he experienced walking into the home was not atypical for him, and he had recently tolerated without issue.  He denies any recent exertional chest pain.  At present he feels back to his baseline, denies any shortness of breath including when lying flat.  He and his family note chronic lower extremity swelling, which has worsened slightly recently.  The swelling does not improve with elevation of his legs.  He has had a general decline in his appetite and overall intake over the last few weeks, with an associated weight loss.  He was recently seen by his primary care provider on 1/23, was reportedly prescribed sertraline due to symptoms of depression and was also referred to  as family reported increased care needs and request additional assistance, as well as informational meeting with hospice to learn about their services.  He has chronic diarrhea, which has been stable at about 2 loose to watery bowel movements a day.  He otherwise denies any recent cough, fevers or chills.    In the Emergency Department, the patient was seen by Dr. Cardoso, with whom I discussed the patient's presenting symptoms and emergency department course.  Initial vital signs were a temperature of 97.4F, HR 57, /55, RR 18, SpO2 96% on room air. Pertinent work-up as noted in A&P. Hospitalists were contacted for admission to the hospital.     Past Medical History    I have reviewed this patient's medical history and updated it with pertinent information if needed.   Past Medical History:   Diagnosis Date    Aortic stenosis     Severe    Atrial fibrillation (H)     paroxysmal    Carotid stenosis     " mild on right, moderate on left    Chronic renal insufficiency     Essential hypertension, benign     Hyperlipidaemia     Hypertrophy of prostate without urinary obstruction and other lower urinary tract symptoms (LUTS)     Lymphocytic colitis     Rheumatic fever     Unspecified cerebral artery occlusion with cerebral infarction        Past Surgical History   I have reviewed this patient's surgical history and updated it with pertinent information if needed.  Past Surgical History:   Procedure Laterality Date    COLONOSCOPY N/A 2022    Procedure: COLONOSCOPY, WITH POLYPECTOMY AND BIOPSY;  Surgeon: Dany Mccormick MD;  Location:  GI    COLONOSCOPY N/A 2022    Procedure: COLONOSCOPY, FLEXIBLE, WITH LESION REMOVAL USING SNARE;  Surgeon: Dany Mccormick MD;  Location:  GI    CV CORONARY ANGIOGRAM N/A 10/22/2020    Procedure: Coronary Angiogram;  Surgeon: Alexandru oDe MD;  Location:  HEART CARDIAC CATH LAB    CV RIGHT HEART CATH MEASUREMENTS RECORDED N/A 10/22/2020    Procedure: Right Heart Cath;  Surgeon: Alexandru Doe MD;  Location:  HEART CARDIAC CATH LAB    ENT SURGERY      SEPTOPLASTY    ENT SURGERY      TONSILLECTOMY    HERNIA REPAIR      HERNIORRHAPHY INGUINAL  2/15/2012    Procedure:HERNIORRHAPHY INGUINAL; LEFT INGUINAL HERNIA REPAIR WITH MESH; Surgeon:SHILO READ; Location:[facility]    ORTHOPEDIC SURGERY  left ankle 2013    ORIF LEFT WRIST FRACTURE         Social History   I have reviewed this patient's social history and updated it with pertinent information if needed.  Social History     Tobacco Use    Smoking status: Former     Types: Cigars, Pipe     Quit date: 2010     Years since quittin.4    Smokeless tobacco: Never   Substance Use Topics    Alcohol use: No     Alcohol/week: 0.0 standard drinks of alcohol    Drug use: No          Family History   I have reviewed this patient's family history and updated it with pertinent information if needed.    Family History   Problem Relation Age of Onset    Respiratory Brother     Coronary Artery Disease Early Onset Mother     Abdominal Aortic Aneurysm Father      Prior to Admission Medications  - Pending pharmacy reconciliation   Prior to Admission Medications   Prescriptions Last Dose Informant Patient Reported? Taking?   acetaminophen (TYLENOL) 325 MG tablet   No No   Sig: Take 2 tablets (650 mg) by mouth every 6 hours as needed for mild pain or fever (> 101 F)   aspirin (ASA) 81 MG EC tablet   No No   Sig: Take 1 tablet (81 mg) by mouth daily   budesonide (ENTOCORT EC) 3 MG EC capsule   No No   Sig: Take 3 capsules (9 mg) by mouth every morning for 14 days, THEN 2 capsules (6 mg) every morning for 14 days, THEN 1 capsule (3 mg) every morning for 30 days.   cholestyramine (QUESTRAN) 4 g packet   No No   Sig: Take 2 packets (8 g) by mouth daily Hold for constipation   fluticasone (FLONASE) 50 MCG/ACT nasal spray   Yes No   Sig: Spray 1 spray into both nostrils 2 times daily as needed for rhinitis or allergies   losartan (COZAAR) 25 MG tablet   No No   Sig: Take 2 tablets (50 mg) by mouth daily   predniSONE (DELTASONE) 20 MG tablet   No No   Sig: Take one tablet (20 mg) each day for 5 (five) days   rivaroxaban ANTICOAGULANT (XARELTO) 20 MG TABS tablet   No No   Sig: Take 1 tablet (20 mg) by mouth daily (with dinner)   simvastatin (ZOCOR) 40 MG tablet   No No   Sig: Take 1 tablet (40 mg) by mouth daily   Patient taking differently: Take 40 mg by mouth every evening   tamsulosin (FLOMAX) 0.4 MG capsule   Yes No   Sig: Take 0.4 mg by mouth 2 times daily   vitamin B-12 (CYANOCOBALAMIN) 1000 MCG tablet   Yes No   Sig: Take 3,000 mcg by mouth daily   vitamin D3 (CHOLECALCIFEROL) 50 mcg (2000 units) tablet   Yes No   Sig: Take 1 tablet by mouth daily      Facility-Administered Medications: None     Allergies   Allergies   Allergen Reactions    Lisinopril        Physical Exam   Vital Signs: Temp: 97.4  F (36.3  C) Temp src:  Oral BP: 124/55 Pulse: 57   Resp: 18 SpO2: 96 % O2 Device: None (Room air)    Weight: 142 lbs 0 oz    Constitutional: Well-appearing, NAD   Respiratory: Scattered crackles at bilateral bases   Cardiovascular: Irregularly irregular, 4/6 harsh systolic murmur RUSB. Bilateral lower extremity edema to upper 1/3 of shin.   GI: Soft, non-tender, non-distended. No rebound tenderness or guarding.    Skin: Warm, dry   Neurologic: Alert. Responding to questions appropriately. Following commands.    Psychiatric: Normal affect, appropriate      Data   Data reviewed today: I reviewed all medications, new labs and imaging results over the last 24 hours. I personally reviewed the EKG tracing showing atrial fibrillation with slow ventricular response .    Recent Labs   Lab 01/24/24  2107   WBC 5.9   HGB 10.8*   *         POTASSIUM 3.7   CHLORIDE 107   CO2 29   BUN 19.3   CR 1.09   ANIONGAP 9   CLAY 8.9   *   ALBUMIN 3.7   PROTTOTAL 6.0*   BILITOTAL 0.4   ALKPHOS 48   ALT 20   AST 28       Recent Results (from the past 24 hour(s))   Chest XR,  PA & LAT    Narrative    EXAM: XR CHEST 2 VIEWS  LOCATION: [facility]  DATE: 1/24/2024    INDICATION: Syncope. Shortness of breath.  COMPARISON: 09/14/2022      Impression    IMPRESSION: New focal areas of opacity right infrahilar region extending laterally to the right lung base suspicious for alveolar infiltrate related to pneumonia or atelectasis. Recommend radiographic follow-up. Left lung clear. Cardiac enlargement. Mild   pulmonary vascular congestion. Aortic calcification. Degenerative changes in the spine and both shoulders, greater on the right.

## 2024-01-25 NOTE — CONSULTS
"[facility]    Cardiology Consultation     Date of Admission:  1/24/2024    Assessment & Plan   Kamari Valentin is a 83 year old male who was admitted on 1/24/2024.    Critical aortic stenosis- pt has refused treatment   Atrial fibrillation- on AC  CVA after coronary angiogram   Moderate TR and pulmonary HTN,- possibly gr II-   Coronary disease - distal LMCA (60%), Lcx ostial 80%, RCA 60%     Recommendations:   Pt has refused intervention for his AV. He was offered CABG + SAVR but declined. What he has is a mechanical problem that needs a mechanical solution. Medications won't fix his valve.    Stop hydralazine to allow for higher resting BP.   Consider hospice consult. Cardiology will sign off.    High complexity     Lane Elena MD, MD    Primary Care Physician   Annita Proctor    Reason for Consult   Reason for consult: I was asked to evaluate this patient for aortic stenosis.    History of Present Illness   Kamari Valentin is a 83 year old male with critical aortic stenosis who presents for syncope. He has a past medical history significant for persistent atrial fibrillation on chronic anticoagulation, hypertension, HLP, CAD, CVA, carotid artery disease. He previously underwent a coronary angiogram as work-up for AVR and unfortunately suffered a CVA post procedure.  After the stroke the patient had declined further cardiac follow-up. exited his car and was walking up a ramp into his home when he began to feel short of breath.      According to other notes \"when he got into the home, he felt lightheaded, sat down and then went limp in the chair losing consciousness.  His family witnessed this, they moved him to the floor and noted that his breathing appeared to be slow and labored, breathing once every 10 seconds.  His pupils appeared constricted at that time.  His family placed a home oximeter they had available and noted an initial heart rate of 112 and oxygen saturation of 57%.  " "They placed oxygen on him from his wife and his oxygen improved.  His son-in-law was unable to palpate a carotid pulse, though he continued to breathe slowly.  He remained unconscious for about 10 minutes and then \"a light switch flipped\" and he regained consciousness.\"     Past Medical History   Past Medical History:   Diagnosis Date    Aortic stenosis     Severe    Atrial fibrillation (H)     paroxysmal    Carotid stenosis     mild on right, moderate on left    Chronic renal insufficiency     Essential hypertension, benign     Hyperlipidaemia     Hypertrophy of prostate without urinary obstruction and other lower urinary tract symptoms (LUTS)     Lymphocytic colitis     Rheumatic fever     Unspecified cerebral artery occlusion with cerebral infarction          Past Surgical History   Past Surgical History:   Procedure Laterality Date    COLONOSCOPY N/A 6/26/2022    Procedure: COLONOSCOPY, WITH POLYPECTOMY AND BIOPSY;  Surgeon: Dany Mccormick MD;  Location:  GI    COLONOSCOPY N/A 6/26/2022    Procedure: COLONOSCOPY, FLEXIBLE, WITH LESION REMOVAL USING SNARE;  Surgeon: Dany Mccormick MD;  Location:  GI    CV CORONARY ANGIOGRAM N/A 10/22/2020    Procedure: Coronary Angiogram;  Surgeon: Alexandru Doe MD;  Location:  HEART CARDIAC CATH LAB    CV RIGHT HEART CATH MEASUREMENTS RECORDED N/A 10/22/2020    Procedure: Right Heart Cath;  Surgeon: Alexandru Doe MD;  Location:  HEART CARDIAC CATH LAB    ENT SURGERY      SEPTOPLASTY    ENT SURGERY      TONSILLECTOMY    HERNIA REPAIR      HERNIORRHAPHY INGUINAL  2/15/2012    Procedure:HERNIORRHAPHY INGUINAL; LEFT INGUINAL HERNIA REPAIR WITH MESH; Surgeon:SHILO READ; Location:[facility]    ORTHOPEDIC SURGERY  left ankle 2/2013    ORIF LEFT WRIST FRACTURE         Prior to Admission Medications   Prior to Admission Medications   Prescriptions Last Dose Informant Patient Reported? Taking?   acetaminophen (TYLENOL) 325 MG tablet   No Yes   Sig: " Take 2 tablets (650 mg) by mouth every 6 hours as needed for mild pain or fever (> 101 F)   aspirin (ASA) 81 MG EC tablet 1/24/2024  No Yes   Sig: Take 1 tablet (81 mg) by mouth daily   budesonide (ENTOCORT EC) 3 MG EC capsule 1/24/2024  Yes Yes   Sig: Take 3 mg by mouth every morning   cholestyramine (QUESTRAN) 4 g packet 1/24/2024  No Yes   Sig: Take 2 packets (8 g) by mouth daily Hold for constipation   fluticasone (FLONASE) 50 MCG/ACT nasal spray   Yes Yes   Sig: Spray 1 spray into both nostrils 2 times daily as needed for rhinitis or allergies   hydrALAZINE (APRESOLINE) 10 MG tablet 1/24/2024  Yes Yes   Sig: Take 10 mg by mouth 2 times daily   losartan (COZAAR) 100 MG tablet 1/24/2024  Yes Yes   Sig: Take 100 mg by mouth daily   rivaroxaban ANTICOAGULANT (XARELTO) 20 MG TABS tablet 1/24/2024  No Yes   Sig: Take 1 tablet (20 mg) by mouth daily (with dinner)   sertraline (ZOLOFT) 25 MG tablet 1/24/2024  Yes Yes   Sig: Take 25 mg by mouth daily   simvastatin (ZOCOR) 40 MG tablet 1/24/2024  No Yes   Sig: Take 1 tablet (40 mg) by mouth daily   Patient taking differently: Take 40 mg by mouth every evening   vitamin B-12 (CYANOCOBALAMIN) 1000 MCG tablet 1/24/2024  Yes Yes   Sig: Take 5,000 mcg by mouth daily   vitamin D3 (CHOLECALCIFEROL) 50 mcg (2000 units) tablet 1/24/2024  Yes Yes   Sig: Take 1 tablet by mouth daily      Facility-Administered Medications: None     Current Facility-Administered Medications   Medication Dose Route Frequency    aspirin  81 mg Oral Daily    [START ON 1/26/2024] budesonide  3 mg Oral QAM    cholestyramine  2 packet Oral Daily    cyanocobalamin  5,000 mcg Oral Daily    hydrALAZINE  10 mg Oral BID    losartan  100 mg Oral Daily    rivaroxaban ANTICOAGULANT  15 mg Oral Daily with supper    sertraline  25 mg Oral Daily    simvastatin  40 mg Oral QPM    vitamin D3  50 mcg Oral Daily     Current Facility-Administered Medications   Medication Last Rate    - MEDICATION INSTRUCTIONS -    "    Allergies   Allergies   Allergen Reactions    Lisinopril Other (See Comments)     Decreased kidney function       Social History    reports that he quit smoking about 13 years ago. His smoking use included cigars and pipe. He has never used smokeless tobacco. He reports that he does not drink alcohol and does not use drugs.      Family History   I have reviewed this patient's family history and updated it with pertinent information if needed.  Family History   Problem Relation Age of Onset    Respiratory Brother     Coronary Artery Disease Early Onset Mother     Abdominal Aortic Aneurysm Father           Review of Systems   A comprehensive review of system was performed and is negative other than that noted in the HPI or here.     Physical Exam   Vital Signs with Ranges  Temp:  [97  F (36.1  C)-98  F (36.7  C)] 98  F (36.7  C)  Pulse:  [52-75] 68  Resp:  [10-29] 20  BP: (124-167)/(55-93) 135/63  SpO2:  [90 %-98 %] 97 %  Wt Readings from Last 4 Encounters:   01/24/24 64.4 kg (142 lb)   09/14/22 72.1 kg (159 lb)   07/18/22 69.3 kg (152 lb 12.8 oz)   07/14/22 68.9 kg (152 lb)     I/O last 3 completed shifts:  In: -   Out: 200 [Urine:200]      Vitals: /63   Pulse 68   Temp 98  F (36.7  C) (Oral)   Resp 20   Ht 1.676 m (5' 6\")   Wt 64.4 kg (142 lb)   SpO2 97%   BMI 22.92 kg/m      Physical Exam:   General - Alert and oriented to time place and person in no acute distress  Eyes - No scleral icterus  HEENT - Neck supple, moist mucous membranes  Cardiovascular - s1 normal, S2 soft.late peaking systolic murmur  Extremities - There is no edema  Respiratory - CTAB  Skin - No pallor or cyanosis  Gastrointestinal - Non tender and non distended without rebound or guarding  Psych - Appropriate affect   Neurological - No gross motor neurological focal deficits    No lab results found in last 7 days.    Invalid input(s): \"TROPONINIES\"    Recent Labs   Lab 01/24/24  2107   WBC 5.9   HGB 10.8*   *    " "     POTASSIUM 3.7   CHLORIDE 107   CO2 29   BUN 19.3   CR 1.09   GFRESTIMATED 67   ANIONGAP 9   CLAY 8.9   *   ALBUMIN 3.7   PROTTOTAL 6.0*   BILITOTAL 0.4   ALKPHOS 48   ALT 20   AST 28     Recent Labs   Lab Test 01/10/22  1909 10/26/20  0907   CHOL 94 119   HDL 37* 40   LDL 45 63   TRIG 60 81     Recent Labs   Lab 24  210   WBC 5.9   HGB 10.8*   HCT 32.3*   *        No results for input(s): \"PH\", \"PHV\", \"PO2\", \"PO2V\", \"SAT\", \"PCO2\", \"PCO2V\", \"HCO3\", \"HCO3V\" in the last 168 hours.  Recent Labs   Lab 24   NTBNPI 12,694*     No results for input(s): \"DD\" in the last 168 hours.  No results for input(s): \"SED\", \"CRP\" in the last 168 hours.  Recent Labs   Lab 24  2107        No results for input(s): \"TSH\" in the last 168 hours.  No results for input(s): \"COLOR\", \"APPEARANCE\", \"URINEGLC\", \"URINEBILI\", \"URINEKETONE\", \"SG\", \"UBLD\", \"URINEPH\", \"PROTEIN\", \"UROBILINOGEN\", \"NITRITE\", \"LEUKEST\", \"RBCU\", \"WBCU\" in the last 168 hours.    Imaging:  Recent Results (from the past 48 hour(s))   Chest XR,  PA & LAT    Narrative    EXAM: XR CHEST 2 VIEWS  LOCATION: [facility]  DATE: 2024    INDICATION: Syncope. Shortness of breath.  COMPARISON: 2022      Impression    IMPRESSION: New focal areas of opacity right infrahilar region extending laterally to the right lung base suspicious for alveolar infiltrate related to pneumonia or atelectasis. Recommend radiographic follow-up. Left lung clear. Cardiac enlargement. Mild   pulmonary vascular congestion. Aortic calcification. Degenerative changes in the spine and both shoulders, greater on the right.   Echocardiogram Complete   Result Value    LVEF  55-60%    [facility]    [id number]  [id number]  [id number]  [id number]^NAT^ALVINO^OLIVE     [facility]  Echocardiography Laboratory  [address] [place] [id number]     Name: SHERRY GARCÍA  MRN: [id number]  : " 1940  Study Date: 01/25/2024 10:11 AM  Age: 83 yrs  Gender: Male  Patient Location: [facility]  Reason For Study: Aortic Valve Disorder  Ordering Physician: ALVINO JOHNS  Referring Physician: ALEJANDRO PINO  Performed By: Yasemin Baig     BSA: 1.7 m2  Height: 66 in  Weight: 142 lb  HR: 60  BP: 159/67 mmHg  ______________________________________________________________________________  Procedure  Complete Echo Adult.  ______________________________________________________________________________  Interpretation Summary     Severe valvular aortic stenosis.  The mean AoV pressure gradient is 69.4mmHg.  The calculated aortic valve area is 0.45cm2. There has been significant  worsening of the severity of the aortic stenosis.  The visual ejection fraction is 55-60%.  Regional wall motion abnormalities cannot be excluded due to limited  visualization. Contrast would enhance regional wall motion analysis.  There is moderate (2+) tricuspid regurgitation.  Right ventricular systolic pressure is elevated, consistent with severe  pulmonary hypertension.  There is mild to moderate (1-2+) aortic regurgitation.  The rhythm was atrial fibrillation.  There is mild to moderate (1-2+) mitral regurgitation.  The study was technically difficult.  ______________________________________________________________________________  Left Ventricle  The left ventricle is normal in size. There is mild to moderate concentric  left ventricular hypertrophy. Diastolic function not assessed due to atrial  fibrillation. The visual ejection fraction is 55-60%. Regional wall motion  abnormalities cannot be excluded due to limited visualization.     Right Ventricle  Right ventricular function cannot be assessed due to poor image quality.     Atria  The left atrium is moderately dilated. The right atrium is moderately dilated.  The interatrial septum was not well-seen.     Mitral Valve  There is mild to moderate mitral annular calcification. The  mitral valve  leaflets are moderately thickened. There is mild to moderate (1-2+) mitral  regurgitation. The mean mitral valve gradient is 2.8 mmHg.     Tricuspid Valve  The right ventricular systolic pressure is approximated at 67mmHg plus the  right atrial pressure. There is moderate (2+) tricuspid regurgitation. Right  ventricular systolic pressure is elevated, consistent with severe pulmonary  hypertension.     Aortic Valve  Thickened aortic valve leaflets. The number of aortic valve leaflets cannot be  determined due to heavy calcification. There is mild to moderate (1-2+) aortic  regurgitation. The peak AoV pressure gradient is 118.0 mmHg. Severe valvular  aortic stenosis. The mean AoV pressure gradient is 69.4mmHg. The calculated  aortic valve area is 0.45cm2.     Pulmonic Valve  There is mild (1+) pulmonic valvular regurgitation. Normal pulmonic valve  velocity.     Vessels  The aortic root is normal size. Normal size ascending aorta. IVC diameter >2.1  cm collapsing <50% with sniff suggests a high RA pressure estimated at 15 mmHg  or greater.     Pericardium  There is no pericardial effusion.     Rhythm  The rhythm was atrial fibrillation.  ______________________________________________________________________________  MMode/2D Measurements & Calculations  IVSd: 1.3 cm     LVIDd: 4.0 cm  LVIDs: 2.9 cm  LVPWd: 1.5 cm  FS: 26.3 %  LV mass(C)d: 209.0 grams  LV mass(C)dI: 120.8 grams/m2  Ao root diam: 3.3 cm  asc Aorta Diam: 3.5 cm  LVOT diam: 2.0 cm  LVOT area: 3.1 cm2  Ao root diam index Ht(cm/m): 2.0  Ao root diam index BSA (cm/m2): 1.9  Asc Ao diam index BSA (cm/m2): 2.0  Asc Ao diam index Ht(cm/m): 2.1  LA Volume (BP): 77.4 ml     LA Volume Index (BP): 44.7 ml/m2  RWT: 0.75  TAPSE: 2.1 cm     Doppler Measurements & Calculations  MV E max jose guadalupe: 146.0 cm/sec  MV A max jose guadalupe: 46.4 cm/sec  MV E/A: 3.1  MV max PG: 10.3 mmHg  MV mean P.8 mmHg  MV V2 VTI: 31.5 cm  MVA(VTI): 2.0 cm2  MV dec time: 0.11 sec  Ao V2  max: 544.2 cm/sec  Ao max P.0 mmHg  Ao V2 mean: 389.6 cm/sec  Ao mean P.4 mmHg  Ao V2 VTI: 138.8 cm  KARYNA(I,D): 0.45 cm2  KARYNA(V,D): 0.59 cm2  AI P1/2t: 332.4 msec  LV V1 max P.5 mmHg  LV V1 max: 104.7 cm/sec  LV V1 VTI: 20.3 cm  SV(LVOT): 62.6 ml  SI(LVOT): 36.2 ml/m2  PA V2 max: 94.6 cm/sec  PA max PG: 3.6 mmHg  PA acc time: 0.08 sec  AV Hari Ratio (DI): 0.19  KARYNA Index (cm2/m2): 0.26     E/E' av.0  Lateral E/e': 10.1  Medial E/e': 17.9  RV S Hari: 9.1 cm/sec     ______________________________________________________________________________  Report approved by: Minerva Novak 2024 11:06 AM             Echo:  No results found for this or any previous visit (from the past 4320 hour(s)).    Clinically Significant Risk Factors Present on Admission               # Drug Induced Coagulation Defect: home medication list includes an anticoagulant medication  # Drug Induced Platelet Defect: home medication list includes an antiplatelet medication   # Hypertension: Noted on problem list              Non-Rheumatic Valve Disease: Aortic valve stenosis    Pulmonary Heart Disease (Pulmonary hypertension or Cor pulmonale): Pulmonary Hypertension, unspecified

## 2024-01-25 NOTE — DISCHARGE SUMMARY
[facility]  Hospitalist Discharge Summary      Date of Admission:  1/24/2024  Date of Discharge:  1/25/2024  Discharging Provider: See Trejo MD  Discharge Service: Hospitalist Service    Discharge Diagnoses     Syncope, suspect secondary to severe aortic stenosis   Hypertension  Coronary artery disease, multivessel   Advanced care planning     Atrial fibrillation with slow ventricular response  Hx of CVA  Lymphocytic colitis     Cognitive impairment  Adult failure to thrive   Right lung opacity     Poor intake   Weight loss   Depression  Hyperlipidemia  BPH  chronic anemia      Clinically Significant Risk Factors          Follow-ups Needed After Discharge   Follow-up Appointments     Follow-up and recommended labs and tests       Follow up with primary care provider, Annita Proctor, within 7 days for   hospital follow- up.  Please facilitate Home Hospice or cardiology follow   up if patient decides to undergo surgery        {  Unresulted Labs Ordered in the Past 30 Days of this Admission       No orders found for last 31 day(s).          Discharge Disposition   Discharged to home  Condition at discharge: Stable    Hospital Course   Kamari Valentin is a 83 year old male with past medical history including severe aortic stenosis, coronary artery disease, hypertension, atrial fibrillation, hx of CVA, BPH, cognitive impairment, lymphocytic colitis who presents with syncopal episode. Admitted on 1/24/2024.      Syncope, suspect secondary to severe aortic stenosis   Hypertension  Coronary artery disease, multivessel   Advanced care planning   *Presents with exertional syncope, with ~10 minutes of unconsciousness with slow breathing. Initial pulse 112 and O2 level 57% per family from home oximeter, placed patient on wife's oxygen, eventually regained consciousness.   *Known severe aortic stenosis, last echocardiogram 2/2020. Normal EF at that time. Had been recommended for CABG and AVR,  which he declined due to stroke following angiogram. Last seen by cardiology 1/2022 where he re-iterated no invasive management for CAD or aortic stenosis. Admission for CHF 6/2022, echocardiogram deferred as not interested in cardiac procedures.  *Lower extremity edema on exam, which has recently increased, XR with new focal area of atelectasis or infiltrate on R (see below), mild pulmonary vascular congestion.   *Given his poor intake and weight loss, with syncope occurring at level of exertion he has typically tolerated, question if intravascular depletion / decreased preload main factor leading to decreased CO and syncope, hesitate to initiate IV diuresis as on room air without respiratory symptoms currently.   - Discussed goals of care at length with patient and family. He remains adamantly opposed to valve replacement. His primary had placed a referral to hospice for informational purposes due to poor intake/appetite, weight loss; at present moment he does not feel ready to enroll as he otherwise feels good. Discussed syncope likely due to progressive valve stenosis, poor prognosis once this develops and inevitability of future symptoms.  -Patient underwent echocardiogram and was reviewed by cardiology.  Repeat refused intervention for his AV.  Discussed with the patient and his daughter who are an ongoing discussion for home hospice and wanted to be discharged home.     Atrial fibrillation with slow ventricular response  Hx of CVA  Does not appear to be on rate controlling agents at baseline  - Telemetry   - Resumed prior to admission rivaroxaban      Lymphocytic colitis   Ongoing loose/watery stools approximately 2x per day.  - Resumed prior to admission budesonide      Cognitive impairment  Adult failure to thrive   *Need for 24 hour care recommended on TCU discharge 7/2022  *Family reporting increased care needs at home, as well as general decline with appetite, weight loss  - Re-orient as needed  -  Maintain normal day/night, sleep wake cycles  - Minimize sedating/altering medications as able  - Treat separate conditions as detailed above/below      Right lung opacity  *CXR with focal areas of opacity right infrahilar region extending laterally to the right lung base suspicious for alveolar infiltrate related pneumonia or atelectasis.   *Denies any preceding cough, f/c. Likely atelectasis.   - Encourage IS      Poor intake   Weight loss   - Nutrition consulted      Depression  Reportedly just started on sertraline at PCP visit 1/23/24   - Resumed prior to admission sertraline      Hyperlipidemia  - statin      BPH  -Not on tamsulosin     chronic anemia  Baseline hemoglobin 11-12 g/dl. Currently around baseline.  - Noted   Consultations This Hospital Stay   CARDIOLOGY IP CONSULT  NUTRITION SERVICES ADULT IP CONSULT  PHYSICAL THERAPY ADULT IP CONSULT  CARE MANAGEMENT / SOCIAL WORK IP CONSULT    Code Status   Prior    Time Spent on this Encounter   ISee MD, personally saw the patient today and spent greater than 30 minutes discharging this patient.       See Trejo MD  [facility] EMERGENCY DEPT  [address] [place] [id number]  Phone: [phone]  Fax: [phone]  ______________________________________________________________________    Physical Exam   Vital Signs: Temp: 98  F (36.7  C) Temp src: Oral BP: (!) 163/90 Pulse: 66   Resp: 28 SpO2: 97 % O2 Device: None (Room air)    Weight: 142 lbs 0 oz  Gen- pleasant   Neck- supple  CVS- I+II+ ESM   RS- CTAB  Abdo- soft, no tenderness. No g/r/r   Ext- no edema   Primary Care Physician   Annita Proctor    Discharge Orders      Reason for your hospital stay    Kamari Valentin is a 83 year old male with past medical history including severe aortic stenosis, coronary artery disease, hypertension, atrial fibrillation, hx of CVA, BPH, cognitive impairment, lymphocytic colitis who presents with syncopal episode     Follow-up  and recommended labs and tests     Follow up with primary care provider, Alejandro Proctor, within 7 days for hospital follow- up.  Please facilitate Home Hospice or cardiology follow up if patient decides to undergo surgery     Activity    Your activity upon discharge: activity as tolerated     Diet    Follow this diet upon discharge: Orders Placed This Encounter      2 Gram Sodium Diet       Significant Results and Procedures   Results for orders placed or performed during the hospital encounter of 24   Chest XR,  PA & LAT    Narrative    EXAM: XR CHEST 2 VIEWS  LOCATION: [facility]  DATE: 2024    INDICATION: Syncope. Shortness of breath.  COMPARISON: 2022      Impression    IMPRESSION: New focal areas of opacity right infrahilar region extending laterally to the right lung base suspicious for alveolar infiltrate related to pneumonia or atelectasis. Recommend radiographic follow-up. Left lung clear. Cardiac enlargement. Mild   pulmonary vascular congestion. Aortic calcification. Degenerative changes in the spine and both shoulders, greater on the right.   Echocardiogram Complete     Value    LVEF  55-60%    Narrative    [id number]  [id number]  [id number]  [id number]^NAT^ALVINO^OLIVE     [facility]  Echocardiography Laboratory  [address] [place] [address]     Name: SHERRY GARCÍA  MRN: [id number]  : 1940  Study Date: 2024 10:11 AM  Age: 83 yrs  Gender: Male  Patient Location: [facility]  Reason For Study: Aortic Valve Disorder  Ordering Physician: ALVINO JOHNS  Referring Physician: ALEJANDRO PROCTOR  Performed By: Yasemin Baig     BSA: 1.7 m2  Height: 66 in  Weight: 142 lb  HR: 60  BP: 159/67 mmHg  ______________________________________________________________________________  Procedure  Complete Echo Adult.  ______________________________________________________________________________  Interpretation Summary     Severe valvular  aortic stenosis.  The mean AoV pressure gradient is 69.4mmHg.  The calculated aortic valve area is 0.45cm2. There has been significant  worsening of the severity of the aortic stenosis.  The visual ejection fraction is 55-60%.  Regional wall motion abnormalities cannot be excluded due to limited  visualization. Contrast would enhance regional wall motion analysis.  There is moderate (2+) tricuspid regurgitation.  Right ventricular systolic pressure is elevated, consistent with severe  pulmonary hypertension.  There is mild to moderate (1-2+) aortic regurgitation.  The rhythm was atrial fibrillation.  There is mild to moderate (1-2+) mitral regurgitation.  The study was technically difficult.  ______________________________________________________________________________  Left Ventricle  The left ventricle is normal in size. There is mild to moderate concentric  left ventricular hypertrophy. Diastolic function not assessed due to atrial  fibrillation. The visual ejection fraction is 55-60%. Regional wall motion  abnormalities cannot be excluded due to limited visualization.     Right Ventricle  Right ventricular function cannot be assessed due to poor image quality.     Atria  The left atrium is moderately dilated. The right atrium is moderately dilated.  The interatrial septum was not well-seen.     Mitral Valve  There is mild to moderate mitral annular calcification. The mitral valve  leaflets are moderately thickened. There is mild to moderate (1-2+) mitral  regurgitation. The mean mitral valve gradient is 2.8 mmHg.     Tricuspid Valve  The right ventricular systolic pressure is approximated at 67mmHg plus the  right atrial pressure. There is moderate (2+) tricuspid regurgitation. Right  ventricular systolic pressure is elevated, consistent with severe pulmonary  hypertension.     Aortic Valve  Thickened aortic valve leaflets. The number of aortic valve leaflets cannot be  determined due to heavy calcification.  There is mild to moderate (1-2+) aortic  regurgitation. The peak AoV pressure gradient is 118.0 mmHg. Severe valvular  aortic stenosis. The mean AoV pressure gradient is 69.4mmHg. The calculated  aortic valve area is 0.45cm2.     Pulmonic Valve  There is mild (1+) pulmonic valvular regurgitation. Normal pulmonic valve  velocity.     Vessels  The aortic root is normal size. Normal size ascending aorta. IVC diameter >2.1  cm collapsing <50% with sniff suggests a high RA pressure estimated at 15 mmHg  or greater.     Pericardium  There is no pericardial effusion.     Rhythm  The rhythm was atrial fibrillation.  ______________________________________________________________________________  MMode/2D Measurements & Calculations  IVSd: 1.3 cm     LVIDd: 4.0 cm  LVIDs: 2.9 cm  LVPWd: 1.5 cm  FS: 26.3 %  LV mass(C)d: 209.0 grams  LV mass(C)dI: 120.8 grams/m2  Ao root diam: 3.3 cm  asc Aorta Diam: 3.5 cm  LVOT diam: 2.0 cm  LVOT area: 3.1 cm2  Ao root diam index Ht(cm/m): 2.0  Ao root diam index BSA (cm/m2): 1.9  Asc Ao diam index BSA (cm/m2): 2.0  Asc Ao diam index Ht(cm/m): 2.1  LA Volume (BP): 77.4 ml     LA Volume Index (BP): 44.7 ml/m2  RWT: 0.75  TAPSE: 2.1 cm     Doppler Measurements & Calculations  MV E max hari: 146.0 cm/sec  MV A max hari: 46.4 cm/sec  MV E/A: 3.1  MV max PG: 10.3 mmHg  MV mean P.8 mmHg  MV V2 VTI: 31.5 cm  MVA(VTI): 2.0 cm2  MV dec time: 0.11 sec  Ao V2 max: 544.2 cm/sec  Ao max P.0 mmHg  Ao V2 mean: 389.6 cm/sec  Ao mean P.4 mmHg  Ao V2 VTI: 138.8 cm  KARYNA(I,D): 0.45 cm2  KARYNA(V,D): 0.59 cm2  AI P1/2t: 332.4 msec  LV V1 max P.5 mmHg  LV V1 max: 104.7 cm/sec  LV V1 VTI: 20.3 cm  SV(LVOT): 62.6 ml  SI(LVOT): 36.2 ml/m2  PA V2 max: 94.6 cm/sec  PA max PG: 3.6 mmHg  PA acc time: 0.08 sec  AV Hari Ratio (DI): 0.19  KARYNA Index (cm2/m2): 0.26     E/E' av.0  Lateral E/e': 10.1  Medial E/e': 17.9  RV S Hari: 9.1 cm/sec      ______________________________________________________________________________  Report approved by: Minerva Novak 01/25/2024 11:06 AM             Discharge Medications   Current Discharge Medication List        CONTINUE these medications which have NOT CHANGED    Details   acetaminophen (TYLENOL) 325 MG tablet Take 2 tablets (650 mg) by mouth every 6 hours as needed for mild pain or fever (> 101 F)  Qty:      Associated Diagnoses: Cerebrovascular accident (CVA), unspecified mechanism (H)      aspirin (ASA) 81 MG EC tablet Take 1 tablet (81 mg) by mouth daily  Qty: 30 tablet, Refills: 0    Associated Diagnoses: Cerebrovascular accident (CVA), unspecified mechanism (H)      budesonide (ENTOCORT EC) 3 MG EC capsule Take 3 mg by mouth every morning      cholestyramine (QUESTRAN) 4 g packet Take 2 packets (8 g) by mouth daily Hold for constipation    Associated Diagnoses: Diarrhea, unspecified type      fluticasone (FLONASE) 50 MCG/ACT nasal spray Spray 1 spray into both nostrils 2 times daily as needed for rhinitis or allergies      losartan (COZAAR) 100 MG tablet Take 100 mg by mouth daily      rivaroxaban ANTICOAGULANT (XARELTO) 20 MG TABS tablet Take 1 tablet (20 mg) by mouth daily (with dinner)  Qty: 30 tablet, Refills: 0    Associated Diagnoses: Cerebrovascular accident (CVA), unspecified mechanism (H)      sertraline (ZOLOFT) 25 MG tablet Take 25 mg by mouth daily      simvastatin (ZOCOR) 40 MG tablet Take 1 tablet (40 mg) by mouth daily  Qty: 30 tablet, Refills: 0    Associated Diagnoses: Cerebrovascular accident (CVA), unspecified mechanism (H)      vitamin B-12 (CYANOCOBALAMIN) 1000 MCG tablet Take 5,000 mcg by mouth daily      vitamin D3 (CHOLECALCIFEROL) 50 mcg (2000 units) tablet Take 1 tablet by mouth daily           STOP taking these medications       hydrALAZINE (APRESOLINE) 10 MG tablet Comments:   Reason for Stopping:             Allergies   Allergies   Allergen Reactions    Lisinopril Other  (See Comments)     Decreased kidney function

## 2024-01-25 NOTE — PHARMACY-ADMISSION MEDICATION HISTORY
Pharmacist Admission Medication History    Admission medication history is complete. The information provided in this note is only as accurate as the sources available at the time of the update.    Information Source(s): Patient, Family member, and CareEverywhere/SureScripts via in-person    Pertinent Information: Patient just finished rifaximin yesterday for a 14 day course     Changes made to PTA medication list:  Added: sertraline, hydralazine  Deleted: tamsulosin, prednisone  Changed: None    Allergies reviewed with patient and updates made in EHR: yes    Medication History Completed By: Minnie Norris RPH 1/25/2024 10:51 AM    PTA Med List   Medication Sig Last Dose    acetaminophen (TYLENOL) 325 MG tablet Take 2 tablets (650 mg) by mouth every 6 hours as needed for mild pain or fever (> 101 F)     aspirin (ASA) 81 MG EC tablet Take 1 tablet (81 mg) by mouth daily 1/24/2024    budesonide (ENTOCORT EC) 3 MG EC capsule Take 3 mg by mouth every morning 1/24/2024    cholestyramine (QUESTRAN) 4 g packet Take 2 packets (8 g) by mouth daily Hold for constipation 1/24/2024    fluticasone (FLONASE) 50 MCG/ACT nasal spray Spray 1 spray into both nostrils 2 times daily as needed for rhinitis or allergies     hydrALAZINE (APRESOLINE) 10 MG tablet Take 10 mg by mouth 2 times daily 1/24/2024    losartan (COZAAR) 100 MG tablet Take 100 mg by mouth daily 1/24/2024    rivaroxaban ANTICOAGULANT (XARELTO) 20 MG TABS tablet Take 1 tablet (20 mg) by mouth daily (with dinner) 1/24/2024    sertraline (ZOLOFT) 25 MG tablet Take 25 mg by mouth daily 1/24/2024    simvastatin (ZOCOR) 40 MG tablet Take 1 tablet (40 mg) by mouth daily (Patient taking differently: Take 40 mg by mouth every evening) 1/24/2024    vitamin B-12 (CYANOCOBALAMIN) 1000 MCG tablet Take 5,000 mcg by mouth daily 1/24/2024    vitamin D3 (CHOLECALCIFEROL) 50 mcg (2000 units) tablet Take 1 tablet by mouth daily 1/24/2024

## 2024-01-25 NOTE — ED NOTES
Bed: ED05  Expected date: 1/24/24  Expected time: 8:20 PM  Means of arrival: Ambulance  Comments:  HEMS 423 83M syncope, afib

## 2024-01-25 NOTE — ED PROVIDER NOTES
"  History     Chief Complaint:  Loss of Consciousness, Generalized Weakness, and Dizziness       The history is provided by the patient and a relative.      Kamari Valentin is a 83 year old male who presents for evaluation of loss of consciousness and lightheadedness. Kamari reports that he came from dinner this evening and felt lightheaded when he entered the kitchen so he sat down and then lost consciousness. He adds that he was short of breath while walking up the ramp to his kitchen. He denies and chest discomfort and pain anywhere. He has been eating and drinking normally. 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  He has been not been having any fevers or cough.  No other recent illnesses.    Independent Historian:   Son In law - They report that the patient went limp in the chair and was nonverbal. He moved Ruddy to the floor and he made a \"gurgling sound\". He adds that the he had shallow respirations, one every 3-10 seconds and notes that he was not responding with his pupils constricted. He adds that he gave him oxygen and that his SPO2 was originally 57% with 112 pulse but it chloé to 97%. He was unconscious for 10-15 minutes and denies any seizure like activity. He notes a DNR and DNI order for Ruddy.     Review of External Notes:   Reviewed clinic care coordinator out reach note from earlier in the day.  Daughter was discussing hospice evaluation and assessment for loss of appetite, weight loss increased sleepiness and diarrhea.    Medications:    Acetaminophen  Aspirin  Budesonide  Cholestyramine  Fluticasone  Losartan  Prednisone  Rivaroxaban  Simvastatin  Tamsulosin    Past Medical History:    Aortic stenosis  Atrial fibrillation  Carotid stenosis  Chronic renal insufficiency  Essential hypertension  Hyperlipidemia  Hypertrophy of prostate without urinary obstruction and other LUTS  Rheumatic fever  Unspecified cerebral artery occlusion with " "cerebral infarction    Past Surgical History:    Colonoscopy x2  CV coronary angiogram  CV right heart cath measurements  ENT surgery x2  Hernia repair  Orthopedic surgery  Herniorrhaphy inguinal     Physical Exam   Patient Vitals for the past 24 hrs:   BP Temp Temp src Pulse Resp SpO2 Height Weight   01/24/24 2042 124/55 97.4  F (36.3  C) Oral 57 18 96 % 1.676 m (5' 6\") 64.4 kg (142 lb)        Physical Exam  General: Well-nourished, appears to be resting comfortably when I enter the room  Eyes: PERRL, conjunctivae pink no scleral icterus or conjunctival injection  ENT:  Moist mucus membranes, posterior oropharynx clear without erythema or exudates  Respiratory:  Lungs clear to auscultation bilaterally, no crackles/rubs/wheezes.  Good air movement  CV: Normal rate and rhythm, no murmurs/rubs/gallops  GI:  Abdomen soft and non-distended.  Normoactive BS.  No tenderness, guarding or rebound  Skin: Warm, dry.  No rashes or petechiae  Musculoskeletal: No peripheral edema or calf tenderness  Neuro: Alert and oriented to person/place/time. + Subtle left facial droop.  Subtle decrease in strength on the left upper and lower extremity versus the right.  Able to hold up against gravity.  Patient reports this is normal for him.  Normal sensation to light touch throughout.  Psychiatric: Normal affect    Emergency Department Course   ECG  ECG taken at 2102, ECG read at 2111  Atrial fibrillation with slow ventricular response   Minimal voltage criteria for LVH, may be normal variant (R in aVL)  ST&T wave abnormality, consider lateral ischemia  Abnormal ECG   No change as compared to prior, dated 09/14/2022.  Rate 59 bpm. SD interval * ms. QRS duration 100 ms. QT/QTc 446/441 ms. P-R-T axes * -15 143.     Imaging:  Chest XR,  PA & LAT   Final Result   IMPRESSION: New focal areas of opacity right infrahilar region extending laterally to the right lung base suspicious for alveolar infiltrate related to pneumonia or atelectasis. " Recommend radiographic follow-up. Left lung clear. Cardiac enlargement. Mild    pulmonary vascular congestion. Aortic calcification. Degenerative changes in the spine and both shoulders, greater on the right.      Report per radiology.        Laboratory:  Labs Ordered and Resulted from Time of ED Arrival to Time of ED Departure   COMPREHENSIVE METABOLIC PANEL - Abnormal       Result Value    Sodium 145      Potassium 3.7      Carbon Dioxide (CO2) 29      Anion Gap 9      Urea Nitrogen 19.3      Creatinine 1.09      GFR Estimate 67      Calcium 8.9      Chloride 107      Glucose 120 (*)     Alkaline Phosphatase 48      AST 28      ALT 20      Protein Total 6.0 (*)     Albumin 3.7      Bilirubin Total 0.4     TROPONIN T, HIGH SENSITIVITY - Abnormal    Troponin T, High Sensitivity 33 (*)    NT PROBNP INPATIENT - Abnormal    N terminal Pro BNP Inpatient 12,694 (*)    CBC WITH PLATELETS AND DIFFERENTIAL - Abnormal    WBC Count 5.9      RBC Count 3.21 (*)     Hemoglobin 10.8 (*)     Hematocrit 32.3 (*)      (*)     MCH 33.6 (*)     MCHC 33.4      RDW 14.4      Platelet Count 187      % Neutrophils 73      % Lymphocytes 16      % Monocytes 9      % Eosinophils 2      % Basophils 0      % Immature Granulocytes 0      NRBCs per 100 WBC 0      Absolute Neutrophils 4.3      Absolute Lymphocytes 1.0      Absolute Monocytes 0.5      Absolute Eosinophils 0.1      Absolute Basophils 0.0      Absolute Immature Granulocytes 0.0      Absolute NRBCs 0.0     MAGNESIUM - Normal    Magnesium 1.8          Emergency Department Course & Assessments:       Interventions:  Medications   lidocaine (LMX4) cream (has no administration in time range)        Independent Interpretation (X-rays, CTs, rhythm strip):  I reviewed and interpreted the chest x-ray.  I see no evidence of pneumothorax.  Appears to have vascular congestion.      Assessments/Consultations/Discussion of Management or Tests:    ED Course as of 01/25/24 0216 Wed Jan 24,  2024 2112 Initial evaluation   2300 I consulted with Dr. Rubio regarding admission and management of the patient.  Will start in observation as patient may wish to enroll in hospice.  No antibiotics given no fever or cough.  Will hold on diuretics until echo and further evaluation to see if this is his aortic stenosis or another etiology.       Social Determinants of Health affecting care:   None    Disposition:  The patient was admitted to the hospital under the care of Dr. Rubio.     Impression & Plan      Medical Decision Making:  Kamari Valentin is a 83 year old male with history of multiple medical problems including history of chronic atrial fibrillation as well as aortic stenosis who comes with a syncopal episode.  He fortunately did not hit his head.  No new neurologic symptoms and so head CT was considered but I did not feel this was necessary.  EKG shows that he is in atrial fibrillation.  This is chronic for him.  He does not appear to be in RVR.  He is already anticoagulated for this.  Electrolytes were fairly reassuring.  His BNP is significantly elevated.  Chest x-ray looks to me to be congested.  There is a possibility of an infiltrate but the patient is not having fevers or cough and I do not believe this is pneumonia and so we will hold on antibiotics and monitor him instead.  I am, of course, concerned about the possibility of a malignant arrhythmia leading to his syncopal episode.  Additionally, I am concerned about the possibility of progressive aortic stenosis given the symptoms described by his daughter.  Will hold on giving diuretics at this time given the possibility of aortic stenosis.  Will admit to the hospital for further evaluation and workup and for goals of care discussion and suspect the family will pursue enrollment in hospice if the patient so elects after further testing.  Dr. Rubio graciously agreed to meet the patient.    Diagnosis:    ICD-10-CM    1. Syncope and  collapse  R55       2. Dyspnea, unspecified type  R06.00       3. Chronic atrial fibrillation (H)  I48.20            Discharge Medications:  New Prescriptions    No medications on file      Scribe Disclosure:  I, Wali Penaloza, am serving as a scribe at 9:38 PM on 1/24/2024 to document services personally performed by Vero Cardoso MD based on my observations and the provider's statements to me.     I, Sanjuana Ingram, am serving as a scribe at 10:05 PM on 1/24/2024 to document services personally performed by Vero Cardoso MD based on my observations and the provider's statements to me.      1/24/2024   Vero Cardoso MD Cho, Amy C, MD  01/25/24 0216

## 2024-01-25 NOTE — ED NOTES
Pt and family spoke with cardiology and are declining surgery at this time. Also asking if discharge is possible. Hospitalist paged.

## 2024-01-25 NOTE — ED TRIAGE NOTES
BIBA from home. Per Ems pt c/o weakness, dizziness,  and had a syncope episode @ 19:40 this evening. His family lower him to the floor . Pt with 30 seconds, syncope episode, and apneic  for for 5 seconds.  Per son -in-Law who is at the bedside reports  Family placed  pt on 5L/ O2/NC, pt woke up. EMS arrived at the home  they observed pt alert dizziness resolved. /74, HR 55-65, O 2 Sats 94-96 RA, , 12 Lead showed A/Fib. IV18 G , RAC. NS 00cc given. Hx CVA x 2 with Lt sided weakness. On Eliquis.    Daughter Rachael is at the bedside she reports pt is on Xarelto 20mg daily and not Eliquis    Triage Assessment (Adult)       Row Name 01/24/24 2041          Triage Assessment    Airway WDL WDL        Respiratory WDL    Respiratory WDL WDL        Skin Circulation/Temperature WDL    Skin Circulation/Temperature WDL WDL;X  trace  bilateral ankle edema        Cardiac WDL    Cardiac WDL WDL;X     Cardiac Rhythm Atrial fibrillation  Controlled  rate 59        Peripheral/Neurovascular WDL    Peripheral Neurovascular WDL WDL;X  Trace bilateral ankle edema        Cognitive/Neuro/Behavioral WDL    Cognitive/Neuro/Behavioral WDL WDL

## 2024-01-25 NOTE — ED NOTES
[facility]  ED Nurse Handoff Report    ED Chief complaint: Loss of Consciousness, Generalized Weakness, and Dizziness      ED Diagnosis:   Final diagnoses:   Syncope and collapse   Dyspnea, unspecified type   Chronic atrial fibrillation (H)       Code Status: Per the admitting provider     Allergies:   Allergies   Allergen Reactions    Lisinopril        Patient Story:  Pt reports episode c/o weakness, dizziness, and had a  30 seconds syncope episode @ 19:40 this evening. His family lower pt  to the floor  and they placed O2 2L/nc on pt. Pt awoke, denied pain. EMS arrived Per son -in-Law who is at the bedside reports  Family placed  pt on 5L/ O2/NC, pt woke up.When  EMS arrived at the home  pt  was alert  and dizziness resolved.Pt denied pain, dizziness , CP,or headache.  Hx CVA x 2 with Lt sided weakness. On Eliquis for A/fib.        Focused Assessment:   Pt A/o x 4, denied pain. Pt observed to have lt side sided weakness secondary for CVA x 2. Lungs clear on auscultation bilaterally.  + BS x all four quadrants. Abd soft non-tender to palpate. Pt reports  having a BM yesterday. He denied blood in stool.     Treatments and/or interventions provided: Lab, EKG, CXR,   Results for orders placed or performed during the hospital encounter of 01/24/24   Chest XR,  PA & LAT     Status: None    Narrative    EXAM: XR CHEST 2 VIEWS  LOCATION: [facility]  DATE: 1/24/2024    INDICATION: Syncope. Shortness of breath.  COMPARISON: 09/14/2022      Impression    IMPRESSION: New focal areas of opacity right infrahilar region extending laterally to the right lung base suspicious for alveolar infiltrate related to pneumonia or atelectasis. Recommend radiographic follow-up. Left lung clear. Cardiac enlargement. Mild   pulmonary vascular congestion. Aortic calcification. Degenerative changes in the spine and both shoulders, greater on the right.   Comprehensive metabolic panel     Status:  Abnormal   Result Value Ref Range    Sodium 145 135 - 145 mmol/L    Potassium 3.7 3.4 - 5.3 mmol/L    Carbon Dioxide (CO2) 29 22 - 29 mmol/L    Anion Gap 9 7 - 15 mmol/L    Urea Nitrogen 19.3 8.0 - 23.0 mg/dL    Creatinine 1.09 0.67 - 1.17 mg/dL    GFR Estimate 67 >60 mL/min/1.73m2    Calcium 8.9 8.8 - 10.2 mg/dL    Chloride 107 98 - 107 mmol/L    Glucose 120 (H) 70 - 99 mg/dL    Alkaline Phosphatase 48 40 - 150 U/L    AST 28 0 - 45 U/L    ALT 20 0 - 70 U/L    Protein Total 6.0 (L) 6.4 - 8.3 g/dL    Albumin 3.7 3.5 - 5.2 g/dL    Bilirubin Total 0.4 <=1.2 mg/dL   Magnesium     Status: Normal   Result Value Ref Range    Magnesium 1.8 1.7 - 2.3 mg/dL   Troponin T, High Sensitivity     Status: Abnormal   Result Value Ref Range    Troponin T, High Sensitivity 33 (H) <=22 ng/L   Nt probnp inpatient (BNP)     Status: Abnormal   Result Value Ref Range    N terminal Pro BNP Inpatient 12,694 (H) 0 - 1,800 pg/mL   CBC with platelets and differential     Status: Abnormal   Result Value Ref Range    WBC Count 5.9 4.0 - 11.0 10e3/uL    RBC Count 3.21 (L) 4.40 - 5.90 10e6/uL    Hemoglobin 10.8 (L) 13.3 - 17.7 g/dL    Hematocrit 32.3 (L) 40.0 - 53.0 %     (H) 78 - 100 fL    MCH 33.6 (H) 26.5 - 33.0 pg    MCHC 33.4 31.5 - 36.5 g/dL    RDW 14.4 10.0 - 15.0 %    Platelet Count 187 150 - 450 10e3/uL    % Neutrophils 73 %    % Lymphocytes 16 %    % Monocytes 9 %    % Eosinophils 2 %    % Basophils 0 %    % Immature Granulocytes 0 %    NRBCs per 100 WBC 0 <1 /100    Absolute Neutrophils 4.3 1.6 - 8.3 10e3/uL    Absolute Lymphocytes 1.0 0.8 - 5.3 10e3/uL    Absolute Monocytes 0.5 0.0 - 1.3 10e3/uL    Absolute Eosinophils 0.1 0.0 - 0.7 10e3/uL    Absolute Basophils 0.0 0.0 - 0.2 10e3/uL    Absolute Immature Granulocytes 0.0 <=0.4 10e3/uL    Absolute NRBCs 0.0 10e3/uL   Extra Tube     Status: None    Narrative    The following orders were created for panel order Extra Tube.  Procedure                               Abnormality          Status                     ---------                               -----------         ------                     Extra Blue Top Tube[[id number]]                              Final result               Extra Red Top Tube[[id number]]                               Final result                 Please view results for these tests on the individual orders.   Extra Blue Top Tube     Status: None   Result Value Ref Range    Hold Specimen JIC    Extra Red Top Tube     Status: None   Result Value Ref Range    Hold Specimen JIC    EKG 12-lead, tracing only     Status: None   Result Value Ref Range    Systolic Blood Pressure  mmHg    Diastolic Blood Pressure  mmHg    Ventricular Rate 59 BPM    Atrial Rate  BPM    MO Interval  ms    QRS Duration 100 ms     ms    QTc 441 ms    P Axis  degrees    R AXIS -15 degrees    T Axis 143 degrees    Interpretation ECG       Atrial fibrillation with slow ventricular response  Minimal voltage criteria for LVH, may be normal variant ( R in aVL )  ST & T wave abnormality, consider lateral ischemia  Abnormal ECG  When compared with ECG of 14-SEP-2022 19:42,  T wave inversion now evident in Lateral leads  Confirmed by GENERATED REPORT, COMPUTER ([id number]),  ARANZA HARRELL ([id number]) on 1/24/2024 9:16:05 PM     CBC with platelets differential     Status: Abnormal    Narrative    The following orders were created for panel order CBC with platelets differential.  Procedure                               Abnormality         Status                     ---------                               -----------         ------                     CBC with platelets and d...[[id number]]  Abnormal            Final result                 Please view results for these tests on the individual orders.      Patient's response to treatments and/or interventions: Stable     To be done/followed up on inpatient unit:  See orders     Does this patient have any cognitive concerns?:  No     Activity level - Baseline/Home:   "Independent  Activity Level - Current:   Stand with Assist    Patient's Preferred language: English   Needed?: No    Isolation: None  Infection: Not Applicable  Patient tested for COVID 19 prior to admission: YES  Bariatric?: No    Vital Signs:   Vitals:    01/24/24 2042   BP: 124/55   Pulse: 57   Resp: 18   Temp: 97.4  F (36.3  C)   TempSrc: Oral   SpO2: 96%   Weight: 64.4 kg (142 lb)   Height: 1.676 m (5' 6\")       Cardiac Rhythm:Cardiac Rhythm: Atrial fibrillation (Controlled  rate 59)    Was the PSS-3 completed:   Yes  What interventions are required if any?               Family Comments:  X 2 are at the bedside   OBS brochure/video discussed/provided to patient/family: Yes              Name of person given brochure if not patient: N/A               Relationship to patient: N/a     For the majority of the shift this patient's behavior was Green.   Behavioral interventions performed were N/A .    ED NURSE PHONE NUMBER: *[phone]        "

## 2024-01-25 NOTE — PROGRESS NOTES
[facility]    Medicine Progress Note - Hospitalist Service    Date of Admission:  1/24/2024    Assessment & Plan   Kamari Valentin is a 83 year old male with past medical history including severe aortic stenosis, coronary artery disease, hypertension, atrial fibrillation, hx of CVA, BPH, cognitive impairment, lymphocytic colitis who presents with syncopal episode. Admitted on 1/24/2024.      Syncope, suspect secondary to severe aortic stenosis   Hypertension  Coronary artery disease, multivessel   Advanced care planning   *Presents with exertional syncope, with ~10 minutes of unconsciousness with slow breathing. Initial pulse 112 and O2 level 57% per family from home oximeter, placed patient on wife's oxygen, eventually regained consciousness.   *Known severe aortic stenosis, last echocardiogram 2/2020. Normal EF at that time. Had been recommended for CABG and AVR, which he declined due to stroke following angiogram. Last seen by cardiology 1/2022 where he re-iterated no invasive management for CAD or aortic stenosis. Admission for CHF 6/2022, echocardiogram deferred as not interested in cardiac procedures.  *Lower extremity edema on exam, which has recently increased, XR with new focal area of atelectasis or infiltrate on R (see below), mild pulmonary vascular congestion.   *Given his poor intake and weight loss, with syncope occurring at level of exertion he has typically tolerated, question if intravascular depletion / decreased preload main factor leading to decreased CO and syncope, hesitate to initiate IV diuresis as on room air without respiratory symptoms currently.   - Discussed goals of care at length with patient and family. He remains adamantly opposed to valve replacement. His primary had placed a referral to hospice for informational purposes due to poor intake/appetite, weight loss; at present moment he does not feel ready to enroll as he otherwise feels good. Discussed  syncope likely due to progressive valve stenosis, poor prognosis once this develops and inevitability of future symptoms.  - Telemetry   - Echocardiogram for medication management and prognostic purposes   -Await cardiology consult  - 2 gram sodium restricted diet  - Resume prior to admission losartan      Atrial fibrillation with slow ventricular response  Hx of CVA  Does not appear to be on rate controlling agents at baseline  - Telemetry   - Resume prior to admission rivaroxaban      Lymphocytic colitis   Ongoing loose/watery stools approximately 2x per day.  - Resume prior to admission budesonide      Cognitive impairment  Adult failure to thrive   *Need for 24 hour care recommended on TCU discharge 7/2022  *Family reporting increased care needs at home, as well as general decline with appetite, weight loss  - Re-orient as needed  - Maintain normal day/night, sleep wake cycles  - Minimize sedating/altering medications as able  - Treat separate conditions as detailed above/below   - SW consulted for discharge planning     Right lung opacity  *CXR with focal areas of opacity right infrahilar region extending laterally to the right lung base suspicious for alveolar infiltrate related pneumonia or atelectasis.   *Denies any preceding cough, f/c. Likely atelectasis.   - Encourage IS      Poor intake   Weight loss   - Nutrition consulted      Depression  Reportedly just started on sertraline at PCP visit 1/23/24   - Resume prior to admission sertraline      Hyperlipidemia  - Hold prior to admission statin while observation status      BPH  -Not on tamsulosin    chronic anemia  Baseline hemoglobin 11-12 g/dl. Currently around baseline.  - Noted          Diet: 2 Gram Sodium Diet    DVT Prophylaxis: DOAC  Shen Catheter: Not present  Lines: None     Cardiac Monitoring: None  Code Status:  DNR/DNI    Clinically Significant Risk Factors Present on Admission               # Drug Induced Coagulation Defect: home medication  "list includes an anticoagulant medication  # Drug Induced Platelet Defect: home medication list includes an antiplatelet medication   # Hypertension: Noted on problem list               Disposition Plan      Expected Discharge Date: 01/26/2024                See Trejo MD  Hospitalist Service  [facility]  Securely message with [employer] (more info)  Text page via [facility] Paging/Directory   The above note was dictated using voice recognition software. Although reviewed after completion, some word and grammatical error may remain . Please contact the author for any clarifications.  _____________________________________________________________________    Interval History   History reviewed.  Feels better.  Denies any chest pain/palpitations.  Denies any nausea/vomiting    Physical Exam   BP (!) 167/92   Pulse 69   Temp 98  F (36.7  C) (Oral)   Resp 21   Ht 1.676 m (5' 6\")   Wt 64.4 kg (142 lb)   SpO2 97%   BMI 22.92 kg/m    Gen- pleasant   Neck- supple  CVS- I+II+ ESM   RS- CTAB  Abdo- soft, no tenderness. No g/r/r   Ext- no edema     Medical Decision Making       40 MINUTES SPENT BY ME on the date of service doing chart review, history, exam, documentation & further activities per the note.      Data   ------------------------- PAST 24 HR DATA REVIEWED -----------------------------------------------    I have personally reviewed the following data over the past 24 hrs:    5.9  \   10.8 (L)   / 187     145 107 19.3 /  120 (H)   3.7 29 1.09 \     ALT: 20 AST: 28 AP: 48 TBILI: 0.4   ALB: 3.7 TOT PROTEIN: 6.0 (L) LIPASE: N/A     Trop: 33 (H) BNP: 12,694 (H)     Procal: 0.05 CRP: N/A Lactic Acid: N/A         Imaging results reviewed over the past 24 hrs:   Recent Results (from the past 24 hour(s))   Chest XR,  PA & LAT    Narrative    EXAM: XR CHEST 2 VIEWS  LOCATION: [facility]  DATE: 1/24/2024    INDICATION: Syncope. Shortness of breath.  COMPARISON: 09/14/2022   "    Impression    IMPRESSION: New focal areas of opacity right infrahilar region extending laterally to the right lung base suspicious for alveolar infiltrate related to pneumonia or atelectasis. Recommend radiographic follow-up. Left lung clear. Cardiac enlargement. Mild   pulmonary vascular congestion. Aortic calcification. Degenerative changes in the spine and both shoulders, greater on the right.   Echocardiogram Complete   Result Value    LVEF  55-60%    [facility]    [id number]  [id number]  [id number]  [id number]^NAT^ALVINO^OLIVE     [facility]  Echocardiography Laboratory  [address] [place], [place] [address]     Name: SHERRY GARCÍA  MRN: [id number]  : 1940  Study Date: 2024 10:11 AM  Age: 83 yrs  Gender: Male  Patient Location: [facility]  Reason For Study: Aortic Valve Disorder  Ordering Physician: ALVINO JOHNS  Referring Physician: ALEJANDRO PINO  Performed By: Yasemin Baig     BSA: 1.7 m2  Height: 66 in  Weight: 142 lb  HR: 60  BP: 159/67 mmHg  ______________________________________________________________________________  Procedure  Complete Echo Adult.  ______________________________________________________________________________  Interpretation Summary     Severe valvular aortic stenosis.  The mean AoV pressure gradient is 69.4mmHg.  The calculated aortic valve area is 0.45cm2. There has been significant  worsening of the severity of the aortic stenosis.  The visual ejection fraction is 55-60%.  Regional wall motion abnormalities cannot be excluded due to limited  visualization. Contrast would enhance regional wall motion analysis.  There is moderate (2+) tricuspid regurgitation.  Right ventricular systolic pressure is elevated, consistent with severe  pulmonary hypertension.  There is mild to moderate (1-2+) aortic regurgitation.  The rhythm was atrial fibrillation.  There is mild to moderate (1-2+) mitral regurgitation.  The study was technically  difficult.  ______________________________________________________________________________  Left Ventricle  The left ventricle is normal in size. There is mild to moderate concentric  left ventricular hypertrophy. Diastolic function not assessed due to atrial  fibrillation. The visual ejection fraction is 55-60%. Regional wall motion  abnormalities cannot be excluded due to limited visualization.     Right Ventricle  Right ventricular function cannot be assessed due to poor image quality.     Atria  The left atrium is moderately dilated. The right atrium is moderately dilated.  The interatrial septum was not well-seen.     Mitral Valve  There is mild to moderate mitral annular calcification. The mitral valve  leaflets are moderately thickened. There is mild to moderate (1-2+) mitral  regurgitation. The mean mitral valve gradient is 2.8 mmHg.     Tricuspid Valve  The right ventricular systolic pressure is approximated at 67mmHg plus the  right atrial pressure. There is moderate (2+) tricuspid regurgitation. Right  ventricular systolic pressure is elevated, consistent with severe pulmonary  hypertension.     Aortic Valve  Thickened aortic valve leaflets. The number of aortic valve leaflets cannot be  determined due to heavy calcification. There is mild to moderate (1-2+) aortic  regurgitation. The peak AoV pressure gradient is 118.0 mmHg. Severe valvular  aortic stenosis. The mean AoV pressure gradient is 69.4mmHg. The calculated  aortic valve area is 0.45cm2.     Pulmonic Valve  There is mild (1+) pulmonic valvular regurgitation. Normal pulmonic valve  velocity.     Vessels  The aortic root is normal size. Normal size ascending aorta. IVC diameter >2.1  cm collapsing <50% with sniff suggests a high RA pressure estimated at 15 mmHg  or greater.     Pericardium  There is no pericardial effusion.     Rhythm  The rhythm was atrial  fibrillation.  ______________________________________________________________________________  MMode/2D Measurements & Calculations  IVSd: 1.3 cm     LVIDd: 4.0 cm  LVIDs: 2.9 cm  LVPWd: 1.5 cm  FS: 26.3 %  LV mass(C)d: 209.0 grams  LV mass(C)dI: 120.8 grams/m2  Ao root diam: 3.3 cm  asc Aorta Diam: 3.5 cm  LVOT diam: 2.0 cm  LVOT area: 3.1 cm2  Ao root diam index Ht(cm/m): 2.0  Ao root diam index BSA (cm/m2): 1.9  Asc Ao diam index BSA (cm/m2): 2.0  Asc Ao diam index Ht(cm/m): 2.1  LA Volume (BP): 77.4 ml     LA Volume Index (BP): 44.7 ml/m2  RWT: 0.75  TAPSE: 2.1 cm     Doppler Measurements & Calculations  MV E max hari: 146.0 cm/sec  MV A max hari: 46.4 cm/sec  MV E/A: 3.1  MV max PG: 10.3 mmHg  MV mean P.8 mmHg  MV V2 VTI: 31.5 cm  MVA(VTI): 2.0 cm2  MV dec time: 0.11 sec  Ao V2 max: 544.2 cm/sec  Ao max P.0 mmHg  Ao V2 mean: 389.6 cm/sec  Ao mean P.4 mmHg  Ao V2 VTI: 138.8 cm  KARYNA(I,D): 0.45 cm2  KARYNA(V,D): 0.59 cm2  AI P1/2t: 332.4 msec  LV V1 max P.5 mmHg  LV V1 max: 104.7 cm/sec  LV V1 VTI: 20.3 cm  SV(LVOT): 62.6 ml  SI(LVOT): 36.2 ml/m2  PA V2 max: 94.6 cm/sec  PA max PG: 3.6 mmHg  PA acc time: 0.08 sec  AV Hari Ratio (DI): 0.19  KARYNA Index (cm2/m2): 0.26     E/E' av.0  Lateral E/e': 10.1  Medial E/e': 17.9  RV S Hari: 9.1 cm/sec     ______________________________________________________________________________  Report approved by: Minerva Novak 2024 11:06 AM

## 2024-01-26 NOTE — PROGRESS NOTES
Clinic Care Coordination Contact  Follow Up Progress Note      Assessment: Spoke with pt's daughter Rachael, she updated writer that pt was hospitalized at [facility] from 1/24-1/25 for a syncopal episode thought to be caused by his severe aortic stenosis. Pt opted to go home and have a hospice consult once home although Rachael notes that pt's short-term memory is not that good and although he had been against any surgical intervention, when faced with discussions about hospice, he has seemed nervous about his future if he doesn't treat/have surgery.    We discussed that a consultation with hospice may help give pt some better understanding and less fear about hospice. Validated that talking about hospice is scary, but hopefully with more information he will be more comfortable and/or have a clearer understanding of which path he would like to go down.     Rachael is working on arranging private home care through [facility] and wanted to know if this would be needed and writer explained that yes, hospice is an intermittent service and does not perform the assistance with day to day ADL's except for a bath once per week. Explained the services that hospice does provide including: nursing, medical/pain management, social work, bryce, music therapy and volunteer services available. Writer also explained option to have hospice in a setting other than home such as residential hospice home or skilled nursing facility. Explained general cost of this. Rachael is familiar with [facility] with another family member and pt's spouse had a wonderful experience there. At this time the hope had been for pt to remain at home but we discussed that at times the plan does change and the ability to move from home to a different setting while remaining on hospice is an option if needed.    Writer offered to make referral for hospice consultation/assessment, Rachael would like to pursue this. Writer offered choice of agency and Rachael would like to  use [facility] to keep all records together. Writer has made referral and they will reach out to Rachael to schedule consult/assessment and thought they would be able to arrange for this today.    Care Gaps:    Health Maintenance Due   Topic Date Due    HF ACTION PLAN  Never done    ADVANCE CARE PLANNING  Never done    RSV VACCINE (Pregnancy & 60+) (1 - 1-dose 60+ series) Never done    ZOSTER IMMUNIZATION (2 of 3) 09/25/2012    DTAP/TDAP/TD IMMUNIZATION (2 - Td or Tdap) 10/25/2021    MEDICARE ANNUAL WELLNESS VISIT  09/14/2022    LIPID  01/10/2023    COVID-19 Vaccine (5 - 2023-24 season) 09/01/2023    FALL RISK ASSESSMENT  09/02/2023    PHQ-2 (once per calendar year)  Never done       Care Plans  Care Plan: Help At Home       Problem: Insufficient In-home support       Goal: Establish adequate home support       Start Date: 11/21/2023 Expected End Date: 2/21/2024    This Visit's Progress: 30% Recent Progress: 10%    Priority: High    Note:     Barriers: New to utilizing services   Strengths: Supportive family and financial means  Patient expressed understanding of goal: Yes  Action steps to achieve this goal:  1. I will review the resources that [facility] sends  2. I will start receiving help in the home through agency  3. I will consider assisted living if needed                               Intervention/Education provided during outreach: Referral for hospice consultation: recent PCP visit note and Rx for hospice assessment faxed to [phone].      Outreach Frequency: monthly, more frequently as needed    The patient;s daughter Rachael consented via Verbal consent to have contact information and resources sent via email in an unencrypted manner.    The following e-mail was sent to Rachael on 1/26/2024     Rachael,    Here are some residential Hospice homes, residential hospice homes are a smaller/home-like environment that are private pay, I would estimate the cost of a residential hospice home is about $500 per  day.  [facility] ([place]): [facility]  There are other residential hospice homes but [facility] would be closest- [facility] one in [place] called [facility] ([facility])   and there is [facility] in [place] (which is actually free but often full)  [facility] ([email or web address])  and they would actually use their own hospice     Otherwise,  Here are some skilled nursing facilities in the area that are reputable and could take your dad on hospice but sometimes beds are full (that would be the only barrier) or a little bit of  a wait would be:   [place] [place]  [facility]   [facility]  [facility]     For a full list of medicare certified skilled nursing facilities:  Find Healthcare Providers: Compare Care Near You  Medicare         If receiving hospice in a medicare skilled nursing facility, the hospice agency would continue to follow him there and he would receive hospice care on top of  the 24 hr care supplied by the facility. The room and board would be private pay and would likely be between $250-$300 per day.     Let me know if you have any questions!    Rachael          Plan:   [facility] will follow closely during hospice referral process and will remain available as needed.    Rachael Veliz [facility]  Social Work Care Coordinator  Phone: [phone]

## 2024-01-30 NOTE — PROGRESS NOTES
Clinic Care Coordination Contact  Follow Up Progress Note      Assessment: Follow-up call placed to pt's daughter Rachael to check in and ensure that they were able to have hospice consultation. Rachael updated writer that yes, things are going well and they are working with [facility]. [facility] hospice did the consultation on Friday (1/26) late afternoon and pt signed on with them later that evening. She stated that the nurse has been out a few times, they are meeting with the bryce and  this week and someone is coming to give pt a bath tomorrow. Rachael explained that pt seems much more comfortable with hospice after meeting with them and discussing their services in further detail.    Rachael did inquire whether they should keep or cancel their appointment in Feb with PCP Dr. Proctor, writer encouraged them to discuss further with hospice and if pt would like to attend and if hospice allows, then they can keep the appointment.     Rachael has not yet hired add'l care but does plan to do this as things settle in with hospice and she feels she has the resources to know how to do this. Rachael denied having add'l questions/concerns for [facility] at this time.     Care Gaps:    Health Maintenance Due   Topic Date Due    HF ACTION PLAN  Never done    ADVANCE CARE PLANNING  Never done    RSV VACCINE (Pregnancy & 60+) (1 - 1-dose 60+ series) Never done    ZOSTER IMMUNIZATION (2 of 3) 09/25/2012    DTAP/TDAP/TD IMMUNIZATION (2 - Td or Tdap) 10/25/2021    MEDICARE ANNUAL WELLNESS VISIT  09/14/2022    LIPID  01/10/2023    COVID-19 Vaccine (5 - 2023-24 season) 09/01/2023    FALL RISK ASSESSMENT  09/02/2023    PHQ-2 (once per calendar year)  Never done         Care Plans  Care Plan: Help At Home       Problem: Insufficient In-home support       Goal: Establish adequate home support       Start Date: 11/21/2023 Expected End Date: 2/21/2024    This Visit's Progress: 90% Recent Progress: 30%    Priority: High     Note:     Barriers: New to utilizing services   Strengths: Supportive family and financial means  Patient expressed understanding of goal: Yes  Action steps to achieve this goal:  1. I will review the resources that [facility] sends  2. I will start receiving help in the home through agency  3. I will consider assisted living if needed                               Intervention/Education provided during outreach: Supportive check-in     Plan:   Care Coordinator will follow up in 1 month and remain available as needed.    Rachael Veliz [facility]  Social Work Care Coordinator  Phone: [phone]

## 2024-10-30 NOTE — PROGRESS NOTES
10/30/2024Social work transition of care planning  Hospice consult noted. Sw spoke with pt's dtr for hospice choices,list declined. Referral to [facility] for info only at this time.  Electronically signed by ALBINO Archibald on 10/30/2024 at 12:01 PM     Clinic Care Coordination Contact  Clinic Care Coordination Contact  OUTREACH    Referral Information:  Referral Source: PCP: Daughter reached out to PCP clinic inquiring about process of getting help at home.    Primary Diagnosis: Psychosocial    Chief Complaint   Patient presents with    Clinic Care Coordination - Initial     SW Outreach        [place] Utilization:   Clinic Utilization  Difficulty keeping appointments:: No  Compliance Concerns: No  No-Show Concerns: No  No PCP office visit in Past Year: No  Utilization      No Show Count (past year)  0             ED Visits  0             Hospital Admissions  0                    Current as of: 11/15/2023  4:19 PM                Clinical Concerns:  Current Medical Concerns:  A-fib, hx of CVA, CHF and recently has been having issues with diarrhea   Current Behavioral Concerns: None    Education Provided to patient: SW role and resources for in home care and assisted living        Medication Management:  Medication review status: Not reviewed    Functional Status:  Dependent ADLs:: Ambulation-walker  Dependent IADLs:: Cooking, Laundry, Shopping, Meal Preparation, Transportation  Bed or wheelchair confined:: No  Mobility Status: Independent w/Device    Living Situation:  Current living arrangement:: I live in a private home with spouse  Type of residence:: Private home - stairs    Lifestyle & Psychosocial Needs:  Pt is a 83 yr old  male who lives with his spouse (Agatha) in [facility]. Spoke with pt's daughter Rachael, she explained that her mom has been pt's primary caregiver and is starting to become overwhelmed and has expressed needing additional help in the home. Agatha is active and independent. Rachael helps when she can but works and has children making it difficult for her to be there as much as is needed.     Rachael explained that pt is able to walk with a walker but is up frequently at night going to the bathroom, he has a hx of stroke, CHF and recently  diarrhea. He is working closely with GI to hopefully get diarrhea under control but if this continues it is going to be a challenge to keep pt at home. They have discussed getting help from a home care agency to help but her parents are a bit hesitant about having someone they don't know coming into their home especially at night. We discussed starting small, having someone there during the day and once they get to know the person, possibly increasing as well as hiring a family friend privately. Rachael stated that her parents do have financial means to pay for in home care.    Rachael was also interested in know the process of placing pt in SNF or residential in future if needed. Writer explained process and cost in general for both. Rachael stated that they would like to keep pt at home if possible but will explore residential/SNF if needed.     Social Determinants of Health     Food Insecurity: Low Risk  (11/21/2023)    Food Insecurity     Within the past 12 months, did you worry that your food would run out before you got money to buy more?: No     Within the past 12 months, did the food you bought just not last and you didn t have money to get more?: No   Depression: Not on file   Housing Stability: Low Risk  (11/21/2023)    Housing Stability     Do you have housing? : Yes     Are you worried about losing your housing?: No   Tobacco Use: Medium Risk (7/19/2022)    Patient History     Smoking Tobacco Use: Former     Smokeless Tobacco Use: Never     Passive Exposure: Not on file   Financial Resource Strain: Low Risk  (11/21/2023)    Financial Resource Strain     Within the past 12 months, have you or your family members you live with been unable to get utilities (heat, electricity) when it was really needed?: No   Alcohol Use: Not At Risk (1/22/2019)    AUDIT-C     Frequency of Alcohol Consumption: Never     Average Number of Drinks: Not on file     Frequency of Binge Drinking: Not on file   Transportation Needs: Low Risk   (11/21/2023)    Transportation Needs     Within the past 12 months, has lack of transportation kept you from medical appointments, getting your medicines, non-medical meetings or appointments, work, or from getting things that you need?: No   Physical Activity: Not on file   Interpersonal Safety: Not on file   Stress: Not on file   Social Connections: Not on file     Inadequate nutrition (GOAL):: No  Tube Feeding: No  Inadequate activity/exercise (GOAL):: No  Significant changes in sleep pattern (GOAL): No  Transportation means:: Family, Regular car     Rastafarian or spiritual beliefs that impact treatment:: No  Mental health DX:: No  Mental health management concern (GOAL):: No  Chemical Dependency Status: No Current Concerns  Informal Support system:: Children, Spouse      Resources and Interventions:  Current Resources: None     Community Resources: None     Equipment Currently Used at Home: walker, rolling, grab bar, toilet, grab bar, tub/shower, tub bench, ramp  Employment Status: retired       Spoke with pt's daughter Rachael. E-mail her resources for private pay home care agencies as well as agencies that help people find assisted living facilities.        The patient's daughter consented via Verbal consent to have contact information and resources sent via email in an unencrypted manner.     Care Plan:  Care Plan: Help At Home       Problem: Insufficient In-home support       Goal: Establish adequate home support       Start Date: 11/21/2023 Expected End Date: 2/21/2024    Priority: High    Note:     Barriers: New to utilizing services   Strengths: Supportive family and financial means  Patient expressed understanding of goal: Yes  Action steps to achieve this goal:  1. I will review the resources that [facility] sends  2. I will start receiving help in the home through agency  3. I will consider assisted living if needed                               Patient/Caregiver understanding: Yes    Outreach Frequency:  monthly, more frequently as needed      Plan: [facility] will plan to outreach in 1 month.    Rachael Veliz [facility]  Social Work Care Coordinator  Phone: [phone]

## (undated) DEVICE — GUIDEWIRE VASC 0.014INX190CM J TIP CGRXT190HJ

## (undated) DEVICE — TOTE ANGIO CORP PC15AT SAN32CC83O

## (undated) DEVICE — CATH ANGIO WEDGE PRESSURE 5FRX110CM DL AI-07124

## (undated) DEVICE — WIRE GLIDE 0.035"X150CM VASC GR3506

## (undated) DEVICE — DEFIB PRO-PADZ LVP LQD GEL ADULT 8900-2105-01

## (undated) DEVICE — INTRO GLIDESHEATH SLENDER 6FR 10X45CM 60-1060

## (undated) DEVICE — CATH JACKY 5FR 3.5 CURVE 40-5023

## (undated) DEVICE — Device

## (undated) DEVICE — INTRO SHEATH 7FRX10CM PINNACLE RSS702

## (undated) DEVICE — KIT HAND CONTROL ANGIOTOUCH ACIST 65CM AT-P65

## (undated) DEVICE — SLEEVE TR BAND RADIAL COMPRESSION DEVICE 24CM TRB24-REG

## (undated) DEVICE — INTRODUCER CATH VASC 5FRX10CM  MPIS-501-NT-U-SST

## (undated) RX ORDER — POTASSIUM CHLORIDE 1500 MG/1
TABLET, EXTENDED RELEASE ORAL
Status: DISPENSED
Start: 2020-10-22

## (undated) RX ORDER — HEPARIN SODIUM 200 [USP'U]/100ML
INJECTION, SOLUTION INTRAVENOUS
Status: DISPENSED
Start: 2020-10-22

## (undated) RX ORDER — NITROGLYCERIN 5 MG/ML
VIAL (ML) INTRAVENOUS
Status: DISPENSED
Start: 2020-10-22

## (undated) RX ORDER — FENTANYL CITRATE 50 UG/ML
INJECTION, SOLUTION INTRAMUSCULAR; INTRAVENOUS
Status: DISPENSED
Start: 2020-10-22

## (undated) RX ORDER — VERAPAMIL HYDROCHLORIDE 2.5 MG/ML
INJECTION, SOLUTION INTRAVENOUS
Status: DISPENSED
Start: 2020-10-22

## (undated) RX ORDER — HEPARIN SODIUM 1000 [USP'U]/ML
INJECTION, SOLUTION INTRAVENOUS; SUBCUTANEOUS
Status: DISPENSED
Start: 2020-10-22

## (undated) RX ORDER — FENTANYL CITRATE 50 UG/ML
INJECTION, SOLUTION INTRAMUSCULAR; INTRAVENOUS
Status: DISPENSED
Start: 2022-06-25

## (undated) RX ORDER — LIDOCAINE HYDROCHLORIDE 10 MG/ML
INJECTION, SOLUTION EPIDURAL; INFILTRATION; INTRACAUDAL; PERINEURAL
Status: DISPENSED
Start: 2020-10-22

## (undated) RX ORDER — FENTANYL CITRATE 50 UG/ML
INJECTION, SOLUTION INTRAMUSCULAR; INTRAVENOUS
Status: DISPENSED
Start: 2022-06-26